# Patient Record
Sex: FEMALE | Race: BLACK OR AFRICAN AMERICAN | NOT HISPANIC OR LATINO | Employment: OTHER | URBAN - METROPOLITAN AREA
[De-identification: names, ages, dates, MRNs, and addresses within clinical notes are randomized per-mention and may not be internally consistent; named-entity substitution may affect disease eponyms.]

---

## 2017-02-27 ENCOUNTER — TELEPHONE (OUTPATIENT)
Dept: FAMILY MEDICINE | Facility: CLINIC | Age: 62
End: 2017-02-27

## 2017-02-27 DIAGNOSIS — E11.9 CONTROLLED TYPE 2 DIABETES MELLITUS WITHOUT COMPLICATION, WITHOUT LONG-TERM CURRENT USE OF INSULIN (H): Primary | ICD-10-CM

## 2017-02-27 NOTE — TELEPHONE ENCOUNTER
[employer] sending Fax for med change.  LANTUS SOLOSTAR 100 UNIT/ML soln not covered.  Patient inusrance prefers Basaglar, Levemapryl, Trebisa    Please send new Rx        [facility]

## 2017-03-26 DIAGNOSIS — E11.9 CONTROLLED TYPE 2 DIABETES MELLITUS WITHOUT COMPLICATION, WITHOUT LONG-TERM CURRENT USE OF INSULIN (H): ICD-10-CM

## 2017-03-28 RX ORDER — INSULIN DETEMIR 100 [IU]/ML
INJECTION, SOLUTION SUBCUTANEOUS
Qty: 15 ML | Refills: 0 | Status: SHIPPED | OUTPATIENT
Start: 2017-03-28 | End: 2017-12-15 | Stop reason: DRUGHIGH

## 2017-04-22 DIAGNOSIS — E11.3299 DIABETES MELLITUS WITH BACKGROUND RETINOPATHY (H): ICD-10-CM

## 2017-04-22 NOTE — TELEPHONE ENCOUNTER
insulin pen needle (B-D U/F) 31G X 5 MM         Last Written Prescription Date: 5/5/16  Last Fill Quantity: 100, # refills: 5  Last Office Visit with [facility], [facility] or [facility] prescribing provider:  10/17/16        BP Readings from Last 3 Encounters:   11/08/16 136/70   10/17/16 146/78   05/05/16 130/78     Lab Results   Component Value Date    MICROL 51 05/05/2016     Lab Results   Component Value Date    UMALCR 34.16 05/05/2016     Creatinine   Date Value Ref Range Status   10/17/2016 1.05 (H) 0.52 - 1.04 mg/dL Final   ]  GFR Estimate   Date Value Ref Range Status   10/17/2016 53 (L) >60 mL/min/1.7m2 Final     Comment:     Non  GFR Calc   05/05/2016 40 (L) >60 mL/min/1.7m2 Final     Comment:     Non  GFR Calc   10/20/2015 60 (L) >60 mL/min/1.7m2 Final     Comment:     Non  GFR Calc     GFR Estimate If Black   Date Value Ref Range Status   10/17/2016 64 >60 mL/min/1.7m2 Final     Comment:      GFR Calc   05/05/2016 49 (L) >60 mL/min/1.7m2 Final     Comment:      GFR Calc   10/20/2015 73 >60 mL/min/1.7m2 Final     Comment:      GFR Calc     Lab Results   Component Value Date    CHOL 139 05/05/2016     Lab Results   Component Value Date    HDL 42 05/05/2016     Lab Results   Component Value Date    LDL 73 05/05/2016     Lab Results   Component Value Date    TRIG 120 05/05/2016     Lab Results   Component Value Date    CHOLHDLRATIO 5.1 01/22/2015     Lab Results   Component Value Date    AST 13 05/05/2016     Lab Results   Component Value Date    ALT 15 05/05/2016     Lab Results   Component Value Date    A1C 7.3 10/17/2016    A1C 7.7 05/05/2016    A1C 9.7 10/20/2015    A1C 9.0 05/04/2015    A1C 14.5 01/22/2015     Potassium   Date Value Ref Range Status   10/17/2016 3.9 3.4 - 5.3 mmol/L Final

## 2017-04-25 RX ORDER — FLURBIPROFEN SODIUM 0.3 MG/ML
SOLUTION/ DROPS OPHTHALMIC
Qty: 100 EACH | Refills: 0 | Status: SHIPPED | OUTPATIENT
Start: 2017-04-25 | End: 2017-08-12

## 2017-04-25 NOTE — TELEPHONE ENCOUNTER
Medication is being filled for 1 time refill only due to:  Patient needs to be seen because due for office visit.   Carri Estrada RN

## 2017-05-03 DIAGNOSIS — E11.3299 DIABETES MELLITUS WITH BACKGROUND RETINOPATHY (H): ICD-10-CM

## 2017-05-03 DIAGNOSIS — E78.5 HYPERLIPIDEMIA LDL GOAL <100: ICD-10-CM

## 2017-05-03 RX ORDER — ATORVASTATIN CALCIUM 80 MG/1
TABLET, FILM COATED ORAL
Qty: 30 TABLET | Refills: 0 | Status: SHIPPED | OUTPATIENT
Start: 2017-05-03 | End: 2017-05-30

## 2017-05-03 NOTE — TELEPHONE ENCOUNTER
atorvastatin (LIPITOR) 80 MG tablet     Last Written Prescription Date: 10/17/16  Last Fill Quantity: 90, # refills: 1  Last Office Visit with [facility], [facility] or [facility] prescribing provider: 10/17/16       Lab Results   Component Value Date    CHOL 139 05/05/2016     Lab Results   Component Value Date    HDL 42 05/05/2016     Lab Results   Component Value Date    LDL 73 05/05/2016     Lab Results   Component Value Date    TRIG 120 05/05/2016     Lab Results   Component Value Date    CHOLHDLRATIO 5.1 01/22/2015         [facility]

## 2017-05-03 NOTE — TELEPHONE ENCOUNTER
Medication is being filled for 1 time refill only due to:  Patient needs to be seen because due for office visit and lab.   Carri Estrada RN

## 2017-05-23 ENCOUNTER — OFFICE VISIT (OUTPATIENT)
Dept: FAMILY MEDICINE | Facility: CLINIC | Age: 62
End: 2017-05-23
Payer: COMMERCIAL

## 2017-05-23 VITALS
HEIGHT: 61 IN | DIASTOLIC BLOOD PRESSURE: 80 MMHG | SYSTOLIC BLOOD PRESSURE: 132 MMHG | OXYGEN SATURATION: 97 % | BODY MASS INDEX: 37.57 KG/M2 | TEMPERATURE: 97.2 F | WEIGHT: 199 LBS | HEART RATE: 85 BPM

## 2017-05-23 DIAGNOSIS — E11.3299 DIABETES MELLITUS WITH BACKGROUND RETINOPATHY (H): ICD-10-CM

## 2017-05-23 DIAGNOSIS — E78.5 HYPERLIPIDEMIA LDL GOAL <100: ICD-10-CM

## 2017-05-23 DIAGNOSIS — I10 HYPERTENSION GOAL BP (BLOOD PRESSURE) < 140/90: ICD-10-CM

## 2017-05-23 DIAGNOSIS — E66.812 OBESITY, CLASS II, BMI 35-39.9: ICD-10-CM

## 2017-05-23 DIAGNOSIS — J30.9 ALLERGIC SINUSITIS: Primary | ICD-10-CM

## 2017-05-23 DIAGNOSIS — Z13.89 SCREENING FOR DIABETIC PERIPHERAL NEUROPATHY: ICD-10-CM

## 2017-05-23 LAB
ANION GAP SERPL CALCULATED.3IONS-SCNC: 8 MMOL/L (ref 3–14)
BUN SERPL-MCNC: 18 MG/DL (ref 7–30)
CALCIUM SERPL-MCNC: 8.7 MG/DL (ref 8.5–10.1)
CHLORIDE SERPL-SCNC: 106 MMOL/L (ref 94–109)
CO2 SERPL-SCNC: 27 MMOL/L (ref 20–32)
CREAT SERPL-MCNC: 1.17 MG/DL (ref 0.52–1.04)
CREAT UR-MCNC: 61 MG/DL
GFR SERPL CREATININE-BSD FRML MDRD: 47 ML/MIN/1.7M2
GLUCOSE SERPL-MCNC: 118 MG/DL (ref 70–99)
HBA1C MFR BLD: 9.2 % (ref 4.3–6)
LDLC SERPL DIRECT ASSAY-MCNC: 75 MG/DL
MICROALBUMIN UR-MCNC: 22 MG/L
MICROALBUMIN/CREAT UR: 36.47 MG/G CR (ref 0–25)
POTASSIUM SERPL-SCNC: 4.2 MMOL/L (ref 3.4–5.3)
SODIUM SERPL-SCNC: 141 MMOL/L (ref 133–144)
TSH SERPL DL<=0.005 MIU/L-ACNC: 2.28 MU/L (ref 0.4–4)

## 2017-05-23 PROCEDURE — 83721 ASSAY OF BLOOD LIPOPROTEIN: CPT | Performed by: PREVENTIVE MEDICINE

## 2017-05-23 PROCEDURE — 36415 COLL VENOUS BLD VENIPUNCTURE: CPT | Performed by: PREVENTIVE MEDICINE

## 2017-05-23 PROCEDURE — 80048 BASIC METABOLIC PNL TOTAL CA: CPT | Performed by: PREVENTIVE MEDICINE

## 2017-05-23 PROCEDURE — 84443 ASSAY THYROID STIM HORMONE: CPT | Performed by: PREVENTIVE MEDICINE

## 2017-05-23 PROCEDURE — 82043 UR ALBUMIN QUANTITATIVE: CPT | Performed by: PREVENTIVE MEDICINE

## 2017-05-23 PROCEDURE — 99214 OFFICE O/P EST MOD 30 MIN: CPT | Performed by: PREVENTIVE MEDICINE

## 2017-05-23 PROCEDURE — 83036 HEMOGLOBIN GLYCOSYLATED A1C: CPT | Performed by: PREVENTIVE MEDICINE

## 2017-05-23 PROCEDURE — 99207 C FOOT EXAM  NO CHARGE: CPT | Performed by: PREVENTIVE MEDICINE

## 2017-05-23 RX ORDER — FLUTICASONE PROPIONATE 50 MCG
1-2 SPRAY, SUSPENSION (ML) NASAL DAILY
Qty: 16 G | Refills: 0 | Status: SHIPPED | OUTPATIENT
Start: 2017-05-23 | End: 2019-02-25

## 2017-05-23 RX ORDER — CETIRIZINE HYDROCHLORIDE 10 MG/1
10 TABLET ORAL EVERY EVENING
Qty: 30 TABLET | Refills: 1 | Status: SHIPPED | OUTPATIENT
Start: 2017-05-23 | End: 2019-02-25

## 2017-05-23 ASSESSMENT — PAIN SCALES - GENERAL: PAINLEVEL: NO PAIN (0)

## 2017-05-23 NOTE — PATIENT INSTRUCTIONS
At [facility], we strive to deliver an exceptional experience to you, every time we see you.    If you receive a survey in the mail, please send us back your thoughts. We really do value your feedback.    Thank you for visiting [facility]    Normal or non-critical lab and imaging results will be communicated to you by MyChart, letter or phone within 7 days.  If you do not hear from us within 10 days, please call the clinic. If you have a critical or abnormal lab result, we will notify you by phone as soon as possible.     If you have any questions regarding your visit please contact:     Team [facility]  Clinic Hours Telephone Number   Dr. Veronica Flores   7am-7pm  Monday through Thursday  7am-5pm Friday [phone]  Teressa VERDIN RN   Pharmacy 8:30am-9pm Monday-Friday    9am-5pm Saturday-Sunday [phone]   Urgent Care 11am-9pm Monday-Friday        9am-5pm Saturday-Sunday [phone]     After hours, weekend or if you need to make an appointment with your primary provider please call [phone].   After Hours nurse advise: call [facility]: [phone]    Use [email or web address] (secure email communication and access to your chart) to send your primary care provider a message or make an appointment. Ask someone on your Team how to sign up for [employer]. To log on to [employer] or for more information in [employer] please visit the website at [email or web address].   As of October 8, 2013, all password changes, disabled accounts, or ID changes in [employer]/MyHealth will be done by our Access Services Department.   If you need help with your account or password, call: [phone]. Clinic staff no longer has the ability to change passwords.     Allergic Rhinitis  Allergic rhinitis is an allergic reaction that affects the nose, and often the eyes. It s often known  as nasal allergies. Nasal allergies are often due to things in the environment that are breathed in. Depending what you are sensitive to, nasal allergies may occur only during certain seasons. Or they may occur year round. Common indoor allergens include house dust mites, mold, cockroaches, and pet dander. Outdoor allergens include pollen from trees, grasses, and weeds.   Symptoms include a drippy, stuffy, and itchy nose. They also include sneezing and red and itchy eyes. You may feel tired more often. Severe allergies may also affect your breathing and trigger a condition called asthma.   Tests can be done to see what allergens are affecting you. You may be referred to an allergy specialist for testing and further evaluation.  Home care  The healthcare provider may prescribe medicines to help relieve allergy symptoms.   Ask the provider for advice on how to avoid substances that you are allergic to. Below are a few tips for each type of allergen.  Pet dander:    Do not have pets with fur and feathers.    If you cannot avoid having a pet, keep it out of your bedroom and off upholstered furniture.  Pollen:    When pollen counts are high, keep windows of your car and home closed. If possible, use an air conditioner instead.    Wear a filter mask when mowing or doing yard work.  House dust mites:    Wash bedding every week in warm water and detergent and dry on a hot setting.    Cover the mattress, box spring, and pillows with allergy covers.     If possible, sleep in a room with no carpet, curtains, or upholstered furniture.  Cockroaches:    Store food in sealed containers.    Remove garbage from the home promptly.    Fix water leaks  Mold:    Keep humidity low by using a dehumidifier or air conditioner. Keep the dehumidifier and air conditioner clean and free of mold.    Clean moldy areas with bleach and water.  In general:    Vacuum once or twice a week. If possible, use a vacuum with a high-efficiency particulate  air (HEPA) filter.    Do not smoke. Avoid cigarette smoke. Cigarette smoke is an irritant that can make symptoms worse.  Follow-up care  Follow up as advised by the health care provider or our staff. If you were referred to an allergy specialist, make this appointment promptly.  When to seek medical advice  Call your healthcare provider right away if the following occur:    Coughing or wheezing    Fever greater than 100.4 F (38 C)    Continuing symptoms, new symptoms, or worsening symptoms  Call 911 right away if you have:    Trouble breathing    Hives (raised red bumps)    Severe swelling of the face or severe itching of the eyes or mouth    [id number] [employer]. [address] [place] [address]. All rights reserved. This information is not intended as a substitute for professional medical care. Always follow your healthcare professional's instructions.

## 2017-05-23 NOTE — PROGRESS NOTES
SUBJECTIVE:                                                    Adrian Cruz is a 61 year old female who presents to clinic today for the following health issues:      RESPIRATORY SYMPTOMS      Duration: x 1.5 weeks    Description  nasal congestion, rhinorrhea, facial pain/pressure, cough, ear pain right and fatigue/malaise    Severity: moderate    Accompanying signs and symptoms: None    History (predisposing factors):  none    Precipitating or alleviating factors: None    Therapies tried and outcome:  OTC NSAID         Diabetes Follow-up    Patient is checking blood sugars: once daily.  Results are as follows:         am - 136 mg /dl    Diabetic concerns: None     Symptoms of hypoglycemia (low blood sugar): none     Paresthesias (numbness or burning in feet) or sores: No     Date of last diabetic eye exam: DUE, seen by [facility]     Hyperlipidemia Follow-Up      Rate your low fat/cholesterol diet?: fair    Taking statin?  Yes, no muscle aches from statin    Other lipid medications/supplements?:  none     Hypertension Follow-up      Outpatient blood pressures are being checked at home.  Results are less than 140/90.    Low Salt Diet: low salt         Problem list and histories reviewed & adjusted, as indicated.  Additional history: as documented    Patient Active Problem List   Diagnosis     Hyperlipidemia LDL goal <100     Type 2 diabetes, HbA1C goal < 8% (H)     Hypertension goal BP (blood pressure) < 140/90     Cataract     Diabetes mellitus with background retinopathy (H)     Advanced directives, counseling/discussion     Health Care Home     Morbid obesity (H)     Obesity, Class II, BMI 35-39.9 (H)     Past Surgical History:   Procedure Laterality Date     LASER SURGERY OF EYE Right 2015       Social History   Substance Use Topics     Smoking status: Never Smoker     Smokeless tobacco: Never Used     Alcohol use No     Family History   Problem Relation Age of Onset     DIABETES Mother       HEART DISEASE Mother      chf and pacemaker     DIABETES Father      Asthma Sister      Asthma Son      Asthma Daughter      HEART DISEASE Daughter      C.A.D. Father      age 49     CEREBROVASCULAR DISEASE Father      Genitourinary Problems Mother      ESRD     Glaucoma No family hx of      Macular Degeneration No family hx of          Current Outpatient Prescriptions   Medication Sig Dispense Refill     fluticasone (FLONASE) 50 MCG/ACT spray Spray 1-2 sprays into both nostrils daily 16 g 0     cetirizine (ZYRTEC) 10 MG tablet Take 1 tablet (10 mg) by mouth every evening 30 tablet 1     atorvastatin (LIPITOR) 80 MG tablet TAKE 1 TABLET BY MOUTH DAILY 30 tablet 0     B-D U/F insulin pen needle USE TO TEST AS DIRECTED 100 each 0     LEVEMIR FLEXTOUCH 100 UNIT/ML injection INJECT 14 UNITS SUBCUTANEOUS AT BEDTIME 15 mL 0     amLODIPine (NORVASC) 10 MG tablet Take 1 tablet (10 mg) by mouth daily 90 tablet 1     metFORMIN (GLUCOPHAGE-XR) 500 MG 24 hr tablet TAKE 4 CAPSULES BY MOUTH DAILY WITH BREAKFAST 360 tablet 1     glimepiride (AMARYL) 4 MG tablet TAKE 2 TABLETS BY MOUTH EVERY MORNING BEFORE BREAKFAST 180 tablet 1     sitagliptin (JANUVIA) 100 MG tablet Take 1 tablet (100 mg) by mouth daily 90 tablet 1     losartan-hydrochlorothiazide (HYZAAR) 100-25 MG per tablet Take 1 tablet by mouth daily 90 tablet 1     omeprazole 20 MG tablet Take 1 tablet (20 mg) by mouth daily 90 tablet 1     ONE TOUCH ULTRA test strip TEST TWICE DAILY 50 each 0     ORDER FOR DME Glucometer of choice (One touch) with supplies Disp 1    Testing Strips for bid testing  #100    Lancets for bid testing #100 1 Month 11     ORDER FOR DME Equipment being ordered: blood pressure cuff/machine 1 Device 0     aspirin 81 MG tablet Take by mouth daily       LANCETS MISC.        Allergies   Allergen Reactions     Ace Inhibitors Cough     Glucophage [Biguanides] Cramps     Stomach upset     Lisinopril      Recent Labs   Lab Test  10/17/16   1244  05/05/16    "0730  10/20/15   0754   01/22/15   0754  04/25/13   0734   02/16/12   0743   A1C  7.3*  7.7*  9.7*   < >  14.5*  12.9*   < >   --    LDL   --   73   --    --   137*  160*   < >   --    HDL   --   42*   --    --   46*  36*   < >   --    TRIG   --   120   --    --   266*  161*   < >   --    ALT   --   15   --    --   16   --    --   17   CR  1.05*  1.34*  0.94   --   0.85  0.68   --   0.81   GFRESTIMATED  53*  40*  60*   --   68  89   --   73   GFRESTBLACK  64  49*  73   --   83  >90   --   88   POTASSIUM  3.9  4.3  4.1   --   4.5  5.0   < >  4.7   TSH   --    --    --    --   1.82  1.86   --    --     < > = values in this interval not displayed.      BP Readings from Last 3 Encounters:   05/23/17 132/80   11/08/16 136/70   10/17/16 146/78    Wt Readings from Last 3 Encounters:   05/23/17 199 lb (90.3 kg)   10/17/16 201 lb (91.2 kg)   05/05/16 196 lb (88.9 kg)                    Reviewed and updated as needed this visit by clinical staff  Tobacco  Allergies  Meds       Reviewed and updated as needed this visit by Provider         ROS:  Constitutional, HEENT, cardiovascular, pulmonary, gi and gu systems are negative, except as otherwise noted.    OBJECTIVE:                                                    /80  Pulse 85  Temp 97.2  F (36.2  C) (Oral)  Ht 5' 0.5\" (1.537 m)  Wt 199 lb (90.3 kg)  SpO2 97%  Breastfeeding? No  BMI 38.22 kg/m2  Body mass index is 38.22 kg/(m^2).  GENERAL APPEARANCE: healthy, alert and no distress  EYES: Eyes grossly normal to inspection and conjunctivae and sclerae normal  HENT: ear canals and TM's normal and nose and mouth without ulcers or lesions  NECK: no adenopathy and no asymmetry, masses, or scars  RESP: lungs clear to auscultation - no rales, rhonchi or wheezes  CV: regular rates and rhythm, normal S1 S2, no S3 or S4 and no murmur, click or rub  ABDOMEN: soft, non-tender  MS: extremities normal- no gross deformities noted and peripheral pulses normal  SKIN: no " suspicious lesions or rashes  NEURO: Normal strength and tone, mentation intact and speech normal  DIABETIC FOOT EXAM: normal DP and PT pulses, no trophic changes or ulcerative lesions, normal sensory exam and normal monofilament exam  PSYCH: mentation appears normal    Diagnostic test results:  Diagnostic Test Results:  No results found for this or any previous visit (from the past 24 hour(s)).     ASSESSMENT/PLAN:                                                    1. Allergic sinusitis  -Defer antibiotics at this time   - fluticasone (FLONASE) 50 MCG/ACT spray; Spray 1-2 sprays into both nostrils daily  Dispense: 16 g; Refill: 0  - cetirizine (ZYRTEC) 10 MG tablet; Take 1 tablet (10 mg) by mouth every evening  Dispense: 30 tablet; Refill: 1    2. Diabetes mellitus with background retinopathy (H)  -Patient is interested in cutting down tablets that she is taking. If HbA1C is at goal, consider increasing insulin and tapering off Januvia (cost highest). She is also concerned about weight gain. From insulin   - BASIC METABOLIC PANEL  - Albumin Random Urine Quantitative  - TSH WITH FREE T4 REFLEX  - LDL cholesterol direct  - Hemoglobin A1c    3. Obesity, Class II, BMI 35-39.9 (H)  -Weight stable    4. Screening for diabetic peripheral neuropathy  - FOOT EXAM  NO CHARGE [28427.114]    5. Hypertension goal BP (blood pressure) < 140/90  -At goal  -continue current medication     6. Hyperlipidemia LDL goal <100  -Last LDL at goal at 73  -Await labs  -Continue statin     I ended our visit today by discussing the patient's diagnoses and recommended treatment. Please refer to today's diagnoses and orders for further details. I briefly discussed the pathophysiology of these conditions and outlined their expected course. I discussed the warning symptoms and signs that indicate an atypical course that would need urgent or emergent care. I also discussed self care strategies for symptom relief.  Patient voiced complete  understanding of plan of care and was in full agreement to proceed. After visit summary discussed and handed to patient.    Common side effects of medications prescribed at this visit were discussed with the patient. Severe side effects, including current applicable black box warnings, were discussed.     Follow up with Provider - Will contact with labs when available, if normal then diabetes follow up in 4-5 months       Radha Madrigal MD MPH    [facility]

## 2017-05-23 NOTE — MR AVS SNAPSHOT
After Visit Summary   5/23/2017    Adrian Cruz    MRN: [id number]           Patient Information     Date Of Birth          1955        Visit Information        Provider Department      5/23/2017 7:00 AM Radha Madrigal MD [facility]        Today's Diagnoses     Screening for diabetic peripheral neuropathy    -  1    Hypertension goal BP (blood pressure) < 140/90        Hyperlipidemia LDL goal <100        Obesity, Class II, BMI 35-39.9 (H)        Diabetes mellitus with background retinopathy (H)        Allergic sinusitis          Care Instructions    At [facility], we strive to deliver an exceptional experience to you, every time we see you.    If you receive a survey in the mail, please send us back your thoughts. We really do value your feedback.    Thank you for visiting [facility]    Normal or non-critical lab and imaging results will be communicated to you by MyChart, letter or phone within 7 days.  If you do not hear from us within 10 days, please call the clinic. If you have a critical or abnormal lab result, we will notify you by phone as soon as possible.     If you have any questions regarding your visit please contact:     Team Christine/Spirit  Clinic Hours Telephone Number   Dr. Veronica Flores   7am-7pm  Monday through Thursday  7am-5pm Friday [phone]  Teressa VERDIN RN   Pharmacy 8:30am-9pm Monday-Friday    9am-5pm Saturday-Sunday [phone]   Urgent Care 11am-9pm Monday-Friday        9am-5pm Saturday-Sunday [phone]     After hours, weekend or if you need to make an appointment with your primary provider please call [phone].   After Hours nurse advise: call [facility]: [phone]    Use [employer] (secure email communication and access to your chart) to send your primary care provider a  message or make an appointment. Ask someone on your Team how to sign up for [employer]. To log on to [employer] or for more information in [employer] please visit the website at [email or web address].   As of October 8, 2013, all password changes, disabled accounts, or ID changes in [employer]/MyHealth will be done by our Access Services Department.   If you need help with your account or password, call: [phone]. Clinic staff no longer has the ability to change passwords.     Allergic Rhinitis  Allergic rhinitis is an allergic reaction that affects the nose, and often the eyes. It s often known as nasal allergies. Nasal allergies are often due to things in the environment that are breathed in. Depending what you are sensitive to, nasal allergies may occur only during certain seasons. Or they may occur year round. Common indoor allergens include house dust mites, mold, cockroaches, and pet dander. Outdoor allergens include pollen from trees, grasses, and weeds.   Symptoms include a drippy, stuffy, and itchy nose. They also include sneezing and red and itchy eyes. You may feel tired more often. Severe allergies may also affect your breathing and trigger a condition called asthma.   Tests can be done to see what allergens are affecting you. You may be referred to an allergy specialist for testing and further evaluation.  Home care  The healthcare provider may prescribe medicines to help relieve allergy symptoms.   Ask the provider for advice on how to avoid substances that you are allergic to. Below are a few tips for each type of allergen.  Pet dander:    Do not have pets with fur and feathers.    If you cannot avoid having a pet, keep it out of your bedroom and off upholstered furniture.  Pollen:    When pollen counts are high, keep windows of your car and home closed. If possible, use an air conditioner instead.    Wear a filter mask when mowing or doing yard work.  House dust mites:    Wash bedding every week in warm  water and detergent and dry on a hot setting.    Cover the mattress, box spring, and pillows with allergy covers.     If possible, sleep in a room with no carpet, curtains, or upholstered furniture.  Cockroaches:    Store food in sealed containers.    Remove garbage from the home promptly.    Fix water leaks  Mold:    Keep humidity low by using a dehumidifier or air conditioner. Keep the dehumidifier and air conditioner clean and free of mold.    Clean moldy areas with bleach and water.  In general:    Vacuum once or twice a week. If possible, use a vacuum with a high-efficiency particulate air (HEPA) filter.    Do not smoke. Avoid cigarette smoke. Cigarette smoke is an irritant that can make symptoms worse.  Follow-up care  Follow up as advised by the health care provider or our staff. If you were referred to an allergy specialist, make this appointment promptly.  When to seek medical advice  Call your healthcare provider right away if the following occur:    Coughing or wheezing    Fever greater than 100.4 F (38 C)    Continuing symptoms, new symptoms, or worsening symptoms  Call 911 right away if you have:    Trouble breathing    Hives (raised red bumps)    Severe swelling of the face or severe itching of the eyes or mouth    0488-9399 [employer]. [address] [place] [address], [place] [address]. All rights reserved. This information is not intended as a substitute for professional medical care. Always follow your healthcare professional's instructions.              Follow-ups after your visit        Who to contact     If you have questions or need follow up information about today's clinic visit or your schedule please contact [facility] directly at [phone].  Normal or non-critical lab and imaging results will be communicated to you by MyChart, letter or phone within 4 business days after the clinic has received the results. If you do not hear from us within 7 days, please  "contact the clinic through [employer] or phone. If you have a critical or abnormal lab result, we will notify you by phone as soon as possible.  Submit refill requests through [employer] or call your pharmacy and they will forward the refill request to us. Please allow 3 business days for your refill to be completed.          Additional Information About Your Visit        [employer] Information     [employer] lets you send messages to your doctor, view your test results, renew your prescriptions, schedule appointments and more. To sign up, go to [email or web address] . Click on \"Log in\" on the left side of the screen, which will take you to the Welcome page. Then click on \"Sign up Now\" on the right side of the page.     You will be asked to enter the access code listed below, as well as some personal information. Please follow the directions to create your username and password.     Your access code is: [email or web address]  Expires: 2017  7:37 AM     Your access code will  in 90 days. If you need help or a new code, please call your [place] clinic or [phone].        Care EveryWhere ID     This is your Care EveryWhere ID. This could be used by other organizations to access your [place] medical records  [id number]        Your Vitals Were     Pulse Temperature Height Pulse Oximetry Breastfeeding? BMI (Body Mass Index)    85 97.2  F (36.2  C) (Oral) 5' 0.5\" (1.537 m) 97% No 38.22 kg/m2       Blood Pressure from Last 3 Encounters:   17 132/80   16 136/70   10/17/16 146/78    Weight from Last 3 Encounters:   17 199 lb (90.3 kg)   10/17/16 201 lb (91.2 kg)   16 196 lb (88.9 kg)              We Performed the Following     Albumin Random Urine Quantitative     BASIC METABOLIC PANEL     FOOT EXAM  NO CHARGE [31840.114]     Hemoglobin A1c     LDL cholesterol direct     TSH WITH FREE T4 REFLEX          Today's Medication Changes          These changes are accurate as of: 17  7:37 AM.  If you have " any questions, ask your nurse or doctor.               Start taking these medicines.        Dose/Directions    cetirizine 10 MG tablet   Commonly known as:  zyrTEC   Used for:  Allergic sinusitis   Started by:  Radha Madrigal MD        Dose:  10 mg   Take 1 tablet (10 mg) by mouth every evening   Quantity:  30 tablet   Refills:  1       fluticasone 50 MCG/ACT spray   Commonly known as:  FLONASE   Used for:  Allergic sinusitis   Started by:  Radha Madrigal MD        Dose:  1-2 spray   Spray 1-2 sprays into both nostrils daily   Quantity:  16 g   Refills:  0            Where to get your medicines      These medications were sent to [id number] - [place], [place] [address] AT [address], [place] [address]     Phone:  [phone]     cetirizine 10 MG tablet    fluticasone 50 MCG/ACT spray                Primary Care Provider Office Phone # Fax #    Milagro Morales PA-C [phone]       [facility] [address] [facility] [address]        Thank you!     Thank you for choosing [facility]  for your care. Our goal is always to provide you with excellent care. Hearing back from our patients is one way we can continue to improve our services. Please take a few minutes to complete the written survey that you may receive in the mail after your visit with us. Thank you!             Your Updated Medication List - Protect others around you: Learn how to safely use, store and throw away your medicines at www.disposemymeds.org.          This list is accurate as of: 5/23/17  7:37 AM.  Always use your most recent med list.                   Brand Name Dispense Instructions for use    amLODIPine 10 MG tablet    NORVASC    90 tablet    Take 1 tablet (10 mg) by mouth daily       aspirin 81 MG tablet      Take by mouth daily       atorvastatin 80 MG tablet    LIPITOR    30 tablet    TAKE 1 TABLET BY MOUTH DAILY        B-D U/F 31G X 5 MM   Generic drug:  insulin pen needle     100 each    USE TO TEST AS DIRECTED       cetirizine 10 MG tablet    zyrTEC    30 tablet    Take 1 tablet (10 mg) by mouth every evening       fluticasone 50 MCG/ACT spray    FLONASE    16 g    Spray 1-2 sprays into both nostrils daily       glimepiride 4 MG tablet    AMARYL    180 tablet    TAKE 2 TABLETS BY MOUTH EVERY MORNING BEFORE BREAKFAST       LANCETS MISC.          LEVEMIR FLEXTOUCH 100 UNIT/ML injection   Generic drug:  insulin detemir     15 mL    INJECT 14 UNITS SUBCUTANEOUS AT BEDTIME       losartan-hydrochlorothiazide 100-25 MG per tablet    HYZAAR    90 tablet    Take 1 tablet by mouth daily       metFORMIN 500 MG 24 hr tablet    GLUCOPHAGE-XR    360 tablet    TAKE 4 CAPSULES BY MOUTH DAILY WITH BREAKFAST       omeprazole 20 MG tablet     90 tablet    Take 1 tablet (20 mg) by mouth daily       ONE TOUCH ULTRA test strip   Generic drug:  blood glucose monitoring     50 each    TEST TWICE DAILY       * order for DME     1 Device    Equipment being ordered: blood pressure cuff/machine       * order for DME     1 Month    Glucometer of choice (One touch) with supplies Disp 1  Testing Strips for bid testing  #100  Lancets for bid testing #100       sitagliptin 100 MG tablet    JANUVIA    90 tablet    Take 1 tablet (100 mg) by mouth daily       * Notice:  This list has 2 medication(s) that are the same as other medications prescribed for you. Read the directions carefully, and ask your doctor or other care provider to review them with you.

## 2017-05-23 NOTE — NURSING NOTE
"Chief Complaint   Patient presents with     Sinus Problem       Initial /76  Pulse 85  Temp 97.2  F (36.2  C) (Oral)  Ht 5' 0.5\" (1.537 m)  Wt 199 lb (90.3 kg)  SpO2 97%  Breastfeeding? No  BMI 38.22 kg/m2 Estimated body mass index is 38.22 kg/(m^2) as calculated from the following:    Height as of this encounter: 5' 0.5\" (1.537 m).    Weight as of this encounter: 199 lb (90.3 kg).  Medication Reconciliation: complete   Luz BLANTON        "

## 2017-05-25 ENCOUNTER — TELEPHONE (OUTPATIENT)
Dept: FAMILY MEDICINE | Facility: CLINIC | Age: 62
End: 2017-05-25

## 2017-05-25 NOTE — PROGRESS NOTES
Please CALL patient:    Three month glucose value has increased from 7.3 to 9.2, goal is less than 8. Please increase Levemir Insulin from 14 units daily to 18 units daily. Increase dose by 2 units every 3 days if fasting glucose is over 120 mg/dl. Also, monitor glucose 2 hours after you have eaten. We would need to do a follow up on the diabetes in 3 months and check labs at that time. Please let me know if any questions.     LDL cholesterol is at goal.  Electrolytes and thyroid function are normal.  Kidney function is borderline, avoid Advil, Aleve and Ibuprofen over the counter  Urine sample showed a small amount of abnormal protein, this should improve as your glucose improves.     Thank you,    Radha Madrigal MD MPH

## 2017-05-25 NOTE — TELEPHONE ENCOUNTER
Please CALL patient:     Three month glucose value has increased from 7.3 to 9.2, goal is less than 8. Please increase Levemir Insulin from 14 units daily to 18 units daily. Increase dose by 2 units every 3 days if fasting glucose is over 120 mg/dl. Also, monitor glucose 2 hours after you have eaten. We would need to do a follow up on the diabetes in 3 months and check labs at that time. Please let me know if any questions.     LDL cholesterol is at goal.   Electrolytes and thyroid function are normal.   Kidney function is borderline, avoid Advil, Aleve and Ibuprofen over the counter   Urine sample showed a small amount of abnormal protein, this should improve as your glucose improves.     Thank you,     Radha Madrigal MD MPH     This [facility]    Reason for call discuss results and left message to return call.    When patient calls back, please contact clinic RN team. If no one available, document that pt called and route to care team. routine priority.        Teressa Camarena RN

## 2017-05-25 NOTE — TELEPHONE ENCOUNTER
Patient called back and was given the information below. Had patient repeat back the insulin dosing and increase. Corrected errors if stated.    Sabrina Ruiz RN, [facility]

## 2017-05-30 DIAGNOSIS — E11.3299 DIABETES MELLITUS WITH BACKGROUND RETINOPATHY (H): ICD-10-CM

## 2017-05-30 DIAGNOSIS — E78.5 HYPERLIPIDEMIA LDL GOAL <100: ICD-10-CM

## 2017-05-30 NOTE — TELEPHONE ENCOUNTER
atorvastatin (LIPITOR) 80 MG tablet     Last Written Prescription Date: 5/3/17  Last Fill Quantity: 30, # refills: 0  Last Office Visit with [facility], P or [facility] prescribing provider: 5/23/17       Lab Results   Component Value Date    CHOL 139 05/05/2016     Lab Results   Component Value Date    HDL 42 05/05/2016     Lab Results   Component Value Date    LDL 75 05/23/2017    LDL 73 05/05/2016     Lab Results   Component Value Date    TRIG 120 05/05/2016     Lab Results   Component Value Date    CHOLHDLRATIO 5.1 01/22/2015           Nelson Faarax  [facility]

## 2017-05-30 NOTE — TELEPHONE ENCOUNTER
glimepiride (AMARYL) 4 MG tablet         Last Written Prescription Date: 10/17/16  Last Fill Quantity: 180, # refills: 1  Last Office Visit with G, P or [facility] prescribing provider:  05/23/17        BP Readings from Last 3 Encounters:   05/23/17 132/80   11/08/16 136/70   10/17/16 146/78     Lab Results   Component Value Date    MICROL 22 05/23/2017     Lab Results   Component Value Date    UMALCR 36.47 05/23/2017     Creatinine   Date Value Ref Range Status   05/23/2017 1.17 (H) 0.52 - 1.04 mg/dL Final   ]  GFR Estimate   Date Value Ref Range Status   05/23/2017 47 (L) >60 mL/min/1.7m2 Final     Comment:     Non  GFR Calc   10/17/2016 53 (L) >60 mL/min/1.7m2 Final     Comment:     Non  GFR Calc   05/05/2016 40 (L) >60 mL/min/1.7m2 Final     Comment:     Non  GFR Calc     GFR Estimate If Black   Date Value Ref Range Status   05/23/2017 57 (L) >60 mL/min/1.7m2 Final     Comment:      GFR Calc   10/17/2016 64 >60 mL/min/1.7m2 Final     Comment:      GFR Calc   05/05/2016 49 (L) >60 mL/min/1.7m2 Final     Comment:      GFR Calc     Lab Results   Component Value Date    CHOL 139 05/05/2016     Lab Results   Component Value Date    HDL 42 05/05/2016     Lab Results   Component Value Date    LDL 75 05/23/2017    LDL 73 05/05/2016     Lab Results   Component Value Date    TRIG 120 05/05/2016     Lab Results   Component Value Date    CHOLHDLRATIO 5.1 01/22/2015     Lab Results   Component Value Date    AST 13 05/05/2016     Lab Results   Component Value Date    ALT 15 05/05/2016     Lab Results   Component Value Date    A1C 9.2 05/23/2017    A1C 7.3 10/17/2016    A1C 7.7 05/05/2016    A1C 9.7 10/20/2015    A1C 9.0 05/04/2015     Potassium   Date Value Ref Range Status   05/23/2017 4.2 3.4 - 5.3 mmol/L Final         [facility]

## 2017-06-01 NOTE — TELEPHONE ENCOUNTER
Routing refill request to provider for review/approval because:  Labs out of range:  Creatinine and GFR  Carri Estrada RN

## 2017-06-03 RX ORDER — ATORVASTATIN CALCIUM 80 MG/1
TABLET, FILM COATED ORAL
Qty: 90 TABLET | Refills: 3 | Status: SHIPPED | OUTPATIENT
Start: 2017-06-03 | End: 2018-04-27

## 2017-06-03 NOTE — TELEPHONE ENCOUNTER
Prescription approved per [facility] Refill Protocol.  LUI Camarena, Clinical RN [facility].

## 2017-06-06 RX ORDER — GLIMEPIRIDE 4 MG/1
TABLET ORAL
Qty: 180 TABLET | Refills: 0 | Status: SHIPPED | OUTPATIENT
Start: 2017-06-06 | End: 2017-09-08

## 2017-06-19 ENCOUNTER — TELEPHONE (OUTPATIENT)
Dept: FAMILY MEDICINE | Facility: CLINIC | Age: 62
End: 2017-06-19

## 2017-06-19 DIAGNOSIS — E11.3299 DIABETES MELLITUS WITH BACKGROUND RETINOPATHY (H): ICD-10-CM

## 2017-06-19 DIAGNOSIS — J30.9 ALLERGIC SINUSITIS: ICD-10-CM

## 2017-06-19 DIAGNOSIS — I10 ESSENTIAL HYPERTENSION WITH GOAL BLOOD PRESSURE LESS THAN 140/90: ICD-10-CM

## 2017-06-19 NOTE — TELEPHONE ENCOUNTER
sitagliptin (JANUVIA) 100 MG tablet         Last Written Prescription Date: 10/17/16  Last Fill Quantity: 90, # refills: 1  Last Office Visit with G, P or [facility] prescribing provider:  05/23/17        BP Readings from Last 3 Encounters:   05/23/17 132/80   11/08/16 136/70   10/17/16 146/78     Lab Results   Component Value Date    MICROL 22 05/23/2017     Lab Results   Component Value Date    UMALCR 36.47 05/23/2017     Creatinine   Date Value Ref Range Status   05/23/2017 1.17 (H) 0.52 - 1.04 mg/dL Final   ]  GFR Estimate   Date Value Ref Range Status   05/23/2017 47 (L) >60 mL/min/1.7m2 Final     Comment:     Non  GFR Calc   10/17/2016 53 (L) >60 mL/min/1.7m2 Final     Comment:     Non  GFR Calc   05/05/2016 40 (L) >60 mL/min/1.7m2 Final     Comment:     Non  GFR Calc     GFR Estimate If Black   Date Value Ref Range Status   05/23/2017 57 (L) >60 mL/min/1.7m2 Final     Comment:      GFR Calc   10/17/2016 64 >60 mL/min/1.7m2 Final     Comment:      GFR Calc   05/05/2016 49 (L) >60 mL/min/1.7m2 Final     Comment:      GFR Calc     Lab Results   Component Value Date    CHOL 139 05/05/2016     Lab Results   Component Value Date    HDL 42 05/05/2016     Lab Results   Component Value Date    LDL 75 05/23/2017    LDL 73 05/05/2016     Lab Results   Component Value Date    TRIG 120 05/05/2016     Lab Results   Component Value Date    CHOLHDLRATIO 5.1 01/22/2015     Lab Results   Component Value Date    AST 13 05/05/2016     Lab Results   Component Value Date    ALT 15 05/05/2016     Lab Results   Component Value Date    A1C 9.2 05/23/2017    A1C 7.3 10/17/2016    A1C 7.7 05/05/2016    A1C 9.7 10/20/2015    A1C 9.0 05/04/2015     Potassium   Date Value Ref Range Status   05/23/2017 4.2 3.4 - 5.3 mmol/L Final         [facility]

## 2017-06-19 NOTE — TELEPHONE ENCOUNTER
fluticasone (FLONASE) 50 MCG/ACT spray      Last Written Prescription Date: 05/23/17  Last Fill Quantity: 16g,  # refills: 0   Last Office Visit with [facility], [facility] or [facility] prescribing provider: 05/23/17      [facility]

## 2017-06-19 NOTE — TELEPHONE ENCOUNTER
losartan-hydrochlorothiazide (HYZAAR) 100-25 MG per tablet      Last Written Prescription Date: 10/17/16  Last Fill Quantity: 90, # refills: 1  Last Office Visit with [facility], [facility] or [facility] prescribing provider: 05/23/17       Potassium   Date Value Ref Range Status   05/23/2017 4.2 3.4 - 5.3 mmol/L Final     Creatinine   Date Value Ref Range Status   05/23/2017 1.17 (H) 0.52 - 1.04 mg/dL Final     BP Readings from Last 3 Encounters:   05/23/17 132/80   11/08/16 136/70   10/17/16 146/78         Elidia Tobin  [facility]

## 2017-06-20 RX ORDER — LOSARTAN POTASSIUM AND HYDROCHLOROTHIAZIDE 25; 100 MG/1; MG/1
TABLET ORAL
Qty: 90 TABLET | Refills: 0 | Status: SHIPPED | OUTPATIENT
Start: 2017-06-20 | End: 2017-09-15

## 2017-06-20 RX ORDER — FLUTICASONE PROPIONATE 50 MCG
SPRAY, SUSPENSION (ML) NASAL
Qty: 16 ML | Refills: 0 | OUTPATIENT
Start: 2017-06-20

## 2017-06-20 NOTE — TELEPHONE ENCOUNTER
Patient called back stating that she did not request this medication. States she does not need the medication. Medication refused back.    Sabrina Ruiz RN, [facility]

## 2017-06-20 NOTE — TELEPHONE ENCOUNTER
[facility]    Reason for call triage symptoms for meication and left message to return call.    When patient calls back, please contact clinic RN team. If no one available, document that pt called and route to care team. routine priority.      Sabrina Ruiz, RN

## 2017-06-20 NOTE — TELEPHONE ENCOUNTER
Routing refill request to provider for review/approval because:  Labs out of range:  Creatinine  A break in medication      Carri Estrada RN

## 2017-06-21 RX ORDER — SITAGLIPTIN 100 MG/1
TABLET, FILM COATED ORAL
Qty: 90 TABLET | Refills: 0 | Status: SHIPPED | OUTPATIENT
Start: 2017-06-21 | End: 2018-04-27

## 2017-06-27 DIAGNOSIS — E11.3299 DIABETES MELLITUS WITH BACKGROUND RETINOPATHY (H): ICD-10-CM

## 2017-06-27 DIAGNOSIS — I10 ESSENTIAL HYPERTENSION WITH GOAL BLOOD PRESSURE LESS THAN 140/90: ICD-10-CM

## 2017-06-27 NOTE — TELEPHONE ENCOUNTER
amLODIPine (NORVASC) 10 MG tablet      Last Written Prescription Date: 10/17/16  Last Fill Quantity: 90, # refills: 1    Last Office Visit with [facility], [facility] or [facility] prescribing provider:  5/23/17   Future Office Visit:        BP Readings from Last 3 Encounters:   05/23/17 132/80   11/08/16 136/70   10/17/16 146/78           Nelson Faarax  [facility]

## 2017-06-28 RX ORDER — AMLODIPINE BESYLATE 10 MG/1
TABLET ORAL
Qty: 90 TABLET | Refills: 0 | Status: SHIPPED | OUTPATIENT
Start: 2017-06-28 | End: 2017-09-26

## 2017-06-28 NOTE — TELEPHONE ENCOUNTER
Routing refill request to provider for review/approval because:  A break in medication  Carri Estrada RN

## 2017-07-18 DIAGNOSIS — E11.9 CONTROLLED TYPE 2 DIABETES MELLITUS WITHOUT COMPLICATION, WITHOUT LONG-TERM CURRENT USE OF INSULIN (H): ICD-10-CM

## 2017-07-18 NOTE — TELEPHONE ENCOUNTER
LEVEMIR FLEXTOUCH 100 UNIT/ML injection         Last Written Prescription Date: 3/28/17  Last Fill Quantity: 15 ml, # refills: 0  Last Office Visit with G, P or [facility] prescribing provider:  5/23/17        BP Readings from Last 3 Encounters:   05/23/17 132/80   11/08/16 136/70   10/17/16 146/78     Lab Results   Component Value Date    MICROL 22 05/23/2017     Lab Results   Component Value Date    UMALCR 36.47 05/23/2017     Creatinine   Date Value Ref Range Status   05/23/2017 1.17 (H) 0.52 - 1.04 mg/dL Final   ]  GFR Estimate   Date Value Ref Range Status   05/23/2017 47 (L) >60 mL/min/1.7m2 Final     Comment:     Non  GFR Calc   10/17/2016 53 (L) >60 mL/min/1.7m2 Final     Comment:     Non  GFR Calc   05/05/2016 40 (L) >60 mL/min/1.7m2 Final     Comment:     Non  GFR Calc     GFR Estimate If Black   Date Value Ref Range Status   05/23/2017 57 (L) >60 mL/min/1.7m2 Final     Comment:      GFR Calc   10/17/2016 64 >60 mL/min/1.7m2 Final     Comment:      GFR Calc   05/05/2016 49 (L) >60 mL/min/1.7m2 Final     Comment:      GFR Calc     Lab Results   Component Value Date    CHOL 139 05/05/2016     Lab Results   Component Value Date    HDL 42 05/05/2016     Lab Results   Component Value Date    LDL 75 05/23/2017    LDL 73 05/05/2016     Lab Results   Component Value Date    TRIG 120 05/05/2016     Lab Results   Component Value Date    CHOLHDLRATIO 5.1 01/22/2015     Lab Results   Component Value Date    AST 13 05/05/2016     Lab Results   Component Value Date    ALT 15 05/05/2016     Lab Results   Component Value Date    A1C 9.2 05/23/2017    A1C 7.3 10/17/2016    A1C 7.7 05/05/2016    A1C 9.7 10/20/2015    A1C 9.0 05/04/2015     Potassium   Date Value Ref Range Status   05/23/2017 4.2 3.4 - 5.3 mmol/L Final           Nelson Faarax  [facility]

## 2017-08-12 DIAGNOSIS — E11.3299 DIABETES MELLITUS WITH BACKGROUND RETINOPATHY (H): ICD-10-CM

## 2017-08-13 NOTE — TELEPHONE ENCOUNTER
B-D U/F insulin pen needle         Last Written Prescription Date: 4/25/17  Last Fill Quantity: 100, # refills: 0  Last Office Visit with [facility], [facility] or [facility] prescribing provider:  5/23/17        BP Readings from Last 3 Encounters:   05/23/17 132/80   11/08/16 136/70   10/17/16 146/78     Lab Results   Component Value Date    MICROL 22 05/23/2017     Lab Results   Component Value Date    UMALCR 36.47 05/23/2017     Creatinine   Date Value Ref Range Status   05/23/2017 1.17 (H) 0.52 - 1.04 mg/dL Final   ]  GFR Estimate   Date Value Ref Range Status   05/23/2017 47 (L) >60 mL/min/1.7m2 Final     Comment:     Non  GFR Calc   10/17/2016 53 (L) >60 mL/min/1.7m2 Final     Comment:     Non  GFR Calc   05/05/2016 40 (L) >60 mL/min/1.7m2 Final     Comment:     Non  GFR Calc     GFR Estimate If Black   Date Value Ref Range Status   05/23/2017 57 (L) >60 mL/min/1.7m2 Final     Comment:      GFR Calc   10/17/2016 64 >60 mL/min/1.7m2 Final     Comment:      GFR Calc   05/05/2016 49 (L) >60 mL/min/1.7m2 Final     Comment:      GFR Calc     Lab Results   Component Value Date    CHOL 139 05/05/2016     Lab Results   Component Value Date    HDL 42 05/05/2016     Lab Results   Component Value Date    LDL 75 05/23/2017    LDL 73 05/05/2016     Lab Results   Component Value Date    TRIG 120 05/05/2016     Lab Results   Component Value Date    CHOLHDLRATIO 5.1 01/22/2015     Lab Results   Component Value Date    AST 13 05/05/2016     Lab Results   Component Value Date    ALT 15 05/05/2016     Lab Results   Component Value Date    A1C 9.2 05/23/2017    A1C 7.3 10/17/2016    A1C 7.7 05/05/2016    A1C 9.7 10/20/2015    A1C 9.0 05/04/2015     Potassium   Date Value Ref Range Status   05/23/2017 4.2 3.4 - 5.3 mmol/L Final         Keila Helton  [facility]

## 2017-08-16 RX ORDER — FLURBIPROFEN SODIUM 0.3 MG/ML
SOLUTION/ DROPS OPHTHALMIC
Qty: 100 EACH | Refills: 0 | Status: SHIPPED | OUTPATIENT
Start: 2017-08-16 | End: 2017-12-15

## 2017-09-08 DIAGNOSIS — E11.3299 DIABETES MELLITUS WITH BACKGROUND RETINOPATHY (H): ICD-10-CM

## 2017-09-08 NOTE — TELEPHONE ENCOUNTER
glimepiride (AMARYL) 4 MG tablet         Last Written Prescription Date: 06/06/17  Last Fill Quantity: 180, # refills: 0  Last Office Visit with [facility], [facility] or [facility] prescribing provider:  05/23/17        BP Readings from Last 3 Encounters:   05/23/17 132/80   11/08/16 136/70   10/17/16 146/78     Lab Results   Component Value Date    MICROL 22 05/23/2017     Lab Results   Component Value Date    UMALCR 36.47 05/23/2017     Creatinine   Date Value Ref Range Status   05/23/2017 1.17 (H) 0.52 - 1.04 mg/dL Final   ]  GFR Estimate   Date Value Ref Range Status   05/23/2017 47 (L) >60 mL/min/1.7m2 Final     Comment:     Non  GFR Calc   10/17/2016 53 (L) >60 mL/min/1.7m2 Final     Comment:     Non  GFR Calc   05/05/2016 40 (L) >60 mL/min/1.7m2 Final     Comment:     Non  GFR Calc     GFR Estimate If Black   Date Value Ref Range Status   05/23/2017 57 (L) >60 mL/min/1.7m2 Final     Comment:      GFR Calc   10/17/2016 64 >60 mL/min/1.7m2 Final     Comment:      GFR Calc   05/05/2016 49 (L) >60 mL/min/1.7m2 Final     Comment:      GFR Calc     Lab Results   Component Value Date    CHOL 139 05/05/2016     Lab Results   Component Value Date    HDL 42 05/05/2016     Lab Results   Component Value Date    LDL 75 05/23/2017    LDL 73 05/05/2016     Lab Results   Component Value Date    TRIG 120 05/05/2016     Lab Results   Component Value Date    CHOLHDLRATIO 5.1 01/22/2015     Lab Results   Component Value Date    AST 13 05/05/2016     Lab Results   Component Value Date    ALT 15 05/05/2016     Lab Results   Component Value Date    A1C 9.2 05/23/2017    A1C 7.3 10/17/2016    A1C 7.7 05/05/2016    A1C 9.7 10/20/2015    A1C 9.0 05/04/2015     Potassium   Date Value Ref Range Status   05/23/2017 4.2 3.4 - 5.3 mmol/L Final         [facility]

## 2017-09-12 RX ORDER — GLIMEPIRIDE 4 MG/1
TABLET ORAL
Qty: 60 TABLET | Refills: 0 | Status: SHIPPED | OUTPATIENT
Start: 2017-09-12 | End: 2017-09-20

## 2017-09-15 DIAGNOSIS — I10 ESSENTIAL HYPERTENSION WITH GOAL BLOOD PRESSURE LESS THAN 140/90: ICD-10-CM

## 2017-09-15 DIAGNOSIS — E11.3299 DIABETES MELLITUS WITH BACKGROUND RETINOPATHY (H): ICD-10-CM

## 2017-09-15 RX ORDER — SITAGLIPTIN 100 MG/1
TABLET, FILM COATED ORAL
Qty: 90 TABLET | Refills: 0 | Status: SHIPPED | OUTPATIENT
Start: 2017-09-15 | End: 2017-12-15

## 2017-09-15 NOTE — TELEPHONE ENCOUNTER
JANUVIA 100 MG tablet         Last Written Prescription Date: 06/21/17  Last Fill Quantity: 90, # refills: 0  Last Office Visit with G, [facility] or [facility] prescribing provider:  05/23/17        BP Readings from Last 3 Encounters:   05/23/17 132/80   11/08/16 136/70   10/17/16 146/78     Lab Results   Component Value Date    MICROL 22 05/23/2017     Lab Results   Component Value Date    UMALCR 36.47 05/23/2017     Creatinine   Date Value Ref Range Status   05/23/2017 1.17 (H) 0.52 - 1.04 mg/dL Final   ]  GFR Estimate   Date Value Ref Range Status   05/23/2017 47 (L) >60 mL/min/1.7m2 Final     Comment:     Non  GFR Calc   10/17/2016 53 (L) >60 mL/min/1.7m2 Final     Comment:     Non  GFR Calc   05/05/2016 40 (L) >60 mL/min/1.7m2 Final     Comment:     Non  GFR Calc     GFR Estimate If Black   Date Value Ref Range Status   05/23/2017 57 (L) >60 mL/min/1.7m2 Final     Comment:      GFR Calc   10/17/2016 64 >60 mL/min/1.7m2 Final     Comment:      GFR Calc   05/05/2016 49 (L) >60 mL/min/1.7m2 Final     Comment:      GFR Calc     Lab Results   Component Value Date    CHOL 139 05/05/2016     Lab Results   Component Value Date    HDL 42 05/05/2016     Lab Results   Component Value Date    LDL 75 05/23/2017    LDL 73 05/05/2016     Lab Results   Component Value Date    TRIG 120 05/05/2016     Lab Results   Component Value Date    CHOLHDLRATIO 5.1 01/22/2015     Lab Results   Component Value Date    AST 13 05/05/2016     Lab Results   Component Value Date    ALT 15 05/05/2016     Lab Results   Component Value Date    A1C 9.2 05/23/2017    A1C 7.3 10/17/2016    A1C 7.7 05/05/2016    A1C 9.7 10/20/2015    A1C 9.0 05/04/2015     Potassium   Date Value Ref Range Status   05/23/2017 4.2 3.4 - 5.3 mmol/L Final         [facility]

## 2017-09-15 NOTE — TELEPHONE ENCOUNTER
losartan-hydrochlorothiazide (HYZAAR) 100-25 MG per tablet      Last Written Prescription Date: 06/20/17  Last Fill Quantity: 90, # refills: 0  Last Office Visit with [facility], [facility] or [facility] prescribing provider: 05/23/17       Potassium   Date Value Ref Range Status   05/23/2017 4.2 3.4 - 5.3 mmol/L Final     Creatinine   Date Value Ref Range Status   05/23/2017 1.17 (H) 0.52 - 1.04 mg/dL Final     BP Readings from Last 3 Encounters:   05/23/17 132/80   11/08/16 136/70   10/17/16 146/78         Elidia Tobin  [facility]

## 2017-09-19 RX ORDER — LOSARTAN POTASSIUM AND HYDROCHLOROTHIAZIDE 25; 100 MG/1; MG/1
TABLET ORAL
Qty: 90 TABLET | Refills: 2 | Status: SHIPPED | OUTPATIENT
Start: 2017-09-19 | End: 2018-04-27

## 2017-09-19 NOTE — TELEPHONE ENCOUNTER
Prescription approved per [facility] Refill Protocol.    Creatinine was slightly out of range but this was commented upon in lab notes and instructions to patient given.

## 2017-09-20 DIAGNOSIS — E11.3299 DIABETES MELLITUS WITH BACKGROUND RETINOPATHY (H): ICD-10-CM

## 2017-09-20 NOTE — TELEPHONE ENCOUNTER
glimepiride (AMARYL) 4 MG tablet         Last Written Prescription Date: 09/12/16  Last Fill Quantity: 60, # refills: 0  Last Office Visit with G, [facility] or [facility] prescribing provider:  05/23/17        BP Readings from Last 3 Encounters:   05/23/17 132/80   11/08/16 136/70   10/17/16 146/78     Lab Results   Component Value Date    MICROL 22 05/23/2017     Lab Results   Component Value Date    UMALCR 36.47 05/23/2017     Creatinine   Date Value Ref Range Status   05/23/2017 1.17 (H) 0.52 - 1.04 mg/dL Final   ]  GFR Estimate   Date Value Ref Range Status   05/23/2017 47 (L) >60 mL/min/1.7m2 Final     Comment:     Non  GFR Calc   10/17/2016 53 (L) >60 mL/min/1.7m2 Final     Comment:     Non  GFR Calc   05/05/2016 40 (L) >60 mL/min/1.7m2 Final     Comment:     Non  GFR Calc     GFR Estimate If Black   Date Value Ref Range Status   05/23/2017 57 (L) >60 mL/min/1.7m2 Final     Comment:      GFR Calc   10/17/2016 64 >60 mL/min/1.7m2 Final     Comment:      GFR Calc   05/05/2016 49 (L) >60 mL/min/1.7m2 Final     Comment:      GFR Calc     Lab Results   Component Value Date    CHOL 139 05/05/2016     Lab Results   Component Value Date    HDL 42 05/05/2016     Lab Results   Component Value Date    LDL 75 05/23/2017    LDL 73 05/05/2016     Lab Results   Component Value Date    TRIG 120 05/05/2016     Lab Results   Component Value Date    CHOLHDLRATIO 5.1 01/22/2015     Lab Results   Component Value Date    AST 13 05/05/2016     Lab Results   Component Value Date    ALT 15 05/05/2016     Lab Results   Component Value Date    A1C 9.2 05/23/2017    A1C 7.3 10/17/2016    A1C 7.7 05/05/2016    A1C 9.7 10/20/2015    A1C 9.0 05/04/2015     Potassium   Date Value Ref Range Status   05/23/2017 4.2 3.4 - 5.3 mmol/L Final         [facility]

## 2017-09-22 RX ORDER — GLIMEPIRIDE 4 MG/1
TABLET ORAL
Qty: 60 TABLET | Refills: 0 | Status: SHIPPED | OUTPATIENT
Start: 2017-09-22 | End: 2017-12-15

## 2017-09-22 NOTE — TELEPHONE ENCOUNTER
Routing refill request to provider for review/approval because:  Labs out of range:  Creatinine, GFR  A break in medication  Carri Estrada RN

## 2017-09-26 DIAGNOSIS — I10 ESSENTIAL HYPERTENSION WITH GOAL BLOOD PRESSURE LESS THAN 140/90: ICD-10-CM

## 2017-09-26 DIAGNOSIS — E11.3299 DIABETES MELLITUS WITH BACKGROUND RETINOPATHY (H): ICD-10-CM

## 2017-09-26 NOTE — TELEPHONE ENCOUNTER
amLODIPine (NORVASC) 10 MG tablet      Last Written Prescription Date: 06/28/17  Last Fill Quantity: 90, # refills: 0    Last Office Visit with [facility], [facility] or [facility] prescribing provider:  05/23/17   Future Office Visit:        BP Readings from Last 3 Encounters:   05/23/17 132/80   11/08/16 136/70   10/17/16 146/78         [facility]

## 2017-09-27 RX ORDER — AMLODIPINE BESYLATE 10 MG/1
TABLET ORAL
Qty: 90 TABLET | Refills: 1 | Status: SHIPPED | OUTPATIENT
Start: 2017-09-27 | End: 2018-03-31

## 2017-10-10 DIAGNOSIS — E11.9 CONTROLLED TYPE 2 DIABETES MELLITUS WITHOUT COMPLICATION, WITHOUT LONG-TERM CURRENT USE OF INSULIN (H): ICD-10-CM

## 2017-10-10 NOTE — TELEPHONE ENCOUNTER
LEVEMIR FLEXTOUCH 100 UNIT/ML injection       Last Written Prescription Date: 03/28/17  Last Fill Quantity: 15ml, # refills: 0    Last Office Visit with G, [facility] or [facility] prescribing provider:  05/23/17   Future Office Visit:       Date of Last Asthma Action Plan Letter:   There are no preventive care reminders to display for this patient.   Asthma Control Test: No flowsheet data found.    Date of Last Spirometry Test:   No results found for this or any previous visit.            [facility]

## 2017-10-12 RX ORDER — INSULIN DETEMIR 100 [IU]/ML
INJECTION, SOLUTION SUBCUTANEOUS
Qty: 15 ML | Refills: 0 | Status: SHIPPED | OUTPATIENT
Start: 2017-10-12 | End: 2017-12-15

## 2017-10-12 NOTE — TELEPHONE ENCOUNTER
Medication is being filled for 1 time refill only due to:  Patient needs to be seen because overdue for diabetes visit and lab. Noted in pharmacy comments.

## 2017-12-05 ENCOUNTER — TELEPHONE (OUTPATIENT)
Dept: FAMILY MEDICINE | Facility: CLINIC | Age: 62
End: 2017-12-05

## 2017-12-05 DIAGNOSIS — E11.3299 TYPE 2 DIABETES MELLITUS WITH BACKGROUND RETINOPATHY (H): Primary | ICD-10-CM

## 2017-12-05 NOTE — TELEPHONE ENCOUNTER
Panel Management Review      Lab Results   Component Value Date    A1C 9.2 05/23/2017    A1C 7.3 10/17/2016     Last Office Visit with this department: 6/19/2017    Fail List measure: Dm      Patient is due/failing the following:   A1C    Action needed:   Patient needs office visit for Diabetes check.    Type of outreach:    Phone, spoke to patient.       Questions for provider review:    None                                                                                   aMrina Anaya CMA

## 2017-12-11 DIAGNOSIS — E11.3299 DIABETES MELLITUS WITH BACKGROUND RETINOPATHY (H): ICD-10-CM

## 2017-12-11 RX ORDER — FLURBIPROFEN SODIUM 0.3 MG/ML
SOLUTION/ DROPS OPHTHALMIC
Qty: 100 EACH | Refills: 0 | Status: CANCELLED | OUTPATIENT
Start: 2017-12-11

## 2017-12-12 DIAGNOSIS — E11.3299 DIABETES MELLITUS WITH BACKGROUND RETINOPATHY (H): ICD-10-CM

## 2017-12-12 RX ORDER — SITAGLIPTIN 100 MG/1
TABLET, FILM COATED ORAL
Qty: 90 TABLET | Refills: 0 | Status: CANCELLED | OUTPATIENT
Start: 2017-12-12

## 2017-12-12 NOTE — TELEPHONE ENCOUNTER
Requested Prescriptions   Pending Prescriptions Disp Refills     glimepiride (AMARYL) 4 MG tablet [Pharmacy Med Name: GLIMEPIRIDE 4MG TABLETS]  Last Written Prescription Date:  09/22/17  Last Fill Quantity: 60,  # refills: 0   Last Office Visit with [facility], [facility] or [facility] prescribing provider:  05/23/17   Future Office Visit:    Next 5 appointments (look out 90 days)     Dec 15, 2017  7:00 AM CST   Office Visit with Radha Madrigal MD   [facility] ([facility])    [address] [place] [id number]   [phone]                180 tablet 0     Sig: TAKE 2 TABLETS BY MOUTH EVERY MORNING BEFORE BREAKFAST    Sulfonylurea Agents Failed    12/11/2017  9:56 PM       Failed - Patient's BP is less than 140/90    BP Readings from Last 3 Encounters:   05/23/17 132/80   11/08/16 136/70   10/17/16 146/78                Failed - Patient has documented A1c within the specified period of time.    Recent Labs   Lab Test  05/23/17   0744   A1C  9.2*            Failed - Patient has a recent creatinine (normal) within the past 12 mos.    Recent Labs   Lab Test  05/23/17 0744   CR  1.17*            Failed - Patient has had an appointment with authorizing provider within the past 6 mos. or  within next 30 days    Patient had office visit in the last 6 months or has a visit in the next 30 days with authorizing provider.  See chart review.            Passed - Patient has documented LDL within the past 12 mos.    Recent Labs   Lab Test  05/23/17   0744   LDL  75            Passed - Patient has had a Microalbumin in the past 12 mos.    Recent Labs   Lab Test  05/23/17 0817   MICROL  22   UMALCR  36.47*            Passed - Patient is age 18 or older       Passed - No active pregnancy on record       Passed - Patient has not had a positive pregnancy test within the past 12 mos.        B-D U/F insulin pen needle [Pharmacy Med Name: B-D PEN NDL MINI [id number](3/16)PRPL]  Last Written  Prescription Date:  08/16/17  Last Fill Quantity: 100,  # refills: 0   Last Office Visit with G, [facility] or [facility] prescribing provider:  05/23/17   Future Office Visit:    Next 5 appointments (look out 90 days)     Dec 15, 2017  7:00 AM CST   Office Visit with Radha Madrigal MD   [facility]    [address] [place] [id number] [phone]                100 each 0     Sig: USE TO TEST AS DIRECTED    Diabetic Supplies Protocol Failed    12/11/2017  9:56 PM       Failed - Patient has had appt within past 6 mos    IV to PO - Antibiotics     None                   Passed - Patient is 18 years of age or older

## 2017-12-12 NOTE — TELEPHONE ENCOUNTER
Requested Prescriptions   Pending Prescriptions Disp Refills     JANUVIA 100 MG tablet [Pharmacy Med Name: JANUVIA 100MG TABLETS]    Last Written Prescription Date:  9/15/17  Last Fill Quantity: 90,  # refills: 0   Last Office Visit with [facility], [facility] or [facility] prescribing provider:  5/23/17   Future Office Visit:    Next 5 appointments (look out 90 days)     Dec 15, 2017  7:00 AM CST   [facility]    [address] [place] [id number]   [phone]                  90 tablet 0     Sig: TAKE 1 TABLET BY MOUTH DAILY    DPP4 Inhibitors Protocol Failed    12/12/2017  1:31 PM       Failed - Blood pressure less than 140/90 in past 6 months    BP Readings from Last 3 Encounters:   05/23/17 132/80   11/08/16 136/70   10/17/16 146/78                Failed - HgbA1C in past 3 or 6 months    Recent Labs   Lab Test  05/23/17   0744   A1C  9.2*            Failed - Normal serum creatinine in past 12 months    Recent Labs   Lab Test  05/23/17   0744   CR  1.17*            Passed - LDL on file in past 12 months    Recent Labs   Lab Test  05/23/17   0744   LDL  75            Passed - Microalbumin on file in past 12 months    Recent Labs   Lab Test  05/23/17   0817   MICROL  22   UMALCR  36.47*            Passed - Patient is age 18 or older       Passed - No active pregnancy on record       Passed - No positive pregnancy test in past 12 months       Passed - Recent visit with authorizing provider's specialty in past 6 months    IV to PO - Antibiotics     None                          Nelson Faarax  [facility]

## 2017-12-14 NOTE — TELEPHONE ENCOUNTER
Future lab orders placed. Patient should stop at the lab on the morning of the appointment.  Thanks,    Radha Madrigal MD MPH

## 2017-12-14 NOTE — TELEPHONE ENCOUNTER
.. returned call    Best number to reach caller: Cell number on file:    Telephone Information:   Mobile [phone]       Is it ok to leave a detailed message: YES     Patient stated that she can't make the lab appointment today due to work schedule  but she'll do it tomorrow.

## 2017-12-14 NOTE — TELEPHONE ENCOUNTER
This writer attempted to contact Adrian on 12/14/17      Reason for call pre visit labs and left message that clinic will return call.      If patient calls back:   Relay message Pt can get her A1C lab drawn before her appointment, (read verbatim), document that pt called and close encounter.        Karin Ware, [facility]

## 2017-12-15 ENCOUNTER — OFFICE VISIT (OUTPATIENT)
Dept: FAMILY MEDICINE | Facility: CLINIC | Age: 62
End: 2017-12-15
Payer: COMMERCIAL

## 2017-12-15 VITALS
WEIGHT: 203 LBS | BODY MASS INDEX: 38.99 KG/M2 | DIASTOLIC BLOOD PRESSURE: 60 MMHG | HEART RATE: 91 BPM | TEMPERATURE: 96.5 F | OXYGEN SATURATION: 98 % | SYSTOLIC BLOOD PRESSURE: 138 MMHG

## 2017-12-15 DIAGNOSIS — Z79.4 TYPE 2 DIABETES MELLITUS WITH HYPERGLYCEMIA, WITH LONG-TERM CURRENT USE OF INSULIN (H): Primary | ICD-10-CM

## 2017-12-15 DIAGNOSIS — E66.01 MORBID OBESITY (H): ICD-10-CM

## 2017-12-15 DIAGNOSIS — E78.5 HYPERLIPIDEMIA LDL GOAL <100: ICD-10-CM

## 2017-12-15 DIAGNOSIS — E11.3299 DIABETES MELLITUS WITH BACKGROUND RETINOPATHY (H): ICD-10-CM

## 2017-12-15 DIAGNOSIS — Z12.11 SCREEN FOR COLON CANCER: ICD-10-CM

## 2017-12-15 DIAGNOSIS — E11.65 TYPE 2 DIABETES MELLITUS WITH HYPERGLYCEMIA, WITH LONG-TERM CURRENT USE OF INSULIN (H): Primary | ICD-10-CM

## 2017-12-15 DIAGNOSIS — I10 HYPERTENSION GOAL BP (BLOOD PRESSURE) < 140/90: ICD-10-CM

## 2017-12-15 LAB
ANION GAP SERPL CALCULATED.3IONS-SCNC: 7 MMOL/L (ref 3–14)
BUN SERPL-MCNC: 28 MG/DL (ref 7–30)
CALCIUM SERPL-MCNC: 9.1 MG/DL (ref 8.5–10.1)
CHLORIDE SERPL-SCNC: 104 MMOL/L (ref 94–109)
CO2 SERPL-SCNC: 26 MMOL/L (ref 20–32)
CREAT SERPL-MCNC: 1.3 MG/DL (ref 0.52–1.04)
GFR SERPL CREATININE-BSD FRML MDRD: 41 ML/MIN/1.7M2
GLUCOSE SERPL-MCNC: 161 MG/DL (ref 70–99)
HBA1C MFR BLD: 11.1 % (ref 4.3–6)
POTASSIUM SERPL-SCNC: 4.2 MMOL/L (ref 3.4–5.3)
SODIUM SERPL-SCNC: 137 MMOL/L (ref 133–144)

## 2017-12-15 PROCEDURE — 99214 OFFICE O/P EST MOD 30 MIN: CPT | Performed by: PREVENTIVE MEDICINE

## 2017-12-15 PROCEDURE — 80048 BASIC METABOLIC PNL TOTAL CA: CPT | Performed by: PREVENTIVE MEDICINE

## 2017-12-15 PROCEDURE — 36415 COLL VENOUS BLD VENIPUNCTURE: CPT | Performed by: PREVENTIVE MEDICINE

## 2017-12-15 PROCEDURE — 83036 HEMOGLOBIN GLYCOSYLATED A1C: CPT | Performed by: PREVENTIVE MEDICINE

## 2017-12-15 RX ORDER — METFORMIN HCL 500 MG
TABLET, EXTENDED RELEASE 24 HR ORAL
Qty: 360 TABLET | Refills: 1 | Status: SHIPPED | OUTPATIENT
Start: 2017-12-15 | End: 2018-04-27

## 2017-12-15 RX ORDER — GLIMEPIRIDE 4 MG/1
TABLET ORAL
Qty: 180 TABLET | Refills: 1 | Status: SHIPPED | OUTPATIENT
Start: 2017-12-15 | End: 2018-04-27

## 2017-12-15 RX ORDER — GLIMEPIRIDE 4 MG/1
TABLET ORAL
Qty: 180 TABLET | Refills: 0 | OUTPATIENT
Start: 2017-12-15

## 2017-12-15 ASSESSMENT — PAIN SCALES - GENERAL: PAINLEVEL: NO PAIN (0)

## 2017-12-15 NOTE — PROGRESS NOTES
Results discussed directly with patient while patient was present. Any further details documented in the note.   Radha Madrigal MD

## 2017-12-15 NOTE — PROGRESS NOTES
SUBJECTIVE:   Adrian Cruz is a 62 year old female who presents to clinic today for the following health issues:      Diabetes Follow-up    Patient is checking blood sugars: twice daily.    Results are as follows:       am - 130       bedtime - 160        Diabetic concerns: None     Symptoms of hypoglycemia (low blood sugar): none     Paresthesias (numbness or burning in feet) or sores: No     Date of last diabetic eye exam: DUE  BP Readings from Last 2 Encounters:   05/23/17 132/80   11/08/16 136/70     Hemoglobin A1C (%)   Date Value   05/23/2017 9.2 (H)   10/17/2016 7.3 (H)     LDL Cholesterol Calculated (mg/dL)   Date Value   05/05/2016 73   01/22/2015 137 (H)     LDL Cholesterol Direct (mg/dL)   Date Value   05/23/2017 75         Amount of exercise or physical activity: None    Problems taking medications regularly: No    Medication side effects: none    Diet: diabetic    Has stopped taking Metformin for several months, did not like taking so many pills.     Hyperlipidemia Follow-Up      Rate your low fat/cholesterol diet?: fair    Taking statin?  Yes, no muscle aches from statin    Other lipid medications/supplements?:  none    Hypertension Follow-up      Outpatient blood pressures are being checked at home.  Results are 130-140 sytolic.    Low Salt Diet: low salt       Obesity:  -Recently joined a program through work  -It is an online support group  -Has a diet , food scale  -Program is called [employer]       Problem list and histories reviewed & adjusted, as indicated.  Additional history: as documented    Patient Active Problem List   Diagnosis     Hyperlipidemia LDL goal <100     Type 2 diabetes, HbA1C goal < 8% (H)     Hypertension goal BP (blood pressure) < 140/90     Cataract     Diabetes mellitus with background retinopathy (H)     Advanced directives, counseling/discussion     Health Care Home     Morbid obesity (H)     Obesity, Class II, BMI 35-39.9     Past Surgical History:    Procedure Laterality Date     LASER SURGERY OF EYE Right 2015       Social History   Substance Use Topics     Smoking status: Never Smoker     Smokeless tobacco: Never Used     Alcohol use No     Family History   Problem Relation Age of Onset     DIABETES Mother      HEART DISEASE Mother      chf and pacemaker     DIABETES Father      Asthma Sister      Asthma Son      Asthma Daughter      HEART DISEASE Daughter      C.A.D. Father      age 49     CEREBROVASCULAR DISEASE Father      Genitourinary Problems Mother      ESRD     Glaucoma No family hx of      Macular Degeneration No family hx of          Current Outpatient Prescriptions   Medication Sig Dispense Refill     metFORMIN (GLUCOPHAGE-XR) 500 MG 24 hr tablet TAKE 4 CAPSULES BY MOUTH DAILY WITH BREAKFAST 360 tablet 1     insulin detemir (LEVEMIR FLEXTOUCH) 100 UNIT/ML injection INJECT 24 UNITS SUBCUTANEOUS AT BEDTIME 15 mL 0     glimepiride (AMARYL) 4 MG tablet TAKE 2 TABLETS BY MOUTH EVERY MORNING BEFORE A MEAL 180 tablet 1     sitagliptin (JANUVIA) 100 MG tablet Take 1 tablet (100 mg) by mouth daily 90 tablet 1     insulin pen needle (B-D U/F) 31G X 5 MM USE as directed once a day 100 each 3     amLODIPine (NORVASC) 10 MG tablet TAKE 1 TABLET(10 MG) BY MOUTH DAILY 90 tablet 1     losartan-hydrochlorothiazide (HYZAAR) 100-25 MG per tablet TAKE 1 TABLET BY MOUTH DAILY 90 tablet 2     JANUVIA 100 MG tablet TAKE 1 TABLET BY MOUTH DAILY 90 tablet 0     atorvastatin (LIPITOR) 80 MG tablet TAKE 1 TABLET BY MOUTH DAILY 90 tablet 3     fluticasone (FLONASE) 50 MCG/ACT spray Spray 1-2 sprays into both nostrils daily 16 g 0     cetirizine (ZYRTEC) 10 MG tablet Take 1 tablet (10 mg) by mouth every evening 30 tablet 1     omeprazole 20 MG tablet Take 1 tablet (20 mg) by mouth daily 90 tablet 1     ONE TOUCH ULTRA test strip TEST TWICE DAILY 50 each 0     ORDER FOR DME Glucometer of choice (One touch) with supplies Disp 1    Testing Strips for bid testing  #100    Lancets  for bid testing #100 1 Month 11     ORDER FOR DME Equipment being ordered: blood pressure cuff/machine 1 Device 0     aspirin 81 MG tablet Take by mouth daily       LANCETS MISC.        [DISCONTINUED] LEVEMIR FLEXTOUCH 100 UNIT/ML injection INJECT 18 UNITS SUBCUTANEOUS AT BEDTIME 15 mL 0     [DISCONTINUED] glimepiride (AMARYL) 4 MG tablet TAKE 2 TABLETS BY MOUTH EVERY MORNING BEFORE A MEAL 60 tablet 0     [DISCONTINUED] JANUVIA 100 MG tablet TAKE 1 TABLET BY MOUTH DAILY 90 tablet 0     [DISCONTINUED] LEVEMIR FLEXTOUCH 100 UNIT/ML injection INJECT 14 UNITS SUBCUTANEOUS AT BEDTIME 15 mL 0     [DISCONTINUED] metFORMIN (GLUCOPHAGE-XR) 500 MG 24 hr tablet TAKE 4 CAPSULES BY MOUTH DAILY WITH BREAKFAST 360 tablet 1     Allergies   Allergen Reactions     Ace Inhibitors Cough     Glucophage [Biguanides] Cramps     Stomach upset     Lisinopril      Recent Labs   Lab Test  12/15/17   0705  05/23/17   0744  10/17/16   1244  05/05/16   0730   01/22/15   0754  04/25/13   0734   02/16/12   0743   A1C  11.1*  9.2*  7.3*  7.7*   < >  14.5*  12.9*   < >   --    LDL   --   75   --   73   --   137*  160*   < >   --    HDL   --    --    --   42*   --   46*  36*   < >   --    TRIG   --    --    --   120   --   266*  161*   < >   --    ALT   --    --    --   15   --   16   --    --   17   CR   --   1.17*  1.05*  1.34*   < >  0.85  0.68   --   0.81   GFRESTIMATED   --   47*  53*  40*   < >  68  89   --   73   GFRESTBLACK   --   57*  64  49*   < >  83  >90   --   88   POTASSIUM   --   4.2  3.9  4.3   < >  4.5  5.0   < >  4.7   TSH   --   2.28   --    --    --   1.82  1.86   --    --     < > = values in this interval not displayed.      BP Readings from Last 3 Encounters:   12/15/17 138/60   05/23/17 132/80   11/08/16 136/70    Wt Readings from Last 3 Encounters:   12/15/17 203 lb (92.1 kg)   05/23/17 199 lb (90.3 kg)   10/17/16 201 lb (91.2 kg)               [facility]        Reviewed and updated as needed this visit by Provider         ROS:  Constitutional, HEENT, cardiovascular, pulmonary, gi and gu systems are negative, except as otherwise noted.      OBJECTIVE:                                                    /60  Pulse 91  Temp 96.5  F (35.8  C) (Oral)  Wt 203 lb (92.1 kg)  SpO2 98%  Breastfeeding? No  BMI 38.99 kg/m2  Body mass index is 38.99 kg/(m^2).  GENERAL APPEARANCE: healthy, alert and no distress  EYES: Eyes grossly normal to inspection and conjunctivae and sclerae normal  NECK: no adenopathy, no asymmetry, masses, or scars and trachea midline and normal to palpation  RESP: lungs clear to auscultation - no rales, rhonchi or wheezes  CV: regular rates and rhythm, normal S1 S2, no S3 or S4 and no murmur, click or rub  ABDOMEN: soft, non-tender  MS: extremities normal- no gross deformities noted  SKIN: no suspicious lesions or rashes  NEURO: Normal strength and tone, mentation intact and speech normal  PSYCH: mentation appears normal and affect normal/bright    Diagnostic test results:  Diagnostic Test Results:  Results for orders placed or performed in visit on 12/15/17 (from the past 24 hour(s))   Hemoglobin A1c   Result Value Ref Range    Hemoglobin A1C 11.1 (H) 4.3 - 6.0 %          ASSESSMENT/PLAN:                                                    1. Type 2 diabetes mellitus with hyperglycemia, with long-term current use of insulin (H)  -HbA1C has increased from 9.2 to 11.1  -Discussed restarting Metformin XR, can take 2 tabs twice a day if that is easier than 4 at a time  -Increased Levemir to 22 units and titrate up to 24 units  -Recheck HbA1C in 3 months  -Continue other medications the same     2. Morbid obesity (H)  -Goal of 5% body weight loss discussed in the next 6 months  -Has started a weight loss program    3. Diabetes mellitus with background retinopathy (H)  - metFORMIN (GLUCOPHAGE-XR) 500 MG 24 hr tablet; TAKE 4 CAPSULES BY MOUTH DAILY WITH  BREAKFAST  Dispense: 360 tablet; Refill: 1  - glimepiride (AMARYL) 4 MG tablet; TAKE 2 TABLETS BY MOUTH EVERY MORNING BEFORE A MEAL  Dispense: 180 tablet; Refill: 1  - sitagliptin (JANUVIA) 100 MG tablet; Take 1 tablet (100 mg) by mouth daily  Dispense: 90 tablet; Refill: 1    4. Screen for colon cancer  -Last colonoscopy was 2007  - GASTROENTEROLOGY ADULT REF PROCEDURE ONLY    5. Hypertension goal BP (blood pressure) < 140/90  -At goal  -Continue current medication  -Weight loss will also improve blood pressure     6. Hyperlipidemia LDL goal <100  -Last LDL was 75  -On statin and Aspirin     The 10-year ASCVD risk score (Juan A DC Jr, et al., 2013) is: 12.1%    Values used to calculate the score:      Age: 62 years      Sex: Female      Is Non- : No      Diabetic: Yes      Tobacco smoker: No      Systolic Blood Pressure: 138 mmHg      Is BP treated: Yes      HDL Cholesterol: 42 mg/dL      Total Cholesterol: 139 mg/dL      Follow up with Provider - Check HbA1C in 2-3 months, further follow up based on labs. If at goal then see me in 6 months. If not at goal will need office visit.      Radha Madrigal MD MPH  [facility]

## 2017-12-15 NOTE — MR AVS SNAPSHOT
After Visit Summary   12/15/2017    Adrian Cruz    MRN: [id number]           Patient Information     Date Of Birth          1955        Visit Information        Provider Department      12/15/2017 7:00 AM Radha Madrigal MD [facility]        Today's Diagnoses     Type 2 diabetes mellitus with hyperglycemia, with long-term current use of insulin (H)    -  1    Morbid obesity (H)        Diabetes mellitus with background retinopathy (H)        Screen for colon cancer        Hypertension goal BP (blood pressure) < 140/90        Hyperlipidemia LDL goal <100          Care Instructions    At [facility], we strive to deliver an exceptional experience to you, every time we see you.  If you receive a survey in the mail, please send us back your thoughts. We really do value your feedback.    Based on your medical history, these are the current health maintenance/preventive care services that you are due for (some may have been done at this visit.)  Health Maintenance Due   Topic Date Due     EYE EXAM Q1 YEAR  04/03/2016     DIABETIC EDUCATION Q1 YEAR  11/24/2016     INFLUENZA VACCINE (SYSTEM ASSIGNED)  09/01/2017     BMP Q6 MOS  11/23/2017     A1C Q6 MO  11/23/2017     COLON CANCER SCREEN (SYSTEM ASSIGNED)  11/29/2017         Suggested websites for health information:  Www.BakedCode.Arcivr : Up to date and easily searchable information on multiple topics.  Www.medlineplus.gov : medication info, interactive tutorials, watch real surgeries online  Www.familydoctor.org : good info from the Academy of Family Physicians  Www.cdc.gov : public health info, travel advisories, epidemics (H1N1)  Www.aap.org : children's health info, normal development, vaccinations  Www.health.[facility].[place].us : [place] dept of health, public health issues in [place], N1N1    Your care team:                            Family Medicine Internal Medicine   MD Maverick Manzano MD Shantel  MD Malena Martin PA-C, MD Pediatrics   DEBO Medrano, KELLY Flores APRN MD Eli Stewart MD Deborah Mielke, MD Kim Thein, APRN [facility]      Clinic hours: Monday - Thursday 7 am-7 pm; Fridays 7 am-5 pm.   Urgent care: Monday - Friday 11 am-9 pm; Saturday and Sunday 9 am-5 pm.  Pharmacy : Monday -Thursday 8 am-8 pm; Friday 8 am-6 pm; Saturday and Sunday 9 am-5 pm.     Clinic: [phone]   Pharmacy: [phone]              Follow-ups after your visit        Additional Services     GASTROENTEROLOGY ADULT REF PROCEDURE ONLY       Last Lab Result: Creatinine (mg/dL)       Date                     Value                 05/23/2017               1.17 (H)         ----------  Body mass index is 38.99 kg/(m^2).     Needed:  No  Language:  English    Patient will be contacted to schedule procedure.     Please be aware that coverage of these services is subject to the terms and limitations of your health insurance plan.  Call member services at your health plan with any benefit or coverage questions.  Any procedures must be performed at a [facility] facility OR coordinated by your clinic's referral office.    Please bring the following with you to your appointment:    (1) Any X-Rays, CTs or MRIs which have been performed.  Contact the facility where they were done to arrange for  prior to your scheduled appointment.    (2) List of current medications   (3) This referral request   (4) Any documents/labs given to you for this referral                  Follow-up notes from your care team     Return in about 3 months (around 3/15/2018) for Lab Work.      Future tests that were ordered for you today     Open Future Orders        Priority Expected Expires Ordered    Hemoglobin A1c Routine  12/15/2018 12/15/2017            Who to contact     If you have questions or need follow up information about today's clinic visit or your  "schedule please contact [facility] directly at [phone].  Normal or non-critical lab and imaging results will be communicated to you by MyChart, letter or phone within 4 business days after the clinic has received the results. If you do not hear from us within 7 days, please contact the clinic through MyChart or phone. If you have a critical or abnormal lab result, we will notify you by phone as soon as possible.  Submit refill requests through [employer] or call your pharmacy and they will forward the refill request to us. Please allow 3 business days for your refill to be completed.          Additional Information About Your Visit        [employer]iwave[employer]Stratus[employer]IVE Information     [employer] lets you send messages to your doctor, view your test results, renew your prescriptions, schedule appointments and more. To sign up, go to [email or web address] . Click on \"Log in\" on the left side of the screen, which will take you to the Welcome page. Then click on \"Sign up Now\" on the right side of the page.     You will be asked to enter the access code listed below, as well as some personal information. Please follow the directions to create your username and password.     Your access code is: [email or web address]  Expires: 3/15/2018  8:26 AM     Your access code will  in 90 days. If you need help or a new code, please call your [facility] clinic or [phone].        Care EveryWhere ID     This is your Care EveryWhere ID. This could be used by other organizations to access your [facility] medical records  [id number]        Your Vitals Were     Pulse Temperature Pulse Oximetry Breastfeeding? BMI (Body Mass Index)       91 96.5  F (35.8  C) (Oral) 98% No 38.99 kg/m2        Blood Pressure from Last 3 Encounters:   12/15/17 138/60   17 132/80   16 136/70    Weight from Last 3 Encounters:   12/15/17 203 lb (92.1 kg)   17 199 lb (90.3 kg)   10/17/16 201 lb (91.2 kg)              We Performed the Following     " Basic metabolic panel     GASTROENTEROLOGY ADULT REF PROCEDURE ONLY     Hemoglobin A1c          Today's Medication Changes          These changes are accurate as of: 12/15/17  8:26 AM.  If you have any questions, ask your nurse or doctor.               These medicines have changed or have updated prescriptions.        Dose/Directions    glimepiride 4 MG tablet   Commonly known as:  AMARYL   This may have changed:  See the new instructions.   Used for:  Diabetes mellitus with background retinopathy (H)   Changed by:  Radha Madrigal MD        TAKE 2 TABLETS BY MOUTH EVERY MORNING BEFORE A MEAL   Quantity:  180 tablet   Refills:  1       insulin pen needle 31G X 5 MM   Commonly known as:  B-D U/F   This may have changed:  See the new instructions.   Changed by:  Radha Madrigal MD        USE as directed once a day   Quantity:  100 each   Refills:  3       * JANUVIA 100 MG tablet   This may have changed:  Another medication with the same name was changed. Make sure you understand how and when to take each.   Used for:  Diabetes mellitus with background retinopathy (H)   Generic drug:  sitagliptin   Changed by:  Milagro Morales PA-C        TAKE 1 TABLET BY MOUTH DAILY   Quantity:  90 tablet   Refills:  0       * sitagliptin 100 MG tablet   Commonly known as:  JANUVIA   This may have changed:  See the new instructions.   Used for:  Diabetes mellitus with background retinopathy (H)   Changed by:  Radha Madrigal MD        Dose:  100 mg   Take 1 tablet (100 mg) by mouth daily   Quantity:  90 tablet   Refills:  1       LEVEMIR FLEXTOUCH 100 UNIT/ML injection   This may have changed:  See the new instructions.   Generic drug:  insulin detemir   Changed by:  Radha Madrigal MD        INJECT 24 UNITS SUBCUTANEOUS AT BEDTIME   Quantity:  15 mL   Refills:  0       * Notice:  This list has 2 medication(s) that are the same as other medications prescribed for you. Read the directions carefully, and ask your doctor or other  care provider to review them with you.         Where to get your medicines      These medications were sent to [id number] [place], [place] [address] AT [place] [address] [place] [address]     Phone:  [phone]     glimepiride 4 MG tablet    insulin pen needle 31G X 5 MM    metFORMIN 500 MG 24 hr tablet    sitagliptin 100 MG tablet                Primary Care Provider Office Phone # Fax #    Radha Madrigal [phone]       [address]  [facility] [address]        Equal Access to Services     [facility]: Hadii aad ku hadasho Soomaali, waaxda luqadaha, qaybta kaalmada adeegyada, waxay idiin hayaan adeeg sandra griffin . So [facility] [phone].    ATENCIÓN: Si habla español, tiene a sosa disposición servicios gratuitos de asistencia lingüística. Ll[facility] [phone].    We comply with applicable federal civil rights laws and [place] laws. We do not discriminate on the basis of race, color, national origin, age, disability, sex, sexual orientation, or gender identity.            Thank you!     Thank you for choosing [facility]  for your care. Our goal is always to provide you with excellent care. Hearing back from our patients is one way we can continue to improve our services. Please take a few minutes to complete the written survey that you may receive in the mail after your visit with us. Thank you!             Your Updated Medication List - Protect others around you: Learn how to safely use, store and throw away your medicines at www.disposemymeds.org.          This list is accurate as of: 12/15/17  8:26 AM.  Always use your most recent med list.                   Brand Name Dispense Instructions for use Diagnosis    amLODIPine 10 MG tablet    NORVASC    90 tablet    TAKE 1 TABLET(10 MG) BY MOUTH DAILY    Essential hypertension with goal blood pressure less than 140/90, Diabetes mellitus with background  retinopathy (H)       aspirin 81 MG tablet      Take by mouth daily        atorvastatin 80 MG tablet    LIPITOR    90 tablet    TAKE 1 TABLET BY MOUTH DAILY    Hyperlipidemia LDL goal <100, Diabetes mellitus with background retinopathy (H)       cetirizine 10 MG tablet    zyrTEC    30 tablet    Take 1 tablet (10 mg) by mouth every evening    Allergic sinusitis       fluticasone 50 MCG/ACT spray    FLONASE    16 g    Spray 1-2 sprays into both nostrils daily    Allergic sinusitis       glimepiride 4 MG tablet    AMARYL    180 tablet    TAKE 2 TABLETS BY MOUTH EVERY MORNING BEFORE A MEAL    Diabetes mellitus with background retinopathy (H)       insulin pen needle 31G X 5 MM    B-D U/F    100 each    USE as directed once a day        * JANUVIA 100 MG tablet   Generic drug:  sitagliptin     90 tablet    TAKE 1 TABLET BY MOUTH DAILY    Diabetes mellitus with background retinopathy (H)       * sitagliptin 100 MG tablet    JANUVIA    90 tablet    Take 1 tablet (100 mg) by mouth daily    Diabetes mellitus with background retinopathy (H)       LANCETS MISC.           LEVEMIR FLEXTOUCH 100 UNIT/ML injection   Generic drug:  insulin detemir     15 mL    INJECT 24 UNITS SUBCUTANEOUS AT BEDTIME        losartan-hydrochlorothiazide 100-25 MG per tablet    HYZAAR    90 tablet    TAKE 1 TABLET BY MOUTH DAILY    Essential hypertension with goal blood pressure less than 140/90, Diabetes mellitus with background retinopathy (H)       metFORMIN 500 MG 24 hr tablet    GLUCOPHAGE-XR    360 tablet    TAKE 4 CAPSULES BY MOUTH DAILY WITH BREAKFAST    Diabetes mellitus with background retinopathy (H)       omeprazole 20 MG tablet     90 tablet    Take 1 tablet (20 mg) by mouth daily    Gastroesophageal reflux disease without esophagitis       ONETOUCH ULTRA test strip   Generic drug:  blood glucose monitoring     50 each    TEST TWICE DAILY    Type 2 diabetes, HbA1C goal < 8% (H)       * order for DME     1 Device    Equipment being ordered:  blood pressure cuff/machine    Hypertension goal BP (blood pressure) < 140/90       * order for DME     1 Month    Glucometer of choice (One touch) with supplies Disp 1  Testing Strips for bid testing  #100  Lancets for bid testing #100    Type 2 diabetes, HbA1C goal < 8% (H)       * Notice:  This list has 4 medication(s) that are the same as other medications prescribed for you. Read the directions carefully, and ask your doctor or other care provider to review them with you.

## 2017-12-15 NOTE — PATIENT INSTRUCTIONS
At [facility], we strive to deliver an exceptional experience to you, every time we see you.  If you receive a survey in the mail, please send us back your thoughts. We really do value your feedback.    Based on your medical history, these are the current health maintenance/preventive care services that you are due for (some may have been done at this visit.)  Health Maintenance Due   Topic Date Due     EYE EXAM Q1 YEAR  04/03/2016     DIABETIC EDUCATION Q1 YEAR  11/24/2016     INFLUENZA VACCINE (SYSTEM ASSIGNED)  09/01/2017     BMP Q6 MOS  11/23/2017     A1C Q6 MO  11/23/2017     COLON CANCER SCREEN (SYSTEM ASSIGNED)  11/29/2017         Suggested websites for health information:  Www.[employer].Clean TeQ : Up to date and easily searchable information on multiple topics.  Www.medlineplus.gov : medication info, interactive tutorials, watch real surgeries online  Www.familydoctor.org : good info from the Academy of Family Physicians  Www.cdc.gov : public health info, travel advisories, epidemics (H1N1)  Www.aap.org : children's health info, normal development, vaccinations  Www.health.[facility].[place].us : [place] dept of health, public health issues in [place], N1N1    Your care team:                            Family Medicine Internal Medicine   MD Maverick Manzano MD Shantel Branch-Fleming, MD Katya Georgiev PA-C Nam Ho, MD Pediatrics   DEBO Medrano, MD Eli Barnett CNP, MD Deborah Mielke, MD Kim Thein, APRN CNP      Clinic hours: Monday - Thursday 7 am-7 pm; Fridays 7 am-5 pm.   Urgent care: Monday - Friday 11 am-9 pm; Saturday and Sunday 9 am-5 pm.  Pharmacy : Monday -Thursday 8 am-8 pm; Friday 8 am-6 pm; Saturday and Sunday 9 am-5 pm.     Clinic: [phone]   Pharmacy: [phone]

## 2017-12-15 NOTE — LETTER
December 15, 2017      Adrian Cruz  [address]  [facility] [address]            Dear Adrian Cruz,    Electrolytes were normal.   Glucose was high at 161 mg/dl  Kidney function was borderline low but stable for you. Avoid large dose of Ibuprofen. Optimal control of glucose should also help with this.   Plan of care and follow up as discussed in clinic.     Regards,    Radha Madrigal MD MPH/ak      Results for orders placed or performed in visit on 12/15/17   Hemoglobin A1c   Result Value Ref Range    Hemoglobin A1C 11.1 (H) 4.3 - 6.0 %   Basic metabolic panel   Result Value Ref Range    Sodium 137 133 - 144 mmol/L    Potassium 4.2 3.4 - 5.3 mmol/L    Chloride 104 94 - 109 mmol/L    Carbon Dioxide 26 20 - 32 mmol/L    Anion Gap 7 3 - 14 mmol/L    Glucose 161 (H) 70 - 99 mg/dL    Urea Nitrogen 28 7 - 30 mg/dL    Creatinine 1.30 (H) 0.52 - 1.04 mg/dL    GFR Estimate 41 (L) >60 mL/min/1.7m2    GFR Estimate If Black 50 (L) >60 mL/min/1.7m2    Calcium 9.1 8.5 - 10.1 mg/dL

## 2017-12-15 NOTE — PROGRESS NOTES
Please send a letter:    Dear Adrian Cruz,    Electrolytes were normal.   Glucose was high at 161 mg/dl  Kidney function was borderline low but stable for you. Avoid large dose of Ibuprofen. Optimal control of glucose should also help with this.   Plan of care and follow up as discussed in clinic.     Regards,    Radha Madrigal MD MPH

## 2018-01-10 DIAGNOSIS — E11.9 CONTROLLED TYPE 2 DIABETES MELLITUS WITHOUT COMPLICATION, WITHOUT LONG-TERM CURRENT USE OF INSULIN (H): ICD-10-CM

## 2018-01-10 NOTE — TELEPHONE ENCOUNTER
"Requested Prescriptions   Pending Prescriptions Disp Refills     LEVEMIR FLEXTOUCH 100 UNIT/ML injection [Pharmacy Med Name: LEVEMIR FLEX TOUCH PEN INJ 5X3ML]  Last Written Prescription Date:  12/15/17  Last Fill Quantity: 15ml,  # refills: 0   Last Office Visit with [facility], [facility] or [facility] prescribing provider:  12/15/17   Future Office Visit:    15 mL 0     Sig: INJECT 18 UNITS SUBCUTANEOUS AT BEDTIME    Long Acting Insulin Protocol Passed    1/10/2018  3:37 AM       Passed - Blood pressure less than 140/90 in past 6 months    BP Readings from Last 3 Encounters:   12/15/17 138/60   05/23/17 132/80   11/08/16 136/70                Passed - LDL on file in past 12 months    Recent Labs   Lab Test  05/23/17   0744   LDL  75            Passed - Microalbumin on file in past 12 months    Recent Labs   Lab Test  05/23/17   0817   MICROL  22   UMALCR  36.47*            Passed - Serum creatinine on file in past 12 months    Recent Labs   Lab Test  12/15/17   0705   CR  1.30*            Passed - HgbA1C in past 3 or 6 months    Recent Labs   Lab Test  12/15/17   0705   A1C  11.1*            Passed - Patient is age 18 or older       Passed - Recent visit with authorizing provider's specialty in past 6 months    Patient had office visit in the last 6 months or has a visit in the next 30 days with authorizing provider.  See \"Patient Info\" tab in inbasket, or \"Choose Columns\" in Meds & Orders section of the refill encounter.             "

## 2018-01-11 NOTE — TELEPHONE ENCOUNTER
Routing refill request to provider for review/approval because:  Labs out of range.   Fidelina Gottlieb RN

## 2018-02-06 ENCOUNTER — RADIANT APPOINTMENT (OUTPATIENT)
Dept: GENERAL RADIOLOGY | Facility: CLINIC | Age: 63
End: 2018-02-06
Attending: PHYSICIAN ASSISTANT
Payer: COMMERCIAL

## 2018-02-06 ENCOUNTER — OFFICE VISIT (OUTPATIENT)
Dept: URGENT CARE | Facility: URGENT CARE | Age: 63
End: 2018-02-06
Payer: COMMERCIAL

## 2018-02-06 VITALS
SYSTOLIC BLOOD PRESSURE: 148 MMHG | HEART RATE: 92 BPM | DIASTOLIC BLOOD PRESSURE: 74 MMHG | OXYGEN SATURATION: 98 % | TEMPERATURE: 97.6 F

## 2018-02-06 DIAGNOSIS — S99.911A INJURY OF RIGHT ANKLE AND FOOT, INITIAL ENCOUNTER: ICD-10-CM

## 2018-02-06 DIAGNOSIS — S99.921A INJURY OF RIGHT ANKLE AND FOOT, INITIAL ENCOUNTER: ICD-10-CM

## 2018-02-06 DIAGNOSIS — S82.821A CLOSED TORUS FRACTURE OF DISTAL END OF RIGHT FIBULA, INITIAL ENCOUNTER: Primary | ICD-10-CM

## 2018-02-06 PROCEDURE — 99213 OFFICE O/P EST LOW 20 MIN: CPT | Performed by: PHYSICIAN ASSISTANT

## 2018-02-06 PROCEDURE — 73630 X-RAY EXAM OF FOOT: CPT | Mod: RT

## 2018-02-06 PROCEDURE — 73610 X-RAY EXAM OF ANKLE: CPT | Mod: RT

## 2018-02-06 ASSESSMENT — PAIN SCALES - GENERAL: PAINLEVEL: EXTREME PAIN (9)

## 2018-02-06 NOTE — NURSING NOTE
"Chief Complaint   Patient presents with     Trauma     Pt c/o right ankle injury after falling outside of her home Wednesday.        Initial /74 (BP Location: Left arm, Patient Position: Chair, Cuff Size: Adult Large)  Pulse 92  Temp 97.6  F (36.4  C) (Oral)  SpO2 98%  Breastfeeding? No Estimated body mass index is 38.99 kg/(m^2) as calculated from the following:    Height as of 5/23/17: 5' 0.5\" (1.537 m).    Weight as of 12/15/17: 203 lb (92.1 kg).  Medication Reconciliation: complete     Darlene Rojas CMA (AAMA)      "

## 2018-02-06 NOTE — MR AVS SNAPSHOT
After Visit Summary   2/6/2018    Adrian Cruz    MRN: [id number]           Patient Information     Date Of Birth          1955        Visit Information        Provider Department      2/6/2018 11:10 AM Leonila Puckett PA-C [facility]        Today's Diagnoses     Closed torus fracture of distal end of right fibula, initial encounter    -  1    Injury of right ankle and foot, initial encounter           Follow-ups after your visit        Additional Services     ORTHO  REFERRAL       [facility] is referring you to the Orthopedic  Services at [facility].       The  Representative will assist you in the coordination of your Orthopedic and Musculoskeletal Care as prescribed by your physician.      The  Representative will call you within 1 business day to help schedule your appointment, or you may contact the  Representative at:    All areas ~ [phone]     Type of Referral : COMMINUTED TORUS TYPE FRACTURE R FIBULA  On 1/30/2018      Timeframe requested: this week    Coverage of these services is subject to the terms and limitations of your health insurance plan.  Please call member services at your health plan with any benefit or coverage questions.      If X-rays, CT or MRI's have been performed, please contact the facility where they were done to arrange for , prior to your scheduled appointment.  Please bring this referral request to your appointment and present it to your specialist.                  Who to contact     If you have questions or need follow up information about today's clinic visit or your schedule please contact [facility] directly at [phone].  Normal or non-critical lab and imaging results will be communicated to you by MyChart, letter or phone within 4 business days after the clinic has received the results. If you do not hear  "from us within 7 days, please contact the clinic through Indexing or phone. If you have a critical or abnormal lab result, we will notify you by phone as soon as possible.  Submit refill requests through Indexing or call your pharmacy and they will forward the refill request to us. Please allow 3 business days for your refill to be completed.          Additional Information About Your Visit        [employer] Information     Indexing lets you send messages to your doctor, view your test results, renew your prescriptions, schedule appointments and more. To sign up, go to [email or web address] . Click on \"Log in\" on the left side of the screen, which will take you to the Welcome page. Then click on \"Sign up Now\" on the right side of the page.     You will be asked to enter the access code listed below, as well as some personal information. Please follow the directions to create your username and password.     Your access code is: [email or web address]  Expires: 3/15/2018  8:26 AM     Your access code will  in 90 days. If you need help or a new code, please call your [place] clinic or [phone].        Care EveryWhere ID     This is your Care EveryWhere ID. This could be used by other organizations to access your [place] medical records  [id number]        Your Vitals Were     Pulse Temperature Pulse Oximetry Breastfeeding?          92 97.6  F (36.4  C) (Oral) 98% No         Blood Pressure from Last 3 Encounters:   18 148/74   12/15/17 138/60   17 132/80    Weight from Last 3 Encounters:   12/15/17 203 lb (92.1 kg)   17 199 lb (90.3 kg)   10/17/16 201 lb (91.2 kg)              We Performed the Following     ORTHO  REFERRAL          Today's Medication Changes          These changes are accurate as of 18 12:14 PM.  If you have any questions, ask your nurse or doctor.               These medicines have changed or have updated prescriptions.        Dose/Directions    * order for DME   This may " have changed:  Another medication with the same name was added. Make sure you understand how and when to take each.   Used for:  Hypertension goal BP (blood pressure) < 140/90   Changed by:  Leonila Puckett PA-C        Equipment being ordered: blood pressure cuff/machine   Quantity:  1 Device   Refills:  0       * order for DME   This may have changed:  Another medication with the same name was added. Make sure you understand how and when to take each.   Used for:  Type 2 diabetes, HbA1C goal < 8% (H)   Changed by:  Leonila Puckett PA-C        Glucometer of choice (One touch) with supplies Disp 1  Testing Strips for bid testing  #100  Lancets for bid testing #100   Quantity:  1 Month   Refills:  11       * order for DME   This may have changed:  You were already taking a medication with the same name, and this prescription was added. Make sure you understand how and when to take each.   Used for:  Closed torus fracture of distal end of right fibula, initial encounter   Changed by:  Leonila Puckett PA-C        by Device route continuous Air walker -  Use as instructed   Quantity:  1 Device   Refills:  0       * Notice:  This list has 3 medication(s) that are the same as other medications prescribed for you. Read the directions carefully, and ask your doctor or other care provider to review them with you.         Where to get your medicines      Some of these will need a paper prescription and others can be bought over the counter.  Ask your nurse if you have questions.     You don't need a prescription for these medications     order for DME                Primary Care Provider Office Phone # Fax #    Radha Madrigal [phone]       [address]  [facility] [address]        Equal Access to Services     : Hadii mayuri sanchez Sojaden, waaxda luqadaha, qaybta kaalkei jhaveri, naima woods. [facility] [phone].    ATENCIÓN: Luis Fernando mullen  español, tiene a sosa disposición servicios gratuitos de asistencia lingüística. Armin berg [phone].    We comply with applicable federal civil rights laws and [place] laws. We do not discriminate on the basis of race, color, national origin, age, disability, sex, sexual orientation, or gender identity.            Thank you!     Thank you for choosing [facility]  for your care. Our goal is always to provide you with excellent care. Hearing back from our patients is one way we can continue to improve our services. Please take a few minutes to complete the written survey that you may receive in the mail after your visit with us. Thank you!             Your Updated Medication List - Protect others around you: Learn how to safely use, store and throw away your medicines at www.disposemymeds.org.          This list is accurate as of 2/6/18 12:14 PM.  Always use your most recent med list.                   Brand Name Dispense Instructions for use Diagnosis    amLODIPine 10 MG tablet    NORVASC    90 tablet    TAKE 1 TABLET(10 MG) BY MOUTH DAILY    Essential hypertension with goal blood pressure less than 140/90, Diabetes mellitus with background retinopathy (H)       aspirin 81 MG tablet      Take by mouth daily        atorvastatin 80 MG tablet    LIPITOR    90 tablet    TAKE 1 TABLET BY MOUTH DAILY    Hyperlipidemia LDL goal <100, Diabetes mellitus with background retinopathy (H)       cetirizine 10 MG tablet    zyrTEC    30 tablet    Take 1 tablet (10 mg) by mouth every evening    Allergic sinusitis       fluticasone 50 MCG/ACT spray    FLONASE    16 g    Spray 1-2 sprays into both nostrils daily    Allergic sinusitis       glimepiride 4 MG tablet    AMARYL    180 tablet    TAKE 2 TABLETS BY MOUTH EVERY MORNING BEFORE A MEAL    Diabetes mellitus with background retinopathy (H)       insulin pen needle 31G X 5 MM    B-D U/F    100 each    USE as directed once a day        * JANUVIA 100 MG tablet    Generic drug:  sitagliptin     90 tablet    TAKE 1 TABLET BY MOUTH DAILY    Diabetes mellitus with background retinopathy (H)       * sitagliptin 100 MG tablet    JANUVIA    90 tablet    Take 1 tablet (100 mg) by mouth daily    Diabetes mellitus with background retinopathy (H)       LANCETS MISC.           * LEVEMIR FLEXTOUCH 100 UNIT/ML injection   Generic drug:  insulin detemir     15 mL    INJECT 24 UNITS SUBCUTANEOUS AT BEDTIME        * insulin detemir 100 UNIT/ML injection    LEVEMIR FLEXTOUCH    15 mL    Inject 24 Units Subcutaneous At Bedtime    Controlled type 2 diabetes mellitus without complication, without long-term current use of insulin (H)       losartan-hydrochlorothiazide 100-25 MG per tablet    HYZAAR    90 tablet    TAKE 1 TABLET BY MOUTH DAILY    Essential hypertension with goal blood pressure less than 140/90, Diabetes mellitus with background retinopathy (H)       metFORMIN 500 MG 24 hr tablet    GLUCOPHAGE-XR    360 tablet    TAKE 4 CAPSULES BY MOUTH DAILY WITH BREAKFAST    Diabetes mellitus with background retinopathy (H)       omeprazole 20 MG tablet     90 tablet    Take 1 tablet (20 mg) by mouth daily    Gastroesophageal reflux disease without esophagitis       ONETOUCH ULTRA test strip   Generic drug:  blood glucose monitoring     50 each    TEST TWICE DAILY    Type 2 diabetes, HbA1C goal < 8% (H)       * order for DME     1 Device    Equipment being ordered: blood pressure cuff/machine    Hypertension goal BP (blood pressure) < 140/90       * order for DME     1 Month    Glucometer of choice (One touch) with supplies Disp 1  Testing Strips for bid testing  #100  Lancets for bid testing #100    Type 2 diabetes, HbA1C goal < 8% (H)       * order for DME     1 Device    by Device route continuous Air walker -  Use as instructed    Closed torus fracture of distal end of right fibula, initial encounter       * Notice:  This list has 7 medication(s) that are the same as other medications  prescribed for you. Read the directions carefully, and ask your doctor or other care provider to review them with you.

## 2018-02-06 NOTE — PROGRESS NOTES
SUBJECTIVE:   Adrian Cruz is a 62 year old female who presents to clinic today for the following health issues:    Musculoskeletal problem/pain      Duration: Wednesday    Description  Location: right ankle    Intensity:  9/10    Accompanying signs and symptoms: swelling    History  Previous similar problem: no   Previous evaluation:  none    Precipitating or alleviating factors:  Trauma or overuse: YES- slipped on black ice at home on Wednesday. Landed on left hip, R leg went forward and ankle twisted. Is able to bear weight for slow gait  Aggravating factors include: putting pressure on foot, walking, sitting    Therapies tried and outcome: rest/inactivity, ibuprofen, ice, and heat- some relief.          Past Medical History:   Diagnosis Date     GERD (gastroesophageal reflux disease)      History of elevated lipids      Lower extremity edema      NIDDM (non-insulin dependent diabetes mellitus) 1997     Rhinitis      History   Smoking Status     Never Smoker   Smokeless Tobacco     Never Used       ROS:  GEN no fevers  SKIN no erythema  Musculoskeletal:  See HPI.      OBJECTIVE:  Blood pressure 148/74, pulse 92, temperature 97.6  F (36.4  C), temperature source Oral, SpO2 98 %, not currently breastfeeding.  Patient is alert and NAD.  EYES: conjunctiva clear  Ankle Exam (right):  Inspection:swelling around the lateral malleolus  swelling around the medial malleolus  Palpation:tender mainly over lateral malleolus  Tender mildly over medial malleolus  Tender mildly base of 5th metatarsal  Cap refill intact.    Good doralis pedis.  Neurovascularly Intact Distally.     XR no fx/fb- I see a comminuted torus fracture of the fibula.    ASSESSMENT:    ICD-10-CM    1. Closed torus fracture of distal end of right fibula, initial encounter S82.821A order for DME     ORTHO  REFERRAL   2. Injury of right ankle and foot, initial encounter S99.911A XR Ankle Right G/E 3 Views    S99.921A XR Foot Right G/E 3  Views           PLAN:See Ortho this week. Cam walker at all times.    OTC pain control as needed.  Ice, elevate, slowly increase activity level with active range of motion exercises encouraged.    GALINDO KhanC

## 2018-02-08 ENCOUNTER — OFFICE VISIT (OUTPATIENT)
Dept: PODIATRY | Facility: CLINIC | Age: 63
End: 2018-02-08
Payer: COMMERCIAL

## 2018-02-08 ENCOUNTER — TELEPHONE (OUTPATIENT)
Dept: PODIATRY | Facility: CLINIC | Age: 63
End: 2018-02-08

## 2018-02-08 ENCOUNTER — RADIANT APPOINTMENT (OUTPATIENT)
Dept: GENERAL RADIOLOGY | Facility: CLINIC | Age: 63
End: 2018-02-08
Attending: PODIATRIST
Payer: COMMERCIAL

## 2018-02-08 VITALS
SYSTOLIC BLOOD PRESSURE: 136 MMHG | BODY MASS INDEX: 37.19 KG/M2 | HEIGHT: 61 IN | WEIGHT: 197 LBS | DIASTOLIC BLOOD PRESSURE: 63 MMHG | HEART RATE: 87 BPM

## 2018-02-08 DIAGNOSIS — S82.831A CLOSED FRACTURE OF DISTAL END OF RIGHT FIBULA, UNSPECIFIED FRACTURE MORPHOLOGY, INITIAL ENCOUNTER: Primary | ICD-10-CM

## 2018-02-08 DIAGNOSIS — S82.831A CLOSED FRACTURE OF DISTAL END OF RIGHT FIBULA, UNSPECIFIED FRACTURE MORPHOLOGY, INITIAL ENCOUNTER: ICD-10-CM

## 2018-02-08 PROCEDURE — 73610 X-RAY EXAM OF ANKLE: CPT | Mod: RT

## 2018-02-08 PROCEDURE — 27786 TREATMENT OF ANKLE FRACTURE: CPT | Mod: RT | Performed by: PODIATRIST

## 2018-02-08 RX ORDER — HYDROCODONE BITARTRATE AND ACETAMINOPHEN 5; 325 MG/1; MG/1
TABLET ORAL
Qty: 20 TABLET | Refills: 0 | Status: SHIPPED | OUTPATIENT
Start: 2018-02-08 | End: 2018-04-27

## 2018-02-08 NOTE — PROGRESS NOTES
"Foot & Ankle Surgery  February 8, 2018    CC: R ankle fracture    I was asked to see Adrian Cruz regarding the chief complaint by:  HUAN Puckett PA-C    HPI:  Pt is a 62 year old female who presents with above complaint.  R ankle fracture, occurred 7 days ago.  She slipped on ice.  xrays 2/6/18 showed an oblique non-displaced fracture of the distal fibula without loss of ankle joint congruency.  She was put into a CAM and has been WB.  Describes a deep ache, pain 9/10 \"mostly when laying down at night, worse with walking\".  She has taken ibuprofen, applied ice packs.      ROS:   Pos for CC.  The patient denies current nausea, vomiting, chills, fevers, belly pain, calf pain, chest pain or SOB.  Complete remainder of ROS is otherwise neg.    VITALS:    Vitals:    02/08/18 0949   BP: 136/63   BP Location: Left arm   Patient Position: Sitting   Cuff Size: Adult Large   Pulse: 87   Weight: 197 lb (89.4 kg)   Height: 5' 1\" (1.549 m)       PMH:    Past Medical History:   Diagnosis Date     GERD (gastroesophageal reflux disease)      History of elevated lipids      Lower extremity edema      NIDDM (non-insulin dependent diabetes mellitus) 1997     Rhinitis        SXHX:    Past Surgical History:   Procedure Laterality Date     LASER SURGERY OF EYE Right 2015        MEDS:    Current Outpatient Prescriptions   Medication     insulin detemir (LEVEMIR FLEXTOUCH) 100 UNIT/ML injection     metFORMIN (GLUCOPHAGE-XR) 500 MG 24 hr tablet     glimepiride (AMARYL) 4 MG tablet     amLODIPine (NORVASC) 10 MG tablet     losartan-hydrochlorothiazide (HYZAAR) 100-25 MG per tablet     JANUVIA 100 MG tablet     atorvastatin (LIPITOR) 80 MG tablet     aspirin 81 MG tablet     order for DME     insulin detemir (LEVEMIR FLEXTOUCH) 100 UNIT/ML injection     sitagliptin (JANUVIA) 100 MG tablet     insulin pen needle (B-D U/F) 31G X 5 MM     fluticasone (FLONASE) 50 MCG/ACT spray     cetirizine (ZYRTEC) 10 MG tablet     omeprazole 20 MG tablet "     ONE TOUCH ULTRA test strip     ORDER FOR DME     ORDER FOR DME     LANCETS MISC.     No current facility-administered medications for this visit.        ALL:     Allergies   Allergen Reactions     Ace Inhibitors Cough     Glucophage [Biguanides] Cramps     Stomach upset     Lisinopril        FMH:    Family History   Problem Relation Age of Onset     DIABETES Mother      HEART DISEASE Mother      chf and pacemaker     DIABETES Father      Asthma Sister      Asthma Son      Asthma Daughter      HEART DISEASE Daughter      C.A.D. Father      age 49     CEREBROVASCULAR DISEASE Father      Genitourinary Problems Mother      ESRD     Glaucoma No family hx of      Macular Degeneration No family hx of        SocHx:    Social History     Social History     Marital status:      Spouse name: N/A     Number of children: N/A     Years of education: N/A     Occupational History     Not on file.     Social History Main Topics     Smoking status: Never Smoker     Smokeless tobacco: Never Used     Alcohol use No     Drug use: No     Sexual activity: No     Other Topics Concern     Parent/Sibling W/ Cabg, Mi Or Angioplasty Before 65f 55m? No     Social History Narrative           EXAMINATION:  Gen:   No apparent distress  Neuro:   A&Ox3, no deficits  Psych:    Answering questions appropriately for age and situation with normal affect  Head:    NCAT  Eye:    Visual scanning without deficit  Ear:    Response to auditory stimuli wnl  Lung:    Non-labored breathing on RA noted  Abd:    NTND per patient report  Lymph:    Moderate right ankle edema.  Vasc:    Pulses palpable, CFT minimally delayed  Neuro:    Light touch sensation intact to all sensory nerve distributions without paresthesias  Derm:    Neg for nodules, lesions or ulcerations  MSK:    R ankle - tender at distal fibula.  Knee-to-ankle neg.  No deltoid ligament pain.  No anterior calcaneus or 5th met base pain  Calf:    Neg for redness, swelling or  tenderness      Imaging:  xrays 2/6/18 - IMPRESSION: There is an oblique nondisplaced fracture of the distal fibula. There is soft tissue swelling about the ankle. Small bone  density projecting adjacent to the tip of the medial malleolus which  could be a small avulsion fracture or accessory ossicle. The ankle  mortise is congruent. Mild degenerative changes of the first  metatarsophalangeal joint.    -xrays 2/8/18 - IMPRESSION:  Mildly distracted oblique fracture through the distal  fibula, unchanged in alignment. No definite callus formation. No  significant change.    Assessment:  62 year old female with mildly displaced right distal fibular fracture without loss of ankle joint alignment      Plan:  Discussed etiologies, anatomy and options  1.  Right distal fibular fracture without ankle joint loss of congruency   -repeat films, as patient has been ambulating on the foot; no change in alignment  -NWB in Aircast boot  -Rx for Norco for better pain control  -RICE prn  -tensogrip dispensed for edema/pain control  -DME order for walker for NWB    Follow up:  4 weeks for xrays or sooner with acute issues      Patient's medical history was reviewed today    Body mass index is 37.22 kg/(m^2).  Weight management plan: Patient was referred to their PCP to discuss a diet and exercise plan.        Boby Rousseau DPM   Podiatric Foot & Ankle Surgeon  [facility]  [phone]

## 2018-02-08 NOTE — Clinical Note
Good morning  I saw Adrian last Thursday for her right distal fibular fracture.  As she had been ambulating on the foot, repeat films were ordered, but fortunately no change in alignment of the ankle was noted.  This type of fracture typically heals quite well non-surgically, and she'll start NWB with a walker.  We'll see her back in 4 weeks for repeat films.  Thanks  Boby

## 2018-02-08 NOTE — PATIENT INSTRUCTIONS
Thank you for choosing [facility]!    DR. VILCHIS'S CLINIC LOCATIONS:   MONDAY - [place] TUESDAY - [place]   [address] [facility]  [address]   [place], [place] [address] [place], [place] [id number] [phone]       THURSDAY AM - [place] THURSDAY PM - [facility]   [address]   [place] [address] [place], [place] [address] [phone]       FRIDAY AM - [place] SET UP SURGERY: [phone] [address] APPOINTMENTS: [phone]   [place] [address] BILLING QUESTIONS: [phone] FAX NUMBER: [phone]     Follow Up: in 4 weeks    PRICE Therapy    Many aches and pains throughout the foot and ankle can be helped with many simple treatments.  This is usually described as PRICE Therapy.      P - Protection - often times, inflammation/pain in the lower extremity is not able to improve simply because the areas involved are never allowed to rest.  Every step we take can bother the problematic area.  Protecting those areas is an important step in the healing process.  This may involve a walking cast boot, a special insert/orthotic device, an ankle brace, or simply avoiding barefoot walking.    R - Rest - in addition to protecting the foot/ankle, resting is an important, but often times difficult, treatment option.  Getting off your feet when they bother you, and specifically avoiding activities that cause pain/discomfort, are very beneficial to prevent, and treat, foot/ankle pain.      I - Ice - icing regularly can help to decrease inflammation and swelling in the foot, thus decreasing pain.  Using an ice pack or a bag of frozen peas works very well.  Ice for 20 minutes multiple times per day as needed.  Do not place the ice directly on the skin as this can cause tissue damage.    C - Compression - using a compression wrap or an ACE wrap can help to decrease swelling, which can help to decrease pain.   Wearing the wraps is generally not needed at night, but they should be worn on a regular basis when you are going to be on your feet for prolonged periods as gravity tends to pull fluids down to your feet/ankles.    E - Elevation - elevating your lower extremities multiple times daily for 15-20 minutes can help to decrease swelling, which works well in decreasing pain levels.      NSAID/Tylenol - An anti-inflammatory, like Aleve or ibuprofen, and/or a pain medication, such as Tylenol, can help to improve pain levels and get the issue resolved sooner rather than later.  Anyone with liver issues should be careful with Tylenol, and anyone with high blood pressure or heart, stomach or kidney issues should be careful with anti-inflammatories.  Please ask if you have questions about these medications, including dosage.    [facility]  [place]  [address]   [place] [address]  Ph: [phone]  Fax: [phone] [place] [facility]  [address]  [place] [address]  Ph: [phone]  Fax: [phone]   [place]  [address]   [place] [address]  Ph: [phone]  Fax: [phone]   [place]  [address]   [place] [address]  Ph: [phone]  Fax: [phone] [facility]   [address] [place], [place] [address] [phone]   [facility]  [address]   [phone]  [facility] [place]  [address]  [phone]     ** Alternative Scooter Rental/Purchase: [employer] [place]  [phone]  [email or web address]        Body Mass Index (BMI)  Many things can cause foot and ankle problems. Foot structure, activity level, foot mechanics and injuries are common causes of pain. One very important issue that often goes unmentioned, is body weight. Extra weight can cause increased stress on muscles, ligaments, bones and tendons. Sometimes just a few extra pounds is all it takes to put one over her/his threshold. Without reducing that  stress, it can be difficult to alleviate pain. Some people are uncomfortable addressing this issue, but we feel it is important for you to think about it. As Foot &  Ankle specialists, our job is addressing the lower extremity problem and possible causes. Regarding extra body weight, we encourage patients to discuss diet and weight management plans with their primary care doctors. It is this team approach that gives you the best opportunity for pain relief and getting you back on your feet.

## 2018-02-08 NOTE — TELEPHONE ENCOUNTER
Reason for Call:  Parking Permit     Detailed comments: Pt is requesting a temporary disability parking permit     Phone Number Patient can be reached at: Home number on file [phone] (home)    Best Time: any    Can we leave a detailed message on this number? YES    Call taken on 2/8/2018 at 11:54 AM by Marilyn Cruz

## 2018-02-08 NOTE — TELEPHONE ENCOUNTER
LVM disability parking form is completed, can leave form at [facility] or can mail to patient, requested call back from patient.    LV informing patient form would be mailed to them.

## 2018-02-08 NOTE — MR AVS SNAPSHOT
After Visit Summary   2/8/2018    Adrian Cruz    MRN: [id number]           Patient Information     Date Of Birth          1955        Visit Information        Provider Department      2/8/2018 9:45 AM Boby Vilchis DPM [facility]        Today's Diagnoses     Closed fracture of distal end of right fibula, unspecified fracture morphology, initial encounter    -  1      Care Instructions    Thank you for choosing [facility]!    DR. VILCHIS'S CLINIC LOCATIONS:   MONDAY - [place] TUESDAY - [place]   [address] [facility]  [address]   [place] [address] [place] [address]   [phone]       THURSDAY AM - [place] THURSDAY PM - [place]   [address]   [place] [address] [place] [address]   [phone]       FRIDAY AM - [place] SET UP SURGERY: [phone] [address] APPOINTMENTS: [phone]   [place] [address] BILLING QUESTIONS: [phone] FAX NUMBER: [phone]     Follow Up: in 4 weeks    PRICE Therapy    Many aches and pains throughout the foot and ankle can be helped with many simple treatments.  This is usually described as PRICE Therapy.      P - Protection - often times, inflammation/pain in the lower extremity is not able to improve simply because the areas involved are never allowed to rest.  Every step we take can bother the problematic area.  Protecting those areas is an important step in the healing process.  This may involve a walking cast boot, a special insert/orthotic device, an ankle brace, or simply avoiding barefoot walking.    R - Rest - in addition to protecting the foot/ankle, resting is an important, but often times difficult, treatment option.  Getting off your feet when they bother you, and specifically avoiding activities that cause pain/discomfort, are very beneficial to prevent, and treat, foot/ankle  pain.      I - Ice - icing regularly can help to decrease inflammation and swelling in the foot, thus decreasing pain.  Using an ice pack or a bag of frozen peas works very well.  Ice for 20 minutes multiple times per day as needed.  Do not place the ice directly on the skin as this can cause tissue damage.    C - Compression - using a compression wrap or an ACE wrap can help to decrease swelling, which can help to decrease pain.  Wearing the wraps is generally not needed at night, but they should be worn on a regular basis when you are going to be on your feet for prolonged periods as gravity tends to pull fluids down to your feet/ankles.    E - Elevation - elevating your lower extremities multiple times daily for 15-20 minutes can help to decrease swelling, which works well in decreasing pain levels.      NSAID/Tylenol - An anti-inflammatory, like Aleve or ibuprofen, and/or a pain medication, such as Tylenol, can help to improve pain levels and get the issue resolved sooner rather than later.  Anyone with liver issues should be careful with Tylenol, and anyone with high blood pressure or heart, stomach or kidney issues should be careful with anti-inflammatories.  Please ask if you have questions about these medications, including dosage.    [facility]  [place]  [address]   [place] [address]  Ph: [phone]  Fax: [phone] [place] ([facility])  [address]  [place] [address]  Ph: [phone]  Fax: [phone]   [place]  [address]   [place] [address]  Ph: [phone]  Fax: [phone]   [place]  [address]   [place] [address]  Ph: [phone]  Fax: [phone] [facility]   [address]  [place], [place] [address] [phone]   [facility]  [address]   [phone]  [place]  [place]  [address]  [phone]     ** Alternative Scooter Rental/Purchase: [employer] [place]  [phone]   [email or web address]        Body Mass Index (BMI)  Many things can cause foot and ankle problems. Foot structure, activity level, foot mechanics and injuries are common causes of pain. One very important issue that often goes unmentioned, is body weight. Extra weight can cause increased stress on muscles, ligaments, bones and tendons. Sometimes just a few extra pounds is all it takes to put one over her/his threshold. Without reducing that stress, it can be difficult to alleviate pain. Some people are uncomfortable addressing this issue, but we feel it is important for you to think about it. As Foot &  Ankle specialists, our job is addressing the lower extremity problem and possible causes. Regarding extra body weight, we encourage patients to discuss diet and weight management plans with their primary care doctors. It is this team approach that gives you the best opportunity for pain relief and getting you back on your feet.            Follow-ups after your visit        Future tests that were ordered for you today     Open Future Orders        Priority Expected Expires Ordered    XR Ankle Right G/E 3 Views Routine 2/8/2018 2/8/2019 2/8/2018            Who to contact     If you have questions or need follow up information about today's clinic visit or your schedule please contact [facility] directly at [phone].  Normal or non-critical lab and imaging results will be communicated to you by MyChart, letter or phone within 4 business days after the clinic has received the results. If you do not hear from us within 7 days, please contact the clinic through [employer] or phone. If you have a critical or abnormal lab result, we will notify you by phone as soon as possible.  Submit refill requests through [employer] or call your pharmacy and they will forward the refill request to us. Please allow 3 business days for your refill to be completed.          Additional Information About Your Visit        [employer]  "Information     Commun.it lets you send messages to your doctor, view your test results, renew your prescriptions, schedule appointments and more. To sign up, go to [email or web address] . Click on \"Log in\" on the left side of the screen, which will take you to the Welcome page. Then click on \"Sign up Now\" on the right side of the page.     You will be asked to enter the access code listed below, as well as some personal information. Please follow the directions to create your username and password.     Your access code is: [email or web address]  Expires: 3/15/2018  8:26 AM     Your access code will  in 90 days. If you need help or a new code, please call your [facility] clinic or [phone].        Care EveryWhere ID     This is your Care EveryWhere ID. This could be used by other organizations to access your [facility] medical records  [id number]        Your Vitals Were     Pulse Height BMI (Body Mass Index)             87 5' 1\" (1.549 m) 37.22 kg/m2          Blood Pressure from Last 3 Encounters:   18 136/63   18 148/74   12/15/17 138/60    Weight from Last 3 Encounters:   18 197 lb (89.4 kg)   12/15/17 203 lb (92.1 kg)   17 199 lb (90.3 kg)                 Today's Medication Changes          These changes are accurate as of 18 10:14 AM.  If you have any questions, ask your nurse or doctor.               Start taking these medicines.        Dose/Directions    HYDROcodone-acetaminophen 5-325 MG per tablet   Commonly known as:  NORCO   Used for:  Closed fracture of distal end of right fibula, unspecified fracture morphology, initial encounter   Started by:  Boby Rousseau DPM        Take 1-2 tablets every 4-6 hours as needed for pain.  Do not take other tylenol products with this medication, as too much tylenol can be damaging to the liver.   Quantity:  20 tablet   Refills:  0         These medicines have changed or have updated prescriptions.        Dose/Directions    * order for DME "   This may have changed:  Another medication with the same name was added. Make sure you understand how and when to take each.   Used for:  Hypertension goal BP (blood pressure) < 140/90   Changed by:  Boby Rousseau DPM        Equipment being ordered: blood pressure cuff/machine   Quantity:  1 Device   Refills:  0       * order for DME   This may have changed:  Another medication with the same name was added. Make sure you understand how and when to take each.   Used for:  Type 2 diabetes, HbA1C goal < 8% (H)   Changed by:  Boby Rousseau DPM        Glucometer of choice (One touch) with supplies Disp 1  Testing Strips for bid testing  #100  Lancets for bid testing #100   Quantity:  1 Month   Refills:  11       * order for DME   This may have changed:  Another medication with the same name was added. Make sure you understand how and when to take each.   Used for:  Closed torus fracture of distal end of right fibula, initial encounter   Changed by:  Boby Rousseau DPM        by Device route continuous Air walker -  Use as instructed   Quantity:  1 Device   Refills:  0       * order for DME   This may have changed:  You were already taking a medication with the same name, and this prescription was added. Make sure you understand how and when to take each.   Used for:  Closed fracture of distal end of right fibula, unspecified fracture morphology, initial encounter   Changed by:  Boby Rousseau DPM        Equipment being ordered: aluminum walker   Quantity:  1 Device   Refills:  0       * Notice:  This list has 4 medication(s) that are the same as other medications prescribed for you. Read the directions carefully, and ask your doctor or other care provider to review them with you.         Where to get your medicines      Some of these will need a paper prescription and others can be bought over the counter.  Ask your nurse if you have questions.     Bring a paper prescription for each of these  medications     HYDROcodone-acetaminophen 5-325 MG per tablet    order for DME                Primary Care Provider Office Phone # Fax #    Rahda Madrigal [phone]       [address]  [facility] [address]        Equal Access to Services     SHERYL HILTON : Hadii aad ku hadmaria eugeniao Soomaali, waaxda luqadaha, qaybta kaalmada adeegyada, waxay idiin hayaan ademaxi khdamarissh lajoshua woods. So [facility] [phone].    ATENCIÓN: Si habla español, tiene a sosa disposición servicios gratuitos de asistencia lingüística. Llame al [phone].    We comply with applicable federal civil rights laws and [place] laws. We do not discriminate on the basis of race, color, national origin, age, disability, sex, sexual orientation, or gender identity.            Thank you!     Thank you for choosing [facility]  for your care. Our goal is always to provide you with excellent care. Hearing back from our patients is one way we can continue to improve our services. Please take a few minutes to complete the written survey that you may receive in the mail after your visit with us. Thank you!             Your Updated Medication List - Protect others around you: Learn how to safely use, store and throw away your medicines at www.disposemymeds.org.          This list is accurate as of 2/8/18 10:14 AM.  Always use your most recent med list.                   Brand Name Dispense Instructions for use Diagnosis    amLODIPine 10 MG tablet    NORVASC    90 tablet    TAKE 1 TABLET(10 MG) BY MOUTH DAILY    Essential hypertension with goal blood pressure less than 140/90, Diabetes mellitus with background retinopathy (H)       aspirin 81 MG tablet      Take by mouth daily        atorvastatin 80 MG tablet    LIPITOR    90 tablet    TAKE 1 TABLET BY MOUTH DAILY    Hyperlipidemia LDL goal <100, Diabetes mellitus with background retinopathy (H)       cetirizine 10 MG tablet    zyrTEC    30 tablet    Take 1 tablet (10 mg) by mouth every  evening    Allergic sinusitis       fluticasone 50 MCG/ACT spray    FLONASE    16 g    Spray 1-2 sprays into both nostrils daily    Allergic sinusitis       glimepiride 4 MG tablet    AMARYL    180 tablet    TAKE 2 TABLETS BY MOUTH EVERY MORNING BEFORE A MEAL    Diabetes mellitus with background retinopathy (H)       HYDROcodone-acetaminophen 5-325 MG per tablet    NORCO    20 tablet    Take 1-2 tablets every 4-6 hours as needed for pain.  Do not take other tylenol products with this medication, as too much tylenol can be damaging to the liver.    Closed fracture of distal end of right fibula, unspecified fracture morphology, initial encounter       insulin pen needle 31G X 5 MM    B-D U/F    100 each    USE as directed once a day        * JANUVIA 100 MG tablet   Generic drug:  sitagliptin     90 tablet    TAKE 1 TABLET BY MOUTH DAILY    Diabetes mellitus with background retinopathy (H)       * sitagliptin 100 MG tablet    JANUVIA    90 tablet    Take 1 tablet (100 mg) by mouth daily    Diabetes mellitus with background retinopathy (H)       LANCETS MISC.           * LEVEMIR FLEXTOUCH 100 UNIT/ML injection   Generic drug:  insulin detemir     15 mL    INJECT 24 UNITS SUBCUTANEOUS AT BEDTIME        * insulin detemir 100 UNIT/ML injection    LEVEMIR FLEXTOUCH    15 mL    Inject 24 Units Subcutaneous At Bedtime    Controlled type 2 diabetes mellitus without complication, without long-term current use of insulin (H)       losartan-hydrochlorothiazide 100-25 MG per tablet    HYZAAR    90 tablet    TAKE 1 TABLET BY MOUTH DAILY    Essential hypertension with goal blood pressure less than 140/90, Diabetes mellitus with background retinopathy (H)       metFORMIN 500 MG 24 hr tablet    GLUCOPHAGE-XR    360 tablet    TAKE 4 CAPSULES BY MOUTH DAILY WITH BREAKFAST    Diabetes mellitus with background retinopathy (H)       omeprazole 20 MG tablet     90 tablet    Take 1 tablet (20 mg) by mouth daily    Gastroesophageal reflux  disease without esophagitis       ONETOUCH ULTRA test strip   Generic drug:  blood glucose monitoring     50 each    TEST TWICE DAILY    Type 2 diabetes, HbA1C goal < 8% (H)       * order for DME     1 Device    Equipment being ordered: blood pressure cuff/machine    Hypertension goal BP (blood pressure) < 140/90       * order for DME     1 Month    Glucometer of choice (One touch) with supplies Disp 1  Testing Strips for bid testing  #100  Lancets for bid testing #100    Type 2 diabetes, HbA1C goal < 8% (H)       * order for DME     1 Device    by Device route continuous Air walker -  Use as instructed    Closed torus fracture of distal end of right fibula, initial encounter       * order for DME     1 Device    Equipment being ordered: aluminum walker    Closed fracture of distal end of right fibula, unspecified fracture morphology, initial encounter       * Notice:  This list has 8 medication(s) that are the same as other medications prescribed for you. Read the directions carefully, and ask your doctor or other care provider to review them with you.

## 2018-02-08 NOTE — NURSING NOTE
"Chief Complaint   Patient presents with     Fracture     Right distal fibula fracture. DOI: 1/31/2018       Initial /63 (BP Location: Left arm, Patient Position: Sitting, Cuff Size: Adult Large)  Pulse 87  Ht 5' 1\" (1.549 m)  Wt 197 lb (89.4 kg)  BMI 37.22 kg/m2 Estimated body mass index is 37.22 kg/(m^2) as calculated from the following:    Height as of this encounter: 5' 1\" (1.549 m).    Weight as of this encounter: 197 lb (89.4 kg).  Medication Reconciliation: complete    "

## 2018-03-31 DIAGNOSIS — E11.9 CONTROLLED TYPE 2 DIABETES MELLITUS WITHOUT COMPLICATION, WITHOUT LONG-TERM CURRENT USE OF INSULIN (H): ICD-10-CM

## 2018-03-31 DIAGNOSIS — I10 ESSENTIAL HYPERTENSION WITH GOAL BLOOD PRESSURE LESS THAN 140/90: ICD-10-CM

## 2018-03-31 DIAGNOSIS — E11.3299 DIABETES MELLITUS WITH BACKGROUND RETINOPATHY (H): ICD-10-CM

## 2018-03-31 NOTE — TELEPHONE ENCOUNTER
"Requested Prescriptions   Pending Prescriptions Disp Refills     amLODIPine (NORVASC) 10 MG tablet [Pharmacy Med Name: AMLODIPINE BESYLATE 10MG TABLETS]    Last Written Prescription Date:  9/27/17  Last Fill Quantity: 90,  # refills: 1   Last Office Visit with [facility] or [facility] prescribing provider:  12/15/17   Future Office Visit:      90 tablet 0     Sig: TAKE 1 TABLET(10 MG) BY MOUTH DAILY    Calcium Channel Blockers Protocol  Failed    3/31/2018  3:36 AM       Failed - Normal serum creatinine on file in past 12 months    Recent Labs   Lab Test  12/15/17   0705   CR  1.30*            Passed - Blood pressure under 140/90 in past 12 months    BP Readings from Last 3 Encounters:   02/08/18 136/63   02/06/18 148/74   12/15/17 138/60                Passed - Recent (12 mo) or future (30 days) visit within the authorizing provider's specialty    Patient had office visit in the last 12 months or has a visit in the next 30 days with authorizing provider or within the authorizing provider's specialty.  See \"Patient Info\" tab in inbasket, or \"Choose Columns\" in Meds & Orders section of the refill encounter.           Passed - Patient is age 18 or older       Passed - No active pregnancy on record       Passed - No positive pregnancy test in past 12 months        LEVEMIR FLEXTOUCH 100 UNIT/ML injection [Pharmacy Med Name: LEVEMIR FLEX TOUCH PEN INJ 3ML]    Last Written Prescription Date:  1/12/18  Last Fill Quantity: 15 ml,  # refills: 0   Last Office Visit [place] [facility] or [facility] prescribing provider:  12/15/17   Future Office Visit:      15 mL 0     Sig: INJECT 24 UNITS SUBCUTANEOUSLY AT BEDTIME    Long Acting Insulin Protocol Failed    3/31/2018  3:36 AM       Failed - HgbA1C in past 3 or 6 months    Recent Labs   Lab Test  12/15/17   0705   A1C  11.1*            Passed - Blood pressure less than 140/90 in past 6 months    BP Readings from Last 3 Encounters:   02/08/18 136/63   02/06/18 148/74   12/15/17 138/60 " "               Passed - LDL on file in past 12 months    Recent Labs   Lab Test  05/23/17   0744   LDL  75            Passed - Microalbumin on file in past 12 months    Recent Labs   Lab Test  05/23/17   0817   MICROL  22   UMALCR  36.47*            Passed - Serum creatinine on file in past 12 months    Recent Labs   Lab Test  12/15/17   0705   CR  1.30*            Passed - Patient is age 18 or older       Passed - Recent (6 mo) or future (30 days) visit within the authorizing provider's specialty    Patient had office visit in the last 6 months or has a visit in the next 30 days with authorizing provider or within the authorizing provider's specialty.  See \"Patient Info\" tab in inbasket, or \"Choose Columns\" in Meds & Orders section of the refill encounter.                  Nelson Faarax  [facility]  "

## 2018-04-03 RX ORDER — AMLODIPINE BESYLATE 10 MG/1
TABLET ORAL
Qty: 30 TABLET | Refills: 0 | Status: SHIPPED | OUTPATIENT
Start: 2018-04-03 | End: 2018-04-27

## 2018-04-03 RX ORDER — INSULIN DETEMIR 100 [IU]/ML
INJECTION, SOLUTION SUBCUTANEOUS
Qty: 3 ML | Refills: 0 | Status: SHIPPED | OUTPATIENT
Start: 2018-04-03 | End: 2018-04-18

## 2018-04-03 NOTE — TELEPHONE ENCOUNTER
Routing refill request to provider for review/approval because:  Labs out of range  Labs not current:  A1C- patient did not show in February for A1C  Fidelina Gottlieb RN

## 2018-04-04 NOTE — TELEPHONE ENCOUNTER
Spoke with patient and informed her of the medication refill and that she needs to come in for labs and an office visit.  Mindy [place] MA

## 2018-04-18 ENCOUNTER — TELEPHONE (OUTPATIENT)
Dept: FAMILY MEDICINE | Facility: CLINIC | Age: 63
End: 2018-04-18

## 2018-04-18 DIAGNOSIS — E11.9 CONTROLLED TYPE 2 DIABETES MELLITUS WITHOUT COMPLICATION, WITHOUT LONG-TERM CURRENT USE OF INSULIN (H): ICD-10-CM

## 2018-04-18 NOTE — TELEPHONE ENCOUNTER
"Requested Prescriptions   Pending Prescriptions Disp Refills     LEVEMIR FLEXTOUCH 100 UNIT/ML injection [Pharmacy Med Name: LEVEMIR FLEX TOUCH PEN INJ 3ML]  Last Written Prescription Date:  04/03/18  Last Fill Quantity: 3ml,  # refills: 0   Last Office Visit with LUC, RICARDO or [facility] prescribing provider:  12/15/17   Future Office Visit:    3 mL 0     Sig: INJECT 24 UNITS UNDER THE SKIN AT BEDTIME    Long Acting Insulin Protocol Failed    4/18/2018  3:36 AM       Failed - HgbA1C in past 3 or 6 months    Recent Labs   Lab Test  12/15/17   0705   A1C  11.1*            Passed - Blood pressure less than 140/90 in past 6 months    BP Readings from Last 3 Encounters:   02/08/18 136/63   02/06/18 148/74   12/15/17 138/60                Passed - LDL on file in past 12 months    Recent Labs   Lab Test  05/23/17   0744   LDL  75            Passed - Microalbumin on file in past 12 months    Recent Labs   Lab Test  05/23/17   0817   MICROL  22   UMALCR  36.47*            Passed - Serum creatinine on file in past 12 months    Recent Labs   Lab Test  12/15/17   0705   CR  1.30*            Passed - Patient is age 18 or older       Passed - Recent (6 mo) or future (30 days) visit within the authorizing provider's specialty    Patient had office visit in the last 6 months or has a visit in the next 30 days with authorizing provider or within the authorizing provider's specialty.  See \"Patient Info\" tab in inbasket, or \"Choose Columns\" in Meds & Orders section of the refill encounter.              "

## 2018-04-19 NOTE — TELEPHONE ENCOUNTER
Routing refill request to provider for review/approval because:  Labs out of range:  A1c  Patient needs to be seen because:  Per last OV 12/15/17        1. Type 2 diabetes mellitus with hyperglycemia, with long-term current use of insulin (H)  -HbA1C has increased from 9.2 to 11.1  -Discussed restarting Metformin XR, can take 2 tabs twice a day if that is easier than 4 at a time  -Increased Levemir to 22 units and titrate up to 24 units  -Recheck HbA1C in 3 months  -Continue other medications the same     Xochitl Peter,RN BSN  [facility]  [phone]

## 2018-04-20 RX ORDER — INSULIN DETEMIR 100 [IU]/ML
INJECTION, SOLUTION SUBCUTANEOUS
Qty: 3 ML | Refills: 0 | Status: SHIPPED | OUTPATIENT
Start: 2018-04-20 | End: 2018-04-27 | Stop reason: DRUGHIGH

## 2018-04-20 NOTE — TELEPHONE ENCOUNTER
I sent in a one time refill only. Is due for office visit and labs. No further refills till then.  Thanks,  Radha Madrigal MD MPH

## 2018-04-20 NOTE — TELEPHONE ENCOUNTER
Patient states already have 4 pens left and doesn't need refills yet. Patient informed at least a refill is on hand at pharmacy already when out. Appointment schedule for next Friday with Dr Madrigal for Diabetes check.  William Paredes CMA

## 2018-04-27 ENCOUNTER — OFFICE VISIT (OUTPATIENT)
Dept: FAMILY MEDICINE | Facility: CLINIC | Age: 63
End: 2018-04-27
Payer: COMMERCIAL

## 2018-04-27 VITALS
HEART RATE: 87 BPM | TEMPERATURE: 95.1 F | HEIGHT: 61 IN | SYSTOLIC BLOOD PRESSURE: 137 MMHG | OXYGEN SATURATION: 99 % | BODY MASS INDEX: 38.06 KG/M2 | DIASTOLIC BLOOD PRESSURE: 63 MMHG | WEIGHT: 201.6 LBS

## 2018-04-27 DIAGNOSIS — E11.3299 DIABETES MELLITUS WITH BACKGROUND RETINOPATHY (H): ICD-10-CM

## 2018-04-27 DIAGNOSIS — Z13.89 SCREENING FOR DIABETIC PERIPHERAL NEUROPATHY: ICD-10-CM

## 2018-04-27 DIAGNOSIS — N18.30 TYPE 2 DIABETES MELLITUS WITH STAGE 3 CHRONIC KIDNEY DISEASE, WITH LONG-TERM CURRENT USE OF INSULIN (H): Primary | ICD-10-CM

## 2018-04-27 DIAGNOSIS — E11.22 TYPE 2 DIABETES MELLITUS WITH STAGE 3 CHRONIC KIDNEY DISEASE, WITH LONG-TERM CURRENT USE OF INSULIN (H): Primary | ICD-10-CM

## 2018-04-27 DIAGNOSIS — I10 HYPERTENSION GOAL BP (BLOOD PRESSURE) < 140/90: ICD-10-CM

## 2018-04-27 DIAGNOSIS — Z79.4 TYPE 2 DIABETES MELLITUS WITH STAGE 3 CHRONIC KIDNEY DISEASE, WITH LONG-TERM CURRENT USE OF INSULIN (H): Primary | ICD-10-CM

## 2018-04-27 DIAGNOSIS — E78.5 HYPERLIPIDEMIA LDL GOAL <100: ICD-10-CM

## 2018-04-27 DIAGNOSIS — I10 ESSENTIAL HYPERTENSION WITH GOAL BLOOD PRESSURE LESS THAN 140/90: ICD-10-CM

## 2018-04-27 DIAGNOSIS — E66.01 MORBID OBESITY (H): ICD-10-CM

## 2018-04-27 DIAGNOSIS — Z12.11 SCREEN FOR COLON CANCER: ICD-10-CM

## 2018-04-27 LAB
CHOLEST SERPL-MCNC: 115 MG/DL
CREAT UR-MCNC: 106 MG/DL
HBA1C MFR BLD: 10.2 % (ref 0–5.6)
HDLC SERPL-MCNC: 43 MG/DL
LDLC SERPL CALC-MCNC: 51 MG/DL
MICROALBUMIN UR-MCNC: 66 MG/L
MICROALBUMIN/CREAT UR: 61.89 MG/G CR (ref 0–25)
NONHDLC SERPL-MCNC: 72 MG/DL
TRIGL SERPL-MCNC: 106 MG/DL

## 2018-04-27 PROCEDURE — 36415 COLL VENOUS BLD VENIPUNCTURE: CPT | Performed by: PREVENTIVE MEDICINE

## 2018-04-27 PROCEDURE — 82043 UR ALBUMIN QUANTITATIVE: CPT | Performed by: PREVENTIVE MEDICINE

## 2018-04-27 PROCEDURE — 99207 C FOOT EXAM  NO CHARGE: CPT | Performed by: PREVENTIVE MEDICINE

## 2018-04-27 PROCEDURE — 80061 LIPID PANEL: CPT | Performed by: PREVENTIVE MEDICINE

## 2018-04-27 PROCEDURE — 99214 OFFICE O/P EST MOD 30 MIN: CPT | Performed by: PREVENTIVE MEDICINE

## 2018-04-27 PROCEDURE — 83036 HEMOGLOBIN GLYCOSYLATED A1C: CPT | Performed by: PREVENTIVE MEDICINE

## 2018-04-27 RX ORDER — GLIMEPIRIDE 4 MG/1
TABLET ORAL
Qty: 180 TABLET | Refills: 0 | Status: SHIPPED | OUTPATIENT
Start: 2018-04-27 | End: 2018-07-29

## 2018-04-27 RX ORDER — LOSARTAN POTASSIUM AND HYDROCHLOROTHIAZIDE 25; 100 MG/1; MG/1
1 TABLET ORAL DAILY
Qty: 90 TABLET | Refills: 0 | Status: SHIPPED | OUTPATIENT
Start: 2018-04-27 | End: 2018-07-03

## 2018-04-27 RX ORDER — METFORMIN HCL 500 MG
TABLET, EXTENDED RELEASE 24 HR ORAL
Qty: 360 TABLET | Refills: 0 | Status: SHIPPED | OUTPATIENT
Start: 2018-04-27 | End: 2018-07-29

## 2018-04-27 RX ORDER — AMLODIPINE BESYLATE 10 MG/1
TABLET ORAL
Qty: 90 TABLET | Refills: 0 | Status: SHIPPED | OUTPATIENT
Start: 2018-04-27 | End: 2018-07-29

## 2018-04-27 RX ORDER — ATORVASTATIN CALCIUM 80 MG/1
80 TABLET, FILM COATED ORAL DAILY
Qty: 90 TABLET | Refills: 0 | Status: SHIPPED | OUTPATIENT
Start: 2018-04-27 | End: 2018-07-29

## 2018-04-27 NOTE — LETTER
April 30, 2018      Adrian Cruz  [address]  [facility] [address]              Dear Adrian,    LDL cholesterol is at goal for you.  Three month glucose value is still high. Increase Levemir to 28 units as discussed.   Urine sample showed some abnormal protein, this should improve with better glucose control.    Plan of care and follow up as discussed in clinic.     Regards,    Radha Madrigal MD MPH/ag    Results for orders placed or performed in visit on 04/27/18   Hemoglobin A1c   Result Value Ref Range    Hemoglobin A1C 10.2 (H) 0 - 5.6 %   Lipid panel reflex to direct LDL Fasting   Result Value Ref Range    Cholesterol 115 <200 mg/dL    Triglycerides 106 <150 mg/dL    HDL Cholesterol 43 (L) >49 mg/dL    LDL Cholesterol Calculated 51 <100 mg/dL    Non HDL Cholesterol 72 <130 mg/dL   Albumin Random Urine Quantitative with Creat Ratio   Result Value Ref Range    Creatinine Urine 106 mg/dL    Albumin Urine mg/L 66 mg/L    Albumin Urine mg/g Cr 61.89 (H) 0 - 25 mg/g Cr

## 2018-04-27 NOTE — PATIENT INSTRUCTIONS
At [facility], we strive to deliver an exceptional experience to you, every time we see you.  If you receive a survey in the mail, please send us back your thoughts. We really do value your feedback.    Based on your medical history, these are the current health maintenance/preventive care services that you are due for (some may have been done at this visit.)  Health Maintenance Due   Topic Date Due     HIV SCREEN (SYSTEM ASSIGNED)  07/24/1972     EYE EXAM Q1 YEAR  04/03/2016     DIABETIC EDUCATION Q1 YEAR  11/24/2016     INFLUENZA VACCINE (1) 09/01/2017     COLON CANCER SCREEN (SYSTEM ASSIGNED)  11/29/2017     FOOT EXAM Q1 YEAR  05/23/2018     LIPID MONITORING Q1 YEAR  05/23/2018     MICROALBUMIN Q1 YEAR  05/23/2018         Suggested websites for health information:  Www.[employer] : Up to date and easily searchable information on multiple topics.  Www.Gigaom.gov : medication info, interactive tutorials, watch real surgeries online  Www.familydoctor.org : good info from the Academy of Family Physicians  Www.cdc.gov : public health info, travel advisories, epidemics (H1N1)  Www.aap.org : children's health info, normal development, vaccinations  Www.health.[facility].[place].us : [place] dept of health, public health issues in [place], N1N1    Your care team:                            Family Medicine Internal Medicine   MD Maverick Manzano MD Shantel Branch-Fleming, MD Katya Georgiev PA-C Nam Ho, MD Pediatrics   DEBO Medrano, MD Eli Barnett CNP, MD Deborah Mielke, MD Kim Thein, APRIRIS CNP      Clinic hours: Monday - Thursday 7 am-7 pm; Fridays 7 am-5 pm.   Urgent care: Monday - Friday 11 am-9 pm; Saturday and Sunday 9 am-5 pm.  Pharmacy : Monday -Thursday 8 am-8 pm; Friday 8 am-6 pm; Saturday and Sunday 9 am-5 pm.     Clinic: [phone]   Pharmacy: [phone]

## 2018-04-27 NOTE — PROGRESS NOTES
Please send a letter:    Dear Adrian Cruz,    LDL cholesterol is at goal for you.  Three month glucose value is still high. Increase Levemir to 28 units as discussed.   Urine sample showed some abnormal protein, this should improve with better glucose control.    Plan of care and follow up as discussed in clinic.     Regards,    Radha Madrigal MD MPH

## 2018-04-27 NOTE — MR AVS SNAPSHOT
After Visit Summary   4/27/2018    Adrian Cruz    MRN: [id number]           Patient Information     Date Of Birth          1955        Visit Information        Provider Department      4/27/2018 7:00 AM Radha Madrigal MD [facility]        Today's Diagnoses     Type 2 diabetes mellitus with stage 3 chronic kidney disease, with long-term current use of insulin (H)    -  1    Screen for colon cancer        Screening for diabetic peripheral neuropathy        Hypertension goal BP (blood pressure) < 140/90        Hyperlipidemia LDL goal <100          Care Instructions    At [facility], we strive to deliver an exceptional experience to you, every time we see you.  If you receive a survey in the mail, please send us back your thoughts. We really do value your feedback.    Based on your medical history, these are the current health maintenance/preventive care services that you are due for (some may have been done at this visit.)  Health Maintenance Due   Topic Date Due     HIV SCREEN (SYSTEM ASSIGNED)  07/24/1972     EYE EXAM Q1 YEAR  04/03/2016     DIABETIC EDUCATION Q1 YEAR  11/24/2016     INFLUENZA VACCINE (1) 09/01/2017     COLON CANCER SCREEN (SYSTEM ASSIGNED)  11/29/2017     FOOT EXAM Q1 YEAR  05/23/2018     LIPID MONITORING Q1 YEAR  05/23/2018     MICROALBUMIN Q1 YEAR  05/23/2018         Suggested websites for health information:  Www.[employer].org : Up to date and easily searchable information on multiple topics.  Www.medlineplus.gov : medication info, interactive tutorials, watch real surgeries online  Www.familydoctor.org : good info from the Academy of Family Physicians  Www.cdc.gov : public health info, travel advisories, epidemics (H1N1)  Www.aap.org : children's health info, normal development, vaccinations  Www.health.state[place]us : [place] dept of health, public health issues in [place], N1N1    Your care team:                            Family  Medicine Internal Medicine   MD Maverick Manzano MD Shantel Branch-Fleming, MD Katya Georgiev PA-C Nam Ho, MD Pediatrics   DEBO Medrano, MD Eli Barnett CNP, MD Deborah Mielke, MD Kim Thein, APRN [facility]      Clinic hours: Monday - Thursday 7 am-7 pm; Fridays 7 am-5 pm.   Urgent care: Monday - Friday 11 am-9 pm; Saturday and Sunday 9 am-5 pm.  Pharmacy : Monday -Thursday 8 am-8 pm; Friday 8 am-6 pm; Saturday and Sunday 9 am-5 pm.     Clinic: [phone]   Pharmacy: [phone]            Follow-ups after your visit        Additional Services     GASTROENTEROLOGY ADULT REF PROCEDURE ONLY       Last Lab Result: Creatinine (mg/dL)       Date                     Value                 12/15/2017               1.30 (H)         ----------  Body mass index is 38.09 kg/(m^2).     Needed:  No  Language:  English    Patient will be contacted to schedule procedure.     Please be aware that coverage of these services is subject to the terms and limitations of your health insurance plan.  Call member services at your health plan with any benefit or coverage questions.  Any procedures must be performed at a [place] facility OR coordinated by your clinic's referral office.    Please bring the following with you to your appointment:    (1) Any X-Rays, CTs or MRIs which have been performed.  Contact the facility where they were done to arrange for  prior to your scheduled appointment.    (2) List of current medications   (3) This referral request   (4) Any documents/labs given to you for this referral                  Future tests that were ordered for you today     Open Future Orders        Priority Expected Expires Ordered    TSH with free T4 reflex Routine  11/27/2018 4/27/2018    Basic metabolic panel Routine  11/27/2018 4/27/2018    Hemoglobin A1c Routine  11/27/2018 4/27/2018            Who to contact     If you  "have questions or need follow up information about today's clinic visit or your schedule please contact [facility] FREDY DELGADO directly at [phone].  Normal or non-critical lab and imaging results will be communicated to you by MyChart, letter or phone within 4 business days after the clinic has received the results. If you do not hear from us within 7 days, please contact the clinic through [employer] or phone. If you have a critical or abnormal lab result, we will notify you by phone as soon as possible.  Submit refill requests through [employer] or call your pharmacy and they will forward the refill request to us. Please allow 3 business days for your refill to be completed.          Additional Information About Your Visit        JANZZ[employer] Information     [employer] lets you send messages to your doctor, view your test results, renew your prescriptions, schedule appointments and more. To sign up, go to [email or web address] . Click on \"Log in\" on the left side of the screen, which will take you to the Welcome page. Then click on \"Sign up Now\" on the right side of the page.     You will be asked to enter the access code listed below, as well as some personal information. Please follow the directions to create your username and password.     Your access code is: [email or web address]  Expires: 2018  7:29 AM     Your access code will  in 90 days. If you need help or a new code, please call your [facility] clinic or [phone].        Care EveryWhere ID     This is your Care EveryWhere ID. This could be used by other organizations to access your [facility] medical records  [id number]        Your Vitals Were     Pulse Temperature Height Pulse Oximetry BMI (Body Mass Index)       87 95.1  F (35.1  C) (Tympanic) 5' 1\" (1.549 m) 99% 38.09 kg/m2        Blood Pressure from Last 3 Encounters:   18 137/63   18 136/63   18 148/74    Weight from Last 3 Encounters:   18 201 lb 9.6 oz (91.4 kg) "   02/08/18 197 lb (89.4 kg)   12/15/17 203 lb (92.1 kg)              We Performed the Following     Albumin Random Urine Quantitative with Creat Ratio     FOOT EXAM  NO CHARGE [[id number]]     GASTROENTEROLOGY ADULT REF PROCEDURE ONLY     Hemoglobin A1c     Lipid panel reflex to direct LDL Fasting          Today's Medication Changes          These changes are accurate as of 4/27/18  7:29 AM.  If you have any questions, ask your nurse or doctor.               These medicines have changed or have updated prescriptions.        Dose/Directions    * LEVEMIR FLEXTOUCH 100 UNIT/ML injection   This may have changed:  Another medication with the same name was changed. Make sure you understand how and when to take each.   Used for:  Controlled type 2 diabetes mellitus without complication, without long-term current use of insulin (H)   Generic drug:  insulin detemir   Changed by:  Radha Madrigal MD        INJECT 24 UNITS UNDER THE SKIN AT BEDTIME   Quantity:  3 mL   Refills:  0       * insulin detemir 100 UNIT/ML injection   Commonly known as:  LEVEMIR FLEXTOUCH   This may have changed:  additional instructions   Used for:  Type 2 diabetes mellitus with stage 3 chronic kidney disease, with long-term current use of insulin (H)   Changed by:  Radha Madrigal MD        INJECT 28 UNITS SUBCUTANEOUS AT BEDTIME   Quantity:  15 mL   Refills:  0       * Notice:  This list has 2 medication(s) that are the same as other medications prescribed for you. Read the directions carefully, and ask your doctor or other care provider to review them with you.         Where to get your medicines      These medications were sent to [facility], [address] AT [address] [place] [address]     Phone:  [phone]     insulin detemir 100 UNIT/ML injection                Primary Care Provider Office Phone # Fax #    Radha Madrigal [phone]       [address]  [facility] [address]        Equal Access to Services     SHERLY HILTON : Hadii mayuri goldsmith hadmaria eugeniaking Somakenzieali, waaxda luqadaha, qaybta kaalmada peterhlaeydeja, naima duffydamarismanuel woods. So [facility] [phone].    ATENCIÓN: Si habla español, tiene a sosa disposición servicios gratuitos de asistencia lingüística. [facility] [phone].    We comply with applicable federal civil rights laws and [place] laws. We do not discriminate on the basis of race, color, national origin, age, disability, sex, sexual orientation, or gender identity.            Thank you!     Thank you for choosing [facility]  for your care. Our goal is always to provide you with excellent care. Hearing back from our patients is one way we can continue to improve our services. Please take a few minutes to complete the written survey that you may receive in the mail after your visit with us. Thank you!             Your Updated Medication List - Protect others around you: Learn how to safely use, store and throw away your medicines at www.disposemymeds.org.          This list is accurate as of 4/27/18  7:29 AM.  Always use your most recent med list.                   Brand Name Dispense Instructions for use Diagnosis    amLODIPine 10 MG tablet    NORVASC    30 tablet    TAKE 1 TABLET(10 MG) BY MOUTH DAILY    Essential hypertension with goal blood pressure less than 140/90, Diabetes mellitus with background retinopathy (H)       aspirin 81 MG tablet      Take by mouth daily        atorvastatin 80 MG tablet    LIPITOR    90 tablet    TAKE 1 TABLET BY MOUTH DAILY    Hyperlipidemia LDL goal <100, Diabetes mellitus with background retinopathy (H)       cetirizine 10 MG tablet    zyrTEC    30 tablet    Take 1 tablet (10 mg) by mouth every evening    Allergic sinusitis       fluticasone 50 MCG/ACT spray    FLONASE    16 g    Spray 1-2 sprays into both nostrils daily    Allergic sinusitis       glimepiride 4 MG tablet    AMARYL     180 tablet    TAKE 2 TABLETS BY MOUTH EVERY MORNING BEFORE A MEAL    Diabetes mellitus with background retinopathy (H)       insulin pen needle 31G X 5 MM    B-D U/F    100 each    USE as directed once a day        * JANUVIA 100 MG tablet   Generic drug:  sitagliptin     90 tablet    TAKE 1 TABLET BY MOUTH DAILY    Diabetes mellitus with background retinopathy (H)       * sitagliptin 100 MG tablet    JANUVIA    90 tablet    Take 1 tablet (100 mg) by mouth daily    Diabetes mellitus with background retinopathy (H)       LANCETS MISC.           * LEVEMIR FLEXTOUCH 100 UNIT/ML injection   Generic drug:  insulin detemir     3 mL    INJECT 24 UNITS UNDER THE SKIN AT BEDTIME    Controlled type 2 diabetes mellitus without complication, without long-term current use of insulin (H)       * insulin detemir 100 UNIT/ML injection    LEVEMIR FLEXTOUCH    15 mL    INJECT 28 UNITS SUBCUTANEOUS AT BEDTIME    Type 2 diabetes mellitus with stage 3 chronic kidney disease, with long-term current use of insulin (H)       losartan-hydrochlorothiazide 100-25 MG per tablet    HYZAAR    90 tablet    TAKE 1 TABLET BY MOUTH DAILY    Essential hypertension with goal blood pressure less than 140/90, Diabetes mellitus with background retinopathy (H)       metFORMIN 500 MG 24 hr tablet    GLUCOPHAGE-XR    360 tablet    TAKE 4 CAPSULES BY MOUTH DAILY WITH BREAKFAST    Diabetes mellitus with background retinopathy (H)       omeprazole 20 MG tablet     90 tablet    Take 1 tablet (20 mg) by mouth daily    Gastroesophageal reflux disease without esophagitis       ONETOUCH ULTRA test strip   Generic drug:  blood glucose monitoring     50 each    TEST TWICE DAILY    Type 2 diabetes, HbA1C goal < 8% (H)       order for DME     1 Device    Equipment being ordered: blood pressure cuff/machine    Hypertension goal BP (blood pressure) < 140/90       order for DME     1 Month    Glucometer of choice (One touch) with supplies Disp 1  Testing Strips for bid  testing  #100  Lancets for bid testing #100    Type 2 diabetes, HbA1C goal < 8% (H)       * order for DME     1 Device    by Device route continuous Air walker -  Use as instructed    Closed torus fracture of distal end of right fibula, initial encounter       * order for DME     1 Device    Equipment being ordered: aluminum walker    Closed fracture of distal end of right fibula, unspecified fracture morphology, initial encounter       * Notice:  This list has 6 medication(s) that are the same as other medications prescribed for you. Read the directions carefully, and ask your doctor or other care provider to review them with you.

## 2018-04-27 NOTE — PROGRESS NOTES
SUBJECTIVE:   Adrian Cruz is a 62 year old female who presents to clinic today for the following health issues:    Diabetes Follow-up    Patient is checking blood sugars: once daily.  Results are as follows:          am - 172    Diabetic concerns: None     Symptoms of hypoglycemia (low blood sugar): none     Paresthesias (numbness or burning in feet) or sores: No     Date of last diabetic eye exam: 2017    Hyperlipidemia Follow-Up      Rate your low fat/cholesterol diet?: good    Taking statin?  Yes, no muscle aches from statin    Other lipid medications/supplements?:  none    Hypertension Follow-up      Outpatient blood pressures are being checked at home.  Results are 130/80.    Low Salt Diet: low salt    BP Readings from Last 2 Encounters:   04/27/18 137/63   02/08/18 136/63     Hemoglobin A1C (%)   Date Value   04/27/2018 10.2 (H)   12/15/2017 11.1 (H)     LDL Cholesterol Calculated (mg/dL)   Date Value   05/05/2016 73   01/22/2015 137 (H)     LDL Cholesterol Direct (mg/dL)   Date Value   05/23/2017 75       Amount of exercise or physical activity: None    Problems taking medications regularly: No    Medication side effects: none    Diet: low salt and low fat/cholesterol        Chronic Kidney Disease Follow-up      Current NSAID use?  Yes:   ibuprofen (Motrin)  Frequency: once every few weeks      Problem list and histories reviewed & adjusted, as indicated.  Additional history: as documented    Patient Active Problem List   Diagnosis     Hyperlipidemia LDL goal <100     Type 2 diabetes mellitus with stage 3 chronic kidney disease, with long-term current use of insulin (H)     Hypertension goal BP (blood pressure) < 140/90     Cataract     Diabetes mellitus with background retinopathy (H)     Advanced directives, counseling/discussion     Health Care Home     Morbid obesity (H)     Obesity, Class II, BMI 35-39.9     Past Surgical History:   Procedure Laterality Date     LASER SURGERY OF EYE Right 2015        Social History   Substance Use Topics     Smoking status: Never Smoker     Smokeless tobacco: Never Used     Alcohol use No     Family History   Problem Relation Age of Onset     DIABETES Mother      HEART DISEASE Mother      chf and pacemaker     Genitourinary Problems Mother      ESRD     DIABETES Father      C.A.D. Father      age 49     CEREBROVASCULAR DISEASE Father      Asthma Sister      Asthma Son      Asthma Daughter      HEART DISEASE Daughter      Glaucoma No family hx of      Macular Degeneration No family hx of          Current Outpatient Prescriptions   Medication Sig Dispense Refill     amLODIPine (NORVASC) 10 MG tablet TAKE 1 TABLET(10 MG) BY MOUTH DAILY 90 tablet 0     aspirin 81 MG tablet Take 1 tablet (81 mg) by mouth daily 90 tablet 0     atorvastatin (LIPITOR) 80 MG tablet Take 1 tablet (80 mg) by mouth daily 90 tablet 0     cetirizine (ZYRTEC) 10 MG tablet Take 1 tablet (10 mg) by mouth every evening 30 tablet 1     fluticasone (FLONASE) 50 MCG/ACT spray Spray 1-2 sprays into both nostrils daily 16 g 0     glimepiride (AMARYL) 4 MG tablet TAKE 2 TABLETS BY MOUTH EVERY MORNING BEFORE A MEAL 180 tablet 0     insulin detemir (LEVEMIR FLEXTOUCH) 100 UNIT/ML injection INJECT 28 UNITS SUBCUTANEOUS AT BEDTIME 15 mL 0     insulin pen needle (B-D U/F) 31G X 5 MM USE as directed once a day 100 each 3     losartan-hydrochlorothiazide (HYZAAR) 100-25 MG per tablet Take 1 tablet by mouth daily 90 tablet 0     metFORMIN (GLUCOPHAGE-XR) 500 MG 24 hr tablet TAKE 4 CAPSULES BY MOUTH DAILY WITH BREAKFAST 360 tablet 0     omeprazole 20 MG tablet Take 1 tablet (20 mg) by mouth daily 90 tablet 1     ONE TOUCH ULTRA test strip TEST TWICE DAILY 50 each 0     ORDER FOR DME Equipment being ordered: blood pressure cuff/machine 1 Device 0     ORDER FOR DME Glucometer of choice (One touch) with supplies Disp 1    Testing Strips for bid testing  #100    Lancets for bid testing #100 1 Month 11     order for DME by  Device route continuous Air walker -  Use as instructed 1 Device 0     order for DME Equipment being ordered: aluminum walker 1 Device 0     sitagliptin (JANUVIA) 100 MG tablet Take 1 tablet (100 mg) by mouth daily 90 tablet 0     [DISCONTINUED] amLODIPine (NORVASC) 10 MG tablet TAKE 1 TABLET(10 MG) BY MOUTH DAILY 30 tablet 0     [DISCONTINUED] atorvastatin (LIPITOR) 80 MG tablet TAKE 1 TABLET BY MOUTH DAILY 90 tablet 3     [DISCONTINUED] glimepiride (AMARYL) 4 MG tablet TAKE 2 TABLETS BY MOUTH EVERY MORNING BEFORE A MEAL 180 tablet 1     [DISCONTINUED] insulin detemir (LEVEMIR FLEXTOUCH) 100 UNIT/ML injection INJECT 24 UNITS SUBCUTANEOUS AT BEDTIME 15 mL 0     [DISCONTINUED] JANUVIA 100 MG tablet TAKE 1 TABLET BY MOUTH DAILY 90 tablet 0     [DISCONTINUED] LEVEMIR FLEXTOUCH 100 UNIT/ML injection INJECT 24 UNITS UNDER THE SKIN AT BEDTIME 3 mL 0     [DISCONTINUED] losartan-hydrochlorothiazide (HYZAAR) 100-25 MG per tablet TAKE 1 TABLET BY MOUTH DAILY 90 tablet 2     [DISCONTINUED] metFORMIN (GLUCOPHAGE-XR) 500 MG 24 hr tablet TAKE 4 CAPSULES BY MOUTH DAILY WITH BREAKFAST 360 tablet 1     [DISCONTINUED] sitagliptin (JANUVIA) 100 MG tablet Take 1 tablet (100 mg) by mouth daily 90 tablet 1     Allergies   Allergen Reactions     Ace Inhibitors Cough     Glucophage [Biguanides] Cramps     Stomach upset     Lisinopril      Recent Labs   Lab Test  04/27/18   0704  12/15/17   0705  05/23/17   0744   05/05/16   0730   01/22/15   0754  04/25/13   0734   02/16/12   0743   A1C  10.2*  11.1*  9.2*   < >  7.7*   < >  14.5*  12.9*   < >   --    LDL   --    --   75   --   73   --   137*  160*   < >   --    HDL   --    --    --    --   42*   --   46*  36*   < >   --    TRIG   --    --    --    --   120   --   266*  161*   < >   --    ALT   --    --    --    --   15   --   16   --    --   17   CR   --   1.30*  1.17*   < >  1.34*   < >  0.85  0.68   --   0.81   GFRESTIMATED   --   41*  47*   < >  40*   < >  68  89   --   73  "  GFRESTBLACK   --   50*  57*   < >  49*   < >  83  >90   --   88   POTASSIUM   --   4.2  4.2   < >  4.3   < >  4.5  5.0   < >  4.7   TSH   --    --   2.28   --    --    --   1.82  1.86   --    --     < > = values in this interval not displayed.      BP Readings from Last 3 Encounters:   04/27/18 137/63   02/08/18 136/63   02/06/18 148/74    Wt Readings from Last 3 Encounters:   04/27/18 201 lb 9.6 oz (91.4 kg)   02/08/18 197 lb (89.4 kg)   12/15/17 203 lb (92.1 kg)                  Labs reviewed in EPIC    Reviewed and updated as needed this visit by clinical staff  Tobacco  Allergies  Meds  Med Hx  Surg Hx  Fam Hx  Soc Hx      Reviewed and updated as needed this visit by Provider         ROS:  Constitutional, HEENT, cardiovascular, pulmonary, gi and gu systems are negative, except as otherwise noted.    OBJECTIVE:                                                    /63 (BP Location: Left arm, Patient Position: Chair, Cuff Size: Adult Large)  Pulse 87  Temp 95.1  F (35.1  C) (Tympanic)  Ht 5' 1\" (1.549 m)  Wt 201 lb 9.6 oz (91.4 kg)  SpO2 99%  BMI 38.09 kg/m2  Body mass index is 38.09 kg/(m^2).  GENERAL APPEARANCE: healthy, alert and no distress  EYES: Eyes grossly normal to inspection  NECK: trachea midline and normal to palpation  RESP: lungs clear to auscultation - no rales, rhonchi or wheezes  CV: regular rates and rhythm, normal S1 S2, no S3 or S4 and no irregular beats  ABDOMEN: soft, non-tender  MS: Trace bilateral pedal edema   SKIN: no suspicious lesions or rashes  NEURO: Normal strength and tone, mentation intact and speech normal  DIABETIC FOOT EXAM: normal DP and PT pulses, no trophic changes or ulcerative lesions, normal sensory exam and onychomycosis  PSYCH: mentation appears normal and affect normal/bright    Diagnostic test results:  Diagnostic Test Results:  Results for orders placed or performed in visit on 04/27/18 (from the past 24 hour(s))   Hemoglobin A1c   Result Value Ref " Range    Hemoglobin A1C 10.2 (H) 0 - 5.6 %        ASSESSMENT/PLAN:                                                    1. Type 2 diabetes mellitus with stage 3 chronic kidney disease, with long-term current use of insulin (H)  -slight improvement in HbA1C from 11.1 to 10.2  - Hemoglobin A1c  - Albumin Random Urine Quantitative with Creat Ratio  - insulin detemir (LEVEMIR FLEXTOUCH) 100 UNIT/ML injection; INJECT 28 UNITS SUBCUTANEOUS AT BEDTIME  Dispense: 15 mL; Refill: 0  - TSH with free T4 reflex; Future  - Basic metabolic panel; Future  - Hemoglobin A1c; Future  - aspirin 81 MG tablet; Take 1 tablet (81 mg) by mouth daily  Dispense: 90 tablet; Refill: 0  -Continue oral medication unchanged  -Increased Levemir to 28 units from 22 units  -Contact me with fasting readings in 1 week, adjust insulin as needed    2. Hypertension goal BP (blood pressure) < 140/90  - Albumin Random Urine Quantitative with Creat Ratio    3. Morbid obesity (H)  -Weight increased from last check  -Plans to increase exercise as weather improved    4. Diabetes mellitus with background retinopathy (H)  - amLODIPine (NORVASC) 10 MG tablet; TAKE 1 TABLET(10 MG) BY MOUTH DAILY  Dispense: 90 tablet; Refill: 0  - atorvastatin (LIPITOR) 80 MG tablet; Take 1 tablet (80 mg) by mouth daily  Dispense: 90 tablet; Refill: 0  - glimepiride (AMARYL) 4 MG tablet; TAKE 2 TABLETS BY MOUTH EVERY MORNING BEFORE A MEAL  Dispense: 180 tablet; Refill: 0  - sitagliptin (JANUVIA) 100 MG tablet; Take 1 tablet (100 mg) by mouth daily  Dispense: 90 tablet; Refill: 0  - losartan-hydrochlorothiazide (HYZAAR) 100-25 MG per tablet; Take 1 tablet by mouth daily  Dispense: 90 tablet; Refill: 0  - metFORMIN (GLUCOPHAGE-XR) 500 MG 24 hr tablet; TAKE 4 CAPSULES BY MOUTH DAILY WITH BREAKFAST  Dispense: 360 tablet; Refill: 0    5. Hyperlipidemia LDL goal <100  - Lipid panel reflex to direct LDL Fasting  -Last LDL was 75  - atorvastatin (LIPITOR) 80 MG tablet; Take 1 tablet (80 mg)  by mouth daily  Dispense: 90 tablet; Refill: 0      The 10-year ASCVD risk score (Juan A XIMENA Jr, et al., 2013) is: 11.9%    Values used to calculate the score:      Age: 62 years      Sex: Female      Is Non- : No      Diabetic: Yes      Tobacco smoker: No      Systolic Blood Pressure: 137 mmHg      Is BP treated: Yes      HDL Cholesterol: 42 mg/dL      Total Cholesterol: 139 mg/dL    6. Screen for colon cancer  -Due for colonoscopy  - GASTROENTEROLOGY ADULT REF PROCEDURE ONLY    7. Screening for diabetic peripheral neuropathy  - FOOT EXAM  NO CHARGE [80087.114]    8. Essential hypertension with goal blood pressure less than 140/90  - amLODIPine (NORVASC) 10 MG tablet; TAKE 1 TABLET(10 MG) BY MOUTH DAILY  Dispense: 90 tablet; Refill: 0  - losartan-hydrochlorothiazide (HYZAAR) 100-25 MG per tablet; Take 1 tablet by mouth daily  Dispense: 90 tablet; Refill: 0  -At goal  -continue current medication       Follow up with Provider - recheck labs in 8 weeks  If at goal them 4 month follow up      Radha Madrigal MD MPH  [facility]

## 2018-07-01 DIAGNOSIS — E11.3299 DIABETES MELLITUS WITH BACKGROUND RETINOPATHY (H): ICD-10-CM

## 2018-07-01 DIAGNOSIS — I10 ESSENTIAL HYPERTENSION WITH GOAL BLOOD PRESSURE LESS THAN 140/90: ICD-10-CM

## 2018-07-01 NOTE — TELEPHONE ENCOUNTER
"Requested Prescriptions   Pending Prescriptions Disp Refills     losartan-hydrochlorothiazide (HYZAAR) 100-25 MG per tablet [Pharmacy Med Name: LOSARTAN/HCTZ 100/25MG TABLETS] 90 tablet 0     Sig: TAKE 1 TABLET BY MOUTH DAILY    Angiotensin-II Receptors Failed    7/1/2018  3:38 AM       Failed - Normal serum creatinine on file in past 12 months    Recent Labs   Lab Test  12/15/17   0705   CR  1.30*            Passed - Blood pressure under 140/90 in past 12 months    BP Readings from Last 3 Encounters:   04/27/18 137/63   02/08/18 136/63   02/06/18 148/74                Passed - Recent (12 mo) or future (30 days) visit within the authorizing provider's specialty    Patient had office visit in the last 12 months or has a visit in the next 30 days with authorizing provider or within the authorizing provider's specialty.  See \"Patient Info\" tab in inbasket, or \"Choose Columns\" in Meds & Orders section of the refill encounter.           Passed - Patient is age 18 or older       Passed - No active pregnancy on record       Passed - Normal serum potassium on file in past 12 months    Recent Labs   Lab Test  12/15/17   0705   POTASSIUM  4.2                   Passed - No positive pregnancy test in past 12 months        Last Written Prescription Date:  4/27/18  Last Fill Quantity: 90,  # refills: 0   Last office visit: 4/27/2018 with prescribing provider:  SKY   Future Office Visit:      "

## 2018-07-02 ENCOUNTER — TELEPHONE (OUTPATIENT)
Dept: FAMILY MEDICINE | Facility: CLINIC | Age: 63
End: 2018-07-02

## 2018-07-02 NOTE — TELEPHONE ENCOUNTER
Panel Management Review      BP Readings from Last 1 Encounters:   04/27/18 137/63    ,   Lab Results   Component Value Date    A1C 10.2 04/27/2018    A1C 11.1 12/15/2017   , 4/27/2018  Last Office Visit with this department: 4/27/2018    Fail List measure: Colon cancer screening, Diabetes management      Patient is due/failing the following:   COLONOSCOPY and FOLLOW UP    Action needed:   Patient needs office visit for Diabetes check and another appointment for colonoscopy.    Type of outreach:    Phone, spoke to patient.  Appointment scheduled for diabetes check and reminded about colonoscopy due    Questions for provider review:    None                                                                                                                                    William Paredes      Chart routed to NA .

## 2018-07-03 RX ORDER — LOSARTAN POTASSIUM AND HYDROCHLOROTHIAZIDE 25; 100 MG/1; MG/1
1 TABLET ORAL DAILY
Qty: 30 TABLET | Refills: 0 | Status: SHIPPED | OUTPATIENT
Start: 2018-07-03 | End: 2018-10-30

## 2018-07-03 RX ORDER — LOSARTAN POTASSIUM AND HYDROCHLOROTHIAZIDE 25; 100 MG/1; MG/1
1 TABLET ORAL DAILY
Qty: 30 TABLET | Refills: 0 | Status: SHIPPED | OUTPATIENT
Start: 2018-07-03 | End: 2019-02-19

## 2018-07-03 NOTE — TELEPHONE ENCOUNTER
Call pharmacy and cancel lisinopril - hctz rx just sent. Looks like patient is on losartan-hctz now.  Labs and visit still due.

## 2018-07-03 NOTE — TELEPHONE ENCOUNTER
Routing refill request to provider for review/approval because:  CR not at goal  Beverley Greco RN

## 2018-07-03 NOTE — TELEPHONE ENCOUNTER
Called pharmacy with the information below.    Sabrina Ruiz RN, [facility]

## 2018-07-07 DIAGNOSIS — Z79.4 TYPE 2 DIABETES MELLITUS WITH STAGE 3 CHRONIC KIDNEY DISEASE, WITH LONG-TERM CURRENT USE OF INSULIN (H): ICD-10-CM

## 2018-07-07 DIAGNOSIS — E11.22 TYPE 2 DIABETES MELLITUS WITH STAGE 3 CHRONIC KIDNEY DISEASE, WITH LONG-TERM CURRENT USE OF INSULIN (H): ICD-10-CM

## 2018-07-07 DIAGNOSIS — N18.30 TYPE 2 DIABETES MELLITUS WITH STAGE 3 CHRONIC KIDNEY DISEASE, WITH LONG-TERM CURRENT USE OF INSULIN (H): ICD-10-CM

## 2018-07-07 NOTE — TELEPHONE ENCOUNTER
"Requested Prescriptions   Pending Prescriptions Disp Refills     LEVEMIR FLEXTOUCH 100 UNIT/ML injection [Pharmacy Med Name: LEVEMIR FLEX TOUCH PEN INJ 3ML] 15 mL 0    Last Written Prescription Date:  4/27/18  Last Fill Quantity: 15ml,  # refills: 0   Last Office Visit with G, P or [facility] prescribing provider:  4/27/2018     Future Office Visit:    Next 5 appointments (look out 90 days)     Jul 17, 2018  7:00 AM CDT   Office Visit with Radha Madrigal MD   [facility] ([facility]    [address] [place] [id number]   [phone]                  Sig: INJECT 28 UNITS UNDER THE SKIN AT BEDTIME    Long Acting Insulin Protocol Passed    7/7/2018  9:13 AM       Passed - Blood pressure less than 140/90 in past 6 months    BP Readings from Last 3 Encounters:   04/27/18 137/63   02/08/18 136/63   02/06/18 148/74                Passed - LDL on file in past 12 months    Recent Labs   Lab Test  04/27/18   0704   LDL  51            Passed - Microalbumin on file in past 12 months    Recent Labs   Lab Test  04/27/18   0730   MICROL  66   UMALCR  61.89*            Passed - Serum creatinine on file in past 12 months    Recent Labs   Lab Test  12/15/17   0705   CR  1.30*            Passed - HgbA1C in past 3 or 6 months    If HgbA1C is 8 or greater, it needs to be on file within the past 3 months.  If less than 8, must be on file within the past 6 months.     Recent Labs   Lab Test  04/27/18   0704   A1C  10.2*            Passed - Patient is age 18 or older       Passed - Recent (6 mo) or future (30 days) visit within the authorizing provider's specialty    Patient had office visit in the last 6 months or has a visit in the next 30 days with authorizing provider or within the authorizing provider's specialty.  See \"Patient Info\" tab in inbasket, or \"Choose Columns\" in Meds & Orders section of the refill encounter.              "

## 2018-07-10 RX ORDER — INSULIN DETEMIR 100 [IU]/ML
INJECTION, SOLUTION SUBCUTANEOUS
Qty: 15 ML | Refills: 0 | Status: SHIPPED | OUTPATIENT
Start: 2018-07-10 | End: 2018-08-06

## 2018-07-10 NOTE — TELEPHONE ENCOUNTER
Prescription approved per [facility] Refill Protocol.    Marcel Gutierrez RN, BSN

## 2018-07-29 DIAGNOSIS — E11.3299 DIABETES MELLITUS WITH BACKGROUND RETINOPATHY (H): ICD-10-CM

## 2018-07-29 DIAGNOSIS — I10 ESSENTIAL HYPERTENSION WITH GOAL BLOOD PRESSURE LESS THAN 140/90: ICD-10-CM

## 2018-07-29 DIAGNOSIS — E78.5 HYPERLIPIDEMIA LDL GOAL <100: ICD-10-CM

## 2018-07-29 NOTE — TELEPHONE ENCOUNTER
"Requested Prescriptions   Pending Prescriptions Disp Refills     amLODIPine (NORVASC) 10 MG tablet [Pharmacy Med Name: AMLODIPINE BESYLATE 10MG TABLETS] 90 tablet 0    Last Written Prescription Date:  4/27/18  Last Fill Quantity: 90,  # refills: 0   Last Office Visit with [facility], [facility] or [facility] prescribing provider:  7/17/2018     Future Office Visit:      Sig: TAKE 1 TABLET(10 MG) BY MOUTH DAILY    Calcium Channel Blockers Protocol  Failed    7/29/2018  3:36 AM       Failed - Normal serum creatinine on file in past 12 months    Recent Labs   Lab Test  12/15/17   0705   CR  1.30*            Passed - Blood pressure under 140/90 in past 12 months    BP Readings from Last 3 Encounters:   04/27/18 137/63   02/08/18 136/63   02/06/18 148/74                Passed - Recent (12 mo) or future (30 days) visit within the authorizing provider's specialty    Patient had office visit in the last 12 months or has a visit in the next 30 days with authorizing provider or within the authorizing provider's specialty.  See \"Patient Info\" tab in inbasket, or \"Choose Columns\" in Meds & Orders section of the refill encounter.           Passed - Patient is age 18 or older       Passed - No active pregnancy on record       Passed - No positive pregnancy test in past 12 months        JANUVIA 100 MG tablet [Pharmacy Med Name: JANUVIA 100MG TABLETS] 90 tablet 0    Last Written Prescription Date:  4/27/18  Last Fill Quantity: 90,  # refills: 0   Last Office Visit with [facility], [facility] or [facility] prescribing provider:  7/17/2018     Future Office Visit:      Sig: TAKE 1 TABLET(100 MG) BY MOUTH DAILY    DPP4 Inhibitors Protocol Failed    7/29/2018  3:36 AM       Failed - HgbA1C in past 3 or 6 months    If HgbA1C is 8 or greater, it needs to be on file within the past 3 months.  If less than 8, must be on file within the past 6 months.     Recent Labs   Lab Test  04/27/18   0704   A1C  10.2*            Failed - Normal serum creatinine in past 12 months    " "Recent Labs   Lab Test  12/15/17   0705   CR  1.30*            Passed - Blood pressure less than 140/90 in past 6 months    BP Readings from Last 3 Encounters:   04/27/18 137/63   02/08/18 136/63   02/06/18 148/74                Passed - LDL on file in past 12 months    Recent Labs   Lab Test  04/27/18   0704   LDL  51            Passed - Microalbumin on file in past 12 months    Recent Labs   Lab Test  04/27/18   0730   MICROL  66   UMALCR  61.89*            Passed - Patient is age 18 or older       Passed - No active pregnancy on record       Passed - No positive pregnancy test in past 12 months       Passed - Recent (6 mo) or future (30 days) visit within the authorizing provider's specialty    Patient had office visit in the last 6 months or has a visit in the next 30 days with authorizing provider.  See \"Patient Info\" tab in inbasket, or \"Choose Columns\" in Meds & Orders section of the refill encounter.            metFORMIN (GLUCOPHAGE-XR) 500 MG 24 hr tablet [Pharmacy Med Name: METFORMIN ER 500MG 24HR TABS] 360 tablet 0    Last Written Prescription Date:  4/27/18  Last Fill Quantity: 360,  # refills: 0   Last Office Visit with [facility], [facility] or [facility] prescribing provider:  7/17/2018     Future Office Visit:      Sig: TAKE 4 TABLETS BY MOUTH DAILY WITH BREAKFAST    Biguanide Agents Failed    7/29/2018  3:36 AM       Failed - Patient has documented A1c within the specified period of time.    If HgbA1C is 8 or greater, it needs to be on file within the past 3 months.  If less than 8, must be on file within the past 6 months.     Recent Labs   Lab Test  04/27/18   0704   A1C  10.2*            Failed - Patient's CR is NOT>1.4 OR Patient's EGFR is NOT<45 within past 12 mos.    Recent Labs   Lab Test  12/15/17   0705   GFRESTIMATED  41*   GFRESTBLACK  50*       Recent Labs   Lab Test  12/15/17   0705   CR  1.30*            Passed - Blood pressure less than 140/90 in past 6 months    BP Readings from Last 3 Encounters: " "  04/27/18 137/63   02/08/18 136/63   02/06/18 148/74                Passed - Patient has documented LDL within the past 12 mos.    Recent Labs   Lab Test  04/27/18   0704   LDL  51            Passed - Patient has had a Microalbumin in the past 12 mos.    Recent Labs   Lab Test  04/27/18   0730   MICROL  66   UMALCR  61.89*            Passed - Patient is age 10 or older       Passed - Patient does NOT have a diagnosis of CHF.       Passed - Patient is not pregnant       Passed - Patient has not had a positive pregnancy test within the past 12 mos.        Passed - Recent (6 mo) or future (30 days) visit within the authorizing provider's specialty    Patient had office visit in the last 6 months or has a visit in the next 30 days with authorizing provider or within the authorizing provider's specialty.  See \"Patient Info\" tab in inbasket, or \"Choose Columns\" in Meds & Orders section of the refill encounter.            atorvastatin (LIPITOR) 80 MG tablet [Pharmacy Med Name: ATORVASTATIN 80MG TABLETS] 90 tablet 0    Last Written Prescription Date:  4/27/18  Last Fill Quantity: 90,  # refills: 0   Last Office Visit with [facility], [facility] or [facility] prescribing provider:  7/17/2018     Future Office Visit:      Sig: TAKE 1 TABLET(80 MG) BY MOUTH DAILY    Statins Protocol Passed    7/29/2018  3:36 AM       Passed - LDL on file in past 12 months    Recent Labs   Lab Test  04/27/18   0704   LDL  51            Passed - No abnormal creatine kinase in past 12 months    No lab results found.            Passed - Recent (12 mo) or future (30 days) visit within the authorizing provider's specialty    Patient had office visit in the last 12 months or has a visit in the next 30 days with authorizing provider or within the authorizing provider's specialty.  See \"Patient Info\" tab in inbasket, or \"Choose Columns\" in Meds & Orders section of the refill encounter.           Passed - Patient is age 18 or older       Passed - No active pregnancy " "on record       Passed - No positive pregnancy test in past 12 months        glimepiride (AMARYL) 4 MG tablet [Pharmacy Med Name: GLIMEPIRIDE 4MG TABLETS] 180 tablet 0    Last Written Prescription Date:  4/27/18  Last Fill Quantity: 180,  # refills: 0   Last Office Visit with G, [facility] or [facility] prescribing provider:  7/17/2018     Future Office Visit:      Sig: TAKE 2 TABLETS BY MOUTH EVERY MORNING BEFORE A MEAL    Sulfonylurea Agents Failed    7/29/2018  3:36 AM       Failed - Patient has documented A1c within the specified period of time.    If HgbA1C is 8 or greater, it needs to be on file within the past 3 months.  If less than 8, must be on file within the past 6 months.     Recent Labs   Lab Test  04/27/18   0704   A1C  10.2*            Failed - Patient has a recent creatinine (normal) within the past 12 mos.    Recent Labs   Lab Test  12/15/17   0705   CR  1.30*            Passed - Blood pressure less than 140/90 in past 6 months    BP Readings from Last 3 Encounters:   04/27/18 137/63   02/08/18 136/63   02/06/18 148/74                Passed - Patient has documented LDL within the past 12 mos.    Recent Labs   Lab Test  04/27/18   0704   LDL  51            Passed - Patient has had a Microalbumin in the past 12 mos.    Recent Labs   Lab Test  04/27/18   0730   MICROL  66   UMALCR  61.89*            Passed - Patient is age 18 or older       Passed - No active pregnancy on record       Passed - Patient has not had a positive pregnancy test within the past 12 mos.       Passed - Recent (6 mo) or future (30 days) visit within the authorizing provider's specialty    Patient had office visit in the last 6 months or has a visit in the next 30 days with authorizing provider or within the authorizing provider's specialty.  See \"Patient Info\" tab in inbasket, or \"Choose Columns\" in Meds & Orders section of the refill encounter.              "

## 2018-07-29 NOTE — LETTER
Adrian Cruz  [address]  [facility] [address]          08/03/18      Dear Adrian Cruz        At [facility] we care about your health and are committed to providing quality patient care. Regular appointments are a vital part of the care and management of your health and can help prevent many of the complications that can occur.      It has come to our attention that you are due for a lab only appointment and a few days later an office visit with your provider. Please call [facility] at [phone] soon to schedule your appointment.    If you have transferred care to another clinic please call to inform us so that we do not continue to send you reminder letters.      Sincerely,      [facility] Care Team

## 2018-08-01 RX ORDER — GLIMEPIRIDE 4 MG/1
TABLET ORAL
Qty: 180 TABLET | Refills: 0 | Status: SHIPPED | OUTPATIENT
Start: 2018-08-01 | End: 2018-10-30

## 2018-08-01 RX ORDER — SITAGLIPTIN 100 MG/1
TABLET, FILM COATED ORAL
Qty: 90 TABLET | Refills: 0 | Status: SHIPPED | OUTPATIENT
Start: 2018-08-01 | End: 2018-10-30

## 2018-08-01 RX ORDER — ATORVASTATIN CALCIUM 80 MG/1
TABLET, FILM COATED ORAL
Qty: 90 TABLET | Refills: 0 | Status: SHIPPED | OUTPATIENT
Start: 2018-08-01 | End: 2018-10-30

## 2018-08-01 RX ORDER — METFORMIN HCL 500 MG
TABLET, EXTENDED RELEASE 24 HR ORAL
Qty: 360 TABLET | Refills: 0 | Status: SHIPPED | OUTPATIENT
Start: 2018-08-01 | End: 2018-10-30

## 2018-08-01 RX ORDER — AMLODIPINE BESYLATE 10 MG/1
TABLET ORAL
Qty: 90 TABLET | Refills: 0 | Status: SHIPPED | OUTPATIENT
Start: 2018-08-01 | End: 2018-10-30

## 2018-08-01 NOTE — TELEPHONE ENCOUNTER
This writer attempted to contact patient on 08/01/18      Reason for call medication approval and left message.      If patient calls back:   Schedule Lab appointment and OV within 1 month with PCP, document that pt called and close encounter         Mindy Monroe MA

## 2018-08-01 NOTE — TELEPHONE ENCOUNTER
For glimepiride, metformin, januvia and amlodipine:  Routing refill request to provider for review/approval because:  Labs out of range:  Creatinine, A1C      For atorvastatin:  Prescription approved per [facility] Refill Protocol.      Marcel Gutierrez RN, BSN

## 2018-08-03 NOTE — TELEPHONE ENCOUNTER
This writer attempted to contact patient on 08/03/18      Reason for call Patient to schedule a lab only appointment and an office visit  and left voicemail message and sent letter      If patient calls back:   Schedule Lab only appointment and Office Visit appointment within 1 month with PCP, document that pt called and close encounter         Viridiana Billings MA

## 2018-08-06 DIAGNOSIS — N18.30 TYPE 2 DIABETES MELLITUS WITH STAGE 3 CHRONIC KIDNEY DISEASE, WITH LONG-TERM CURRENT USE OF INSULIN (H): ICD-10-CM

## 2018-08-06 DIAGNOSIS — E11.22 TYPE 2 DIABETES MELLITUS WITH STAGE 3 CHRONIC KIDNEY DISEASE, WITH LONG-TERM CURRENT USE OF INSULIN (H): ICD-10-CM

## 2018-08-06 DIAGNOSIS — Z79.4 TYPE 2 DIABETES MELLITUS WITH STAGE 3 CHRONIC KIDNEY DISEASE, WITH LONG-TERM CURRENT USE OF INSULIN (H): ICD-10-CM

## 2018-08-06 NOTE — TELEPHONE ENCOUNTER
"Requested Prescriptions   Pending Prescriptions Disp Refills     LEVEMIR FLEXTOUCH 100 UNIT/ML injection [Pharmacy Med Name: LEVEMIR FLEX TOUCH PEN INJ 3ML]  Last Written Prescription Date:  07/10/18  Last Fill Quantity: 15ml,  # refills: 0   Last Office Visit with LUC, RICARDO or [facility] prescribing provider:  04/27/18   Future Office Visit:    15 mL 0     Sig: INJECT 28 UNITS UNDER THE SKIN AT BEDTIME    Long Acting Insulin Protocol Failed    8/6/2018  3:37 AM       Failed - HgbA1C in past 3 or 6 months    If HgbA1C is 8 or greater, it needs to be on file within the past 3 months.  If less than 8, must be on file within the past 6 months.     Recent Labs   Lab Test  04/27/18   0704   A1C  10.2*            Passed - Blood pressure less than 140/90 in past 6 months    BP Readings from Last 3 Encounters:   04/27/18 137/63   02/08/18 136/63   02/06/18 148/74                Passed - LDL on file in past 12 months    Recent Labs   Lab Test  04/27/18   0704   LDL  51            Passed - Microalbumin on file in past 12 months    Recent Labs   Lab Test  04/27/18   0730   MICROL  66   UMALCR  61.89*            Passed - Serum creatinine on file in past 12 months    Recent Labs   Lab Test  12/15/17   0705   CR  1.30*            Passed - Patient is age 18 or older       Passed - Recent (6 mo) or future (30 days) visit within the authorizing provider's specialty    Patient had office visit in the last 6 months or has a visit in the next 30 days with authorizing provider or within the authorizing provider's specialty.  See \"Patient Info\" tab in inbasket, or \"Choose Columns\" in Meds & Orders section of the refill encounter.              "

## 2018-08-08 RX ORDER — INSULIN DETEMIR 100 [IU]/ML
INJECTION, SOLUTION SUBCUTANEOUS
Qty: 15 ML | Refills: 0 | Status: SHIPPED | OUTPATIENT
Start: 2018-08-08 | End: 2018-10-30

## 2018-08-08 NOTE — TELEPHONE ENCOUNTER
Routing refill request to provider for review/approval because:  Labs not current:  A1C    Marcel Gutierrez RN, BSN

## 2018-09-21 ENCOUNTER — TRANSFERRED RECORDS (OUTPATIENT)
Dept: HEALTH INFORMATION MANAGEMENT | Facility: CLINIC | Age: 63
End: 2018-09-21

## 2018-09-27 DIAGNOSIS — Z79.4 TYPE 2 DIABETES MELLITUS WITH STAGE 3 CHRONIC KIDNEY DISEASE, WITH LONG-TERM CURRENT USE OF INSULIN (H): ICD-10-CM

## 2018-09-27 DIAGNOSIS — N18.30 TYPE 2 DIABETES MELLITUS WITH STAGE 3 CHRONIC KIDNEY DISEASE, WITH LONG-TERM CURRENT USE OF INSULIN (H): ICD-10-CM

## 2018-09-27 DIAGNOSIS — E11.22 TYPE 2 DIABETES MELLITUS WITH STAGE 3 CHRONIC KIDNEY DISEASE, WITH LONG-TERM CURRENT USE OF INSULIN (H): ICD-10-CM

## 2018-09-27 RX ORDER — ASPIRIN 81 MG/1
TABLET, COATED ORAL
Qty: 90 TABLET | Refills: 1 | Status: SHIPPED | OUTPATIENT
Start: 2018-09-27 | End: 2018-10-30

## 2018-09-27 NOTE — TELEPHONE ENCOUNTER
Prescription approved per [facility] Refill Protocol.    Tessy Denny RN  [facility]

## 2018-09-27 NOTE — TELEPHONE ENCOUNTER
"Requested Prescriptions   Pending Prescriptions Disp Refills     ASPIRIN LOW DOSE 81 MG EC tablet [Pharmacy Med Name: ASPIRIN 81MG EC LOW DOSE  TABLETS]  Last Written Prescription Date:  04/27/18  Last Fill Quantity: 90,  # refills: 0   Last Office Visit with G, P or [facility] prescribing provider:  04/27/18-Rohan   Future Office Visit:    90 tablet 0     Sig: TAKE 1 TABLET BY MOUTH DAILY    Analgesics (Non-Narcotic Tylenol and ASA Only) Passed    9/27/2018  3:36 AM       Passed - Recent (12 mo) or future (30 days) visit within the authorizing provider's specialty    Patient had office visit in the last 12 months or has a visit in the next 30 days with authorizing provider or within the authorizing provider's specialty.  See \"Patient Info\" tab in inbasket, or \"Choose Columns\" in Meds & Orders section of the refill encounter.           Passed - Patient is age 20 years or older    If ASA is flagged for ages under 20 years old. Forward to provider for confirmation Ryes Syndrome is not a concern.                "

## 2018-10-19 DIAGNOSIS — N18.30 TYPE 2 DIABETES MELLITUS WITH STAGE 3 CHRONIC KIDNEY DISEASE, WITH LONG-TERM CURRENT USE OF INSULIN (H): ICD-10-CM

## 2018-10-19 DIAGNOSIS — E11.22 TYPE 2 DIABETES MELLITUS WITH STAGE 3 CHRONIC KIDNEY DISEASE, WITH LONG-TERM CURRENT USE OF INSULIN (H): ICD-10-CM

## 2018-10-19 DIAGNOSIS — Z79.4 TYPE 2 DIABETES MELLITUS WITH STAGE 3 CHRONIC KIDNEY DISEASE, WITH LONG-TERM CURRENT USE OF INSULIN (H): ICD-10-CM

## 2018-10-19 NOTE — TELEPHONE ENCOUNTER
"Requested Prescriptions   Pending Prescriptions Disp Refills     LEVEMIR FLEXTOUCH 100 UNIT/ML injection [Pharmacy Med Name: LEVEMIR FLEX TOUCH PEN INJ 3ML] 15 mL 0     Sig: INJECT 28 UNITS UNDER THE SKIN AT BEDTIME    Long Acting Insulin Protocol Failed    10/19/2018  3:13 PM       Failed - HgbA1C in past 3 or 6 months    If HgbA1C is 8 or greater, it needs to be on file within the past 3 months.  If less than 8, must be on file within the past 6 months.     Recent Labs   Lab Test  04/27/18   0704   A1C  10.2*            Passed - Blood pressure less than 140/90 in past 6 months    BP Readings from Last 3 Encounters:   04/27/18 137/63   02/08/18 136/63   02/06/18 148/74                Passed - LDL on file in past 12 months    Recent Labs   Lab Test  04/27/18   0704   LDL  51            Passed - Microalbumin on file in past 12 months    Recent Labs   Lab Test  04/27/18   0730   MICROL  66   UMALCR  61.89*            Passed - Serum creatinine on file in past 12 months    Recent Labs   Lab Test  12/15/17   0705   CR  1.30*            Passed - Patient is age 18 or older       Passed - Recent (6 mo) or future (30 days) visit within the authorizing provider's specialty    Patient had office visit in the last 6 months or has a visit in the next 30 days with authorizing provider or within the authorizing provider's specialty.  See \"Patient Info\" tab in inbasket, or \"Choose Columns\" in Meds & Orders section of the refill encounter.            Last Written Prescription Date:  8/8/18  Last Fill Quantity: 15,  # refills: 0   Last office visit: 4/27/2018 with prescribing provider:  SKY   Future Office Visit:      "

## 2018-10-23 RX ORDER — INSULIN DETEMIR 100 [IU]/ML
INJECTION, SOLUTION SUBCUTANEOUS
Qty: 15 ML | Refills: 0 | OUTPATIENT
Start: 2018-10-23

## 2018-10-23 NOTE — TELEPHONE ENCOUNTER
Routing refill request to provider for review/approval because:  Labs out of range  Labs not current - A1c  Fidelina Gottlieb RN

## 2018-10-28 DIAGNOSIS — E11.3299 DIABETES MELLITUS WITH BACKGROUND RETINOPATHY (H): ICD-10-CM

## 2018-10-28 DIAGNOSIS — E78.5 HYPERLIPIDEMIA LDL GOAL <100: ICD-10-CM

## 2018-10-28 DIAGNOSIS — I10 ESSENTIAL HYPERTENSION WITH GOAL BLOOD PRESSURE LESS THAN 140/90: ICD-10-CM

## 2018-10-28 RX ORDER — AMLODIPINE BESYLATE 10 MG/1
TABLET ORAL
Qty: 90 TABLET | Refills: 0 | Status: CANCELLED | OUTPATIENT
Start: 2018-10-28

## 2018-10-28 RX ORDER — GLIMEPIRIDE 4 MG/1
TABLET ORAL
Qty: 180 TABLET | Refills: 0 | Status: CANCELLED | OUTPATIENT
Start: 2018-10-28

## 2018-10-28 RX ORDER — ATORVASTATIN CALCIUM 80 MG/1
TABLET, FILM COATED ORAL
Qty: 90 TABLET | Refills: 0 | Status: CANCELLED | OUTPATIENT
Start: 2018-10-28

## 2018-10-28 RX ORDER — SITAGLIPTIN 100 MG/1
TABLET, FILM COATED ORAL
Qty: 90 TABLET | Refills: 0 | Status: CANCELLED | OUTPATIENT
Start: 2018-10-28

## 2018-10-28 NOTE — TELEPHONE ENCOUNTER
"Requested Prescriptions   Pending Prescriptions Disp Refills     amLODIPine (NORVASC) 10 MG tablet [Pharmacy Med Name: AMLODIPINE BESYLATE 10MG TABLETS] 90 tablet 0     Sig: TAKE 1 TABLET(10 MG) BY MOUTH DAILY    Calcium Channel Blockers Protocol  Failed    10/28/2018  3:36 AM       Failed - Normal serum creatinine on file in past 12 months    Recent Labs   Lab Test  12/15/17   0705   CR  1.30*            Passed - Blood pressure under 140/90 in past 12 months    BP Readings from Last 3 Encounters:   04/27/18 137/63   02/08/18 136/63   02/06/18 148/74                Passed - Recent (12 mo) or future (30 days) visit within the authorizing provider's specialty    Patient had office visit in the last 12 months or has a visit in the next 30 days with authorizing provider or within the authorizing provider's specialty.  See \"Patient Info\" tab in inbasket, or \"Choose Columns\" in Meds & Orders section of the refill encounter.             Passed - Patient is age 18 or older       Passed - No active pregnancy on record       Passed - No positive pregnancy test in past 12 months        JANUVIA 100 MG tablet [Pharmacy Med Name: JANUVIA 100MG TABLETS] 90 tablet 0     Sig: TAKE 1 TABLET(100 MG) BY MOUTH DAILY    DPP4 Inhibitors Protocol Failed    10/28/2018  3:36 AM       Failed - Blood pressure less than 140/90 in past 6 months    BP Readings from Last 3 Encounters:   04/27/18 137/63   02/08/18 136/63   02/06/18 148/74                Failed - HgbA1C in past 3 or 6 months    If HgbA1C is 8 or greater, it needs to be on file within the past 3 months.  If less than 8, must be on file within the past 6 months.     Recent Labs   Lab Test  04/27/18   0704   A1C  10.2*            Failed - Normal serum creatinine in past 12 months    Recent Labs   Lab Test  12/15/17   0705   CR  1.30*            Passed - LDL on file in past 12 months    Recent Labs   Lab Test  04/27/18   0704   LDL  51            Passed - Microalbumin on file in past 12 " "months    Recent Labs   Lab Test  04/27/18   0730   MICROL  66   UMALCR  61.89*            Passed - Patient is age 18 or older       Passed - No active pregnancy on record       Passed - No positive pregnancy test in past 12 months       Passed - Recent (6 mo) or future (30 days) visit within the authorizing provider's specialty    Patient had office visit in the last 6 months or has a visit in the next 30 days with authorizing provider.  See \"Patient Info\" tab in inbasket, or \"Choose Columns\" in Meds & Orders section of the refill encounter.            atorvastatin (LIPITOR) 80 MG tablet [Pharmacy Med Name: ATORVASTATIN 80MG TABLETS] 90 tablet 0     Sig: TAKE 1 TABLET(80 MG) BY MOUTH DAILY    Statins Protocol Passed    10/28/2018  3:36 AM       Passed - LDL on file in past 12 months    Recent Labs   Lab Test  04/27/18   0704   LDL  51            Passed - No abnormal creatine kinase in past 12 months    No lab results found.            Passed - Recent (12 mo) or future (30 days) visit within the authorizing provider's specialty    Patient had office visit in the last 12 months or has a visit in the next 30 days with authorizing provider or within the authorizing provider's specialty.  See \"Patient Info\" tab in [facility], or \"Choose Columns\" in Meds & Orders section of the refill encounter.             Passed - Patient is age 18 or older       Passed - No active pregnancy on record       Passed - No positive pregnancy test in past 12 months        glimepiride (AMARYL) 4 MG tablet [Pharmacy Med Name: GLIMEPIRIDE 4MG TABLETS] 180 tablet 0     Sig: TAKE 2 TABLETS BY MOUTH EVERY MORNING BEFORE A MEAL    Sulfonylurea Agents Failed    10/28/2018  3:36 AM       Failed - Blood pressure less than 140/90 in past 6 months    BP Readings from Last 3 Encounters:   04/27/18 137/63   02/08/18 136/63   02/06/18 148/74                Failed - Patient has documented A1c within the specified period of time.    If HgbA1C is 8 or greater, " "it needs to be on file within the past 3 months.  If less than 8, must be on file within the past 6 months.     Recent Labs   Lab Test  04/27/18   0704   A1C  10.2*            Failed - Patient has a recent creatinine (normal) within the past 12 mos.    Recent Labs   Lab Test  12/15/17   0705   CR  1.30*            Passed - Patient has documented LDL within the past 12 mos.    Recent Labs   Lab Test  04/27/18   0704   LDL  51            Passed - Patient has had a Microalbumin in the past 12 mos.    Recent Labs   Lab Test  04/27/18   0730   MICROL  66   UMALCR  61.89*            Passed - Patient is age 18 or older       Passed - No active pregnancy on record       Passed - Patient has not had a positive pregnancy test within the past 12 mos.       Passed - Recent (6 mo) or future (30 days) visit within the authorizing provider's specialty    Patient had office visit in the last 6 months or has a visit in the next 30 days with authorizing provider or within the authorizing provider's specialty.  See \"Patient Info\" tab in inbasket, or \"Choose Columns\" in Meds & Orders section of the refill encounter.            Last Written Prescription Date:  8/1/18  Last Fill Quantity: 90,  # refills: 0   Last office visit: 4/27/2018 with prescribing provider:  SKY   Future Office Visit:   Next 5 appointments (look out 90 days)     Oct 30, 2018  7:40 AM CDT   SHORT with Radha Madrigal MD   [facility] ([facility]    [address] [place] [id number] [phone]                   "

## 2018-10-30 ENCOUNTER — OFFICE VISIT (OUTPATIENT)
Dept: FAMILY MEDICINE | Facility: CLINIC | Age: 63
End: 2018-10-30
Payer: COMMERCIAL

## 2018-10-30 VITALS
BODY MASS INDEX: 38.89 KG/M2 | HEIGHT: 61 IN | HEART RATE: 91 BPM | OXYGEN SATURATION: 97 % | WEIGHT: 206 LBS | DIASTOLIC BLOOD PRESSURE: 70 MMHG | TEMPERATURE: 97.8 F | SYSTOLIC BLOOD PRESSURE: 138 MMHG

## 2018-10-30 DIAGNOSIS — N18.30 TYPE 2 DIABETES MELLITUS WITH STAGE 3 CHRONIC KIDNEY DISEASE, WITH LONG-TERM CURRENT USE OF INSULIN (H): Primary | ICD-10-CM

## 2018-10-30 DIAGNOSIS — Z12.31 VISIT FOR SCREENING MAMMOGRAM: ICD-10-CM

## 2018-10-30 DIAGNOSIS — E11.22 TYPE 2 DIABETES MELLITUS WITH STAGE 3 CHRONIC KIDNEY DISEASE, WITH LONG-TERM CURRENT USE OF INSULIN (H): Primary | ICD-10-CM

## 2018-10-30 DIAGNOSIS — E66.01 MORBID OBESITY (H): ICD-10-CM

## 2018-10-30 DIAGNOSIS — Z12.11 SCREEN FOR COLON CANCER: ICD-10-CM

## 2018-10-30 DIAGNOSIS — E78.5 HYPERLIPIDEMIA LDL GOAL <100: ICD-10-CM

## 2018-10-30 DIAGNOSIS — E11.3299 DIABETES MELLITUS WITH BACKGROUND RETINOPATHY (H): ICD-10-CM

## 2018-10-30 DIAGNOSIS — Z28.21 INFLUENZA VACCINE REFUSED: ICD-10-CM

## 2018-10-30 DIAGNOSIS — Z79.4 TYPE 2 DIABETES MELLITUS WITH STAGE 3 CHRONIC KIDNEY DISEASE, WITH LONG-TERM CURRENT USE OF INSULIN (H): Primary | ICD-10-CM

## 2018-10-30 DIAGNOSIS — I10 ESSENTIAL HYPERTENSION WITH GOAL BLOOD PRESSURE LESS THAN 140/90: ICD-10-CM

## 2018-10-30 LAB
ANION GAP SERPL CALCULATED.3IONS-SCNC: 6 MMOL/L (ref 3–14)
BUN SERPL-MCNC: 24 MG/DL (ref 7–30)
CALCIUM SERPL-MCNC: 8.6 MG/DL (ref 8.5–10.1)
CHLORIDE SERPL-SCNC: 104 MMOL/L (ref 94–109)
CO2 SERPL-SCNC: 29 MMOL/L (ref 20–32)
CREAT SERPL-MCNC: 1.1 MG/DL (ref 0.52–1.04)
CREAT UR-MCNC: 71 MG/DL
GFR SERPL CREATININE-BSD FRML MDRD: 50 ML/MIN/1.7M2
GLUCOSE SERPL-MCNC: 124 MG/DL (ref 70–99)
HBA1C MFR BLD: 9.7 % (ref 0–5.6)
LDLC SERPL DIRECT ASSAY-MCNC: 76 MG/DL
MICROALBUMIN UR-MCNC: 134 MG/L
MICROALBUMIN/CREAT UR: 187.68 MG/G CR (ref 0–25)
POTASSIUM SERPL-SCNC: 4.1 MMOL/L (ref 3.4–5.3)
SODIUM SERPL-SCNC: 139 MMOL/L (ref 133–144)

## 2018-10-30 PROCEDURE — 83036 HEMOGLOBIN GLYCOSYLATED A1C: CPT | Performed by: PREVENTIVE MEDICINE

## 2018-10-30 PROCEDURE — 83721 ASSAY OF BLOOD LIPOPROTEIN: CPT | Performed by: PREVENTIVE MEDICINE

## 2018-10-30 PROCEDURE — 99214 OFFICE O/P EST MOD 30 MIN: CPT | Performed by: PREVENTIVE MEDICINE

## 2018-10-30 PROCEDURE — 80048 BASIC METABOLIC PNL TOTAL CA: CPT | Performed by: PREVENTIVE MEDICINE

## 2018-10-30 PROCEDURE — 36415 COLL VENOUS BLD VENIPUNCTURE: CPT | Performed by: PREVENTIVE MEDICINE

## 2018-10-30 PROCEDURE — 82043 UR ALBUMIN QUANTITATIVE: CPT | Performed by: PREVENTIVE MEDICINE

## 2018-10-30 RX ORDER — ATORVASTATIN CALCIUM 80 MG/1
80 TABLET, FILM COATED ORAL DAILY
Qty: 90 TABLET | Refills: 1 | Status: SHIPPED | OUTPATIENT
Start: 2018-10-30 | End: 2019-02-19

## 2018-10-30 RX ORDER — AMLODIPINE BESYLATE 10 MG/1
TABLET ORAL
Qty: 90 TABLET | Refills: 1 | Status: SHIPPED | OUTPATIENT
Start: 2018-10-30 | End: 2019-02-19

## 2018-10-30 RX ORDER — LOSARTAN POTASSIUM AND HYDROCHLOROTHIAZIDE 25; 100 MG/1; MG/1
1 TABLET ORAL DAILY
Qty: 90 TABLET | Refills: 1 | Status: SHIPPED | OUTPATIENT
Start: 2018-10-30 | End: 2019-02-19

## 2018-10-30 RX ORDER — METFORMIN HCL 500 MG
TABLET, EXTENDED RELEASE 24 HR ORAL
Qty: 360 TABLET | Refills: 1 | Status: SHIPPED | OUTPATIENT
Start: 2018-10-30 | End: 2019-02-19

## 2018-10-30 RX ORDER — GLIMEPIRIDE 4 MG/1
TABLET ORAL
Qty: 180 TABLET | Refills: 1 | Status: SHIPPED | OUTPATIENT
Start: 2018-10-30 | End: 2019-02-19

## 2018-10-30 ASSESSMENT — PAIN SCALES - GENERAL: PAINLEVEL: NO PAIN (0)

## 2018-10-30 NOTE — PATIENT INSTRUCTIONS
At [facility], we strive to deliver an exceptional experience to you, every time we see you.  If you receive a survey in the mail, please send us back your thoughts. We really do value your feedback.    Your care team:                            Family Medicine Internal Medicine   MD Maverick Manzano MD Shantel Branch-Fleming, MD Katya Georgiev PA-C Megan Hill, APRN KELLY Membreno MD Pediatrics   Edson Burkett, DEBO Hartman, MD Kim Roberson APRN CNP   MD Eli Carrasco MD Deborah Mielke, MD Viviana Stout, APRN [facility]      Clinic hours: Monday - Thursday 7 am-7 pm; Fridays 7 am-5 pm.   Urgent care: Monday - Friday 11 am-9 pm; Saturday and Sunday 9 am-5 pm.  Pharmacy : Monday -Thursday 8 am-8 pm; Friday 8 am-6 pm; Saturday and Sunday 9 am-5 pm.     Clinic: [phone]   Pharmacy: [phone]

## 2018-10-30 NOTE — PROGRESS NOTES
Please send a letter:    Dear Adrian Cruz,    Urine sample showed abnormal protein (sign of early kidney damage). This should improve with better glucose control.  LDL cholesterol is at goal for you.  Electrolytes are normal.  Kidney function is low but stable for you. Avoid over the counter Advil, Aleve or Ibuprofen.  Otherwise, plan of care and follow up as discussed in clinic.      Regards,    Radha Madrigal MD MPH

## 2018-10-30 NOTE — PROGRESS NOTES
SUBJECTIVE:   Adrian Cruz is a 63 year old female who presents to clinic today for the following health issues:      Diabetes Follow-up    Patient is checking blood sugars: twice daily.    Blood sugar testing frequency justification: Uncontrolled diabetes  Results are as follows:         am -               postprandial after supper- 200    Diabetic concerns: None     Symptoms of hypoglycemia (low blood sugar): none     Paresthesias (numbness or burning in feet) or sores: No     Date of last diabetic eye exam: UTD    Diabetes Management Resources    Hyperlipidemia Follow-Up      Rate your low fat/cholesterol diet?: good    Taking statin?  Yes, no muscle aches from statin    Other lipid medications/supplements?:  none    Hypertension Follow-up      Outpatient blood pressures are not being checked.    Low Salt Diet: low salt    BP Readings from Last 2 Encounters:   04/27/18 137/63   02/08/18 136/63     Hemoglobin A1C (%)   Date Value   04/27/2018 10.2 (H)   12/15/2017 11.1 (H)     LDL Cholesterol Calculated (mg/dL)   Date Value   04/27/2018 51   05/05/2016 73     LDL Cholesterol Direct (mg/dL)   Date Value   05/23/2017 75       Amount of exercise or physical activity: 6-7 days/week for an average of greater than 60 minutes    Problems taking medications regularly: No    Medication side effects: none    Diet: low salt, low fat/cholesterol and diabetic      Problem list and histories reviewed & adjusted, as indicated.  Additional history: as documented    Patient Active Problem List   Diagnosis     Hyperlipidemia LDL goal <100     Type 2 diabetes mellitus with stage 3 chronic kidney disease, with long-term current use of insulin (H)     Hypertension goal BP (blood pressure) < 140/90     Cataract     Diabetes mellitus with background retinopathy (H)     Advanced directives, counseling/discussion     Health Care Home     Morbid obesity (H)     Obesity, Class II, BMI 35-39.9     Past Surgical History:    Procedure Laterality Date     LASER SURGERY OF EYE Right 2015       Social History   Substance Use Topics     Smoking status: Never Smoker     Smokeless tobacco: Never Used     Alcohol use No     Family History   Problem Relation Age of Onset     Diabetes Mother      HEART DISEASE Mother      chf and pacemaker     Genitourinary Problems Mother      ESRD     Diabetes Father      C.A.D. Father      age 49     Cerebrovascular Disease Father      Asthma Sister      Asthma Son      Asthma Daughter      HEART DISEASE Daughter      Glaucoma No family hx of      Macular Degeneration No family hx of          Current Outpatient Prescriptions   Medication Sig Dispense Refill     amLODIPine (NORVASC) 10 MG tablet TAKE 1 TABLET(10 MG) BY MOUTH DAILY 90 tablet 1     aspirin (ASPIRIN LOW DOSE) 81 MG EC tablet Take 1 tablet (81 mg) by mouth daily 90 tablet 1     atorvastatin (LIPITOR) 80 MG tablet Take 1 tablet (80 mg) by mouth daily 90 tablet 1     cetirizine (ZYRTEC) 10 MG tablet Take 1 tablet (10 mg) by mouth every evening 30 tablet 1     fluticasone (FLONASE) 50 MCG/ACT spray Spray 1-2 sprays into both nostrils daily 16 g 0     glimepiride (AMARYL) 4 MG tablet TAKE 2 TABLETS BY MOUTH EVERY MORNING BEFORE A MEAL 180 tablet 1     insulin detemir (LEVEMIR FLEXTOUCH) 100 UNIT/ML injection INJECT 32 UNITS UNDER THE SKIN AT BEDTIME 15 mL 1     insulin pen needle (B-D U/F) 31G X 5 MM USE as directed once a day 100 each 3     losartan-hydrochlorothiazide (HYZAAR) 100-25 MG per tablet Take 1 tablet by mouth daily Needs to be seen for more. 90 tablet 1     losartan-hydrochlorothiazide (HYZAAR) 100-25 MG per tablet Take 1 tablet by mouth daily Needs to be seen for more. 30 tablet 0     metFORMIN (GLUCOPHAGE-XR) 500 MG 24 hr tablet TAKE 4 TABLETS BY MOUTH DAILY WITH BREAKFAST 360 tablet 1     omeprazole 20 MG tablet Take 1 tablet (20 mg) by mouth daily 90 tablet 1     ONE TOUCH ULTRA test strip TEST TWICE DAILY 50 each 0     order for  DME Equipment being ordered: aluminum walker 1 Device 0     order for DME by Device route continuous Air walker -  Use as instructed 1 Device 0     ORDER FOR DME Glucometer of choice (One touch) with supplies Disp 1    Testing Strips for bid testing  #100    Lancets for bid testing #100 1 Month 11     ORDER FOR DME Equipment being ordered: blood pressure cuff/machine 1 Device 0     sitagliptin (JANUVIA) 100 MG tablet TAKE 1 TABLET(100 MG) BY MOUTH DAILY 90 tablet 1     [DISCONTINUED] amLODIPine (NORVASC) 10 MG tablet TAKE 1 TABLET(10 MG) BY MOUTH DAILY 90 tablet 0     [DISCONTINUED] atorvastatin (LIPITOR) 80 MG tablet TAKE 1 TABLET(80 MG) BY MOUTH DAILY 90 tablet 0     [DISCONTINUED] glimepiride (AMARYL) 4 MG tablet TAKE 2 TABLETS BY MOUTH EVERY MORNING BEFORE A MEAL 180 tablet 0     [DISCONTINUED] JANUVIA 100 MG tablet TAKE 1 TABLET(100 MG) BY MOUTH DAILY 90 tablet 0     [DISCONTINUED] LEVEMIR FLEXTOUCH 100 UNIT/ML injection INJECT 28 UNITS UNDER THE SKIN AT BEDTIME 15 mL 0     [DISCONTINUED] losartan-hydrochlorothiazide (HYZAAR) 100-25 MG per tablet Take 1 tablet by mouth daily Needs to be seen for more. 30 tablet 0     [DISCONTINUED] metFORMIN (GLUCOPHAGE-XR) 500 MG 24 hr tablet TAKE 4 TABLETS BY MOUTH DAILY WITH BREAKFAST 360 tablet 0     Allergies   Allergen Reactions     Ace Inhibitors Cough     Glucophage [Biguanides] Cramps     Stomach upset     Lisinopril      Recent Labs   Lab Test  10/30/18   0810  04/27/18   0704  12/15/17   0705  05/23/17   0744   05/05/16   0730   01/22/15   0754   02/16/12   0743   A1C  9.7*  10.2*  11.1*  9.2*   < >  7.7*   < >  14.5*   < >   --    LDL   --   51   --   75   --   73   --   137*   < >   --    HDL   --   43*   --    --    --   42*   --   46*   < >   --    TRIG   --   106   --    --    --   120   --   266*   < >   --    ALT   --    --    --    --    --   15   --   16   --   17   CR   --    --   1.30*  1.17*   < >  1.34*   < >  0.85   < >  0.81   GFRESTIMATED   --    --    "41*  47*   < >  40*   < >  68   < >  73   GFRESTBLACK   --    --   50*  57*   < >  49*   < >  83   < >  88   POTASSIUM   --    --   4.2  4.2   < >  4.3   < >  4.5   < >  4.7   TSH   --    --    --   2.28   --    --    --   1.82   < >   --     < > = values in this interval not displayed.      BP Readings from Last 3 Encounters:   10/30/18 138/70   04/27/18 137/63   02/08/18 136/63    Wt Readings from Last 3 Encounters:   10/30/18 206 lb (93.4 kg)   04/27/18 201 lb 9.6 oz (91.4 kg)   02/08/18 197 lb (89.4 kg)                  Labs reviewed in EPIC    Reviewed and updated as needed this visit by clinical staff  Allergies  Meds       Reviewed and updated as needed this visit by Provider         ROS:  Constitutional, neuro, ENT, endocrine, pulmonary, cardiac, gastrointestinal, genitourinary, musculoskeletal, integument and psychiatric systems are negative, except as otherwise noted.    OBJECTIVE:                                                    /70  Pulse 91  Temp 97.8  F (36.6  C) (Oral)  Ht 5' 1\" (1.549 m)  Wt 206 lb (93.4 kg)  SpO2 97%  Breastfeeding? No  BMI 38.92 kg/m2  Body mass index is 38.92 kg/(m^2).  GENERAL APPEARANCE: healthy, alert and no distress  EYES: Eyes grossly normal to inspection  NECK: trachea midline and normal to palpation  RESP: lungs clear to auscultation - no rales, rhonchi or wheezes  CV: regular rates and rhythm, normal S1 S2, no S3 or S4 and no murmur, click or rub  ABDOMEN: soft, non-tender  MS: Trace bilateral pedal edema   SKIN: no suspicious lesions or rashes  NEURO: Normal strength and tone, mentation intact and speech normal  PSYCH: mentation appears normal    Diagnostic test results:  Diagnostic Test Results:  Results for orders placed or performed in visit on 10/30/18 (from the past 24 hour(s))   HEMOGLOBIN A1C   Result Value Ref Range    Hemoglobin A1C 9.7 (H) 0 - 5.6 %        ASSESSMENT/PLAN:                                                    1. Type 2 diabetes " mellitus with stage 3 chronic kidney disease, with long-term current use of insulin (H)  -Not at goal  -HbA1C has improved from 10.2 to 9.7  -Increased Levemir from 28 to 32 units  -Referral to [facility] provided   - BASIC METABOLIC PANEL  - HEMOGLOBIN A1C  - LDL cholesterol direct  - Albumin Random Urine Quantitative with Creat Ratio  - aspirin (ASPIRIN LOW DOSE) 81 MG EC tablet; Take 1 tablet (81 mg) by mouth daily  Dispense: 90 tablet; Refill: 1  - insulin detemir (LEVEMIR FLEXTOUCH) 100 UNIT/ML injection; INJECT 32 UNITS UNDER THE SKIN AT BEDTIME  Dispense: 15 mL; Refill: 1    2. Diabetes mellitus with background retinopathy (H)  - amLODIPine (NORVASC) 10 MG tablet; TAKE 1 TABLET(10 MG) BY MOUTH DAILY  Dispense: 90 tablet; Refill: 1  - atorvastatin (LIPITOR) 80 MG tablet; Take 1 tablet (80 mg) by mouth daily  Dispense: 90 tablet; Refill: 1  - glimepiride (AMARYL) 4 MG tablet; TAKE 2 TABLETS BY MOUTH EVERY MORNING BEFORE A MEAL  Dispense: 180 tablet; Refill: 1  - sitagliptin (JANUVIA) 100 MG tablet; TAKE 1 TABLET(100 MG) BY MOUTH DAILY  Dispense: 90 tablet; Refill: 1  - losartan-hydrochlorothiazide (HYZAAR) 100-25 MG per tablet; Take 1 tablet by mouth daily Needs to be seen for more.  Dispense: 90 tablet; Refill: 1  - metFORMIN (GLUCOPHAGE-XR) 500 MG 24 hr tablet; TAKE 4 TABLETS BY MOUTH DAILY WITH BREAKFAST  Dispense: 360 tablet; Refill: 1    3. Morbid obesity (H)  -Starting to increase walking     4. Essential hypertension with goal blood pressure less than 140/90  -At goal  -continue current medication   - amLODIPine (NORVASC) 10 MG tablet; TAKE 1 TABLET(10 MG) BY MOUTH DAILY  Dispense: 90 tablet; Refill: 1  - losartan-hydrochlorothiazide (HYZAAR) 100-25 MG per tablet; Take 1 tablet by mouth daily Needs to be seen for more.  Dispense: 90 tablet; Refill: 1    5. Screen for colon cancer  - GASTROENTEROLOGY ADULT REF PROCEDURE ONLY    6. Visit for screening mammogram  -scheduled   - MA SCREENING DIGITAL BILAT - Future   (s+30); Future    7. Hyperlipidemia LDL goal <100  -LDL was 51  - atorvastatin (LIPITOR) 80 MG tablet; Take 1 tablet (80 mg) by mouth daily  Dispense: 90 tablet; Refill: 1    The ASCVD Risk score ([place] XIMENA Jr, et al., 2013) failed to calculate for the following reasons:    The valid total cholesterol range is 130 to 320 mg/dL      8. Influenza vaccine refused  -Declined       Follow up with Provider - 4 months      Radha Madrigal MD MPH    [facility]

## 2018-10-30 NOTE — MR AVS SNAPSHOT
After Visit Summary   10/30/2018    Adrian Cruz    MRN: [id number]           Patient Information     Date Of Birth          1955        Visit Information        Provider Department      10/30/2018 7:40 AM Radha Madrigal MD [facility]        Today's Diagnoses     Type 2 diabetes mellitus with stage 3 chronic kidney disease, with long-term current use of insulin (H)    -  1    Screen for colon cancer        Visit for screening mammogram        Need for prophylactic vaccination and inoculation against influenza        Essential hypertension with goal blood pressure less than 140/90        Diabetes mellitus with background retinopathy (H)        Hyperlipidemia LDL goal <100          Care Instructions    At [facility], we strive to deliver an exceptional experience to you, every time we see you.  If you receive a survey in the mail, please send us back your thoughts. We really do value your feedback.    Your care team:                            Family Medicine Internal Medicine   MD Maverick Manzano MD Shantel Branch-Fleming, MD Katya Georgiev PA-C Megan Hill, APRN KELLY Membreno MD Pediatrics   Edson Burkett PAJUSTO Hartman, MD Kim Roberson APRN CNP   MD Eli Carrasco MD Deborah Mielke, MD Kim Thein, APRN [facility]      Clinic hours: Monday - Thursday 7 am-7 pm; Fridays 7 am-5 pm.   Urgent care: Monday - Friday 11 am-9 pm; Saturday and Sunday 9 am-5 pm.  Pharmacy : Monday -Thursday 8 am-8 pm; Friday 8 am-6 pm; Saturday and Sunday 9 am-5 pm.     Clinic: [phone]   Pharmacy: [phone]                Follow-ups after your visit        Additional Services     GASTROENTEROLOGY ADULT REF PROCEDURE ONLY       Last Lab Result: Creatinine (mg/dL)       Date                     Value                 12/15/2017               1.30 (H)         ----------  Body mass index is 38.92  kg/(m^2).     Needed:  No  Language:  English    Patient will be contacted to schedule procedure.     Please be aware that coverage of these services is subject to the terms and limitations of your health insurance plan.  Call member services at your health plan with any benefit or coverage questions.  Any procedures must be performed at a [facility] facility OR coordinated by your clinic's referral office.    Please bring the following with you to your appointment:    (1) Any X-Rays, CTs or MRIs which have been performed.  Contact the facility where they were done to arrange for  prior to your scheduled appointment.    (2) List of current medications   (3) This referral request   (4) Any documents/labs given to you for this referral            MED THERAPY MANAGE REFERRAL       Your provider has referred you to: **[facility] Scheduling (numerous locations) [phone]   [email or web address]  FMG: [facility] [phone]   [email or web address]    Reason for Referral: Diabetes     The [facility] department will contact you to schedule an appointment.  You may also schedule the appointment by calling [phone].  For [facility] [place] [facility] [place] patients, please call [phone] to confirm/schedule your appointment on the next business day.    This service is designed to help you get the most from your medications.  A specially trained Pharmacist will work closely with you and your providers to solve any questions, concerns, issues or problems related to your medications.    Please bring all of your prescription and non-prescription medications (such as vitamins, over-the-counter medications, and herbals) or a detailed medication list to your appointment.    If you have a glucose meter or other home monitoring information,  "please also bring this to your appointment (i.e. blood glucose log, blood pressure log, pain log, etc.).                  Future tests that were ordered for you today     Open Future Orders        Priority Expected Expires Ordered    MA SCREENING DIGITAL BILAT - Future  (s+30) Routine  2018 10/30/2018            Who to contact     If you have questions or need follow up information about today's clinic visit or your schedule please contact [facility] directly at [phone].  Normal or non-critical lab and imaging results will be communicated to you by [employer], letter or phone within 4 business days after the clinic has received the results. If you do not hear from us within 7 days, please contact the clinic through uTaPt or phone. If you have a critical or abnormal lab result, we will notify you by phone as soon as possible.  Submit refill requests through [employer] or call your pharmacy and they will forward the refill request to us. Please allow 3 business days for your refill to be completed.          Additional Information About Your Visit        [employer] Information     [employer] lets you send messages to your doctor, view your test results, renew your prescriptions, schedule appointments and more. To sign up, go to [email or web address] . Click on \"Log in\" on the left side of the screen, which will take you to the Welcome page. Then click on \"Sign up Now\" on the right side of the page.     You will be asked to enter the access code listed below, as well as some personal information. Please follow the directions to create your username and password.     Your access code is: [email or web address]  Expires: 2019  8:36 AM     Your access code will  in 90 days. If you need help or a new code, please call your [place] clinic or [phone].        Care EveryWhere ID     This is your Care EveryWhere ID. This could be used by other organizations to access your [facility] medical " "records  [id number]        Your Vitals Were     Pulse Temperature Height Pulse Oximetry Breastfeeding? BMI (Body Mass Index)    91 97.8  F (36.6  C) (Oral) 5' 1\" (1.549 m) 97% No 38.92 kg/m2       Blood Pressure from Last 3 Encounters:   10/30/18 138/70   04/27/18 137/63   02/08/18 136/63    Weight from Last 3 Encounters:   10/30/18 206 lb (93.4 kg)   04/27/18 201 lb 9.6 oz (91.4 kg)   02/08/18 197 lb (89.4 kg)              We Performed the Following     Albumin Random Urine Quantitative with Creat Ratio     BASIC METABOLIC PANEL     GASTROENTEROLOGY ADULT REF PROCEDURE ONLY     HEMOGLOBIN A1C     LDL cholesterol direct     MED THERAPY MANAGE REFERRAL          Today's Medication Changes          These changes are accurate as of 10/30/18  8:36 AM.  If you have any questions, ask your nurse or doctor.               These medicines have changed or have updated prescriptions.        Dose/Directions    aspirin 81 MG EC tablet   Commonly known as:  ASPIRIN LOW DOSE   This may have changed:  See the new instructions.   Used for:  Type 2 diabetes mellitus with stage 3 chronic kidney disease, with long-term current use of insulin (H)   Changed by:  Radha Madrigal MD        Dose:  81 mg   Take 1 tablet (81 mg) by mouth daily   Quantity:  90 tablet   Refills:  1       atorvastatin 80 MG tablet   Commonly known as:  LIPITOR   This may have changed:  See the new instructions.   Used for:  Hyperlipidemia LDL goal <100, Diabetes mellitus with background retinopathy (H)   Changed by:  Radha Madrigal MD        Dose:  80 mg   Take 1 tablet (80 mg) by mouth daily   Quantity:  90 tablet   Refills:  1       insulin detemir 100 UNIT/ML injection   Commonly known as:  LEVEMIR FLEXTOUCH   This may have changed:  See the new instructions.   Used for:  Type 2 diabetes mellitus with stage 3 chronic kidney disease, with long-term current use of insulin (H)   Changed by:  Radha Madrigal MD        INJECT 32 UNITS UNDER THE SKIN AT BEDTIME "   Quantity:  15 mL   Refills:  1            Where to get your medicines      These medications were sent to [facility] - ANJEL MIRANDA - [facility] AT [address] [place] [address]     Phone:  [phone]     amLODIPine 10 MG tablet    aspirin 81 MG EC tablet    atorvastatin 80 MG tablet    glimepiride 4 MG tablet    insulin detemir 100 UNIT/ML injection    losartan-hydrochlorothiazide 100-25 MG per tablet    metFORMIN 500 MG 24 hr tablet    sitagliptin 100 MG tablet                Primary Care Provider Office Phone # Fax #    Radha Madrigal [phone]       [address] [place]  [facility] [address]        Equal Access to Services     SHERYL HILTON : Hadii mayuri ku hadasho Soomaali, waaxda luqadaha, qaybta kaalmada adeegyada, waxay idiin haylucindan [place]. So [facility] [phone].    ATENCIÓN: Si habla español, tiene a sosa disposición servicios gratuitos de asistencia lingüística. [facility] [phone].    We comply with applicable federal civil rights laws and [place] laws. We do not discriminate on the basis of race, color, national origin, age, disability, sex, sexual orientation, or gender identity.            Thank you!     Thank you for choosing [facility]  for your care. Our goal is always to provide you with excellent care. Hearing back from our patients is one way we can continue to improve our services. Please take a few minutes to complete the written survey that you may receive in the mail after your visit with us. Thank you!             Your Updated Medication List - Protect others around you: Learn how to safely use, store and throw away your medicines at www.disposemymeds.org.          This list is accurate as of 10/30/18  8:36 AM.  Always use your most recent med list.                   Brand Name Dispense Instructions for use Diagnosis    amLODIPine 10 MG tablet    NORVASC    90 tablet    TAKE 1  TABLET(10 MG) BY MOUTH DAILY    Essential hypertension with goal blood pressure less than 140/90, Diabetes mellitus with background retinopathy (H)       aspirin 81 MG EC tablet    ASPIRIN LOW DOSE    90 tablet    Take 1 tablet (81 mg) by mouth daily    Type 2 diabetes mellitus with stage 3 chronic kidney disease, with long-term current use of insulin (H)       atorvastatin 80 MG tablet    LIPITOR    90 tablet    Take 1 tablet (80 mg) by mouth daily    Hyperlipidemia LDL goal <100, Diabetes mellitus with background retinopathy (H)       cetirizine 10 MG tablet    zyrTEC    30 tablet    Take 1 tablet (10 mg) by mouth every evening    Allergic sinusitis       fluticasone 50 MCG/ACT spray    FLONASE    16 g    Spray 1-2 sprays into both nostrils daily    Allergic sinusitis       glimepiride 4 MG tablet    AMARYL    180 tablet    TAKE 2 TABLETS BY MOUTH EVERY MORNING BEFORE A MEAL    Diabetes mellitus with background retinopathy (H)       insulin detemir 100 UNIT/ML injection    LEVEMIR FLEXTOUCH    15 mL    INJECT 32 UNITS UNDER THE SKIN AT BEDTIME    Type 2 diabetes mellitus with stage 3 chronic kidney disease, with long-term current use of insulin (H)       insulin pen needle 31G X 5 MM    B-D U/F    100 each    USE as directed once a day        * losartan-hydrochlorothiazide 100-25 MG per tablet    HYZAAR    30 tablet    Take 1 tablet by mouth daily Needs to be seen for more.    Essential hypertension with goal blood pressure less than 140/90, Diabetes mellitus with background retinopathy (H)       * losartan-hydrochlorothiazide 100-25 MG per tablet    HYZAAR    90 tablet    Take 1 tablet by mouth daily Needs to be seen for more.    Essential hypertension with goal blood pressure less than 140/90, Diabetes mellitus with background retinopathy (H)       metFORMIN 500 MG 24 hr tablet    GLUCOPHAGE-XR    360 tablet    TAKE 4 TABLETS BY MOUTH DAILY WITH BREAKFAST    Diabetes mellitus with background retinopathy (H)        omeprazole 20 MG tablet     90 tablet    Take 1 tablet (20 mg) by mouth daily    Gastroesophageal reflux disease without esophagitis       ONETOUCH ULTRA test strip   Generic drug:  blood glucose monitoring     50 each    TEST TWICE DAILY    Type 2 diabetes, HbA1C goal < 8% (H)       order for DME     1 Device    Equipment being ordered: blood pressure cuff/machine    Hypertension goal BP (blood pressure) < 140/90       order for DME     1 Month    Glucometer of choice (One touch) with supplies Disp 1  Testing Strips for bid testing  #100  Lancets for bid testing #100    Type 2 diabetes, HbA1C goal < 8% (H)       * order for DME     1 Device    by Device route continuous Air walker -  Use as instructed    Closed torus fracture of distal end of right fibula, initial encounter       * order for DME     1 Device    Equipment being ordered: aluminum walker    Closed fracture of distal end of right fibula, unspecified fracture morphology, initial encounter       sitagliptin 100 MG tablet    JANUVIA    90 tablet    TAKE 1 TABLET(100 MG) BY MOUTH DAILY    Diabetes mellitus with background retinopathy (H)       * Notice:  This list has 4 medication(s) that are the same as other medications prescribed for you. Read the directions carefully, and ask your doctor or other care provider to review them with you.

## 2019-01-24 DIAGNOSIS — N18.30 TYPE 2 DIABETES MELLITUS WITH STAGE 3 CHRONIC KIDNEY DISEASE, WITH LONG-TERM CURRENT USE OF INSULIN (H): ICD-10-CM

## 2019-01-24 DIAGNOSIS — E11.22 TYPE 2 DIABETES MELLITUS WITH STAGE 3 CHRONIC KIDNEY DISEASE, WITH LONG-TERM CURRENT USE OF INSULIN (H): ICD-10-CM

## 2019-01-24 DIAGNOSIS — Z79.4 TYPE 2 DIABETES MELLITUS WITH STAGE 3 CHRONIC KIDNEY DISEASE, WITH LONG-TERM CURRENT USE OF INSULIN (H): ICD-10-CM

## 2019-01-24 NOTE — TELEPHONE ENCOUNTER
"Requested Prescriptions   Pending Prescriptions Disp Refills     LEVEMIR FLEXTOUCH 100 UNIT/ML pen [Pharmacy Med Name: LEVEMIR FLEX TOUCH PEN INJ 3ML] 15 mL 0      Last Written Prescription Date:  10/30/18  Last Fill Quantity: 15ml,  # refills: 1   Last Office Visit with ARIA, LEVON or [facility] prescribing provider:  10/30/18Nadja   Future Office Visit:    Sig: ADMINISTER 32 UNITS UNDER THE SKIN AT BEDTIME    Long Acting Insulin Protocol Passed - 1/24/2019  3:37 AM       Passed - Blood pressure less than 140/90 in past 6 months    BP Readings from Last 3 Encounters:   10/30/18 138/70   04/27/18 137/63   02/08/18 136/63                Passed - LDL on file in past 12 months    Recent Labs   Lab Test 10/30/18  0810   LDL 76            Passed - Microalbumin on file in past 12 months    Recent Labs   Lab Test 10/30/18  0815   MICROL 134   UMALCR 187.68*            Passed - Serum creatinine on file in past 12 months    Recent Labs   Lab Test 10/30/18  0810   CR 1.10*            Passed - HgbA1C in past 3 or 6 months    If HgbA1C is 8 or greater, it needs to be on file within the past 3 months.  If less than 8, must be on file within the past 6 months.     Recent Labs   Lab Test 10/30/18  0810   A1C 9.7*            Passed - Medication is active on med list       Passed - Patient is age 18 or older       Passed - Recent (6 mo) or future (30 days) visit within the authorizing provider's specialty    Patient had office visit in the last 6 months or has a visit in the next 30 days with authorizing provider or within the authorizing provider's specialty.  See \"Patient Info\" tab in inbasket, or \"Choose Columns\" in Meds & Orders section of the refill encounter.              "

## 2019-01-28 NOTE — TELEPHONE ENCOUNTER
Called and spoke to patient and scheduled her appt for 2/19/19, patient will be out of town prior to that.  Viridiana Billings MA/  For [employer] and Christine

## 2019-01-29 RX ORDER — INSULIN DETEMIR 100 [IU]/ML
INJECTION, SOLUTION SUBCUTANEOUS
Qty: 15 ML | Refills: 0 | Status: SHIPPED | OUTPATIENT
Start: 2019-01-29 | End: 2019-02-19

## 2019-01-29 NOTE — TELEPHONE ENCOUNTER
Prescription approved per [facility] Refill Protocol.    Sabrina Ruiz RN, [facility]

## 2019-02-19 ENCOUNTER — OFFICE VISIT (OUTPATIENT)
Dept: FAMILY MEDICINE | Facility: CLINIC | Age: 64
End: 2019-02-19
Payer: COMMERCIAL

## 2019-02-19 VITALS
SYSTOLIC BLOOD PRESSURE: 140 MMHG | DIASTOLIC BLOOD PRESSURE: 70 MMHG | OXYGEN SATURATION: 96 % | WEIGHT: 208 LBS | TEMPERATURE: 97.3 F | HEART RATE: 92 BPM | HEIGHT: 61 IN | BODY MASS INDEX: 39.27 KG/M2

## 2019-02-19 DIAGNOSIS — Z79.4 TYPE 2 DIABETES MELLITUS WITH STAGE 3 CHRONIC KIDNEY DISEASE, WITH LONG-TERM CURRENT USE OF INSULIN (H): ICD-10-CM

## 2019-02-19 DIAGNOSIS — E11.22 TYPE 2 DIABETES MELLITUS WITH STAGE 3 CHRONIC KIDNEY DISEASE, WITH LONG-TERM CURRENT USE OF INSULIN (H): ICD-10-CM

## 2019-02-19 DIAGNOSIS — N18.30 TYPE 2 DIABETES MELLITUS WITH STAGE 3 CHRONIC KIDNEY DISEASE, WITH LONG-TERM CURRENT USE OF INSULIN (H): ICD-10-CM

## 2019-02-19 DIAGNOSIS — E66.01 MORBID OBESITY (H): ICD-10-CM

## 2019-02-19 DIAGNOSIS — E78.5 HYPERLIPIDEMIA LDL GOAL <100: ICD-10-CM

## 2019-02-19 DIAGNOSIS — I10 ESSENTIAL HYPERTENSION WITH GOAL BLOOD PRESSURE LESS THAN 140/90: ICD-10-CM

## 2019-02-19 DIAGNOSIS — E11.3299 DIABETES MELLITUS WITH BACKGROUND RETINOPATHY (H): Primary | ICD-10-CM

## 2019-02-19 DIAGNOSIS — Z12.31 VISIT FOR SCREENING MAMMOGRAM: ICD-10-CM

## 2019-02-19 DIAGNOSIS — I10 HYPERTENSION GOAL BP (BLOOD PRESSURE) < 140/90: ICD-10-CM

## 2019-02-19 DIAGNOSIS — Z12.11 SCREEN FOR COLON CANCER: ICD-10-CM

## 2019-02-19 LAB
ANION GAP SERPL CALCULATED.3IONS-SCNC: 7 MMOL/L (ref 3–14)
BUN SERPL-MCNC: 24 MG/DL (ref 7–30)
CALCIUM SERPL-MCNC: 8.5 MG/DL (ref 8.5–10.1)
CHLORIDE SERPL-SCNC: 103 MMOL/L (ref 94–109)
CO2 SERPL-SCNC: 28 MMOL/L (ref 20–32)
CREAT SERPL-MCNC: 1.07 MG/DL (ref 0.52–1.04)
CREAT UR-MCNC: 92 MG/DL
GFR SERPL CREATININE-BSD FRML MDRD: 55 ML/MIN/{1.73_M2}
GLUCOSE SERPL-MCNC: 184 MG/DL (ref 70–99)
HBA1C MFR BLD: 10.2 % (ref 0–5.6)
MICROALBUMIN UR-MCNC: 312 MG/L
MICROALBUMIN/CREAT UR: 340.61 MG/G CR (ref 0–25)
POTASSIUM SERPL-SCNC: 3.7 MMOL/L (ref 3.4–5.3)
SODIUM SERPL-SCNC: 138 MMOL/L (ref 133–144)

## 2019-02-19 PROCEDURE — 82043 UR ALBUMIN QUANTITATIVE: CPT | Performed by: PREVENTIVE MEDICINE

## 2019-02-19 PROCEDURE — 80048 BASIC METABOLIC PNL TOTAL CA: CPT | Performed by: PREVENTIVE MEDICINE

## 2019-02-19 PROCEDURE — 83036 HEMOGLOBIN GLYCOSYLATED A1C: CPT | Performed by: PREVENTIVE MEDICINE

## 2019-02-19 PROCEDURE — 36415 COLL VENOUS BLD VENIPUNCTURE: CPT | Performed by: PREVENTIVE MEDICINE

## 2019-02-19 PROCEDURE — 99214 OFFICE O/P EST MOD 30 MIN: CPT | Performed by: PREVENTIVE MEDICINE

## 2019-02-19 RX ORDER — ATORVASTATIN CALCIUM 80 MG/1
80 TABLET, FILM COATED ORAL DAILY
Qty: 90 TABLET | Refills: 3 | Status: SHIPPED | OUTPATIENT
Start: 2019-02-19 | End: 2020-02-21

## 2019-02-19 RX ORDER — AMLODIPINE BESYLATE 10 MG/1
TABLET ORAL
Qty: 90 TABLET | Refills: 3 | Status: SHIPPED | OUTPATIENT
Start: 2019-02-19 | End: 2020-02-21

## 2019-02-19 RX ORDER — GLIMEPIRIDE 4 MG/1
TABLET ORAL
Qty: 180 TABLET | Refills: 3 | Status: SHIPPED | OUTPATIENT
Start: 2019-02-19 | End: 2020-02-21

## 2019-02-19 RX ORDER — METOPROLOL SUCCINATE 25 MG/1
12.5 TABLET, EXTENDED RELEASE ORAL DAILY
Qty: 45 TABLET | Refills: 3 | Status: SHIPPED | OUTPATIENT
Start: 2019-02-19 | End: 2020-02-21

## 2019-02-19 RX ORDER — LOSARTAN POTASSIUM AND HYDROCHLOROTHIAZIDE 25; 100 MG/1; MG/1
1 TABLET ORAL DAILY
Qty: 90 TABLET | Refills: 3 | Status: SHIPPED | OUTPATIENT
Start: 2019-02-19 | End: 2019-12-02

## 2019-02-19 RX ORDER — METFORMIN HCL 500 MG
TABLET, EXTENDED RELEASE 24 HR ORAL
Qty: 360 TABLET | Refills: 3 | Status: SHIPPED | OUTPATIENT
Start: 2019-02-19 | End: 2020-02-11

## 2019-02-19 ASSESSMENT — PAIN SCALES - GENERAL: PAINLEVEL: NO PAIN (0)

## 2019-02-19 ASSESSMENT — MIFFLIN-ST. JEOR: SCORE: 1430.86

## 2019-02-19 NOTE — LETTER
February 20, 2019      Adrian Cruz  [address]  [place] [address]            Dear Adrian,    Urine sample is showing more abnormal protein than last check, it is very important that we get your diabetes under better control to prevent damage to your kidneys.  Electrolytes are in the normal range.  Kidney function is stable for you.   Glucose was high at 184.   Plan of care and follow up as discussed in clinic.  Please let me know if you have any questions and thank you for choosing [place].      Regards,    Radha Madrigal MD MPH/ag  Results for orders placed or performed in visit on 02/19/19   Albumin Random Urine Quantitative with Creat Ratio   Result Value Ref Range    Creatinine Urine 92 mg/dL    Albumin Urine mg/L 312 mg/L    Albumin Urine mg/g Cr 340.61 (H) 0 - 25 mg/g Cr   Hemoglobin A1c   Result Value Ref Range    Hemoglobin A1C 10.2 (H) 0 - 5.6 %   Basic metabolic panel   Result Value Ref Range    Sodium 138 133 - 144 mmol/L    Potassium 3.7 3.4 - 5.3 mmol/L    Chloride 103 94 - 109 mmol/L    Carbon Dioxide 28 20 - 32 mmol/L    Anion Gap 7 3 - 14 mmol/L    Glucose 184 (H) 70 - 99 mg/dL    Urea Nitrogen 24 7 - 30 mg/dL    Creatinine 1.07 (H) 0.52 - 1.04 mg/dL    GFR Estimate 55 (L) >60 mL/min/[1.73_m2]    GFR Estimate If Black 63 >60 mL/min/[1.73_m2]    Calcium 8.5 8.5 - 10.1 mg/dL

## 2019-02-19 NOTE — PROGRESS NOTES
SUBJECTIVE:   Adrian Cruz is a 63 year old female who presents to clinic today for the following health issues:      Diabetes Follow-up    Patient is checking blood sugars: twice daily.    Results are as follows:         am -          bedtime - 130-160    Diabetic concerns: None     Symptoms of hypoglycemia (low blood sugar): couple of times levels were 69      Paresthesias (numbness or burning in feet) or sores: No     Date of last diabetic eye exam: UTD    Did not schedule with MTM per last visit request     Diabetes Management Resources    Hyperlipidemia Follow-Up      Rate your low fat/cholesterol diet?: good    Taking statin?  Yes, no muscle aches from statin    Other lipid medications/supplements?:  none    Hypertension Follow-up      Outpatient blood pressures are not being checked.    Low Salt Diet: low salt    BP Readings from Last 2 Encounters:   02/19/19 140/70   10/30/18 138/70     Hemoglobin A1C (%)   Date Value   02/19/2019 10.2 (H)   10/30/2018 9.7 (H)     LDL Cholesterol Calculated (mg/dL)   Date Value   04/27/2018 51   05/05/2016 73     LDL Cholesterol Direct (mg/dL)   Date Value   10/30/2018 76   05/23/2017 75       Amount of exercise or physical activity: 4-5 days/week for an average of 30-45 minutes    Problems taking medications regularly: No    Medication side effects: none    Diet: low salt, low fat/cholesterol and diabetic      Problem list and histories reviewed & adjusted, as indicated.  Additional history: as documented    Patient Active Problem List   Diagnosis     Hyperlipidemia LDL goal <100     Type 2 diabetes mellitus with stage 3 chronic kidney disease, with long-term current use of insulin (H)     Hypertension goal BP (blood pressure) < 140/90     Cataract     Diabetes mellitus with background retinopathy (H)     Advanced directives, counseling/discussion     Health Care Home     Morbid obesity (H)     Obesity, Class II, BMI 35-39.9     Past Surgical History:    Procedure Laterality Date     LASER SURGERY OF EYE Right 2015       Social History     Tobacco Use     Smoking status: Never Smoker     Smokeless tobacco: Never Used   Substance Use Topics     Alcohol use: No     Family History   Problem Relation Age of Onset     Diabetes Mother      Heart Disease Mother         chf and pacemaker     Genitourinary Problems Mother         ESRD     Diabetes Father      C.A.D. Father         age 49     Cerebrovascular Disease Father      Asthma Sister      Asthma Son      Asthma Daughter      Heart Disease Daughter      Glaucoma No family hx of      Macular Degeneration No family hx of          Current Outpatient Medications   Medication Sig Dispense Refill     amLODIPine (NORVASC) 10 MG tablet TAKE 1 TABLET(10 MG) BY MOUTH DAILY 90 tablet 3     aspirin (ASPIRIN LOW DOSE) 81 MG EC tablet Take 1 tablet (81 mg) by mouth daily 90 tablet 3     atorvastatin (LIPITOR) 80 MG tablet Take 1 tablet (80 mg) by mouth daily 90 tablet 3     cetirizine (ZYRTEC) 10 MG tablet Take 1 tablet (10 mg) by mouth every evening 30 tablet 1     fluticasone (FLONASE) 50 MCG/ACT spray Spray 1-2 sprays into both nostrils daily 16 g 0     glimepiride (AMARYL) 4 MG tablet TAKE 2 TABLETS BY MOUTH EVERY MORNING BEFORE A MEAL 180 tablet 3     insulin detemir (LEVEMIR FLEXTOUCH) 100 UNIT/ML pen ADMINISTER 32 UNITS UNDER THE SKIN AT BEDTIME 45 mL 3     insulin pen needle (B-D U/F) 31G X 5 MM USE as directed once a day 100 each 3     losartan-hydrochlorothiazide (HYZAAR) 100-25 MG tablet Take 1 tablet by mouth daily Needs to be seen for more. 90 tablet 3     metFORMIN (GLUCOPHAGE-XR) 500 MG 24 hr tablet TAKE 4 TABLETS BY MOUTH DAILY WITH BREAKFAST 360 tablet 3     metoprolol succinate ER (TOPROL-XL) 25 MG 24 hr tablet Take 0.5 tablets (12.5 mg) by mouth daily 45 tablet 3     omeprazole 20 MG tablet Take 1 tablet (20 mg) by mouth daily 90 tablet 1     ONE TOUCH ULTRA test strip TEST TWICE DAILY 50 each 0     order for  DME Equipment being ordered: aluminum walker 1 Device 0     order for DME by Device route continuous Air walker -  Use as instructed 1 Device 0     ORDER FOR DME Glucometer of choice (One touch) with supplies Disp 1    Testing Strips for bid testing  #100    Lancets for bid testing #100 1 Month 11     ORDER FOR DME Equipment being ordered: blood pressure cuff/machine 1 Device 0     sitagliptin (JANUVIA) 100 MG tablet TAKE 1 TABLET(100 MG) BY MOUTH DAILY 90 tablet 3     Allergies   Allergen Reactions     Ace Inhibitors Cough     Glucophage [Biguanides] Cramps     Stomach upset     Lisinopril      Recent Labs   Lab Test 02/19/19  0724 10/30/18  0810 04/27/18  0704 12/15/17  0705 05/23/17  0744  05/05/16  0730  01/22/15  0754  02/16/12  0743   A1C 10.2* 9.7* 10.2* 11.1* 9.2*   < > 7.7*   < > 14.5*   < >  --    LDL  --  76 51  --  75  --  73  --  137*   < >  --    HDL  --   --  43*  --   --   --  42*  --  46*   < >  --    TRIG  --   --  106  --   --   --  120  --  266*   < >  --    ALT  --   --   --   --   --   --  15  --  16  --  17   CR  --  1.10*  --  1.30* 1.17*   < > 1.34*   < > 0.85   < > 0.81   GFRESTIMATED  --  50*  --  41* 47*   < > 40*   < > 68   < > 73   GFRESTBLACK  --  60*  --  50* 57*   < > 49*   < > 83   < > 88   POTASSIUM  --  4.1  --  4.2 4.2   < > 4.3   < > 4.5   < > 4.7   TSH  --   --   --   --  2.28  --   --   --  1.82   < >  --     < > = values in this interval not displayed.      BP Readings from Last 3 Encounters:   02/19/19 140/70   10/30/18 138/70   04/27/18 137/63    Wt Readings from Last 3 Encounters:   02/19/19 94.3 kg (208 lb)   10/30/18 93.4 kg (206 lb)   04/27/18 91.4 kg (201 lb 9.6 oz)                  Labs reviewed in EPIC    Reviewed and updated as needed this visit by clinical staff  Tobacco  Allergies  Meds  Problems       Reviewed and updated as needed this visit by Provider  Allergies  Problems         ROS:  Constitutional, neuro, ENT, endocrine, pulmonary, cardiac,  "gastrointestinal, genitourinary, musculoskeletal, integument and psychiatric systems are negative, except as otherwise noted.    OBJECTIVE:                                                    /70   Pulse 92   Temp 97.3  F (36.3  C) (Oral)   Ht 1.549 m (5' 1\")   Wt 94.3 kg (208 lb)   SpO2 96%   Breastfeeding? No   BMI 39.30 kg/m    Body mass index is 39.3 kg/m .  GENERAL APPEARANCE: healthy, alert and no distress  EYES: Eyes grossly normal to inspection and conjunctivae and sclerae normal  RESP: lungs clear to auscultation - no rales, rhonchi or wheezes  CV: regular rates and rhythm, normal S1 S2, no S3 or S4 and no murmur, click or rub  ABDOMEN: soft, non-tender and Central obesity++  MS: extremities normal- no gross deformities noted and peripheral pulses normal  SKIN: no suspicious lesions or rashes  NEURO: Normal strength and tone, mentation intact and speech normal  DIABETIC FOOT EXAM: normal DP and PT pulses, no trophic changes or ulcerative lesions, normal sensory exam and normal monofilament exam  PSYCH: mentation appears normal and affect normal/bright    Diagnostic test results:  Diagnostic Test Results:  Results for orders placed or performed in visit on 02/19/19 (from the past 24 hour(s))   Hemoglobin A1c   Result Value Ref Range    Hemoglobin A1C 10.2 (H) 0 - 5.6 %        ASSESSMENT/PLAN:                                                    1. Diabetes mellitus with background retinopathy (H)  -HbA1C has increased from 9.7 to 10.2  -Did not schedule with MTM last time, new referral place  -Will defer increasing basal insulin as had some low glucose readings  -Will need to monitor meal time glucose, may benefit from meal time coverage, Victoza can also be considered to help with weight (Cost may be limiting factor)   - Albumin Random Urine Quantitative with Creat Ratio  - Hemoglobin A1c  - Basic metabolic panel  - BARIATRIC ADULT REFERRAL  - amLODIPine (NORVASC) 10 MG tablet; TAKE 1 TABLET(10 " MG) BY MOUTH DAILY  Dispense: 90 tablet; Refill: 3  - atorvastatin (LIPITOR) 80 MG tablet; Take 1 tablet (80 mg) by mouth daily  Dispense: 90 tablet; Refill: 3  - glimepiride (AMARYL) 4 MG tablet; TAKE 2 TABLETS BY MOUTH EVERY MORNING BEFORE A MEAL  Dispense: 180 tablet; Refill: 3  - losartan-hydrochlorothiazide (HYZAAR) 100-25 MG tablet; Take 1 tablet by mouth daily Needs to be seen for more.  Dispense: 90 tablet; Refill: 3  - metFORMIN (GLUCOPHAGE-XR) 500 MG 24 hr tablet; TAKE 4 TABLETS BY MOUTH DAILY WITH BREAKFAST  Dispense: 360 tablet; Refill: 3  - sitagliptin (JANUVIA) 100 MG tablet; TAKE 1 TABLET(100 MG) BY MOUTH DAILY  Dispense: 90 tablet; Refill: 3    2. Morbid obesity (H)  -has gained weight since last check   - BARIATRIC ADULT REFERRAL    3. Screen for colon cancer  -Last colonoscopy was in 2007   - GASTROENTEROLOGY ADULT REF PROCEDURE ONLY    4. Visit for screening mammogram  -will get through [facility] program in 4/2019     5. Hyperlipidemia LDL goal <100  -Last LDL was 76  - atorvastatin (LIPITOR) 80 MG tablet; Take 1 tablet (80 mg) by mouth daily  Dispense: 90 tablet; Refill: 3    6. Hypertension goal BP (blood pressure) < 140/90  -Not at goal, added beta blocker  - metoprolol succinate ER (TOPROL-XL) 25 MG 24 hr tablet; Take 0.5 tablets (12.5 mg) by mouth daily  Dispense: 45 tablet; Refill: 3    7. Essential hypertension with goal blood pressure less than 140/90  - amLODIPine (NORVASC) 10 MG tablet; TAKE 1 TABLET(10 MG) BY MOUTH DAILY  Dispense: 90 tablet; Refill: 3  - losartan-hydrochlorothiazide (HYZAAR) 100-25 MG tablet; Take 1 tablet by mouth daily Needs to be seen for more.  Dispense: 90 tablet; Refill: 3    8. Type 2 diabetes mellitus with stage 3 chronic kidney disease, with long-term current use of insulin (H)  - aspirin (ASPIRIN LOW DOSE) 81 MG EC tablet; Take 1 tablet (81 mg) by mouth daily  Dispense: 90 tablet; Refill: 3  - insulin detemir (LEVEMIR FLEXTOUCH) 100 UNIT/ML pen; ADMINISTER 32  UNITS UNDER THE SKIN AT BEDTIME  Dispense: 45 mL; Refill: 3      Follow up with Provider - 4 months      Radha Madrigal MD MPH    [facility]

## 2019-02-19 NOTE — PATIENT INSTRUCTIONS
At [facility], we strive to deliver an exceptional experience to you, every time we see you.  If you receive a survey in the mail, please send us back your thoughts. We really do value your feedback.    Your care team:                            Family Medicine Internal Medicine   MD Maverick Manzano MD Shantel Branch-Fleming, MD Katya Georgiev PA-C Megan Hill, APRN KELLY Membreno MD Pediatrics   Edson Burkett, DEBO Hartman, MD Kim Roberson APRN CNP   MD Eli Carrasco MD Deborah Mielke, MD Viviana Stout, APRN [facility]      Clinic hours: Monday - Thursday 7 am-7 pm; Fridays 7 am-5 pm.   Urgent care: Monday - Friday 11 am-9 pm; Saturday and Sunday 9 am-5 pm.  Pharmacy : Monday -Thursday 8 am-8 pm; Friday 8 am-6 pm; Saturday and Sunday 9 am-5 pm.     Clinic: [phone]   Pharmacy: [phone]

## 2019-02-20 NOTE — RESULT ENCOUNTER NOTE
Please send a letter:    Dear Adrian Cruz,    Urine sample is showing more abnormal protein than last check, it is very important that we get your diabetes under better control to prevent damage to your kidneys.  Electrolytes are in the normal range.  Kidney function is stable for you.   Glucose was high at 184.   Plan of care and follow up as discussed in clinic.  Please let me know if you have any questions and thank you for choosing [facility].      Regards,    Radha Madrigal MD MPH

## 2019-02-25 ENCOUNTER — OFFICE VISIT (OUTPATIENT)
Dept: PHARMACY | Facility: CLINIC | Age: 64
End: 2019-02-25
Payer: COMMERCIAL

## 2019-02-25 VITALS — HEART RATE: 84 BPM | SYSTOLIC BLOOD PRESSURE: 182 MMHG | DIASTOLIC BLOOD PRESSURE: 82 MMHG

## 2019-02-25 DIAGNOSIS — E11.22 TYPE 2 DIABETES MELLITUS WITH STAGE 3 CHRONIC KIDNEY DISEASE, WITH LONG-TERM CURRENT USE OF INSULIN (H): Primary | ICD-10-CM

## 2019-02-25 DIAGNOSIS — I10 HYPERTENSION GOAL BP (BLOOD PRESSURE) < 140/90: ICD-10-CM

## 2019-02-25 DIAGNOSIS — N18.30 TYPE 2 DIABETES MELLITUS WITH STAGE 3 CHRONIC KIDNEY DISEASE, WITH LONG-TERM CURRENT USE OF INSULIN (H): Primary | ICD-10-CM

## 2019-02-25 DIAGNOSIS — J30.9 ALLERGIC SINUSITIS: ICD-10-CM

## 2019-02-25 DIAGNOSIS — K21.9 GASTROESOPHAGEAL REFLUX DISEASE WITHOUT ESOPHAGITIS: ICD-10-CM

## 2019-02-25 DIAGNOSIS — E78.5 HYPERLIPIDEMIA LDL GOAL <100: ICD-10-CM

## 2019-02-25 DIAGNOSIS — Z79.4 TYPE 2 DIABETES MELLITUS WITH STAGE 3 CHRONIC KIDNEY DISEASE, WITH LONG-TERM CURRENT USE OF INSULIN (H): Primary | ICD-10-CM

## 2019-02-25 PROCEDURE — 99605 MTMS BY PHARM NP 15 MIN: CPT | Performed by: PHARMACIST

## 2019-02-25 PROCEDURE — 99607 MTMS BY PHARM ADDL 15 MIN: CPT | Performed by: PHARMACIST

## 2019-02-25 RX ORDER — FLUTICASONE PROPIONATE 50 MCG
1-2 SPRAY, SUSPENSION (ML) NASAL DAILY PRN
Qty: 16 G | Refills: 0 | COMMUNITY
Start: 2019-02-25 | End: 2022-05-13

## 2019-02-25 RX ORDER — NICOTINE POLACRILEX 4 MG/1
20 GUM, CHEWING ORAL DAILY PRN
Qty: 90 TABLET | Refills: 1 | COMMUNITY
Start: 2019-02-25

## 2019-02-25 RX ORDER — CETIRIZINE HYDROCHLORIDE 10 MG/1
10 TABLET ORAL
Qty: 30 TABLET | Refills: 1 | COMMUNITY
Start: 2019-02-25 | End: 2019-06-18

## 2019-02-25 RX ORDER — LIRAGLUTIDE 6 MG/ML
0.6 INJECTION SUBCUTANEOUS DAILY
Qty: 6 ML | Refills: 1 | Status: SHIPPED | OUTPATIENT
Start: 2019-02-25 | End: 2019-04-30

## 2019-02-25 NOTE — PROGRESS NOTES
SUBJECTIVE/OBJECTIVE:                           Adrian Cruz is a 63 year old female coming in for an initial visit for Medication Therapy Management.  She was referred to me from Radha Madrigal.    Chief Complaint: Need to focus on diabetes and get my blood sugars down.  Personal Healthcare Goals:   1. Take less medication, frustrated she has to take so many pills per day.   2. Weight loss - will be starting with a [employer] insurance nutritionist and health coaching program (telephonic).  Referred to weight management program but frustrated with insurance barriers and cost.    Allergies/ADRs: Reviewed in Epic  Tobacco: No tobacco use  Alcohol: not currently using  Caffeine: 2 cups/day of coffee  Activity: walking during workdays, takes breaks from work every 90 minutes. No exercise in evening or weekends.  PMH: Reviewed in Epic    Medication Adherence/Access:  Patient uses pill box(es) and uses reminders/alarms.  Patient takes medications 2 time(s) per day.   Per patient, misses medication 1 times per month      Diabetes:  Pt currently taking Levemir 32u daily, metformin XR 2000mg/day, glimepiride 8mg daily, Januvia 100mg daily. Pt is not experiencing side effects.  SMBG: two times daily.   Ranges (patient reported):   Fasting 110-120s.  Not checking PPGs.   Patient is not experiencing hypoglycemia.   Recent symptoms of high blood sugar? none  Eye exam: due  Foot exam: up to date  ACEi/ARB: Yes: losartan.   Urine Albumin:   Lab Results   Component Value Date    UMALCR 340.61 (H) 02/19/2019      Aspirin: Taking 81mg daily and denies side effects  Diet/Exercise: Has been making changes to her food choices starting in January and frustrated about lack of weight loss and continues to have high BS. Breakfast bagel cream cheese or oatmeal.  Snacks fruit. Lunch: salad, veggie burger+bun. Dinner: salad and veggie burger. Noodles. [place], mashed potato. Yogurt. Non sweetened beverages.     Lab Results   Component Value  "Date    A1C 10.2 02/19/2019    A1C 9.7 10/30/2018    A1C 10.2 04/27/2018    A1C 11.1 12/15/2017    A1C 9.2 05/23/2017     Hypertension: Current medications include amlodipine 10mg daily, losartan/hctz 100/25mg daily. Has not started metoprolol.  Patient does not self-monitor BP.  Patient reports no current medication side effects.  Denies headache, blurry vision.     Allergic rhinitis: Has Flonase and Zyrtec available to take PRN.  Only needs to take in the spring and sometimes fall, works well when she needs them.    GERD: Has omeprazole available for PRN use, hasn't needed to take in a long time.     Hyperlipidemia: Current therapy includes atorvastatin 80mg once daily.  Pt reports no significant myalgias or other side effects.  Recent Labs   Lab Test 10/30/18  0810 04/27/18  0704  05/05/16  0730 01/22/15  0754 04/25/13  0734   CHOL  --  115  --  139 236* 228*   HDL  --  43*  --  42* 46* 36*   LDL 76 51   < > 73 137* 160*   TRIG  --  106  --  120 266* 161*   CHOLHDLRATIO  --   --   --   --  5.1* 6.3*    < > = values in this interval not displayed.       Today's Vitals: /82   Pulse 84       Est CrCl ([place], AjBW 66.4kg) ~ 56 ml/min  Wt Readings from Last 1 Encounters:   02/19/19 208 lb (94.3 kg)     Ht Readings from Last 1 Encounters:   02/19/19 5' 1\" (1.549 m)     Creatinine   Date Value Ref Range Status   02/19/2019 1.07 (H) 0.52 - 1.04 mg/dL Final   ]      ASSESSMENT:                             Current medications were reviewed today.     Medication Adherence: excellent, no issues identified    Diabetes: needs improvement. A1c not at goal <7%. Fasting BS at goal  but likely has very elevated PPGs and not catching with SMBG.  Discussed therapy options, including mealtime insulin, GLP-1 daily or weekly, SGLT2 (ie Invokana), pioglitizone.  Recommend she discontinue Januvia and glimepiride, start mealtime insulin for best success for A1c lowering, however pt declines and would like to instead " focus on weight management and lifestyle changes.  To support her in this effort, will start with use of Victoza with option to dose up to 3mg daily (or switch to saxenda) for weight loss. Will replace Januvia with Victoza.  Education on how to inject and use device.  Also education A1c lowering is typically not enough to achieve A1c goal with this intervention alone - will require diet and exercise changes on her part.  Pt agreeable and provided education to reduce carbohydrates, increase SMBG, start logging her food choices.      Hypertension: Needs Improvement. Patient is not meeting BP goal of < 140/90mmHg.  BP quite elevated today x2.  Encouraged her to start metoprolol and take in the evening.  She will also restart home monitoring.     Allergic rhinitis: stable    GERD: stable    Hyperlipidemia: Stable. Pt is on high intensity statin which is indicated based on 2013 ACC/AHA guidelines for lipid management.         PLAN:                            1. Stop Januvia.   2. Start Victoza 0.6mg daily x1 week, then 1.2mg daily  3. Increase BS monitoring to TID, fasting and 2 hours PPGs  4. Start metoprolol XL 12.5mg daily as prescribed. Restart home BP monitoring.   5. Start a food and glucose logbook  Glucose Log    I spent 60 minutes with this patient today. All changes were made via collaborative practice agreement with Radha Madrigal. A copy of the visit note was provided to the patient's primary care provider.    Will follow up in 1 month.    The patient was given a summary of these recommendations as an after visit summary.     Yessenia Jacobsen, PharmD, BCACP

## 2019-02-25 NOTE — PATIENT INSTRUCTIONS
Recommendations from today's MTM visit:                                                    MTM (medication therapy management) is a service provided by a clinical pharmacist designed to help you get the most of out of your medicines.   Today we reviewed what your medicines are for, how to know if they are working, that your medicines are safe and how to make your medicine regimen as easy as possible.     1. Stop taking Januvia, start Victoza 0.6mg once a day x7 days, then 1.2mg once a day.    2. Try testing blood sugar at different times.  Helpful times are before a meal and 2 hours after a meal.  If you test before and after the same meal, numbers should be within 50 points.    3. Consider keeping a food log with your portion sizes and blood sugars    4. Great job with your changes in food choices so far!  Continue looking for ways to decrease carbohydrates and get a protein with every meal to help keep you full.     5. Take metoprolol at night.  OK to continue amlodipine, losartan/hydrochlorothiazide in the morning.    Next MTM visit: 1 month    To schedule another MTM appointment, please call the clinic directly or you may call the MTM scheduling line at [phone] or toll-free at [phone].     My Clinical Pharmacist's contact information:                                                      It was a pleasure talking with you today!  Please feel free to contact me with any questions or concerns you have.      Yessenia Jacobsen, PharmD, [facility]   [phone]    You may receive a survey about the MTM services you received by email and/or US Mail.  I would appreciate your feedback to help me serve you better in the future. Your comments will be anonymous.    Diabetes Goals:    Home Monitoring of Blood Sugars:Fasting  mg/dL and 2 hours after a meal less than 180 mg/dL.    Hemoglobin A1C: Less than 7%. Yours is   Lab Results   Component Value Date    A1C 10.2 02/19/2019   .    Blood Pressure: Less than  140/90mmHg.     Cholesterol: You are taking atorvastatin to help decrease the risk of heart disease.    Things you can do to help lower your blood sugars:    Diet: 3 meals and 1-2 snacks per day with 45-60 grams of carbohydrates per meal and 15-30 grams of carbohydrates per snack. Try to fill your plate at least half-full with vegetables, fill one-quarter full with lean meats or protein, and also make sure you get at least some carbohydrate with every meal.    Exercise: 30 minutes per day of anything that will increase your heart rate and make you break a sweat! Gardening, walking, cleaning the house, changing the oil in your car, etc. If you feel like 30 minutes per day is too much, start small. Even lifting canned foods or working your arms with a resistance band in front of the TV can help.

## 2019-04-01 ENCOUNTER — TELEPHONE (OUTPATIENT)
Dept: PHARMACY | Facility: CLINIC | Age: 64
End: 2019-04-01

## 2019-04-23 ENCOUNTER — TRANSFERRED RECORDS (OUTPATIENT)
Dept: HEALTH INFORMATION MANAGEMENT | Facility: CLINIC | Age: 64
End: 2019-04-23

## 2019-04-30 DIAGNOSIS — Z79.4 TYPE 2 DIABETES MELLITUS WITH STAGE 3 CHRONIC KIDNEY DISEASE, WITH LONG-TERM CURRENT USE OF INSULIN (H): ICD-10-CM

## 2019-04-30 DIAGNOSIS — N18.30 TYPE 2 DIABETES MELLITUS WITH STAGE 3 CHRONIC KIDNEY DISEASE, WITH LONG-TERM CURRENT USE OF INSULIN (H): ICD-10-CM

## 2019-04-30 DIAGNOSIS — E11.22 TYPE 2 DIABETES MELLITUS WITH STAGE 3 CHRONIC KIDNEY DISEASE, WITH LONG-TERM CURRENT USE OF INSULIN (H): ICD-10-CM

## 2019-04-30 NOTE — TELEPHONE ENCOUNTER
"Requested Prescriptions   Pending Prescriptions Disp Refills     VICTOZA PEN 18 MG/3ML soln [Pharmacy Med Name: VICTOZA 18MG/3ML INJ PEN 3ML]  Last Written Prescription Date:  02/25/19  Last Fill Quantity: 6ml,  # refills: 1   Last Office Visit with ARIA, LEVON or [facility] prescribing provider:  02/19/19Nadja   Future Office Visit:    6 mL 0     Sig: ADMINISTER 0.6 MG UNDER THE SKIN DAILY FOR 7 DAYS. INCREASE TO 1.2 MG DAILY       GLP-1 Agonists Protocol Failed - 4/30/2019  3:37 AM        Failed - Blood pressure less than 140/90 in past 6 months     BP Readings from Last 3 Encounters:   02/25/19 182/82   02/19/19 140/70   10/30/18 138/70                 Failed - Normal serum creatinine on file in past 12 months     Recent Labs   Lab Test 02/19/19  0724   CR 1.07*             Passed - LDL on file in past 12 months     Recent Labs   Lab Test 10/30/18  0810   LDL 76             Passed - Microalbumin on file in past 12 months     Recent Labs   Lab Test 02/19/19  0724   MICROL 312   UMALCR 340.61*             Passed - HgbA1C in past 3 or 6 months     If HgbA1C is 8 or greater, it needs to be on file within the past 3 months.  If less than 8, must be on file within the past 6 months.     Recent Labs   Lab Test 02/19/19  0724   A1C 10.2*             Passed - Medication is active on med list        Passed - Patient is age 18 or older        Passed - No active pregnancy on record        Passed - No positive pregnancy test in past 12 months        Passed - Recent (6 mo) or future (30 days) visit within the authorizing provider's specialty     Patient had office visit in the last 6 months or has a visit in the next 30 days with authorizing provider.  See \"Patient Info\" tab in inbasket, or \"Choose Columns\" in Meds & Orders section of the refill encounter.              "

## 2019-05-01 RX ORDER — LIRAGLUTIDE 6 MG/ML
INJECTION SUBCUTANEOUS
Qty: 6 ML | Refills: 0 | Status: SHIPPED | OUTPATIENT
Start: 2019-05-01 | End: 2019-05-21

## 2019-05-01 NOTE — TELEPHONE ENCOUNTER
Routing refill request to provider for review/approval because:  Labs out of range:  Blood pressure and serum creatinine.    Elvia Garcia RN

## 2019-05-15 ENCOUNTER — TELEPHONE (OUTPATIENT)
Dept: FAMILY MEDICINE | Facility: CLINIC | Age: 64
End: 2019-05-15

## 2019-05-15 NOTE — TELEPHONE ENCOUNTER
Patient is calling to request that all perscriptions now be sent to [facility] in [place] -      [address]  Phone #[phone]

## 2019-05-15 NOTE — TELEPHONE ENCOUNTER
This writer attempted to contact patient on 05/15/19      Reason for call pharmacy to pharmacy transactions and left detailed message.      If patient calls back:   Relay message, (read verbatim), document that pt called and close encounter    Patient should contact her current pharmacy and have them call her old pharmacy to transfer her rxs.     Joyce Rodriguez, CMA

## 2019-05-21 DIAGNOSIS — E11.22 TYPE 2 DIABETES MELLITUS WITH STAGE 3 CHRONIC KIDNEY DISEASE, WITH LONG-TERM CURRENT USE OF INSULIN (H): ICD-10-CM

## 2019-05-21 DIAGNOSIS — N18.30 TYPE 2 DIABETES MELLITUS WITH STAGE 3 CHRONIC KIDNEY DISEASE, WITH LONG-TERM CURRENT USE OF INSULIN (H): ICD-10-CM

## 2019-05-21 DIAGNOSIS — Z79.4 TYPE 2 DIABETES MELLITUS WITH STAGE 3 CHRONIC KIDNEY DISEASE, WITH LONG-TERM CURRENT USE OF INSULIN (H): ICD-10-CM

## 2019-05-22 NOTE — TELEPHONE ENCOUNTER
"Requested Prescriptions   Pending Prescriptions Disp Refills     VICTOZA PEN 18 MG/3ML soln [Pharmacy Med Name: VICTOZA 2-CHAD 18 MG/3 ML PEN]      Last Written Prescription Date:  5/1/19  Last Fill Quantity: 6 ml,  # refills: 0   Last Office Visit with [facility] or [facility] prescribing provider:  2/19/19   Future Office Visit:       0     Sig: INJECT 0.6 MG UNDER SKIN DAILY FOR 7 DAYS, THEN INCREASE TO 1.2 MG DAILY       GLP-1 Agonists Protocol Failed - 5/21/2019  7:03 PM        Failed - Blood pressure less than 140/90 in past 6 months     BP Readings from Last 3 Encounters:   02/25/19 182/82   02/19/19 140/70   10/30/18 138/70                 Failed - HgbA1C in past 3 or 6 months     If HgbA1C is 8 or greater, it needs to be on file within the past 3 months.  If less than 8, must be on file within the past 6 months.     Recent Labs   Lab Test 02/19/19  0724   A1C 10.2*             Failed - Normal serum creatinine on file in past 12 months     Recent Labs   Lab Test 02/19/19  0724   CR 1.07*             Passed - LDL on file in past 12 months     Recent Labs   Lab Test 10/30/18  0810   LDL 76             Passed - Microalbumin on file in past 12 months     Recent Labs   Lab Test 02/19/19  0724   MICROL 312   UMALCR 340.61*             Passed - Medication is active on med list        Passed - Patient is age 18 or older        Passed - No active pregnancy on record        Passed - No positive pregnancy test in past 12 months        Passed - Recent (6 mo) or future (30 days) visit within the authorizing provider's specialty     Patient had office visit in the last 6 months or has a visit in the next 30 days with authorizing provider.  See \"Patient Info\" tab in inbasket, or \"Choose Columns\" in Meds & Orders section of the refill encounter.                  Nelson Husseinx  [facility]  "

## 2019-05-23 RX ORDER — LIRAGLUTIDE 6 MG/ML
1.2 INJECTION SUBCUTANEOUS DAILY
Qty: 6 ML | Refills: 0 | Status: SHIPPED | OUTPATIENT
Start: 2019-05-23 | End: 2019-06-18

## 2019-05-23 NOTE — TELEPHONE ENCOUNTER
Routing refill request to provider for review/approval because:  Labs out of range:  creatinine  Labs not current:  A1C          Marcel Gutierrez RN, BSN, PHN

## 2019-05-23 NOTE — TELEPHONE ENCOUNTER
Refilled, due for follow up next month.   Radha Madrigal MD MPH      Routing comment    This writer attempted to contact Patient on 05/23/19      Reason for call Patient due for follow up in June and left message.      If patient calls back:   Schedule Office Visit appointment In June with PCP, postponing message.        Viridiana Billings MA

## 2019-06-04 NOTE — TELEPHONE ENCOUNTER
Spoke to patient and scheduled a follow up appt for 6/18/19.  Viridiana Billings MA/  For [employer] [facility]

## 2019-06-18 ENCOUNTER — OFFICE VISIT (OUTPATIENT)
Dept: FAMILY MEDICINE | Facility: CLINIC | Age: 64
End: 2019-06-18
Payer: COMMERCIAL

## 2019-06-18 VITALS
TEMPERATURE: 97.6 F | HEART RATE: 91 BPM | HEIGHT: 61 IN | OXYGEN SATURATION: 96 % | WEIGHT: 204 LBS | SYSTOLIC BLOOD PRESSURE: 115 MMHG | RESPIRATION RATE: 16 BRPM | DIASTOLIC BLOOD PRESSURE: 71 MMHG | BODY MASS INDEX: 38.51 KG/M2

## 2019-06-18 DIAGNOSIS — N18.30 TYPE 2 DIABETES MELLITUS WITH STAGE 3 CHRONIC KIDNEY DISEASE, WITH LONG-TERM CURRENT USE OF INSULIN (H): Primary | ICD-10-CM

## 2019-06-18 DIAGNOSIS — E66.01 MORBID OBESITY (H): ICD-10-CM

## 2019-06-18 DIAGNOSIS — Z12.11 SCREEN FOR COLON CANCER: ICD-10-CM

## 2019-06-18 DIAGNOSIS — I10 ESSENTIAL HYPERTENSION WITH GOAL BLOOD PRESSURE LESS THAN 140/90: ICD-10-CM

## 2019-06-18 DIAGNOSIS — Z79.4 TYPE 2 DIABETES MELLITUS WITH STAGE 3 CHRONIC KIDNEY DISEASE, WITH LONG-TERM CURRENT USE OF INSULIN (H): Primary | ICD-10-CM

## 2019-06-18 DIAGNOSIS — E11.22 TYPE 2 DIABETES MELLITUS WITH STAGE 3 CHRONIC KIDNEY DISEASE, WITH LONG-TERM CURRENT USE OF INSULIN (H): Primary | ICD-10-CM

## 2019-06-18 DIAGNOSIS — J30.9 ALLERGIC SINUSITIS: ICD-10-CM

## 2019-06-18 LAB — HBA1C MFR BLD: 7.7 % (ref 0–5.6)

## 2019-06-18 PROCEDURE — 36415 COLL VENOUS BLD VENIPUNCTURE: CPT | Performed by: PREVENTIVE MEDICINE

## 2019-06-18 PROCEDURE — 83036 HEMOGLOBIN GLYCOSYLATED A1C: CPT | Performed by: PREVENTIVE MEDICINE

## 2019-06-18 PROCEDURE — 99214 OFFICE O/P EST MOD 30 MIN: CPT | Performed by: PREVENTIVE MEDICINE

## 2019-06-18 RX ORDER — LIRAGLUTIDE 6 MG/ML
1.2 INJECTION SUBCUTANEOUS DAILY
Qty: 6 ML | Refills: 3 | Status: SHIPPED | OUTPATIENT
Start: 2019-06-18 | End: 2019-09-15

## 2019-06-18 RX ORDER — CETIRIZINE HYDROCHLORIDE 10 MG/1
10 TABLET ORAL
Qty: 90 TABLET | Refills: 1 | Status: SHIPPED | OUTPATIENT
Start: 2019-06-18 | End: 2022-05-13

## 2019-06-18 ASSESSMENT — MIFFLIN-ST. JEOR: SCORE: 1412.72

## 2019-06-18 ASSESSMENT — PAIN SCALES - GENERAL: PAINLEVEL: NO PAIN (0)

## 2019-06-18 NOTE — PROGRESS NOTES
Subjective     Adrian Cruz is a 63 year old female who presents to clinic today for the following health issues:    HPI   Diabetes Follow-up      How often are you checking your blood sugar? One time daily    What time of day are you checking your blood sugars (select all that apply)?  Before meals    Have you had any blood sugars above 200?  No    Have you had any blood sugars below 70?  Yes 69-70    What symptoms do you notice when your blood sugar is low?  Other: only in the morning     What concerns do you have today about your diabetes? None     Do you have any of these symptoms? (Select all that apply)  No numbness or tingling in feet.  No redness, sores or blisters on feet.  No complaints of excessive thirst.  No reports of blurry vision.  No significant changes to weight.     Have you had a diabetic eye exam in the last 12 months? Yes- Date of last eye exam: Monthly     BP Readings from Last 2 Encounters:   06/18/19 115/71   02/25/19 182/82     Hemoglobin A1C (%)   Date Value   06/18/2019 7.7 (H)   02/19/2019 10.2 (H)     LDL Cholesterol Calculated (mg/dL)   Date Value   04/27/2018 51   05/05/2016 73     LDL Cholesterol Direct (mg/dL)   Date Value   10/30/2018 76   05/23/2017 75       Diabetes Management Resources    Hypertension Follow-up      Do you check your blood pressure regularly outside of the clinic? No     Are you following a low salt diet? Yes    Are your blood pressures ever more than 140 on the top number (systolic) OR more   than 90 on the bottom number (diastolic), for example 140/90? No       Amount of exercise or physical activity: walking     Problems taking medications regularly: No    Medication side effects: none    Diet: regular (no restrictions)      Hyperlipidemia Follow-Up      Are you having any of the following symptoms? (Select all that apply)  No complaints of shortness of breath, chest pain or pressure.  No increased sweating or nausea with activity.  No left-sided neck  or arm pain.  No complaints of pain in calves when walking 1-2 blocks.    Are you regularly taking any medication or supplement to lower your cholesterol?   Yes- statin    Are you having muscle aches or other side effects that you think could be caused by your cholesterol lowering medication?  No    Obesity:  -working with a  through [employer]     Patient Active Problem List   Diagnosis     Hyperlipidemia LDL goal <100     Type 2 diabetes mellitus with stage 3 chronic kidney disease, with long-term current use of insulin (H)     Hypertension goal BP (blood pressure) < 140/90     Cataract     Diabetes mellitus with background retinopathy (H)     Advanced directives, counseling/discussion     Health Care Home     Morbid obesity (H)     Obesity, Class II, BMI 35-39.9     Past Surgical History:   Procedure Laterality Date     LASER SURGERY OF EYE Right 2015       Social History     Tobacco Use     Smoking status: Never Smoker     Smokeless tobacco: Never Used   Substance Use Topics     Alcohol use: No     Family History   Problem Relation Age of Onset     Diabetes Mother      Heart Disease Mother         chf and pacemaker     Genitourinary Problems Mother         ESRD     Diabetes Father      C.A.D. Father         age 49     Cerebrovascular Disease Father      Asthma Sister      Asthma Son      Asthma Daughter      Heart Disease Daughter      Glaucoma No family hx of      Macular Degeneration No family hx of          Current Outpatient Medications   Medication Sig Dispense Refill     amLODIPine (NORVASC) 10 MG tablet TAKE 1 TABLET(10 MG) BY MOUTH DAILY 90 tablet 3     aspirin (ASPIRIN LOW DOSE) 81 MG EC tablet Take 1 tablet (81 mg) by mouth daily 90 tablet 3     atorvastatin (LIPITOR) 80 MG tablet Take 1 tablet (80 mg) by mouth daily 90 tablet 3     cetirizine (ZYRTEC) 10 MG tablet Take 1 tablet (10 mg) by mouth every evening as needed for allergies 90 tablet 1     fluticasone (FLONASE) 50 MCG/ACT nasal spray Spray  1-2 sprays into both nostrils daily as needed for rhinitis or allergies 16 g 0     glimepiride (AMARYL) 4 MG tablet TAKE 2 TABLETS BY MOUTH EVERY MORNING BEFORE A MEAL 180 tablet 3     insulin detemir (LEVEMIR FLEXTOUCH) 100 UNIT/ML pen ADMINISTER 32 UNITS UNDER THE SKIN AT BEDTIME 45 mL 3     insulin pen needle (B-D U/F) 31G X 5 MM miscellaneous USE as directed twice a day 100 each 11     liraglutide (VICTOZA PEN) 18 MG/3ML solution Inject 1.2 mg Subcutaneous daily 6 mL 3     losartan-hydrochlorothiazide (HYZAAR) 100-25 MG tablet Take 1 tablet by mouth daily Needs to be seen for more. 90 tablet 3     metFORMIN (GLUCOPHAGE-XR) 500 MG 24 hr tablet TAKE 4 TABLETS BY MOUTH DAILY WITH BREAKFAST 360 tablet 3     metoprolol succinate ER (TOPROL-XL) 25 MG 24 hr tablet Take 0.5 tablets (12.5 mg) by mouth daily 45 tablet 3     omeprazole 20 MG tablet Take 1 tablet (20 mg) by mouth daily as needed 90 tablet 1     ONE TOUCH ULTRA test strip TEST TWICE DAILY 50 each 0     order for DME Equipment being ordered: Digital home blood pressure monitor kit 1 Device 0     ORDER FOR DME Equipment being ordered: blood pressure cuff/machine 1 Device 0     Allergies   Allergen Reactions     Ace Inhibitors Cough     Glucophage [Biguanides] Cramps     Stomach upset     Recent Labs   Lab Test 06/18/19  0717 02/19/19  0724 10/30/18  0810 04/27/18  0704  05/23/17  0744  05/05/16  0730  01/22/15  0754  02/16/12  0743   A1C 7.7* 10.2* 9.7* 10.2*   < > 9.2*   < > 7.7*   < > 14.5*   < >  --    LDL  --   --  76 51  --  75  --  73  --  137*   < >  --    HDL  --   --   --  43*  --   --   --  42*  --  46*   < >  --    TRIG  --   --   --  106  --   --   --  120  --  266*   < >  --    ALT  --   --   --   --   --   --   --  15  --  16  --  17   CR  --  1.07* 1.10*  --    < > 1.17*   < > 1.34*   < > 0.85   < > 0.81   GFRESTIMATED  --  55* 50*  --    < > 47*   < > 40*   < > 68   < > 73   GFRESTBLACK  --  63 60*  --    < > 57*   < > 49*   < > 83   < > 88  "  POTASSIUM  --  3.7 4.1  --    < > 4.2   < > 4.3   < > 4.5   < > 4.7   TSH  --   --   --   --   --  2.28  --   --   --  1.82   < >  --     < > = values in this interval not displayed.      BP Readings from Last 3 Encounters:   06/18/19 115/71   02/25/19 182/82   02/19/19 140/70    Wt Readings from Last 3 Encounters:   06/18/19 92.5 kg (204 lb)   02/19/19 94.3 kg (208 lb)   10/30/18 93.4 kg (206 lb)            Reviewed and updated as needed this visit by Provider  Tobacco  Allergies  Meds  Problems  Med Hx  Surg Hx  Fam Hx         Review of Systems   ROS COMP: Constitutional, HEENT, cardiovascular, pulmonary, gi and gu systems are negative, except as otherwise noted.      Objective    /71 (BP Location: Left arm, Patient Position: Chair, Cuff Size: Adult Regular)   Pulse 91   Temp 97.6  F (36.4  C) (Oral)   Resp 16   Ht 1.549 m (5' 1\")   Wt 92.5 kg (204 lb)   SpO2 96%   BMI 38.55 kg/m    Body mass index is 38.55 kg/m .  Physical Exam   GENERAL: healthy, alert and no distress  EYES: Eyes grossly normal to inspection, PERRL and conjunctivae and sclerae normal  RESP: lungs clear to auscultation - no rales, rhonchi or wheezes  CV: regular rate and rhythm, normal S1 S2, no S3 or S4, no murmur, click or rub, no peripheral edema and peripheral pulses strong  ABDOMEN: soft, nontender, no rebound or guarding   MS: no gross musculoskeletal defects noted, no edema  SKIN: no suspicious lesions or rashes  NEURO: Normal strength and tone, mentation intact and speech normal  PSYCH: mentation appears normal, affect normal/bright  Diabetic foot exam: normal DP and PT pulses, no trophic changes or ulcerative lesions and normal sensory exam    Diagnostic Test Results:  Labs reviewed in Epic  Results for orders placed or performed in visit on 06/18/19 (from the past 24 hour(s))   Hemoglobin A1c   Result Value Ref Range    Hemoglobin A1C 7.7 (H) 0 - 5.6 %           Assessment & Plan     Adrian was seen today for " "hypertension and diabetes.    Diagnoses and all orders for this visit:    Type 2 diabetes mellitus with stage 3 chronic kidney disease, with long-term current use of insulin (H)  -     Hemoglobin A1c  -     liraglutide (VICTOZA PEN) 18 MG/3ML solution; Inject 1.2 mg Subcutaneous daily  -     Albumin Random Urine Quantitative with Creat Ratio; Future  -     Hemoglobin A1c; Future  -     Basic metabolic panel; Future  -     Lipid panel reflex to direct LDL Fasting; Future  -     TSH with free T4 reflex; Future  -     ALT; Future  -HbA1C is at goal!   -continue current medication     Allergic sinusitis  -     cetirizine (ZYRTEC) 10 MG tablet; Take 1 tablet (10 mg) by mouth every evening as needed for allergies    Essential hypertension with goal blood pressure less than 140/90  -     order for DME; Equipment being ordered: Digital home blood pressure monitor kit  -     Basic metabolic panel; Future  -     ALT; Future  -at goal  -continue current medication     Morbid obesity (H)  -has lost weight since last visit     Screen for colon cancer  -     Fecal colorectal cancer screen FIT - Future (S+30); Future         BMI:   Estimated body mass index is 38.55 kg/m  as calculated from the following:    Height as of this encounter: 1.549 m (5' 1\").    Weight as of this encounter: 92.5 kg (204 lb).   Weight management plan: Discussed healthy diet and exercise guidelines        See Patient Instructions    Return in about 6 months (around 12/18/2019) for Routine Visit, Lab Work, Physical Exam, BP Recheck.    Radha Madrigal MD MPH    [facility]        "

## 2019-06-18 NOTE — PATIENT INSTRUCTIONS
At [facility], we strive to deliver an exceptional experience to you, every time we see you.  If you receive a survey in the mail, please send us back your thoughts. We really do value your feedback.    Based on your medical history, these are the current health maintenance/preventive care services that you are due for (some may have been done at this visit.)  Health Maintenance Due   Topic Date Due     HIV SCREENING  07/24/1969     ZOSTER IMMUNIZATION (1 of 2) 07/24/2004     EYE EXAM  04/03/2016     PREVENTIVE CARE VISIT  05/05/2017     COLONOSCOPY  11/29/2017     LIPID  04/27/2019     TSH W/FREE T4 REFLEX  05/23/2019         Suggested websites for health information:  Www.[employer].org : Up to date and easily searchable information on multiple topics.  Www.medlineplus.gov : medication info, interactive tutorials, watch real surgeries online  Www.familydoctor.org : good info from the Academy of Family Physicians  Www.cdc.gov : public health info, travel advisories, epidemics (H1N1)  Www.aap.org : children's health info, normal development, vaccinations  Www.health.[facility].[place].us : [place] dept of health, public health issues in [place], N1N1    Your care team:                            Family Medicine Internal Medicine   MD Maverick Manzano MD Shantel Branch-Fleming, MD Katya Georgiev PA-C Nam Ho, MD Pediatrics   DEBO Medrano, MD Eli Barnett CNP, MD Deborah Mielke, MD Kim Thein, APRN [facility]      Clinic hours: Monday - Thursday 7 am-7 pm; Fridays 7 am-5 pm.   Urgent care: Monday - Friday 11 am-9 pm; Saturday and Sunday 9 am-5 pm.  Pharmacy : Monday -Thursday 8 am-8 pm; Friday 8 am-6 pm; Saturday and Sunday 9 am-5 pm.     Clinic: [phone]   Pharmacy: [phone]

## 2019-09-15 DIAGNOSIS — Z79.4 TYPE 2 DIABETES MELLITUS WITH STAGE 3 CHRONIC KIDNEY DISEASE, WITH LONG-TERM CURRENT USE OF INSULIN (H): ICD-10-CM

## 2019-09-15 DIAGNOSIS — N18.30 TYPE 2 DIABETES MELLITUS WITH STAGE 3 CHRONIC KIDNEY DISEASE, WITH LONG-TERM CURRENT USE OF INSULIN (H): ICD-10-CM

## 2019-09-15 DIAGNOSIS — E11.22 TYPE 2 DIABETES MELLITUS WITH STAGE 3 CHRONIC KIDNEY DISEASE, WITH LONG-TERM CURRENT USE OF INSULIN (H): ICD-10-CM

## 2019-09-15 NOTE — TELEPHONE ENCOUNTER
"Requested Prescriptions   Pending Prescriptions Disp Refills     VICTOZA PEN 18 MG/3ML soln [Pharmacy Med Name: VICTOZA 3-CHAD 18 MG/3 ML PEN]  1     Sig: INJECT 1.2 MG UNDER THE SKIN ONCE DAILY         Last Written Prescription Date:  6/18/19  Last Fill Quantity: 6 ml,  # refills: 3   Last Office Visit with [facility] or [facility] prescribing provider:  6/18/19   Future Office Visit:         GLP-1 Agonists Protocol Failed - 9/15/2019 12:18 AM        Failed - Normal serum creatinine on file in past 12 months     Recent Labs   Lab Test 02/19/19  0724   CR 1.07*             Passed - Blood pressure less than 140/90 in past 6 months     BP Readings from Last 3 Encounters:   06/18/19 115/71   02/25/19 182/82   02/19/19 140/70                 Passed - LDL on file in past 12 months     Recent Labs   Lab Test 10/30/18  0810   LDL 76             Passed - Microalbumin on file in past 12 months     Recent Labs   Lab Test 02/19/19  0724   MICROL 312   UMALCR 340.61*             Passed - HgbA1C in past 3 or 6 months     If HgbA1C is 8 or greater, it needs to be on file within the past 3 months.  If less than 8, must be on file within the past 6 months.     Recent Labs   Lab Test 06/18/19  0717   A1C 7.7*             Passed - Medication is active on med list        Passed - Patient is age 18 or older        Passed - No active pregnancy on record        Passed - No positive pregnancy test in past 12 months        Passed - Recent (6 mo) or future (30 days) visit within the authorizing provider's specialty     Patient had office visit in the last 6 months or has a visit in the next 30 days with authorizing provider.  See \"Patient Info\" tab in inbasket, or \"Choose Columns\" in Meds & Orders section of the refill encounter.                  Nelson Faarax  [facility]  "

## 2019-09-17 RX ORDER — LIRAGLUTIDE 6 MG/ML
INJECTION SUBCUTANEOUS
Qty: 18 ML | Refills: 1 | Status: SHIPPED | OUTPATIENT
Start: 2019-09-17 | End: 2020-02-21

## 2019-10-28 DIAGNOSIS — Z12.11 SCREEN FOR COLON CANCER: ICD-10-CM

## 2019-10-28 PROCEDURE — 82274 ASSAY TEST FOR BLOOD FECAL: CPT | Performed by: PREVENTIVE MEDICINE

## 2019-11-03 LAB — HEMOCCULT STL QL IA: NEGATIVE

## 2019-11-04 NOTE — RESULT ENCOUNTER NOTE
Please send a letter:    Dear Adrian Cruz,    Stool test is not showing any blood. Plan to recheck once a year as part of colon cancer screening.  Please let me know if you have any questions and thank you for choosing [facility].    Regards,    Radha Madrigal MD MPH

## 2019-12-01 ENCOUNTER — TELEPHONE (OUTPATIENT)
Dept: FAMILY MEDICINE | Facility: CLINIC | Age: 64
End: 2019-12-01

## 2019-12-01 DIAGNOSIS — I10 HYPERTENSION GOAL BP (BLOOD PRESSURE) < 140/90: Primary | ICD-10-CM

## 2019-12-01 NOTE — TELEPHONE ENCOUNTER
losartan-hydrochlorothiazide (HYZAAR) 100-25 MG tablet is not available. Please split rx into two.

## 2019-12-02 RX ORDER — HYDROCHLOROTHIAZIDE 25 MG/1
25 TABLET ORAL DAILY
Qty: 90 TABLET | Refills: 1 | Status: SHIPPED | OUTPATIENT
Start: 2019-12-02 | End: 2020-02-21

## 2019-12-02 RX ORDER — LOSARTAN POTASSIUM 100 MG/1
100 TABLET ORAL DAILY
Qty: 90 TABLET | Refills: 1 | Status: SHIPPED | OUTPATIENT
Start: 2019-12-02 | End: 2020-02-21

## 2019-12-02 NOTE — TELEPHONE ENCOUNTER
Routing to provider for review - can new prescriptions be placed?    Thanks    Will Fredy BLANTON

## 2020-02-05 DIAGNOSIS — E11.22 TYPE 2 DIABETES MELLITUS WITH STAGE 3 CHRONIC KIDNEY DISEASE, WITH LONG-TERM CURRENT USE OF INSULIN (H): ICD-10-CM

## 2020-02-05 DIAGNOSIS — Z79.4 TYPE 2 DIABETES MELLITUS WITH STAGE 3 CHRONIC KIDNEY DISEASE, WITH LONG-TERM CURRENT USE OF INSULIN (H): ICD-10-CM

## 2020-02-05 DIAGNOSIS — N18.30 TYPE 2 DIABETES MELLITUS WITH STAGE 3 CHRONIC KIDNEY DISEASE, WITH LONG-TERM CURRENT USE OF INSULIN (H): ICD-10-CM

## 2020-02-05 NOTE — TELEPHONE ENCOUNTER
"Requested Prescriptions   Pending Prescriptions Disp Refills     [facility] ASPIRIN LOW STRENGTH 81 MG EC tablet [Pharmacy Med Name: [facility] ASPIRIN EC 81 MG TABLET]  Last Written Prescription Date: 02/19/19  Last Fill Quantity: 90,  # refills: 3   Last Office Visit with [facility], [facility] or [facility] prescribing provider:  06/18/19Nadja   Future Office Visit:    90 tablet 2     Sig: TAKE 1 TABLET (81 MG) BY MOUTH EVERY DAY       Analgesics (Non-Narcotic Tylenol and ASA Only) Passed - 2/5/2020  2:45 AM        Passed - Recent (12 mo) or future (30 days) visit within the authorizing provider's specialty     Patient has had an office visit with the authorizing provider or a provider within the authorizing providers department within the previous 12 mos or has a future within next 30 days. See \"Patient Info\" tab in inbasket, or \"Choose Columns\" in Meds & Orders section of the refill encounter.              Passed - Patient is age 20 years or older     If ASA is flagged for ages under 20 years old. Forward to provider for confirmation Ryes Syndrome is not a concern.              Passed - Medication is active on med list          "

## 2020-02-07 RX ORDER — PROPYLENE GLYCOL/PEG 400 0.3 %-0.4%
DROPS OPHTHALMIC (EYE)
Qty: 90 TABLET | Refills: 1 | Status: SHIPPED | OUTPATIENT
Start: 2020-02-07 | End: 2020-08-14

## 2020-02-07 NOTE — TELEPHONE ENCOUNTER
Prescription approved per G Refill Protocol.      Sabrina Ruiz RN, [facility] Triage

## 2020-02-08 DIAGNOSIS — E11.3299 DIABETES MELLITUS WITH BACKGROUND RETINOPATHY (H): ICD-10-CM

## 2020-02-08 NOTE — TELEPHONE ENCOUNTER
"Requested Prescriptions   Pending Prescriptions Disp Refills     metFORMIN (GLUCOPHAGE-XR) 500 MG 24 hr tablet [Pharmacy Med Name: METFORMIN HCL  MG TABLET] 360 tablet 2     Sig: TAKE 4 TABLETS BY MOUTH EVERY DAY WITH BREAKFAST       Biguanide Agents Failed - 2/8/2020 12:28 AM        Failed - Blood pressure less than 140/90 in past 6 months     BP Readings from Last 3 Encounters:   06/18/19 115/71   02/25/19 182/82   02/19/19 140/70                 Failed - Patient has documented LDL within the past 12 mos.     Recent Labs   Lab Test 10/30/18  0810   LDL 76             Failed - Patient has documented A1c within the specified period of time.     If HgbA1C is 8 or greater, it needs to be on file within the past 3 months.  If less than 8, must be on file within the past 6 months.     Recent Labs   Lab Test 06/18/19  0717   A1C 7.7*             Failed - Recent (6 mo) or future (30 days) visit within the authorizing provider's specialty     Patient had office visit in the last 6 months or has a visit in the next 30 days with authorizing provider or within the authorizing provider's specialty.  See \"Patient Info\" tab in inbasket, or \"Choose Columns\" in Meds & Orders section of the refill encounter.            Passed - Patient has had a Microalbumin in the past 15 mos.     Recent Labs   Lab Test 02/19/19  0724   MICROL 312   UMALCR 340.61*             Passed - Patient is age 10 or older        Passed - Patient's CR is NOT>1.4 OR Patient's EGFR is NOT<45 within past 12 mos.     Recent Labs   Lab Test 02/19/19  0724   GFRESTIMATED 55*   GFRESTBLACK 63       Recent Labs   Lab Test 02/19/19  0724   CR 1.07*             Passed - Patient does NOT have a diagnosis of CHF.        Passed - Medication is active on med list        Passed - Patient is not pregnant        Passed - Patient has not had a positive pregnancy test within the past 12 mos.       ]  Last Written Prescription Date:  2/19/19  Last Fill Quantity: 360,  # " refills: 3   Last office visit: 6/18/2019 with prescribing provider:  Radha Madrigal     Future Office Visit:

## 2020-02-08 NOTE — LETTER
[address]  [address]  [phone]    February 12, 2020      Adrian Cruz  [address]  [facility] [address]      Dear Adrian,    We have refilled your metformin.   We will need to see you for an office visit and fasting labs before any additional refills can be given.  Please call [phone] to schedule this appointment.      Thank you,    [facility]

## 2020-02-11 RX ORDER — METFORMIN HCL 500 MG
TABLET, EXTENDED RELEASE 24 HR ORAL
Qty: 120 TABLET | Refills: 0 | Status: SHIPPED | OUTPATIENT
Start: 2020-02-11 | End: 2020-02-21

## 2020-02-11 NOTE — TELEPHONE ENCOUNTER
Routing refill request to provider for review/approval because:    Biguanide Agents Failed for the followin/8 9:03 AM   Blood pressure less than 140/90 in past 6 months    Patient has documented LDL within the past 12 mos.    Patient has documented A1c within the specified period of time.    Recent (6 mo) or future (30 days) visit within the authorizing provider's specialty     Sabrina Ruiz RN, [facility] Triage

## 2020-02-21 ENCOUNTER — OFFICE VISIT (OUTPATIENT)
Dept: FAMILY MEDICINE | Facility: CLINIC | Age: 65
End: 2020-02-21
Payer: COMMERCIAL

## 2020-02-21 VITALS
RESPIRATION RATE: 16 BRPM | TEMPERATURE: 97.5 F | DIASTOLIC BLOOD PRESSURE: 70 MMHG | WEIGHT: 200 LBS | OXYGEN SATURATION: 93 % | HEIGHT: 61 IN | SYSTOLIC BLOOD PRESSURE: 138 MMHG | HEART RATE: 89 BPM | BODY MASS INDEX: 37.76 KG/M2

## 2020-02-21 DIAGNOSIS — N18.30 TYPE 2 DIABETES MELLITUS WITH STAGE 3 CHRONIC KIDNEY DISEASE, WITH LONG-TERM CURRENT USE OF INSULIN (H): Primary | ICD-10-CM

## 2020-02-21 DIAGNOSIS — I10 HYPERTENSION GOAL BP (BLOOD PRESSURE) < 140/90: ICD-10-CM

## 2020-02-21 DIAGNOSIS — E78.5 HYPERLIPIDEMIA LDL GOAL <100: ICD-10-CM

## 2020-02-21 DIAGNOSIS — Z79.4 TYPE 2 DIABETES MELLITUS WITH STAGE 3 CHRONIC KIDNEY DISEASE, WITH LONG-TERM CURRENT USE OF INSULIN (H): Primary | ICD-10-CM

## 2020-02-21 DIAGNOSIS — Z12.11 SCREEN FOR COLON CANCER: ICD-10-CM

## 2020-02-21 DIAGNOSIS — E66.01 MORBID OBESITY (H): ICD-10-CM

## 2020-02-21 DIAGNOSIS — E11.3299 DIABETES MELLITUS WITH BACKGROUND RETINOPATHY (H): ICD-10-CM

## 2020-02-21 DIAGNOSIS — E11.22 TYPE 2 DIABETES MELLITUS WITH STAGE 3 CHRONIC KIDNEY DISEASE, WITH LONG-TERM CURRENT USE OF INSULIN (H): Primary | ICD-10-CM

## 2020-02-21 LAB
ANION GAP SERPL CALCULATED.3IONS-SCNC: 6 MMOL/L (ref 3–14)
BUN SERPL-MCNC: 25 MG/DL (ref 7–30)
CALCIUM SERPL-MCNC: 8.5 MG/DL (ref 8.5–10.1)
CHLORIDE SERPL-SCNC: 108 MMOL/L (ref 94–109)
CHOLEST SERPL-MCNC: 139 MG/DL
CO2 SERPL-SCNC: 27 MMOL/L (ref 20–32)
CREAT SERPL-MCNC: 1.38 MG/DL (ref 0.52–1.04)
CREAT UR-MCNC: 112 MG/DL
GFR SERPL CREATININE-BSD FRML MDRD: 40 ML/MIN/{1.73_M2}
GLUCOSE SERPL-MCNC: 170 MG/DL (ref 70–99)
HBA1C MFR BLD: 8.4 % (ref 0–5.6)
HDLC SERPL-MCNC: 32 MG/DL
LDLC SERPL CALC-MCNC: 80 MG/DL
MICROALBUMIN UR-MCNC: 304 MG/L
MICROALBUMIN/CREAT UR: 271.43 MG/G CR (ref 0–25)
NONHDLC SERPL-MCNC: 107 MG/DL
POTASSIUM SERPL-SCNC: 4.2 MMOL/L (ref 3.4–5.3)
SODIUM SERPL-SCNC: 141 MMOL/L (ref 133–144)
TRIGL SERPL-MCNC: 135 MG/DL

## 2020-02-21 PROCEDURE — 83036 HEMOGLOBIN GLYCOSYLATED A1C: CPT | Performed by: PREVENTIVE MEDICINE

## 2020-02-21 PROCEDURE — 80061 LIPID PANEL: CPT | Performed by: PREVENTIVE MEDICINE

## 2020-02-21 PROCEDURE — 99214 OFFICE O/P EST MOD 30 MIN: CPT | Performed by: PREVENTIVE MEDICINE

## 2020-02-21 PROCEDURE — 36415 COLL VENOUS BLD VENIPUNCTURE: CPT | Performed by: PREVENTIVE MEDICINE

## 2020-02-21 PROCEDURE — 80048 BASIC METABOLIC PNL TOTAL CA: CPT | Performed by: PREVENTIVE MEDICINE

## 2020-02-21 PROCEDURE — 82043 UR ALBUMIN QUANTITATIVE: CPT | Performed by: PREVENTIVE MEDICINE

## 2020-02-21 RX ORDER — METFORMIN HCL 500 MG
TABLET, EXTENDED RELEASE 24 HR ORAL
Qty: 360 TABLET | Refills: 3 | Status: SHIPPED | OUTPATIENT
Start: 2020-02-21 | End: 2021-02-05

## 2020-02-21 RX ORDER — LOSARTAN POTASSIUM 100 MG/1
100 TABLET ORAL DAILY
Qty: 90 TABLET | Refills: 3 | Status: SHIPPED | OUTPATIENT
Start: 2020-02-21 | End: 2021-02-05

## 2020-02-21 RX ORDER — HYDROCHLOROTHIAZIDE 25 MG/1
25 TABLET ORAL DAILY
Qty: 90 TABLET | Refills: 3 | Status: SHIPPED | OUTPATIENT
Start: 2020-02-21 | End: 2021-02-05

## 2020-02-21 RX ORDER — ATORVASTATIN CALCIUM 80 MG/1
80 TABLET, FILM COATED ORAL DAILY
Qty: 90 TABLET | Refills: 3 | Status: SHIPPED | OUTPATIENT
Start: 2020-02-21 | End: 2021-02-05

## 2020-02-21 RX ORDER — LIRAGLUTIDE 6 MG/ML
INJECTION SUBCUTANEOUS
Qty: 18 ML | Refills: 3 | Status: SHIPPED | OUTPATIENT
Start: 2020-02-21 | End: 2021-02-05

## 2020-02-21 RX ORDER — GLIMEPIRIDE 4 MG/1
TABLET ORAL
Qty: 180 TABLET | Refills: 3 | Status: SHIPPED | OUTPATIENT
Start: 2020-02-21 | End: 2021-02-05

## 2020-02-21 RX ORDER — AMLODIPINE BESYLATE 10 MG/1
TABLET ORAL
Qty: 90 TABLET | Refills: 3 | Status: SHIPPED | OUTPATIENT
Start: 2020-02-21 | End: 2021-02-05

## 2020-02-21 ASSESSMENT — MIFFLIN-ST. JEOR: SCORE: 1383.69

## 2020-02-21 ASSESSMENT — PAIN SCALES - GENERAL: PAINLEVEL: NO PAIN (0)

## 2020-02-21 NOTE — RESULT ENCOUNTER NOTE
Please send a letter:    Dear Adrian Cruz,    Urine sample is showing abnormal protein, but this is improved from last check. Should improve further with glucose control.  LDL cholesterol is at goal for you.  Electrolytes are normal.  Kidney function is lower than your baseline. Avoid medications like Ibuprofen, Advil and Aleve.  We need to recheck this in 3 months.   Please let me know if you have any questions and thank you for choosing [facility].    Regards,    Radha Madrigal MD MPH

## 2020-02-21 NOTE — PATIENT INSTRUCTIONS
At [facility], we strive to deliver an exceptional experience to you, every time we see you. If you receive a survey, please complete it as we do value your feedback.  If you have MyChart, you can expect to receive results automatically within 24 hours of their completion.  Your provider will send a note interpreting your results as well.   If you do not have MyChart, you should receive your results in about a week by mail.    Your care team:                            Family Medicine Internal Medicine   MD Maverick Manzano MD Shantel Branch-Fleming, MD Katya Georgiev PA-C Megan Hill, APRIRIS Membreno, MD Pediatrics   Edson Burkett, PAMaryC  Lissy Hartman, MD Kmi Roberson APRN CNP   MD Eli Carrasco MD Deborah Mielke, MD Kim Thein, APRN CNP      Clinic hours: Monday - Thursday 7 am-7 pm; Fridays 7 am-5 pm.   Urgent care: Monday - Friday 11 am-9 pm; Saturday and Sunday 9 am-5 pm.  Pharmacy : Monday -Thursday 8 am-8 pm; Friday 8 am-6 pm; Saturday and Sunday 9 am-5 pm.     Clinic: [phone]   Pharmacy: [phone]

## 2020-02-21 NOTE — PROGRESS NOTES
Subjective     Adrian Cruz is a 64 year old female who presents to clinic today for the following health issues:    HPI   Diabetes Follow-up    How often are you checking your blood sugar? Two times daily  Blood sugar testing frequency justification:  Patient modifying lifestyle changes (diet, exercise) with blood sugars  What time of day are you checking your blood sugars (select all that apply)?  Morning Before Meal and After Dinner  Have you had any blood sugars above 200?  No  Have you had any blood sugars below 70?  Yes 68    What symptoms do you notice when your blood sugar is low?  Shaky    What concerns do you have today about your diabetes? None     Do you have any of these symptoms? (Select all that apply)  No numbness or tingling in feet.  No redness, sores or blisters on feet.  No complaints of excessive thirst.  No reports of blurry vision.  No significant changes to weight.  Have you had a diabetic eye exam in the last 12 months? Yes-Vitreoretinal surgery 10/2019     Has changed her eating habits  Had one episode of low glucose  Has concerns about medication coverage with new insurance plan, cost of insulin much higher than before     Hyperlipidemia Follow-Up      Are you regularly taking any medication or supplement to lower your cholesterol?   Yes- LIPITOR    Are you having muscle aches or other side effects that you think could be caused by your cholesterol lowering medication?  No    Hypertension Follow-up      Do you check your blood pressure regularly outside of the clinic? Yes     Are you following a low salt diet? Yes    Are your blood pressures ever more than 140 on the top number (systolic) OR more   than 90 on the bottom number (diastolic), for example 140/90? No    BP Readings from Last 2 Encounters:   02/21/20 138/70   06/18/19 115/71     Hemoglobin A1C (%)   Date Value   02/21/2020 8.4 (H)   06/18/2019 7.7 (H)     LDL Cholesterol Calculated (mg/dL)   Date Value   04/27/2018 51    05/05/2016 73     LDL Cholesterol Direct (mg/dL)   Date Value   10/30/2018 76   05/23/2017 75         How many servings of fruits and vegetables do you eat daily?  4 or more    On average, how many sweetened beverages do you drink each day (Examples: soda, juice, sweet tea, etc.  Do NOT count diet or artificially sweetened beverages)?   1    How many days per week do you exercise enough to make your heart beat faster? 3 or less    How many minutes a day do you exercise enough to make your heart beat faster? 9 or less    How many days per week do you miss taking your medication? 0    Patient Active Problem List   Diagnosis     Hyperlipidemia LDL goal <100     Type 2 diabetes mellitus with stage 3 chronic kidney disease, with long-term current use of insulin (H)     Hypertension goal BP (blood pressure) < 140/90     Cataract     Diabetes mellitus with background retinopathy (H)     Advanced directives, counseling/discussion     Health Care Home     Morbid obesity (H)     Obesity, Class II, BMI 35-39.9     Past Surgical History:   Procedure Laterality Date     LASER SURGERY OF EYE Right 2015       Social History     Tobacco Use     Smoking status: Never Smoker     Smokeless tobacco: Never Used   Substance Use Topics     Alcohol use: No     Family History   Problem Relation Age of Onset     Diabetes Mother      Heart Disease Mother         chf and pacemaker     Genitourinary Problems Mother         ESRD     Diabetes Father      C.A.D. Father         age 49     Cerebrovascular Disease Father      Asthma Sister      Asthma Son      Asthma Daughter      Heart Disease Daughter      Glaucoma No family hx of      Macular Degeneration No family hx of          Current Outpatient Medications   Medication Sig Dispense Refill     amLODIPine 10 MG PO tablet TAKE 1 TABLET(10 MG) BY MOUTH DAILY 90 tablet 3     atorvastatin 80 MG PO tablet Take 1 tablet (80 mg) by mouth daily 90 tablet 3     cetirizine (ZYRTEC) 10 MG tablet Take 1  tablet (10 mg) by mouth every evening as needed for allergies 90 tablet 1     [employer] ASPIRIN LOW STRENGTH 81 MG EC tablet TAKE 1 TABLET (81 MG) BY MOUTH EVERY DAY 90 tablet 1     fluticasone (FLONASE) 50 MCG/ACT nasal spray Spray 1-2 sprays into both nostrils daily as needed for rhinitis or allergies 16 g 0     glimepiride 4 MG PO tablet TAKE 2 TABLETS BY MOUTH EVERY MORNING BEFORE A MEAL 180 tablet 3     hydrochlorothiazide 25 MG PO tablet Take 1 tablet (25 mg) by mouth daily 90 tablet 3     insulin detemir (LEVEMIR FLEXTOUCH) 100 UNIT/ML SC pen ADMINISTER 32 UNITS UNDER THE SKIN AT BEDTIME 45 mL 3     insulin pen needle (B-D U/F) 31G X 5 MM miscellaneous USE as directed twice a day 100 each 11     liraglutide (VICTOZA PEN) 18 MG/3ML SC solution INJECT 1.2 MG UNDER THE SKIN ONCE DAILY 18 mL 3     losartan 100 MG PO tablet Take 1 tablet (100 mg) by mouth daily 90 tablet 3     metFORMIN 500 MG PO 24 hr tablet TAKE 4 TABLETS BY MOUTH EVERY DAY WITH BREAKFAST 360 tablet 3     omeprazole 20 MG tablet Take 1 tablet (20 mg) by mouth daily as needed 90 tablet 1     ONE TOUCH ULTRA test strip TEST TWICE DAILY 50 each 0     order for DME Equipment being ordered: Digital home blood pressure monitor kit 1 Device 0     ORDER FOR DME Equipment being ordered: blood pressure cuff/machine 1 Device 0     Allergies   Allergen Reactions     Ace Inhibitors Cough     Glucophage [Biguanides] Cramps     Stomach upset     Recent Labs   Lab Test 02/21/20  0726 06/18/19  0717 02/19/19  0724 10/30/18  0810 04/27/18  0704  05/23/17  0744  05/05/16  0730  01/22/15  0754  02/16/12  0743   A1C 8.4* 7.7* 10.2* 9.7* 10.2*   < > 9.2*   < > 7.7*   < > 14.5*   < >  --    LDL  --   --   --  76 51  --  75  --  73  --  137*   < >  --    HDL  --   --   --   --  43*  --   --   --  42*  --  46*   < >  --    TRIG  --   --   --   --  106  --   --   --  120  --  266*   < >  --    ALT  --   --   --   --   --   --   --   --  15  --  16  --  17   CR  --   --  1.07*  "1.10*  --    < > 1.17*   < > 1.34*   < > 0.85   < > 0.81   GFRESTIMATED  --   --  55* 50*  --    < > 47*   < > 40*   < > 68   < > 73   GFRESTBLACK  --   --  63 60*  --    < > 57*   < > 49*   < > 83   < > 88   POTASSIUM  --   --  3.7 4.1  --    < > 4.2   < > 4.3   < > 4.5   < > 4.7   TSH  --   --   --   --   --   --  2.28  --   --   --  1.82   < >  --     < > = values in this interval not displayed.      BP Readings from Last 3 Encounters:   02/21/20 138/70   06/18/19 115/71   02/25/19 182/82    Wt Readings from Last 3 Encounters:   02/21/20 90.7 kg (200 lb)   06/18/19 92.5 kg (204 lb)   02/19/19 94.3 kg (208 lb)         Reviewed and updated as needed this visit by Provider  Tobacco  Allergies  Meds  Problems  Med Hx  Surg Hx  Fam Hx         Review of Systems   ROS COMP: Constitutional, HEENT, cardiovascular, pulmonary, gi and gu systems are negative, except as otherwise noted.      Objective    /70 (BP Location: Left arm, Patient Position: Sitting, Cuff Size: Adult Regular)   Pulse 89   Temp 97.5  F (36.4  C) (Oral)   Resp 16   Ht 1.54 m (5' 0.63\")   Wt 90.7 kg (200 lb)   LMP  (LMP Unknown)   SpO2 93%   Breastfeeding No   BMI 38.25 kg/m    Body mass index is 38.25 kg/m .  Physical Exam   GENERAL: healthy, alert and no distress  EYES: Eyes grossly normal to inspection, PERRL and conjunctivae and sclerae normal  RESP: lungs clear to auscultation - no rales, rhonchi or wheezes  CV: regular rate and rhythm, normal S1 S2, no S3 or S4, no murmur, click or rub, no peripheral edema and peripheral pulses strong  ABDOMEN: soft, nontender, no hepatosplenomegaly, no masses and bowel sounds normal  MS: no gross musculoskeletal defects noted, no edema  SKIN: no suspicious lesions or rashes  NEURO: Normal strength and tone, mentation intact and speech normal  PSYCH: mentation appears normal, affect normal/bright  Diabetic foot exam: normal DP and PT pulses, normal sensory exam and one small callus (long " standing) on the plantar aspect of the left foot base of 5th toe     Diagnostic Test Results:  Labs reviewed in Epic  Results for orders placed or performed in visit on 02/21/20 (from the past 24 hour(s))   HEMOGLOBIN A1C   Result Value Ref Range    Hemoglobin A1C 8.4 (H) 0 - 5.6 %           Assessment & Plan     Adrian was seen today for diabetes, lipids and hypertension.    Diagnoses and all orders for this visit:    Type 2 diabetes mellitus with stage 3 chronic kidney disease, with long-term current use of insulin (H)  -     BASIC METABOLIC PANEL  -     HEMOGLOBIN A1C  -     Albumin Random Urine Quantitative with Creat Ratio  -     AMBULATORY ADULT DIABETES EDUCATOR REFERRAL  -     insulin detemir (LEVEMIR FLEXTOUCH) 100 UNIT/ML SC pen; ADMINISTER 32 UNITS UNDER THE SKIN AT BEDTIME  -     liraglutide (VICTOZA PEN) 18 MG/3ML SC solution; INJECT 1.2 MG UNDER THE SKIN ONCE DAILY  -Not at goal  -HbA1C has increased from 7.7 to 8.4  -one episode of hypoglycemia, hence defer basal insulin increase at this time  -referral to Diabetes educator  -will likely benefit from Continuous glucose monitoring  -May need meal time insulin  -hopefully, diabetes educator can also help with reducing cost of medication   -form for DMV completed, copy made for medical records       Morbid obesity (H)  -lost weight since last check  -will be restarting a supervised weight loss program at her workplace    Hypertension goal BP (blood pressure) < 140/90  -     BASIC METABOLIC PANEL  -     hydrochlorothiazide 25 MG PO tablet; Take 1 tablet (25 mg) by mouth daily  -     losartan 100 MG PO tablet; Take 1 tablet (100 mg) by mouth daily  -at goal, continue current medication     Diabetes mellitus with background retinopathy (H)  -     amLODIPine 10 MG PO tablet; TAKE 1 TABLET(10 MG) BY MOUTH DAILY  -     atorvastatin 80 MG PO tablet; Take 1 tablet (80 mg) by mouth daily  -     glimepiride 4 MG PO tablet; TAKE 2 TABLETS BY MOUTH EVERY MORNING  "BEFORE A MEAL  -     metFORMIN 500 MG PO 24 hr tablet; TAKE 4 TABLETS BY MOUTH EVERY DAY WITH BREAKFAST    Hyperlipidemia LDL goal <100  -     Lipid panel reflex to direct LDL Fasting  -     atorvastatin 80 MG PO tablet; Take 1 tablet (80 mg) by mouth daily    Screen for colon cancer  -     Fecal colorectal cancer screen FIT - Future (S+30); Future         BMI:   Estimated body mass index is 38.25 kg/m  as calculated from the following:    Height as of this encounter: 1.54 m (5' 0.63\").    Weight as of this encounter: 90.7 kg (200 lb).   Weight management plan: Discussed healthy diet and exercise guidelines        Return in about 3 months (around 5/21/2020) for Lab Work.    Radha Madrigal MD MPH    [facility]      "

## 2020-02-26 ENCOUNTER — TELEPHONE (OUTPATIENT)
Dept: FAMILY MEDICINE | Facility: CLINIC | Age: 65
End: 2020-02-26

## 2020-08-11 DIAGNOSIS — N18.30 TYPE 2 DIABETES MELLITUS WITH STAGE 3 CHRONIC KIDNEY DISEASE, WITH LONG-TERM CURRENT USE OF INSULIN (H): ICD-10-CM

## 2020-08-11 DIAGNOSIS — E11.22 TYPE 2 DIABETES MELLITUS WITH STAGE 3 CHRONIC KIDNEY DISEASE, WITH LONG-TERM CURRENT USE OF INSULIN (H): ICD-10-CM

## 2020-08-11 DIAGNOSIS — Z79.4 TYPE 2 DIABETES MELLITUS WITH STAGE 3 CHRONIC KIDNEY DISEASE, WITH LONG-TERM CURRENT USE OF INSULIN (H): ICD-10-CM

## 2020-09-01 ENCOUNTER — TRANSFERRED RECORDS (OUTPATIENT)
Dept: MULTI SPECIALTY CLINIC | Facility: CLINIC | Age: 65
End: 2020-09-01

## 2020-09-01 LAB — RETINOPATHY: NORMAL

## 2020-09-04 ENCOUNTER — TRANSFERRED RECORDS (OUTPATIENT)
Dept: HEALTH INFORMATION MANAGEMENT | Facility: CLINIC | Age: 65
End: 2020-09-04

## 2020-09-10 NOTE — TELEPHONE ENCOUNTER
Approved. I had asked patient to increase dose as HbA1C was not at goal.   Radha Madrigal MD MPH   (Routing comment)    [facility] [address] [place] [address] [facility] [address]  Phone: [phone]  Fax: [phone]    Elsy Hare DO        September 10, 2020     Patient: Aissatou Garcia   YOB: 2001   Date of Visit: 9/10/2020       To Whom It May Concern: It is my medical opinion that Aissatou Garcia may return to full participation 9-14-20 with no restrictions. If you have any questions or concerns, please don't hesitate to call.     Sincerely,        Bijal Mcdaniels DO

## 2020-12-01 RX ORDER — METOPROLOL SUCCINATE 25 MG/1
TABLET, EXTENDED RELEASE ORAL
Qty: 45 TABLET | Refills: 2 | OUTPATIENT
Start: 2020-12-01

## 2021-02-16 ENCOUNTER — OFFICE VISIT (OUTPATIENT)
Dept: FAMILY MEDICINE | Facility: CLINIC | Age: 66
End: 2021-02-16
Payer: COMMERCIAL

## 2021-02-16 VITALS
HEIGHT: 61 IN | TEMPERATURE: 97.5 F | BODY MASS INDEX: 38.89 KG/M2 | OXYGEN SATURATION: 96 % | WEIGHT: 206 LBS | HEART RATE: 77 BPM | SYSTOLIC BLOOD PRESSURE: 130 MMHG | DIASTOLIC BLOOD PRESSURE: 79 MMHG

## 2021-02-16 DIAGNOSIS — E78.5 HYPERLIPIDEMIA LDL GOAL <100: ICD-10-CM

## 2021-02-16 DIAGNOSIS — N18.31 TYPE 2 DIABETES MELLITUS WITH STAGE 3A CHRONIC KIDNEY DISEASE, WITH LONG-TERM CURRENT USE OF INSULIN (H): ICD-10-CM

## 2021-02-16 DIAGNOSIS — R32 URINARY INCONTINENCE, UNSPECIFIED TYPE: ICD-10-CM

## 2021-02-16 DIAGNOSIS — Z79.4 TYPE 2 DIABETES MELLITUS WITH STAGE 3A CHRONIC KIDNEY DISEASE, WITH LONG-TERM CURRENT USE OF INSULIN (H): ICD-10-CM

## 2021-02-16 DIAGNOSIS — Z12.11 SCREEN FOR COLON CANCER: ICD-10-CM

## 2021-02-16 DIAGNOSIS — E66.01 MORBID OBESITY (H): ICD-10-CM

## 2021-02-16 DIAGNOSIS — Z00.00 ENCOUNTER FOR MEDICARE ANNUAL WELLNESS EXAM: Primary | ICD-10-CM

## 2021-02-16 DIAGNOSIS — E11.22 TYPE 2 DIABETES MELLITUS WITH STAGE 3A CHRONIC KIDNEY DISEASE, WITH LONG-TERM CURRENT USE OF INSULIN (H): ICD-10-CM

## 2021-02-16 DIAGNOSIS — Z12.31 VISIT FOR SCREENING MAMMOGRAM: ICD-10-CM

## 2021-02-16 DIAGNOSIS — I10 HYPERTENSION GOAL BP (BLOOD PRESSURE) < 140/90: ICD-10-CM

## 2021-02-16 LAB
ALBUMIN SERPL-MCNC: 2.9 G/DL (ref 3.4–5)
ALP SERPL-CCNC: 143 U/L (ref 40–150)
ALT SERPL W P-5'-P-CCNC: 18 U/L (ref 0–50)
ANION GAP SERPL CALCULATED.3IONS-SCNC: 5 MMOL/L (ref 3–14)
AST SERPL W P-5'-P-CCNC: 10 U/L (ref 0–45)
BILIRUB SERPL-MCNC: 0.4 MG/DL (ref 0.2–1.3)
BUN SERPL-MCNC: 28 MG/DL (ref 7–30)
CALCIUM SERPL-MCNC: 9 MG/DL (ref 8.5–10.1)
CHLORIDE SERPL-SCNC: 106 MMOL/L (ref 94–109)
CHOLEST SERPL-MCNC: 137 MG/DL
CO2 SERPL-SCNC: 28 MMOL/L (ref 20–32)
CREAT SERPL-MCNC: 1.12 MG/DL (ref 0.52–1.04)
CREAT UR-MCNC: 138 MG/DL
GFR SERPL CREATININE-BSD FRML MDRD: 51 ML/MIN/{1.73_M2}
GLUCOSE SERPL-MCNC: 82 MG/DL (ref 70–99)
HBA1C MFR BLD: 10.9 % (ref 0–5.6)
HDLC SERPL-MCNC: 44 MG/DL
HGB BLD-MCNC: 12.4 G/DL (ref 11.7–15.7)
LDLC SERPL CALC-MCNC: 74 MG/DL
MICROALBUMIN UR-MCNC: 799 MG/L
MICROALBUMIN/CREAT UR: 578.99 MG/G CR (ref 0–25)
NONHDLC SERPL-MCNC: 93 MG/DL
POTASSIUM SERPL-SCNC: 3.8 MMOL/L (ref 3.4–5.3)
PROT SERPL-MCNC: 7 G/DL (ref 6.8–8.8)
SODIUM SERPL-SCNC: 139 MMOL/L (ref 133–144)
TRIGL SERPL-MCNC: 96 MG/DL
TSH SERPL DL<=0.005 MIU/L-ACNC: 2.34 MU/L (ref 0.4–4)

## 2021-02-16 PROCEDURE — 85018 HEMOGLOBIN: CPT | Performed by: PREVENTIVE MEDICINE

## 2021-02-16 PROCEDURE — 99214 OFFICE O/P EST MOD 30 MIN: CPT | Mod: 25 | Performed by: PREVENTIVE MEDICINE

## 2021-02-16 PROCEDURE — 80053 COMPREHEN METABOLIC PANEL: CPT | Performed by: PREVENTIVE MEDICINE

## 2021-02-16 PROCEDURE — 80061 LIPID PANEL: CPT | Performed by: PREVENTIVE MEDICINE

## 2021-02-16 PROCEDURE — 82043 UR ALBUMIN QUANTITATIVE: CPT | Performed by: PREVENTIVE MEDICINE

## 2021-02-16 PROCEDURE — 83036 HEMOGLOBIN GLYCOSYLATED A1C: CPT | Performed by: PREVENTIVE MEDICINE

## 2021-02-16 PROCEDURE — 84443 ASSAY THYROID STIM HORMONE: CPT | Performed by: PREVENTIVE MEDICINE

## 2021-02-16 PROCEDURE — 36415 COLL VENOUS BLD VENIPUNCTURE: CPT | Performed by: PREVENTIVE MEDICINE

## 2021-02-16 PROCEDURE — G0402 INITIAL PREVENTIVE EXAM: HCPCS | Performed by: PREVENTIVE MEDICINE

## 2021-02-16 RX ORDER — METFORMIN HCL 500 MG
TABLET, EXTENDED RELEASE 24 HR ORAL
Qty: 360 TABLET | Refills: 1 | Status: SHIPPED | OUTPATIENT
Start: 2021-02-16 | End: 2021-08-06

## 2021-02-16 RX ORDER — LIRAGLUTIDE 6 MG/ML
INJECTION SUBCUTANEOUS
Qty: 18 ML | Refills: 1 | Status: SHIPPED | OUTPATIENT
Start: 2021-02-16 | End: 2021-04-27

## 2021-02-16 ASSESSMENT — PAIN SCALES - GENERAL: PAINLEVEL: NO PAIN (0)

## 2021-02-16 ASSESSMENT — MIFFLIN-ST. JEOR: SCORE: 1407.82

## 2021-02-16 NOTE — PATIENT INSTRUCTIONS
At [facility], we strive to deliver an exceptional experience to you, every time we see you. If you receive a survey, please complete it as we do value your feedback.  If you have MyChart, you can expect to receive results automatically within 24 hours of their completion.  Your provider will send a note interpreting your results as well.   If you do not have MyChart, you should receive your results in about a week by mail.    Your care team:                            Family Medicine Internal Medicine   MD Maverick Manzano MD Shantel Branch-Fleming, MD Srinivasa Vaka, MD Katya Belousova, PAJUSTO Biggs, APRN CNP    Eliseo Membreno, MD Pediatrics   Edson Burkett, PAJUSTO Hartman, CNP MD Kim Fowler APRN CNP   MD Eli Carrasco MD Deborah Mielke, MD Viviana Stout, APRN [facility]      Clinic hours: Monday - Thursday 7 am-6 pm; Fridays 7 am-5 pm.   Urgent care: Monday - Friday 11 am-9 pm; Saturday and Sunday 9 am-5 pm.    Clinic: [phone]       [facility]: Monday - Thursday 8 am - 7 pm; Friday 8 am - 6 pm  [facility]: [phone]     Use [email or web address] for 24/7 diagnosis and treatment of dozens of conditions.  Patient Education   Personalized Prevention Plan  You are due for the preventive services outlined below.  Your care team is available to assist you in scheduling these services.  If you have already completed any of these items, please share that information with your care team to update in your medical record.  Health Maintenance Due   Topic Date Due     Osteoporosis Screening  1955     Zoster (Shingles) Vaccine (1 of 2) 07/24/2004     Eye Exam  04/03/2016     Annual Wellness Visit  07/24/2019     FALL RISK ASSESSMENT  07/24/2019     Pneumococcal Vaccine (1 of 1 - PPSV23) 01/22/2020     Discuss Advance Care Planning  02/10/2020     Mammogram  04/23/2020      A1C Lab  08/21/2020     Basic Metabolic Panel  08/21/2020     Flu Vaccine (1) 09/01/2020     Colorectal Cancer Screening  10/28/2020     PHQ-2  01/01/2021     Cholesterol Lab  02/21/2021     Kidney Microalbumin Urine Test  02/21/2021     Diabetic Foot Exam  02/21/2021

## 2021-02-16 NOTE — PROGRESS NOTES
"  SUBJECTIVE:   Adrian Cruz is a 65 year old female who presents for Preventive Visit.      Patient has been advised of split billing requirements and indicates understanding: Yes  Are you in the first 12 months of your Medicare Part B coverage?  Yes,  Visual Acuity:  Right Eye: 20/40   Left Eye: 20/40  Both Eyes: 20/40    Physical Health:    In general, how would you rate your overall physical health? good    Outside of work, how many days during the week do you exercise? 1 day/week    Outside of work, approximately how many minutes a day do you exercise?greater than 60 minutes    If you drink alcohol do you typically have >3 drinks per day or >7 drinks per week? Not Applicable    Do you usually eat at least 4 servings of fruit and vegetables a day, include whole grains & fiber and avoid regularly eating high fat or \"junk\" foods? Yes    Do you have any problems taking medications regularly?  No    Do you have any side effects from medications? none    Needs assistance for the following daily activities: no assistance needed    Which of the following safety concerns are present in your home?  none identified     Hearing impairment: No    In the past 6 months, have you been bothered by leaking of urine? yes    Mental Health:    In general, how would you rate your overall mental or emotional health? excellent  PHQ-2 Score:      Do you feel safe in your environment? Yes    Have you ever done Advance Care Planning? (For example, a Health Directive, POLST, or a discussion with a medical provider or your loved ones about your wishes): Yes, patient states has an Advance Care Planning document and will bring a copy to the clinic.    Additional concerns to address?  No    Fall risk:  Fallen 2 or more times in the past year?: No  Any fall with injury in the past year?: No    Cognitive Screenin) Repeat 3 items (Leader, Season, Table)    2) Clock draw: NORMAL  3) 3 item recall: Recalls 2 objects   Results: " NORMAL clock, 1-2 items recalled: COGNITIVE IMPAIRMENT LESS LIKELY    Mini-CogTM Copyright RAVEN Parada. Licensed by the author for use in [facility]; reprinted with permission (alva@[employer]). All rights reserved.      Do you have sleep apnea, excessive snoring or daytime drowsiness?: no    Urine leakage:  -last 4 months  -when coughing or sneezing  -Urge incontinence+  -no burning  -No constipation   -no blood in the urine     Diabetes Follow-up    How often are you checking your blood sugar? One time daily  What time of day are you checking your blood sugars (select all that apply)?  Before meals  Have you had any blood sugars above 200?  Yes after meals   Have you had any blood sugars below 70?  No    What symptoms do you notice when your blood sugar is low?  None    What concerns do you have today about your diabetes? None     Do you have any of these symptoms? (Select all that apply)  No numbness or tingling in feet.  No redness, sores or blisters on feet.  No complaints of excessive thirst.  No reports of blurry vision.  No significant changes to weight.    Have you had a diabetic eye exam in the last 12 months? Yes- Date of last eye exam: 09/20,  Location: Vitreoretinal surgery      Eye follow up Q6 weeks     Fasting 70, 97,119 mg/dl   2 hours after meals tends to be higher, 167-200 mg/dl  Has been doing more stress eating   Sister is sick    Hyperlipidemia Follow-Up      Are you regularly taking any medication or supplement to lower your cholesterol?   Yes- statin    Are you having muscle aches or other side effects that you think could be caused by your cholesterol lowering medication?  No    Hypertension Follow-up      Do you check your blood pressure regularly outside of the clinic? Yes     Are you following a low salt diet? Yes    Are your blood pressures ever more than 140 on the top number (systolic) OR more   than 90 on the bottom number (diastolic), for example 140/90?  No      138/79  140/80  BP Readings from Last 2 Encounters:   02/16/21 130/79   02/21/20 138/70     Hemoglobin A1C (%)   Date Value   02/16/2021 10.9 (H)   02/21/2020 8.4 (H)     LDL Cholesterol Calculated (mg/dL)   Date Value   02/16/2021 74   02/21/2020 80         Reviewed and updated as needed this visit by clinical staff  Tobacco  Allergies  Meds  Problems  Med Hx  Surg Hx  Fam Hx          Reviewed and updated as needed this visit by Provider  Tobacco  Allergies  Meds  Problems  Med Hx  Surg Hx  Fam Hx         Social History     Tobacco Use     Smoking status: Never Smoker     Smokeless tobacco: Never Used   Substance Use Topics     Alcohol use: No                           Current providers sharing in care for this patient include:   Patient Care Team:  Radha Madrigal MD as PCP - General (Family Practice)  Radha Madrigal MD as Assigned PCP  Emma Martinez RPH as Pharmacist    The following health maintenance items are reviewed in Epic and correct as of today:  Health Maintenance   Topic Date Due     DEXA  1955     ZOSTER IMMUNIZATION (1 of 2) 07/24/2004     EYE EXAM  04/03/2016     FALL RISK ASSESSMENT  07/24/2019     Pneumococcal Vaccine: 65+ Years (1 of 1 - PPSV23) 01/22/2020     MAMMO SCREENING  04/23/2020     INFLUENZA VACCINE (1) 09/01/2020     COLORECTAL CANCER SCREENING  10/28/2020     DIABETIC FOOT EXAM  02/21/2021     A1C  08/16/2021     BMP  08/16/2021     DTAP/TDAP/TD IMMUNIZATION (3 - Td) 01/23/2022     MEDICARE ANNUAL WELLNESS VISIT  02/16/2022     LIPID  02/16/2022     MICROALBUMIN  02/16/2022     ADVANCE CARE PLANNING  02/16/2026     HEPATITIS C SCREENING  Completed     PHQ-2  Completed     Pneumococcal Vaccine: Pediatrics (0 to 5 Years) and At-Risk Patients (6 to 64 Years)  Aged Out     IPV IMMUNIZATION  Aged Out     MENINGITIS IMMUNIZATION  Aged Out     Lab work is in process  Labs reviewed in EPIC  BP Readings from Last 3 Encounters:   02/16/21 130/79   02/21/20 138/70    06/18/19 115/71    Wt Readings from Last 3 Encounters:   02/16/21 93.4 kg (206 lb)   02/21/20 90.7 kg (200 lb)   06/18/19 92.5 kg (204 lb)                  Patient Active Problem List   Diagnosis     Hyperlipidemia LDL goal <100     Type 2 diabetes mellitus with stage 3 chronic kidney disease, with long-term current use of insulin (H)     Hypertension goal BP (blood pressure) < 140/90     Cataract     Diabetes mellitus with background retinopathy (H)     Advanced directives, counseling/discussion     Health Care Home     Morbid obesity (H)     Obesity, Class II, BMI 35-39.9     Past Surgical History:   Procedure Laterality Date     LASER SURGERY OF EYE Right 2015       Social History     Tobacco Use     Smoking status: Never Smoker     Smokeless tobacco: Never Used   Substance Use Topics     Alcohol use: No     Family History   Problem Relation Age of Onset     Diabetes Mother      Heart Disease Mother         chf and pacemaker     Genitourinary Problems Mother         ESRD     Diabetes Father      C.A.D. Father         age 49     Cerebrovascular Disease Father      Asthma Sister      Asthma Son      Asthma Daughter      Heart Disease Daughter      Glaucoma No family hx of      Macular Degeneration No family hx of          Current Outpatient Medications   Medication Sig Dispense Refill     amLODIPine (NORVASC) 10 MG tablet TAKE 1 TABLET(10 MG) BY MOUTH DAILY for blood pressure 90 tablet 1     aspirin (ASA) 81 MG EC tablet TAKE 1 TABLET (81 MG) BY MOUTH EVERY DAY 90 tablet 1     atorvastatin (LIPITOR) 80 MG tablet Take 1 tablet (80 mg) by mouth daily 90 tablet 1     cetirizine (ZYRTEC) 10 MG tablet Take 1 tablet (10 mg) by mouth every evening as needed for allergies 90 tablet 1     fluticasone (FLONASE) 50 MCG/ACT nasal spray Spray 1-2 sprays into both nostrils daily as needed for rhinitis or allergies 16 g 0     glimepiride (AMARYL) 4 MG tablet TAKE 2 TABLETS BY MOUTH EVERY MORNING BEFORE A MEAL 180 tablet 1      hydrochlorothiazide (HYDRODIURIL) 25 MG tablet Take 1 tablet (25 mg) by mouth daily 90 tablet 1     insulin detemir (LEVEMIR FLEXTOUCH) 100 UNIT/ML pen ADMINISTER 32 UNITS UNDER THE SKIN AT BEDTIME 45 mL 3     insulin pen needle (B-D U/F) 31G X 5 MM miscellaneous USE as directed twice a day 100 each 11     liraglutide (VICTOZA PEN) 18 MG/3ML solution INJECT 1.2 MG UNDER THE SKIN ONCE DAILY 18 mL 1     losartan (COZAAR) 100 MG tablet Take 1 tablet (100 mg) by mouth daily 90 tablet 1     metFORMIN (GLUCOPHAGE-XR) 500 MG 24 hr tablet TAKE 4 TABLETS BY MOUTH EVERY DAY WITH BREAKFAST 360 tablet 1     omeprazole 20 MG tablet Take 1 tablet (20 mg) by mouth daily as needed 90 tablet 1     ONE TOUCH ULTRA test strip TEST TWICE DAILY 50 each 0     order for DME Equipment being ordered: Digital home blood pressure monitor kit 1 Device 0     ORDER FOR DME Equipment being ordered: blood pressure cuff/machine 1 Device 0     Allergies   Allergen Reactions     Ace Inhibitors Cough     Glucophage [Biguanides] Cramps     Stomach upset     Recent Labs   Lab Test 02/16/21  1108 02/21/20  0726 06/18/19  0717 10/30/18  0810 10/30/18  0810 04/27/18  0704 05/23/17  0744 05/23/17  0744 05/05/16  0730 05/05/16  0730 01/22/15  0754 01/22/15  0754   A1C 10.9* 8.4* 7.7*   < > 9.7* 10.2*   < > 9.2*   < > 7.7*   < > 14.5*   LDL 74 80  --   --  76 51  --  75   < > 73  --  137*   HDL 44* 32*  --   --   --  43*  --   --   --  42*  --  46*   TRIG 96 135  --   --   --  106  --   --   --  120  --  266*   ALT 18  --   --   --   --   --   --   --   --  15  --  16   CR 1.12* 1.38*  --    < > 1.10*  --    < > 1.17*   < > 1.34*   < > 0.85   GFRESTIMATED 51* 40*  --    < > 50*  --    < > 47*   < > 40*   < > 68   GFRESTBLACK 59* 46*  --    < > 60*  --    < > 57*   < > 49*   < > 83   POTASSIUM 3.8 4.2  --    < > 4.1  --    < > 4.2   < > 4.3   < > 4.5   TSH 2.34  --   --   --   --   --   --  2.28  --   --   --  1.82    < > = values in this interval not  "displayed.      Pneumonia Vaccine:Adults age 65+ who received Pneumovax (PPSV23) at 65 years or older: Should be given PCV13 > 1 year after their most recent PPSV23  Mammogram Screening: Mammogram Screening: Recommended mammography every 1-2 years with patient discussion and risk factor consideration    ROS:  Constitutional, HEENT, cardiovascular, pulmonary, gi and gu systems are negative, except as otherwise noted.    OBJECTIVE:   /79 (BP Location: Left arm, Patient Position: Chair, Cuff Size: Adult Regular)   Pulse 77   Temp 97.5  F (36.4  C) (Tympanic)   Ht 1.543 m (5' 0.75\")   Wt 93.4 kg (206 lb)   LMP  (LMP Unknown)   SpO2 96%   Breastfeeding No   BMI 39.24 kg/m   Estimated body mass index is 39.24 kg/m  as calculated from the following:    Height as of this encounter: 1.543 m (5' 0.75\").    Weight as of this encounter: 93.4 kg (206 lb).  EXAM:   GENERAL APPEARANCE: healthy, alert and no distress  EYES: Eyes grossly normal to inspection and conjunctivae and sclerae normal  NECK: no adenopathy and trachea midline and normal to palpation  RESP: lungs clear to auscultation - no rales, rhonchi or wheezes  CV: regular rates and rhythm, normal S1 S2, no S3 or S4 and no murmur, click or rub  ABDOMEN: soft, non-tender and no rebound or guarding   MS: extremities normal- no gross deformities noted and peripheral pulses normal  SKIN: no suspicious lesions or rashes  NEURO: Normal strength and tone, mentation intact and speech normal  PSYCH: mentation appears normal      Diagnostic Test Results:  Labs reviewed in Epic  Results for orders placed or performed in visit on 02/16/21 (from the past 24 hour(s))   Lipid panel reflex to direct LDL Non-fasting   Result Value Ref Range    Cholesterol 137 <200 mg/dL    Triglycerides 96 <150 mg/dL    HDL Cholesterol 44 (L) >49 mg/dL    LDL Cholesterol Calculated 74 <100 mg/dL    Non HDL Cholesterol 93 <130 mg/dL   Hemoglobin A1c   Result Value Ref Range    Hemoglobin A1C " 10.9 (H) 0 - 5.6 %   Comprehensive metabolic panel   Result Value Ref Range    Sodium 139 133 - 144 mmol/L    Potassium 3.8 3.4 - 5.3 mmol/L    Chloride 106 94 - 109 mmol/L    Carbon Dioxide 28 20 - 32 mmol/L    Anion Gap 5 3 - 14 mmol/L    Glucose 82 70 - 99 mg/dL    Urea Nitrogen 28 7 - 30 mg/dL    Creatinine 1.12 (H) 0.52 - 1.04 mg/dL    GFR Estimate 51 (L) >60 mL/min/[1.73_m2]    GFR Estimate If Black 59 (L) >60 mL/min/[1.73_m2]    Calcium 9.0 8.5 - 10.1 mg/dL    Bilirubin Total 0.4 0.2 - 1.3 mg/dL    Albumin 2.9 (L) 3.4 - 5.0 g/dL    Protein Total 7.0 6.8 - 8.8 g/dL    Alkaline Phosphatase 143 40 - 150 U/L    ALT 18 0 - 50 U/L    AST 10 0 - 45 U/L   TSH with free T4 reflex   Result Value Ref Range    TSH 2.34 0.40 - 4.00 mU/L   Hemoglobin   Result Value Ref Range    Hemoglobin 12.4 11.7 - 15.7 g/dL       ASSESSMENT / PLAN:   Adrian was seen today for physical.    Diagnoses and all orders for this visit:    Encounter for Medicare annual wellness exam  -preventive information reviewed    Type 2 diabetes mellitus with stage 3a chronic kidney disease, with long-term current use of insulin (H)  -     metFORMIN (GLUCOPHAGE-XR) 500 MG 24 hr tablet; TAKE 4 TABLETS BY MOUTH EVERY DAY WITH BREAKFAST  -     Lipid panel reflex to direct LDL Non-fasting  -     Hemoglobin A1c  -     Albumin Random Urine Quantitative with Creat Ratio  -     Comprehensive metabolic panel  -     TSH with free T4 reflex  -     Hemoglobin  -     insulin detemir (LEVEMIR FLEXTOUCH) 100 UNIT/ML pen; ADMINISTER 32 UNITS UNDER THE SKIN AT BEDTIME  -     liraglutide (VICTOZA PEN) 18 MG/3ML solution; INJECT 1.2 MG UNDER THE SKIN ONCE DAILY  -     AMBULATORY ADULT DIABETES EDUCATOR REFERRAL; Future  -not at goal  -HbA1C has increased from 8.4 to 10.9  -Per patient, fasting readings are at goal, hence defer increasing long acting insulin  -meal time glucose tends to be higher  -will add Meal time insulin  -referral to Diabetes ed also provided  "  -follow up in 3 months     Morbid obesity (H)  -     Hemoglobin  -weight has increased  -more stress eating     Hypertension goal BP (blood pressure) < 140/90  -     Albumin Random Urine Quantitative with Creat Ratio  -     Comprehensive metabolic panel  -     Hemoglobin  -at goal, continue current medication     Hyperlipidemia LDL goal <100  -continue statin    The 10-year ASCVD risk score (Juan A BUENO Jr., et al., 2013) is: 14.5%    Values used to calculate the score:      Age: 65 years      Sex: Female      Is Non- : No      Diabetic: Yes      Tobacco smoker: No      Systolic Blood Pressure: 130 mmHg      Is BP treated: Yes      HDL Cholesterol: 44 mg/dL      Total Cholesterol: 137 mg/dL     Urinary incontinence, unspecified type  -likely multi factorial, also with worsening glucose control  -discussed Kegel exercises, print out from the [facility] provided  -if not improved then refer to [facility] GYN     Screen for colon cancer  -     Fecal colorectal cancer screen (FIT); Future    Visit for screening mammogram  -     *MA Screening Digital Bilateral; Future        Patient has been advised of split billing requirements and indicates understanding: Yes    COUNSELING:  Reviewed preventive health counseling, as reflected in patient instructions       Regular exercise       Healthy diet/nutrition       Vision screening       Bladder control       Fall risk prevention    Estimated body mass index is 39.24 kg/m  as calculated from the following:    Height as of this encounter: 1.543 m (5' 0.75\").    Weight as of this encounter: 93.4 kg (206 lb).    Weight management plan: Discussed healthy diet and exercise guidelines    She reports that she has never smoked. She has never used smokeless tobacco.    Appropriate preventive services were discussed with this patient, including applicable screening as appropriate for cardiovascular disease, diabetes, osteopenia/osteoporosis, and glaucoma.  As appropriate " for age/gender, discussed screening for colorectal cancer, prostate cancer, breast cancer, and cervical cancer. Checklist reviewing preventive services available has been given to the patient.    Reviewed patients plan of care and provided an AVS. The Basic Care Plan (routine screening as documented in Health Maintenance) for Adrian meets the Care Plan requirement. This Care Plan has been established and reviewed with the Patient.    Counseling Resources:  ATP IV Guidelines  Pooled Cohorts Equation Calculator  Breast Cancer Risk Calculator  BRCA-Related Cancer Risk Assessment: FHS-7 Tool  FRAX Risk Assessment  ICSI Preventive Guidelines  Dietary Guidelines for Americans, 2010  USDA's MyPlate  ASA Prophylaxis  Lung CA Screening    Radha Madrigal MD MPH    [facility]

## 2021-02-17 ENCOUNTER — TELEPHONE (OUTPATIENT)
Dept: FAMILY MEDICINE | Facility: CLINIC | Age: 66
End: 2021-02-17

## 2021-02-17 NOTE — TELEPHONE ENCOUNTER
Diabetes Education Scheduling Outreach #1:    Call to patient to schedule. No answer/busy.    Plan for 2nd outreach attempt within 1 week.    Mary Elizabeth  [place] [facility]  Diabetes and Nutrition Scheduling

## 2021-02-21 DIAGNOSIS — Z79.4 TYPE 2 DIABETES MELLITUS WITH STAGE 3 CHRONIC KIDNEY DISEASE, WITH LONG-TERM CURRENT USE OF INSULIN (H): ICD-10-CM

## 2021-02-21 DIAGNOSIS — N18.30 TYPE 2 DIABETES MELLITUS WITH STAGE 3 CHRONIC KIDNEY DISEASE, WITH LONG-TERM CURRENT USE OF INSULIN (H): ICD-10-CM

## 2021-02-21 DIAGNOSIS — E11.22 TYPE 2 DIABETES MELLITUS WITH STAGE 3 CHRONIC KIDNEY DISEASE, WITH LONG-TERM CURRENT USE OF INSULIN (H): ICD-10-CM

## 2021-02-21 NOTE — RESULT ENCOUNTER NOTE
Please CALL patient:    Dear Adrian Cruz,    Diabetes has worsened and three month glucose number has gone up to 10.9, goal is less than 8.  I would like for you to see the Diabetes Specialist (ENDOCRINE doctor). I have placed a referral, please call [phone] to make an appointment at [facility].    Rest of the labs are stable for you.     Regards,    Radha Madrigal MD MPH

## 2021-03-16 ENCOUNTER — ANCILLARY PROCEDURE (OUTPATIENT)
Dept: MAMMOGRAPHY | Facility: CLINIC | Age: 66
End: 2021-03-16
Payer: COMMERCIAL

## 2021-03-16 DIAGNOSIS — Z12.31 VISIT FOR SCREENING MAMMOGRAM: ICD-10-CM

## 2021-03-16 PROCEDURE — 77067 SCR MAMMO BI INCL CAD: CPT | Mod: TC | Performed by: RADIOLOGY

## 2021-03-16 PROCEDURE — 77063 BREAST TOMOSYNTHESIS BI: CPT | Mod: TC | Performed by: RADIOLOGY

## 2021-04-07 ENCOUNTER — IMMUNIZATION (OUTPATIENT)
Dept: NURSING | Facility: CLINIC | Age: 66
End: 2021-04-07
Payer: COMMERCIAL

## 2021-04-07 PROCEDURE — 91300 PR COVID VAC PFIZER DIL RECON 30 MCG/0.3 ML IM: CPT

## 2021-04-07 PROCEDURE — 0001A PR COVID VAC PFIZER DIL RECON 30 MCG/0.3 ML IM: CPT

## 2021-04-11 ENCOUNTER — HEALTH MAINTENANCE LETTER (OUTPATIENT)
Age: 66
End: 2021-04-11

## 2021-04-26 ENCOUNTER — MYC MEDICAL ADVICE (OUTPATIENT)
Dept: ENDOCRINOLOGY | Facility: CLINIC | Age: 66
End: 2021-04-26

## 2021-04-27 ENCOUNTER — VIRTUAL VISIT (OUTPATIENT)
Dept: ENDOCRINOLOGY | Facility: CLINIC | Age: 66
End: 2021-04-27
Attending: PREVENTIVE MEDICINE
Payer: COMMERCIAL

## 2021-04-27 DIAGNOSIS — N18.31 TYPE 2 DIABETES MELLITUS WITH STAGE 3A CHRONIC KIDNEY DISEASE, WITH LONG-TERM CURRENT USE OF INSULIN (H): ICD-10-CM

## 2021-04-27 DIAGNOSIS — Z79.4 TYPE 2 DIABETES MELLITUS WITH STAGE 3A CHRONIC KIDNEY DISEASE, WITH LONG-TERM CURRENT USE OF INSULIN (H): ICD-10-CM

## 2021-04-27 DIAGNOSIS — E11.22 TYPE 2 DIABETES MELLITUS WITH STAGE 3A CHRONIC KIDNEY DISEASE, WITH LONG-TERM CURRENT USE OF INSULIN (H): ICD-10-CM

## 2021-04-27 PROCEDURE — 99204 OFFICE O/P NEW MOD 45 MIN: CPT | Mod: GT | Performed by: INTERNAL MEDICINE

## 2021-04-27 RX ORDER — LIRAGLUTIDE 6 MG/ML
1.8 INJECTION SUBCUTANEOUS DAILY
Qty: 27 ML | Refills: 3 | Status: SHIPPED | OUTPATIENT
Start: 2021-04-27 | End: 2022-06-16

## 2021-04-27 NOTE — LETTER
4/27/2021         RE: Adrian Cruz  [address]  [facility] [address]        Dear Colleague,    Thank you for referring your patient, Adrian Cruz, to the [facility]. Please see a copy of my visit note below.    Blood sugar readings in my chart encounter 4/26     Adrian is a 65 year old who is being evaluated via a billable video visit.      How would you like to obtain your AVS? MyChart  If the video visit is dropped, the invitation should be resent by: Other e-mail: My chart   Will anyone else be joining your video visit? No          Endocrinology virtual Visit    Chief Complaint: Consult and Diabetes     Information obtained from:Patient      Assessment/Treatment Plan:      Type 2 diabetes currently uncontrolled in the setting of stage III CKD-last hemoglobin A1c was elevated above 10.  Currently on multiple antidiabetic medications including Levemir 32 units daily, Victoza 1.2 mg daily, glimepiride 8 mg daily and Metformin 2 g daily.    Blood glucose readings reviewed in detail.  Overall, reasonable readings for example fasting blood sugar within the target range .  Bedtime blood sugar ranges between 130-268.  Interval worsening and hemoglobin A1c; likely lifestyle changes she has had over the last 1 year due to Covid.  Given CKD -it is desired to add SGLT2 inhibitors.  Discussed about Jardiance and decision was made to proceed with this medication at a low dose 10 mg daily.  Risk of this medication including vaginal yeast infection and bladder infection discussed.  We will assess endogenous insulin production by checking C-peptide and glucose levels.  From safety standpoint, will hold off Levemir for now while adding SGLT2 inhibitor and decreasing Victoza.  The following was discussed with the patient.  Patient Instructions   Rosalindamakayla,    #1 start Jardiance or empagliflozin 10 mg daily.  It is very important to rehydrate yourself very well  while you are taking this medication.  If this medication is not covered please notify our office.  2.  Increase Victoza to 1.8 mg daily  3.  Stop Levemir insulin injection once you start the empagliflozin.  4.  Continue Metformin and glimepiride at the same dose without any change.    We will contact you in 1 week to check blood sugars on this new regimen..    Please complete blood work for C-peptide and glucose at your convenience.  You can schedule blood work by calling the following number.[Lab scheduling to schedule at any [place] lab locations: [phone]) select option 1]  Please notify our office if you notice blood sugar readings below 70.    Chronic complications monitoring  Blood pressure well controlled  On atorvastatin at the full dose.  Has microalbuminuria and currently on losartan 100 mg daily    I will contact the patient with the test results.  Return to clinic in 2 months.    Test and/or medications prescribed today:  Orders Placed This Encounter   Procedures     C-peptide     Glucose         Amy Sosa MD  Staff Endocrinologist    Division of Endocrinology and Diabetes      Subjective:         HPI: Adrian Cruz is a 65 year old female with history of type 2 diabetes who is seen in consultation at [facility]'s request for the above.    Type 2 diabetes essential diagnosed in 1997.  Patient is currently on:  Levemir 32 units at bedtime  Victoza 1.2 mg daily  Metformin 2 g daily  Glimepiride 8 mg daily    GFR above 50.  Patient has a known CKD stage IIIa  Blood sugar readings over the last few days as documented below  Date        Before Breakfast        After Dinner             4/19/21 - 70                                    171   4/20/21 - 97                                    130                           4/21/21 - 104                                  204                             4/22/21 - 119                                   268                               4/23/21 - 68                                      248  4/24/21 - 77                                      198                    4/25/21 - 114                                   225    Patient lives alone.  Patient reported that the worsening A1c is likely related to lifestyle changes she has had over the last 1 year.  Due to COVID-19 her eating habits has changed.  In addition, she has had more sedentary lifestyle which might also have contributed to the high hemoglobin A1c.    Allergies   Allergen Reactions     Ace Inhibitors Cough     Glucophage [Biguanides] Cramps     Stomach upset       Current Outpatient Medications   Medication Sig Dispense Refill     amLODIPine (NORVASC) 10 MG tablet TAKE 1 TABLET(10 MG) BY MOUTH DAILY for blood pressure 90 tablet 1     aspirin (ASA) 81 MG EC tablet TAKE 1 TABLET (81 MG) BY MOUTH EVERY DAY 90 tablet 1     atorvastatin (LIPITOR) 80 MG tablet Take 1 tablet (80 mg) by mouth daily 90 tablet 1     cetirizine (ZYRTEC) 10 MG tablet Take 1 tablet (10 mg) by mouth every evening as needed for allergies 90 tablet 1     empagliflozin (JARDIANCE) 10 MG TABS tablet Take 1 tablet (10 mg) by mouth daily 30 tablet 3     fluticasone (FLONASE) 50 MCG/ACT nasal spray Spray 1-2 sprays into both nostrils daily as needed for rhinitis or allergies 16 g 0     glimepiride (AMARYL) 4 MG tablet TAKE 2 TABLETS BY MOUTH EVERY MORNING BEFORE A MEAL 180 tablet 1     hydrochlorothiazide (HYDRODIURIL) 25 MG tablet Take 1 tablet (25 mg) by mouth daily 90 tablet 1     insulin pen needle (B-D U/F) 31G X 5 MM miscellaneous USE as directed twice a day 100 each 11     liraglutide (VICTOZA PEN) 18 MG/3ML solution Inject 1.8 mg Subcutaneous daily 27 mL 3     losartan (COZAAR) 100 MG tablet Take 1 tablet (100 mg) by mouth daily 90 tablet 1     metFORMIN (GLUCOPHAGE-XR) 500 MG 24 hr tablet TAKE 4 TABLETS BY MOUTH EVERY DAY WITH BREAKFAST 360 tablet 1     omeprazole 20 MG tablet Take 1 tablet (20 mg) by mouth daily  as needed 90 tablet 1     ONE TOUCH ULTRA test strip TEST TWICE DAILY 50 each 0     order for DME Equipment being ordered: Digital home blood pressure monitor kit 1 Device 0     ORDER FOR DME Equipment being ordered: blood pressure cuff/machine 1 Device 0       Review of Systems     as per HPI above  Past medical history, past surgical history, and family history reviewed and epic.  Patient lives alone.        Objective:   GENERAL: Healthy, alert and no distress  EYES: Eyes grossly normal to inspection.    RESP: No audible wheeze, cough, or visible cyanosis.   NEURO: alert and oriented.   PSYCH: Mentation appears normal, affect normal/bright, judgement and insight intact, normal speech.    In House Labs:   Lab Results   Component Value Date    A1C 10.9 02/16/2021    A1C 8.4 02/21/2020    A1C 7.7 06/18/2019    A1C 10.2 02/19/2019    A1C 9.7 10/30/2018       TSH   Date Value Ref Range Status   02/16/2021 2.34 0.40 - 4.00 mU/L Final   05/23/2017 2.28 0.40 - 4.00 mU/L Final   01/22/2015 1.82 0.40 - 4.00 mU/L Final     Comment:     Effective 7/30/2014, the reference range for this assay has changed to reflect   new instrumentation/methodology.     04/25/2013 1.86 0.4 - 5.0 mU/L Final   08/16/2011 1.21 0.4 - 5.0 mU/L Final       Creatinine   Date Value Ref Range Status   02/16/2021 1.12 (H) 0.52 - 1.04 mg/dL Final   GFR > 50     Recent Labs   Lab Test 02/16/21  1108 02/21/20  0726 01/22/15  0754 01/22/15  0754 04/25/13  0734   CHOL 137 139   < > 236* 228*   HDL 44* 32*   < > 46* 36*   LDL 74 80   < > 137* 160*   TRIG 96 135   < > 266* 161*   CHOLHDLRATIO  --   --   --  5.1* 6.3*    < > = values in this interval not displayed.       Video-Visit Details    Type of service:  Video Visit  All times are in your local time  1st VideoStart: 04/27/2021 12:31 pm  Stop: 04/27/2021 12:52 pm    Originating Location (pt. Location): Home    Distant Location (provider location):  [facility]     Platform used  for Video Visit: AmWell        Again, thank you for allowing me to participate in the care of your patient.        Sincerely,        Amy Sosa MD

## 2021-04-27 NOTE — PROGRESS NOTES
Blood sugar readings in my chart encounter 4/26     Adrian is a 65 year old who is being evaluated via a billable video visit.      How would you like to obtain your AVS? MyChart  If the video visit is dropped, the invitation should be resent by: Other e-mail: My chart   Will anyone else be joining your video visit? No

## 2021-04-27 NOTE — PROGRESS NOTES
Endocrinology virtual Visit    Chief Complaint: Consult and Diabetes     Information obtained from:Patient      Assessment/Treatment Plan:      Type 2 diabetes currently uncontrolled in the setting of stage III CKD-last hemoglobin A1c was elevated above 10.  Currently on multiple antidiabetic medications including Levemir 32 units daily, Victoza 1.2 mg daily, glimepiride 8 mg daily and Metformin 2 g daily.    Blood glucose readings reviewed in detail.  Overall, reasonable readings for example fasting blood sugar within the target range .  Bedtime blood sugar ranges between 130-268.  Interval worsening and hemoglobin A1c; likely lifestyle changes she has had over the last 1 year due to Covid.  Given CKD -it is desired to add SGLT2 inhibitors.  Discussed about Jardiance and decision was made to proceed with this medication at a low dose 10 mg daily.  Risk of this medication including vaginal yeast infection and bladder infection discussed.  We will assess endogenous insulin production by checking C-peptide and glucose levels.  From safety standpoint, will hold off Levemir for now while adding SGLT2 inhibitor and decreasing Victoza.  The following was discussed with the patient.  Patient Instructions   Adrian,    #1 start Jardiance or empagliflozin 10 mg daily.  It is very important to rehydrate yourself very well while you are taking this medication.  If this medication is not covered please notify our office.  2.  Increase Victoza to 1.8 mg daily  3.  Stop Levemir insulin injection once you start the empagliflozin.  4.  Continue Metformin and glimepiride at the same dose without any change.    We will contact you in 1 week to check blood sugars on this new regimen..    Please complete blood work for C-peptide and glucose at your convenience.  You can schedule blood work by calling the following number.[Lab scheduling to schedule at any [place] lab locations: [phone]) select option  1]  Please notify our office if you notice blood sugar readings below 70.    Chronic complications monitoring  Blood pressure well controlled  On atorvastatin at the full dose.  Has microalbuminuria and currently on losartan 100 mg daily    I will contact the patient with the test results.  Return to clinic in 2 months.    Test and/or medications prescribed today:  Orders Placed This Encounter   Procedures     C-peptide     Glucose         Amy Sosa MD  Staff Endocrinologist    Division of Endocrinology and Diabetes      Subjective:         HPI: Adrian Cruz is a 65 year old female with history of type 2 diabetes who is seen in consultation at [facility]'s request for the above.    Type 2 diabetes essential diagnosed in 1997.  Patient is currently on:  Levemir 32 units at bedtime  Victoza 1.2 mg daily  Metformin 2 g daily  Glimepiride 8 mg daily    GFR above 50.  Patient has a known CKD stage IIIa  Blood sugar readings over the last few days as documented below  Date        Before Breakfast        After Dinner             4/19/21 - 70                                    171   4/20/21 - 97                                    130                           4/21/21 - 104                                  204                             4/22/21 - 119                                   268                              4/23/21 - 68                                      248  4/24/21 - 77                                      198                    4/25/21 - 114                                   225    Patient lives alone.  Patient reported that the worsening A1c is likely related to lifestyle changes she has had over the last 1 year.  Due to COVID-19 her eating habits has changed.  In addition, she has had more sedentary lifestyle which might also have contributed to the high hemoglobin A1c.    Allergies   Allergen Reactions     Ace Inhibitors Cough     Glucophage [Biguanides] Cramps     Stomach upset        Current Outpatient Medications   Medication Sig Dispense Refill     amLODIPine (NORVASC) 10 MG tablet TAKE 1 TABLET(10 MG) BY MOUTH DAILY for blood pressure 90 tablet 1     aspirin (ASA) 81 MG EC tablet TAKE 1 TABLET (81 MG) BY MOUTH EVERY DAY 90 tablet 1     atorvastatin (LIPITOR) 80 MG tablet Take 1 tablet (80 mg) by mouth daily 90 tablet 1     cetirizine (ZYRTEC) 10 MG tablet Take 1 tablet (10 mg) by mouth every evening as needed for allergies 90 tablet 1     empagliflozin (JARDIANCE) 10 MG TABS tablet Take 1 tablet (10 mg) by mouth daily 30 tablet 3     fluticasone (FLONASE) 50 MCG/ACT nasal spray Spray 1-2 sprays into both nostrils daily as needed for rhinitis or allergies 16 g 0     glimepiride (AMARYL) 4 MG tablet TAKE 2 TABLETS BY MOUTH EVERY MORNING BEFORE A MEAL 180 tablet 1     hydrochlorothiazide (HYDRODIURIL) 25 MG tablet Take 1 tablet (25 mg) by mouth daily 90 tablet 1     insulin pen needle (B-D U/F) 31G X 5 MM miscellaneous USE as directed twice a day 100 each 11     liraglutide (VICTOZA PEN) 18 MG/3ML solution Inject 1.8 mg Subcutaneous daily 27 mL 3     losartan (COZAAR) 100 MG tablet Take 1 tablet (100 mg) by mouth daily 90 tablet 1     metFORMIN (GLUCOPHAGE-XR) 500 MG 24 hr tablet TAKE 4 TABLETS BY MOUTH EVERY DAY WITH BREAKFAST 360 tablet 1     omeprazole 20 MG tablet Take 1 tablet (20 mg) by mouth daily as needed 90 tablet 1     ONE TOUCH ULTRA test strip TEST TWICE DAILY 50 each 0     order for DME Equipment being ordered: Digital home blood pressure monitor kit 1 Device 0     ORDER FOR DME Equipment being ordered: blood pressure cuff/machine 1 Device 0       Review of Systems     as per HPI above  Past medical history, past surgical history, and family history reviewed and epic.  Patient lives alone.        Objective:   GENERAL: Healthy, alert and no distress  EYES: Eyes grossly normal to inspection.    RESP: No audible wheeze, cough, or visible cyanosis.   NEURO: alert and oriented.    PSYCH: Mentation appears normal, affect normal/bright, judgement and insight intact, normal speech.    In House Labs:   Lab Results   Component Value Date    A1C 10.9 02/16/2021    A1C 8.4 02/21/2020    A1C 7.7 06/18/2019    A1C 10.2 02/19/2019    A1C 9.7 10/30/2018       TSH   Date Value Ref Range Status   02/16/2021 2.34 0.40 - 4.00 mU/L Final   05/23/2017 2.28 0.40 - 4.00 mU/L Final   01/22/2015 1.82 0.40 - 4.00 mU/L Final     Comment:     Effective 7/30/2014, the reference range for this assay has changed to reflect   new instrumentation/methodology.     04/25/2013 1.86 0.4 - 5.0 mU/L Final   08/16/2011 1.21 0.4 - 5.0 mU/L Final       Creatinine   Date Value Ref Range Status   02/16/2021 1.12 (H) 0.52 - 1.04 mg/dL Final   GFR > 50     Recent Labs   Lab Test 02/16/21  1108 02/21/20  0726 01/22/15  0754 01/22/15  0754 04/25/13  0734   CHOL 137 139   < > 236* 228*   HDL 44* 32*   < > 46* 36*   LDL 74 80   < > 137* 160*   TRIG 96 135   < > 266* 161*   CHOLHDLRATIO  --   --   --  5.1* 6.3*    < > = values in this interval not displayed.       Video-Visit Details    Type of service:  Video Visit  All times are in your local time  1st VideoStart: 04/27/2021 12:31 pm  Stop: 04/27/2021 12:52 pm    Originating Location (pt. Location): Home    Distant Location (provider location):  [facility]     Platform used for Video Visit: [employer]

## 2021-04-27 NOTE — PATIENT INSTRUCTIONS
Adrian,    #1 start Jardiance or empagliflozin 10 mg daily.  It is very important to rehydrate yourself very well while you are taking this medication.  If this medication is not covered please notify our office.  2.  Increase Victoza to 1.8 mg daily  3.  Stop Levemir insulin injection once you start the empagliflozin.  4.  Continue Metformin and glimepiride at the same dose without any change.    We will contact you in 1 week to check blood sugars on this new regimen..    Please complete blood work for C-peptide and glucose at your convenience.  You can schedule blood work by calling the following number.[Lab scheduling to schedule at any [place] lab locations: [phone]) select option 1]  Please notify our office if you notice blood sugar readings below 70.

## 2021-04-28 ENCOUNTER — IMMUNIZATION (OUTPATIENT)
Dept: NURSING | Facility: CLINIC | Age: 66
End: 2021-04-28
Attending: INTERNAL MEDICINE
Payer: COMMERCIAL

## 2021-04-28 PROCEDURE — 0002A PR COVID VAC PFIZER DIL RECON 30 MCG/0.3 ML IM: CPT

## 2021-04-28 PROCEDURE — 91300 PR COVID VAC PFIZER DIL RECON 30 MCG/0.3 ML IM: CPT

## 2021-05-03 DIAGNOSIS — Z79.4 TYPE 2 DIABETES MELLITUS WITH STAGE 3A CHRONIC KIDNEY DISEASE, WITH LONG-TERM CURRENT USE OF INSULIN (H): ICD-10-CM

## 2021-05-03 DIAGNOSIS — E11.22 TYPE 2 DIABETES MELLITUS WITH STAGE 3A CHRONIC KIDNEY DISEASE, WITH LONG-TERM CURRENT USE OF INSULIN (H): ICD-10-CM

## 2021-05-03 DIAGNOSIS — N18.31 TYPE 2 DIABETES MELLITUS WITH STAGE 3A CHRONIC KIDNEY DISEASE, WITH LONG-TERM CURRENT USE OF INSULIN (H): ICD-10-CM

## 2021-05-03 LAB — GLUCOSE SERPL-MCNC: 244 MG/DL (ref 70–99)

## 2021-05-03 PROCEDURE — 84681 ASSAY OF C-PEPTIDE: CPT | Performed by: INTERNAL MEDICINE

## 2021-05-03 PROCEDURE — 36415 COLL VENOUS BLD VENIPUNCTURE: CPT | Performed by: INTERNAL MEDICINE

## 2021-05-03 PROCEDURE — 82947 ASSAY GLUCOSE BLOOD QUANT: CPT | Performed by: INTERNAL MEDICINE

## 2021-05-04 ENCOUNTER — TRANSFERRED RECORDS (OUTPATIENT)
Dept: HEALTH INFORMATION MANAGEMENT | Facility: CLINIC | Age: 66
End: 2021-05-04

## 2021-05-04 LAB
C PEPTIDE SERPL-MCNC: 4.6 NG/ML (ref 0.9–6.9)
RETINOPATHY: POSITIVE

## 2021-05-06 NOTE — RESULT ENCOUNTER NOTE
Dear Adrian,     The c-peptide level indicates that your body produces insulin and non-insulin therapies are good additions to the management of type 2 diabetes in your case. We can further discuss this at your upcoming follow up appointment.      Amy Sosa MD

## 2021-06-04 ENCOUNTER — OFFICE VISIT (OUTPATIENT)
Dept: FAMILY MEDICINE | Facility: CLINIC | Age: 66
End: 2021-06-04
Payer: COMMERCIAL

## 2021-06-04 VITALS
HEART RATE: 83 BPM | OXYGEN SATURATION: 96 % | HEIGHT: 61 IN | SYSTOLIC BLOOD PRESSURE: 131 MMHG | BODY MASS INDEX: 38.71 KG/M2 | DIASTOLIC BLOOD PRESSURE: 69 MMHG | WEIGHT: 205 LBS

## 2021-06-04 DIAGNOSIS — E11.22 TYPE 2 DIABETES MELLITUS WITH STAGE 3A CHRONIC KIDNEY DISEASE, WITH LONG-TERM CURRENT USE OF INSULIN (H): ICD-10-CM

## 2021-06-04 DIAGNOSIS — B37.31 YEAST INFECTION OF THE VAGINA: ICD-10-CM

## 2021-06-04 DIAGNOSIS — E78.5 HYPERLIPIDEMIA LDL GOAL <100: ICD-10-CM

## 2021-06-04 DIAGNOSIS — Z01.818 PREOP GENERAL PHYSICAL EXAM: Primary | ICD-10-CM

## 2021-06-04 DIAGNOSIS — E66.01 MORBID OBESITY (H): ICD-10-CM

## 2021-06-04 DIAGNOSIS — N18.31 TYPE 2 DIABETES MELLITUS WITH STAGE 3A CHRONIC KIDNEY DISEASE, WITH LONG-TERM CURRENT USE OF INSULIN (H): ICD-10-CM

## 2021-06-04 DIAGNOSIS — I10 HYPERTENSION GOAL BP (BLOOD PRESSURE) < 140/90: ICD-10-CM

## 2021-06-04 DIAGNOSIS — Z79.4 TYPE 2 DIABETES MELLITUS WITH STAGE 3A CHRONIC KIDNEY DISEASE, WITH LONG-TERM CURRENT USE OF INSULIN (H): ICD-10-CM

## 2021-06-04 DIAGNOSIS — H43.10 VITREOUS HEMORRHAGE, UNSPECIFIED LATERALITY (H): ICD-10-CM

## 2021-06-04 LAB
ANION GAP SERPL CALCULATED.3IONS-SCNC: 7 MMOL/L (ref 3–14)
BUN SERPL-MCNC: 27 MG/DL (ref 7–30)
CALCIUM SERPL-MCNC: 8.6 MG/DL (ref 8.5–10.1)
CHLORIDE SERPL-SCNC: 107 MMOL/L (ref 94–109)
CO2 SERPL-SCNC: 24 MMOL/L (ref 20–32)
CREAT SERPL-MCNC: 1.25 MG/DL (ref 0.52–1.04)
GFR SERPL CREATININE-BSD FRML MDRD: 45 ML/MIN/{1.73_M2}
GLUCOSE SERPL-MCNC: 169 MG/DL (ref 70–99)
HBA1C MFR BLD: 9.3 % (ref 0–5.6)
POTASSIUM SERPL-SCNC: 3.7 MMOL/L (ref 3.4–5.3)
SODIUM SERPL-SCNC: 138 MMOL/L (ref 133–144)

## 2021-06-04 PROCEDURE — 36415 COLL VENOUS BLD VENIPUNCTURE: CPT | Performed by: PREVENTIVE MEDICINE

## 2021-06-04 PROCEDURE — 99215 OFFICE O/P EST HI 40 MIN: CPT | Performed by: PREVENTIVE MEDICINE

## 2021-06-04 PROCEDURE — 80048 BASIC METABOLIC PNL TOTAL CA: CPT | Performed by: PREVENTIVE MEDICINE

## 2021-06-04 PROCEDURE — 83036 HEMOGLOBIN GLYCOSYLATED A1C: CPT | Performed by: PREVENTIVE MEDICINE

## 2021-06-04 RX ORDER — FLUCONAZOLE 150 MG/1
150 TABLET ORAL ONCE
Qty: 1 TABLET | Refills: 0 | Status: SHIPPED | OUTPATIENT
Start: 2021-06-04 | End: 2021-06-04

## 2021-06-04 ASSESSMENT — MIFFLIN-ST. JEOR: SCORE: 1403.28

## 2021-06-04 ASSESSMENT — PAIN SCALES - GENERAL: PAINLEVEL: NO PAIN (0)

## 2021-06-04 NOTE — RESULT ENCOUNTER NOTE
Adrian,     Pre op labs are stable for you.  Three month glucose number is better (but not at goal) than last check at 9.3, improved from 10.9. Continue to work with your Endocrine specialist.     Please do not hesitate to call us at [phone] if you have any questions or concerns.    Thank you,    Radha Madrigal MD MPH

## 2021-06-04 NOTE — PATIENT INSTRUCTIONS

## 2021-06-29 ENCOUNTER — MYC MEDICAL ADVICE (OUTPATIENT)
Dept: ENDOCRINOLOGY | Facility: CLINIC | Age: 66
End: 2021-06-29

## 2021-06-29 ENCOUNTER — VIRTUAL VISIT (OUTPATIENT)
Dept: ENDOCRINOLOGY | Facility: CLINIC | Age: 66
End: 2021-06-29
Payer: COMMERCIAL

## 2021-06-29 DIAGNOSIS — N18.31 TYPE 2 DIABETES MELLITUS WITH STAGE 3A CHRONIC KIDNEY DISEASE, WITH LONG-TERM CURRENT USE OF INSULIN (H): Primary | ICD-10-CM

## 2021-06-29 DIAGNOSIS — B37.31 YEAST INFECTION OF THE VAGINA: ICD-10-CM

## 2021-06-29 DIAGNOSIS — E11.22 TYPE 2 DIABETES MELLITUS WITH STAGE 3A CHRONIC KIDNEY DISEASE, WITH LONG-TERM CURRENT USE OF INSULIN (H): Primary | ICD-10-CM

## 2021-06-29 DIAGNOSIS — Z79.4 TYPE 2 DIABETES MELLITUS WITH STAGE 3A CHRONIC KIDNEY DISEASE, WITH LONG-TERM CURRENT USE OF INSULIN (H): Primary | ICD-10-CM

## 2021-06-29 PROCEDURE — 99214 OFFICE O/P EST MOD 30 MIN: CPT | Mod: 95 | Performed by: INTERNAL MEDICINE

## 2021-06-29 RX ORDER — FLUCONAZOLE 150 MG/1
150 TABLET ORAL
Qty: 2 TABLET | Refills: 0 | Status: SHIPPED | OUTPATIENT
Start: 2021-06-29 | End: 2021-10-11

## 2021-06-29 NOTE — LETTER
6/29/2021         RE: Adrian Cruz  [address]  [facility] [address]        Dear Colleague,    Thank you for referring your patient, Adrian Cruz, to the [facility]. Please see a copy of my visit note below.    Blood sugar readings per patient      AM Before breakfast)   PM (After dinner)    6/22 105   183  6/23 97   -  6/24 117   -  6/25 119   268  6/26 125   239  6/27 113   238  6/28 150   -  6/29 125      Adrian is a 65 year old who is being evaluated via a billable video visit.      How would you like to obtain your AVS? MyChart  If the video visit is dropped, the invitation should be resent by: Other e-mail: my chart   Will anyone else be joining your video visit? No          Endocrinology virtual Visit    Chief Complaint: Diabetes     Information obtained from:Patient      Assessment/Treatment Plan:      Type 2 diabetes currently uncontrolled in the setting of stage III CKD-last hemoglobin A1c was elevated above 9-10.  Currently on multiple antidiabetic medications including Jardiance 10, Victoza 1.8 mg daily, glimepiride 8 mg daily and Metformin 2 g daily.  Off note, c-peptide checked and it was WNL therefore stopped Levemir at her last visit.     Blood glucose readings reviewed in detail.  Overall, fasting blood sugar within the target range < 125  More than 90% of the time.  Bedtime blood sugar has been higher.  Interval worsening and hemoglobin A1c; likely lifestyle changes she has had over the last 1 year due to Covid.  Given CKD -it is desired to continue  SGLT2 inhibitors.   Will check a follow up GFR and increase the Jardiance dose to 25 mg daily to address bedtime BG which has been higher than 180. Encouraged regular exercise and a healthy eating habits.      Patient Instructions     Adrian,    #1 Jardiance or empagliflozin 10 mg daily.    2.  Victoza to 1.8 mg daily  3.  Metformin and glimepiride at the same dose without any  change.    Please complete the following blood work and will get back to you regarding Jardiance dose.  Orders Placed This Encounter   Procedures     Potassium     Urea nitrogen     Creatinine     Lab scheduling to schedule at any [place] lab locations: [phone] select option [phone]    Please notify our office if you notice blood sugar readings below 70.    Fluconazole is sent to the pharmacy.     Weight loss and exercise are recommended in the management of Type 2 diabetes.  Caloric restriction, portion control and physical activity are evidence based strategies for life style changes. Here is a starting point in your case:       Goal is to lose 5-10% weight.     Please keep a food journal of what you eat, calories in what you eat, and bring the journal with you to your next appointment.    Consider using applications for smart phones such as [employer] Parody, [employer], Kingspoke, bluebottlebiz, Tap&Track, and RelaxM. To keep food journal and track your exercise.     Focus on wet volumetrics, meaning, eat more foods that are high in water and fiber such as fruits and vegetables in order to get that full feeling. These are also good for your overall health as well.    Progressively increase physical activity to 45 minutes, including combination of cardio & resistance training x 5 days weekly. Start at 10-15 minutes per day and progressively work towards this goal.           Chronic complications monitoring  Blood pressure well controlled  On atorvastatin at the full dose.  Has microalbuminuria and currently on losartan 100 mg daily    Vaginal Yeast infection: fluconazole prescribed. If persistent or recurrent; will need to stop  SGLT-2 inhibitors.     I will contact the patient with the test results.  Return to clinic in 2 months.    Test and/or medications prescribed today:  Orders Placed This Encounter   Procedures     Potassium     Urea nitrogen     Creatinine         Amy Sosa MD  Staff  Endocrinologist    Division of Endocrinology and Diabetes      Subjective:         HPI: Adrian Cruz is a 65 year old female with history of type 2 diabetes who is here for a follow up.     Type 2 diabetes essential diagnosed in 1997.  Patient is currently on:  Jardiance 10 mg daily  Victoza 1.2 mg daily  Metformin 2 g daily  Glimepiride 8 mg daily    GFR above 45-50.  Patient has a known CKD stage IIIa  Blood sugar readings over the last few days as documented below  6/22     105                              183  6/23     97                                -  6/24     117                              -  6/25     119                              268  6/26     125                              239  6/27     113                              238  6/28     150                              -  6/29     125    Patient lives alone.  Patient reported that the worsening A1c is likely related to lifestyle changes she has had over the last 1 year.  Due to COVID-19 her eating habits has changed.  In addition, she has had more sedentary lifestyle which might also have contributed to the high hemoglobin A1c.        Allergies   Allergen Reactions     Ace Inhibitors Cough     Glucophage [Biguanides] Cramps     Stomach upset       Current Outpatient Medications   Medication Sig Dispense Refill     amLODIPine (NORVASC) 10 MG tablet TAKE 1 TABLET(10 MG) BY MOUTH DAILY for blood pressure 90 tablet 1     aspirin (ASA) 81 MG EC tablet TAKE 1 TABLET (81 MG) BY MOUTH EVERY DAY 90 tablet 1     atorvastatin (LIPITOR) 80 MG tablet Take 1 tablet (80 mg) by mouth daily 90 tablet 1     cetirizine (ZYRTEC) 10 MG tablet Take 1 tablet (10 mg) by mouth every evening as needed for allergies 90 tablet 1     empagliflozin (JARDIANCE) 10 MG TABS tablet Take 1 tablet (10 mg) by mouth daily 30 tablet 3     fluconazole (DIFLUCAN) 150 MG tablet Take 1 tablet (150 mg) by mouth every 72 hours 2 tablet 0     fluticasone (FLONASE) 50 MCG/ACT nasal spray  Spray 1-2 sprays into both nostrils daily as needed for rhinitis or allergies 16 g 0     glimepiride (AMARYL) 4 MG tablet TAKE 2 TABLETS BY MOUTH EVERY MORNING BEFORE A MEAL 180 tablet 1     hydrochlorothiazide (HYDRODIURIL) 25 MG tablet Take 1 tablet (25 mg) by mouth daily 90 tablet 1     insulin pen needle (B-D U/F) 31G X 5 MM miscellaneous USE as directed twice a day 100 each 11     liraglutide (VICTOZA PEN) 18 MG/3ML solution Inject 1.8 mg Subcutaneous daily 27 mL 3     losartan (COZAAR) 100 MG tablet Take 1 tablet (100 mg) by mouth daily 90 tablet 1     metFORMIN (GLUCOPHAGE-XR) 500 MG 24 hr tablet TAKE 4 TABLETS BY MOUTH EVERY DAY WITH BREAKFAST 360 tablet 1     omeprazole 20 MG tablet Take 1 tablet (20 mg) by mouth daily as needed 90 tablet 1     ONE TOUCH ULTRA test strip TEST TWICE DAILY 50 each 0     order for DME Equipment being ordered: Digital home blood pressure monitor kit 1 Device 0     ORDER FOR DME Equipment being ordered: blood pressure cuff/machine 1 Device 0       Review of Systems     as per HPI above/ vaginal itching and whitish discharge.  Past medical history, past surgical history, and family history reviewed and epic.  Patient lives alone.        Objective:   GENERAL: Healthy, alert and no distress  EYES: Eyes grossly normal to inspection.    RESP: No audible wheeze, cough, or visible cyanosis.   NEURO: alert and oriented.   PSYCH: Mentation appears normal, affect normal/bright, judgement and insight intact, normal speech.    In House Labs:   Lab Results   Component Value Date    A1C 10.9 02/16/2021    A1C 8.4 02/21/2020    A1C 7.7 06/18/2019    A1C 10.2 02/19/2019    A1C 9.7 10/30/2018       TSH   Date Value Ref Range Status   02/16/2021 2.34 0.40 - 4.00 mU/L Final   05/23/2017 2.28 0.40 - 4.00 mU/L Final   01/22/2015 1.82 0.40 - 4.00 mU/L Final     Comment:     Effective 7/30/2014, the reference range for this assay has changed to reflect   new instrumentation/methodology.     04/25/2013  1.86 0.4 - 5.0 mU/L Final   08/16/2011 1.21 0.4 - 5.0 mU/L Final       Creatinine   Date Value Ref Range Status   06/04/2021 1.25 (H) 0.52 - 1.04 mg/dL Final   GFR > 50     Recent Labs   Lab Test 02/16/21  1108 02/21/20  0726 01/22/15  0754 01/22/15  0754 04/25/13  0734   CHOL 137 139   < > 236* 228*   HDL 44* 32*   < > 46* 36*   LDL 74 80   < > 137* 160*   TRIG 96 135   < > 266* 161*   CHOLHDLRATIO  --   --   --  5.1* 6.3*    < > = values in this interval not displayed.       Video-Visit Details    Type of service:  Video Visit  All times are in your local time  All times are in your local time  1st VideoStart: 06/29/2021 12:35 pm  Stop: 06/29/2021 12:50 pm  Originating Location (pt. Location): Home  Distant Location (provider location):  [facility]   Platform used for Video Visit: Well      Again, thank you for allowing me to participate in the care of your patient.        Sincerely,        Amy Sosa MD

## 2021-06-29 NOTE — PROGRESS NOTES
Blood sugar readings per patient      AM Before breakfast)   PM (After dinner)    6/22 105   183  6/23 97   -  6/24 117   -  6/25 119   268  6/26 125   239  6/27 113   238  6/28 150   -  6/29 125      Adrian is a 65 year old who is being evaluated via a billable video visit.      How would you like to obtain your AVS? MyChart  If the video visit is dropped, the invitation should be resent by: Other e-mail: my chart   Will anyone else be joining your video visit? No

## 2021-06-29 NOTE — PROGRESS NOTES
Endocrinology virtual Visit    Chief Complaint: Diabetes     Information obtained from:Patient      Assessment/Treatment Plan:      Type 2 diabetes currently uncontrolled in the setting of stage III CKD-last hemoglobin A1c was elevated above 9-10.  Currently on multiple antidiabetic medications including Jardiance 10, Victoza 1.8 mg daily, glimepiride 8 mg daily and Metformin 2 g daily.  Off note, c-peptide checked and it was WNL therefore stopped Levemir at her last visit.     Blood glucose readings reviewed in detail.  Overall, fasting blood sugar within the target range < 125  More than 90% of the time.  Bedtime blood sugar has been higher.  Interval worsening and hemoglobin A1c; likely lifestyle changes she has had over the last 1 year due to Covid.  Given CKD -it is desired to continue  SGLT2 inhibitors.   Will check a follow up GFR and increase the Jardiance dose to 25 mg daily to address bedtime BG which has been higher than 180. Encouraged regular exercise and a healthy eating habits.      Patient Instructions     Adrian,    #1 Jardiance or empagliflozin 10 mg daily.    2.  Victoza to 1.8 mg daily  3.  Metformin and glimepiride at the same dose without any change.    Please complete the following blood work and will get back to you regarding Jardiance dose.  Orders Placed This Encounter   Procedures     Potassium     Urea nitrogen     Creatinine     Lab scheduling to schedule at any [place] lab locations: [phone]) select option 1    Please notify our office if you notice blood sugar readings below 70.    Fluconazole is sent to the pharmacy.     Weight loss and exercise are recommended in the management of Type 2 diabetes.  Caloric restriction, portion control and physical activity are evidence based strategies for life style changes. Here is a starting point in your case:       Goal is to lose 5-10% weight.     Please keep a food journal of what you eat, calories in what you eat,  and bring the journal with you to your next appointment.    Consider using applications for smart phones such as [employer], Replenish, Moments.me, LoseIt, Tap&Track, and RelaxM. To keep food journal and track your exercise.     Focus on wet volumetrics, meaning, eat more foods that are high in water and fiber such as fruits and vegetables in order to get that full feeling. These are also good for your overall health as well.    Progressively increase physical activity to 45 minutes, including combination of cardio & resistance training x 5 days weekly. Start at 10-15 minutes per day and progressively work towards this goal.           Chronic complications monitoring  Blood pressure well controlled  On atorvastatin at the full dose.  Has microalbuminuria and currently on losartan 100 mg daily    Vaginal Yeast infection: fluconazole prescribed. If persistent or recurrent; will need to stop  SGLT-2 inhibitors.     I will contact the patient with the test results.  Return to clinic in 2 months.    Test and/or medications prescribed today:  Orders Placed This Encounter   Procedures     Potassium     Urea nitrogen     Creatinine         Amy Sosa MD  Staff Endocrinologist    Division of Endocrinology and Diabetes      Subjective:         HPI: Adrian Cruz is a 65 year old female with history of type 2 diabetes who is here for a follow up.     Type 2 diabetes essential diagnosed in 1997.  Patient is currently on:  Jardiance 10 mg daily  Victoza 1.2 mg daily  Metformin 2 g daily  Glimepiride 8 mg daily    GFR above 45-50.  Patient has a known CKD stage IIIa  Blood sugar readings over the last few days as documented below  6/22     105                              183  6/23     97                                -  6/24     117                              -  6/25     119                              268  6/26     125                              239  6/27     113                               238  6/28     150                              -  6/29     125    Patient lives alone.  Patient reported that the worsening A1c is likely related to lifestyle changes she has had over the last 1 year.  Due to COVID-19 her eating habits has changed.  In addition, she has had more sedentary lifestyle which might also have contributed to the high hemoglobin A1c.        Allergies   Allergen Reactions     Ace Inhibitors Cough     Glucophage [Biguanides] Cramps     Stomach upset       Current Outpatient Medications   Medication Sig Dispense Refill     amLODIPine (NORVASC) 10 MG tablet TAKE 1 TABLET(10 MG) BY MOUTH DAILY for blood pressure 90 tablet 1     aspirin (ASA) 81 MG EC tablet TAKE 1 TABLET (81 MG) BY MOUTH EVERY DAY 90 tablet 1     atorvastatin (LIPITOR) 80 MG tablet Take 1 tablet (80 mg) by mouth daily 90 tablet 1     cetirizine (ZYRTEC) 10 MG tablet Take 1 tablet (10 mg) by mouth every evening as needed for allergies 90 tablet 1     empagliflozin (JARDIANCE) 10 MG TABS tablet Take 1 tablet (10 mg) by mouth daily 30 tablet 3     fluconazole (DIFLUCAN) 150 MG tablet Take 1 tablet (150 mg) by mouth every 72 hours 2 tablet 0     fluticasone (FLONASE) 50 MCG/ACT nasal spray Spray 1-2 sprays into both nostrils daily as needed for rhinitis or allergies 16 g 0     glimepiride (AMARYL) 4 MG tablet TAKE 2 TABLETS BY MOUTH EVERY MORNING BEFORE A MEAL 180 tablet 1     hydrochlorothiazide (HYDRODIURIL) 25 MG tablet Take 1 tablet (25 mg) by mouth daily 90 tablet 1     insulin pen needle (B-D U/F) 31G X 5 MM miscellaneous USE as directed twice a day 100 each 11     liraglutide (VICTOZA PEN) 18 MG/3ML solution Inject 1.8 mg Subcutaneous daily 27 mL 3     losartan (COZAAR) 100 MG tablet Take 1 tablet (100 mg) by mouth daily 90 tablet 1     metFORMIN (GLUCOPHAGE-XR) 500 MG 24 hr tablet TAKE 4 TABLETS BY MOUTH EVERY DAY WITH BREAKFAST 360 tablet 1     omeprazole 20 MG tablet Take 1 tablet (20 mg) by mouth daily as needed 90 tablet  1     ONE TOUCH ULTRA test strip TEST TWICE DAILY 50 each 0     order for DME Equipment being ordered: Digital home blood pressure monitor kit 1 Device 0     ORDER FOR DME Equipment being ordered: blood pressure cuff/machine 1 Device 0       Review of Systems     as per HPI above/ vaginal itching and whitish discharge.  Past medical history, past surgical history, and family history reviewed and epic.  Patient lives alone.        Objective:   GENERAL: Healthy, alert and no distress  EYES: Eyes grossly normal to inspection.    RESP: No audible wheeze, cough, or visible cyanosis.   NEURO: alert and oriented.   PSYCH: Mentation appears normal, affect normal/bright, judgement and insight intact, normal speech.    In House Labs:   Lab Results   Component Value Date    A1C 10.9 02/16/2021    A1C 8.4 02/21/2020    A1C 7.7 06/18/2019    A1C 10.2 02/19/2019    A1C 9.7 10/30/2018       TSH   Date Value Ref Range Status   02/16/2021 2.34 0.40 - 4.00 mU/L Final   05/23/2017 2.28 0.40 - 4.00 mU/L Final   01/22/2015 1.82 0.40 - 4.00 mU/L Final     Comment:     Effective 7/30/2014, the reference range for this assay has changed to reflect   new instrumentation/methodology.     04/25/2013 1.86 0.4 - 5.0 mU/L Final   08/16/2011 1.21 0.4 - 5.0 mU/L Final       Creatinine   Date Value Ref Range Status   06/04/2021 1.25 (H) 0.52 - 1.04 mg/dL Final   GFR > 50     Recent Labs   Lab Test 02/16/21  1108 02/21/20  0726 01/22/15  0754 01/22/15  0754 04/25/13  0734   CHOL 137 139   < > 236* 228*   HDL 44* 32*   < > 46* 36*   LDL 74 80   < > 137* 160*   TRIG 96 135   < > 266* 161*   CHOLHDLRATIO  --   --   --  5.1* 6.3*    < > = values in this interval not displayed.       Video-Visit Details    Type of service:  Video Visit  All times are in your local time  All times are in your local time  1st VideoStart: 06/29/2021 12:35 pm  Stop: 06/29/2021 12:50 pm  Originating Location (pt. Location): Home  Distant Location (provider location):  [facility]   Platform used for Video Visit: Jamison

## 2021-06-29 NOTE — PATIENT INSTRUCTIONS
Adrian,    #1 Jardiance or empagliflozin 10 mg daily.    2.  Victoza to 1.8 mg daily  3.  Metformin and glimepiride at the same dose without any change.    Please complete the following blood work and will get back to you regarding Jardiance dose.  Orders Placed This Encounter   Procedures     Potassium     Urea nitrogen     Creatinine     Lab scheduling to schedule at any [place] lab locations: [phone] select option 1    Please notify our office if you notice blood sugar readings below 70.    Fluconazole is sent to the pharmacy.     Weight loss and exercise are recommended in the management of Type 2 diabetes.  Caloric restriction, portion control and physical activity are evidence based strategies for life style changes. Here is a starting point in your case:       Goal is to lose 5-10% weight.     Please keep a food journal of what you eat, calories in what you eat, and bring the journal with you to your next appointment.    Consider using applications for smart phones such as [employer], TryLife, OpenAir, LoseIt, Tap&Track, and RelaxM. To keep food journal and track your exercise.     Focus on wet volumetrics, meaning, eat more foods that are high in water and fiber such as fruits and vegetables in order to get that full feeling. These are also good for your overall health as well.    Progressively increase physical activity to 45 minutes, including combination of cardio & resistance training x 5 days weekly. Start at 10-15 minutes per day and progressively work towards this goal.

## 2021-07-08 DIAGNOSIS — E11.22 TYPE 2 DIABETES MELLITUS WITH STAGE 3A CHRONIC KIDNEY DISEASE, WITH LONG-TERM CURRENT USE OF INSULIN (H): ICD-10-CM

## 2021-07-08 DIAGNOSIS — Z79.4 TYPE 2 DIABETES MELLITUS WITH STAGE 3A CHRONIC KIDNEY DISEASE, WITH LONG-TERM CURRENT USE OF INSULIN (H): ICD-10-CM

## 2021-07-08 DIAGNOSIS — N18.31 TYPE 2 DIABETES MELLITUS WITH STAGE 3A CHRONIC KIDNEY DISEASE, WITH LONG-TERM CURRENT USE OF INSULIN (H): ICD-10-CM

## 2021-07-08 LAB
BUN SERPL-MCNC: 28 MG/DL (ref 7–30)
CREAT SERPL-MCNC: 1.3 MG/DL (ref 0.52–1.04)
GFR SERPL CREATININE-BSD FRML MDRD: 42 ML/MIN/{1.73_M2}
POTASSIUM SERPL-SCNC: 4.2 MMOL/L (ref 3.4–5.3)

## 2021-07-08 PROCEDURE — 84132 ASSAY OF SERUM POTASSIUM: CPT | Performed by: INTERNAL MEDICINE

## 2021-07-08 PROCEDURE — 84520 ASSAY OF UREA NITROGEN: CPT | Performed by: INTERNAL MEDICINE

## 2021-07-08 PROCEDURE — 82565 ASSAY OF CREATININE: CPT | Performed by: INTERNAL MEDICINE

## 2021-07-08 PROCEDURE — 36415 COLL VENOUS BLD VENIPUNCTURE: CPT | Performed by: INTERNAL MEDICINE

## 2021-07-09 NOTE — RESULT ENCOUNTER NOTE
Adrian,     The blood work results indicate chronic kidney disease stage III. Results overall has been stable over the last one year. I recommend continuing Jardiance as this medication has been shown to reduce progression of chronic kidney disease. Please let me know if any questions.     Amy Sosa MD

## 2021-07-14 DIAGNOSIS — E78.5 HYPERLIPIDEMIA LDL GOAL <100: ICD-10-CM

## 2021-07-14 RX ORDER — ATORVASTATIN CALCIUM 80 MG/1
TABLET, FILM COATED ORAL
Qty: 90 TABLET | Refills: 1 | Status: SHIPPED | OUTPATIENT
Start: 2021-07-14 | End: 2021-12-31

## 2021-07-30 DIAGNOSIS — I10 HYPERTENSION GOAL BP (BLOOD PRESSURE) < 140/90: ICD-10-CM

## 2021-07-30 RX ORDER — LOSARTAN POTASSIUM 100 MG/1
TABLET ORAL
Qty: 90 TABLET | Refills: 1 | Status: SHIPPED | OUTPATIENT
Start: 2021-07-30 | End: 2022-01-26

## 2021-07-30 NOTE — TELEPHONE ENCOUNTER
Routing refill request to provider for review/approval because:  Labs out of range:  cr  Tosha Deutsch BSN, RN

## 2021-08-06 DIAGNOSIS — N18.31 TYPE 2 DIABETES MELLITUS WITH STAGE 3A CHRONIC KIDNEY DISEASE, WITH LONG-TERM CURRENT USE OF INSULIN (H): ICD-10-CM

## 2021-08-06 DIAGNOSIS — Z79.4 TYPE 2 DIABETES MELLITUS WITH STAGE 3A CHRONIC KIDNEY DISEASE, WITH LONG-TERM CURRENT USE OF INSULIN (H): ICD-10-CM

## 2021-08-06 DIAGNOSIS — E11.22 TYPE 2 DIABETES MELLITUS WITH STAGE 3A CHRONIC KIDNEY DISEASE, WITH LONG-TERM CURRENT USE OF INSULIN (H): ICD-10-CM

## 2021-08-06 DIAGNOSIS — I10 HYPERTENSION GOAL BP (BLOOD PRESSURE) < 140/90: ICD-10-CM

## 2021-08-06 RX ORDER — GLIMEPIRIDE 4 MG/1
TABLET ORAL
Qty: 180 TABLET | Refills: 1 | Status: SHIPPED | OUTPATIENT
Start: 2021-08-06 | End: 2021-10-11

## 2021-08-06 RX ORDER — HYDROCHLOROTHIAZIDE 25 MG/1
TABLET ORAL
Qty: 90 TABLET | Refills: 1 | Status: SHIPPED | OUTPATIENT
Start: 2021-08-06 | End: 2022-02-08

## 2021-08-06 RX ORDER — METFORMIN HCL 500 MG
TABLET, EXTENDED RELEASE 24 HR ORAL
Qty: 360 TABLET | Refills: 1 | Status: SHIPPED | OUTPATIENT
Start: 2021-08-06 | End: 2021-10-11

## 2021-08-06 RX ORDER — AMLODIPINE BESYLATE 10 MG/1
TABLET ORAL
Qty: 90 TABLET | Refills: 1 | Status: SHIPPED | OUTPATIENT
Start: 2021-08-06 | End: 2022-02-08

## 2021-08-06 NOTE — TELEPHONE ENCOUNTER
Refills provided, further refills on Diabetes medications from Endocrine please. Thank you, Radha Madrigal MD MPH

## 2021-08-06 NOTE — TELEPHONE ENCOUNTER
Routing refill request to provider for review/approval because:  Labs out of range:  cr  Pt seeing endocrinologist now, do you want diabetic meds to go to them.  Tosha Deutsch BSN, RN

## 2021-08-27 DIAGNOSIS — Z79.4 TYPE 2 DIABETES MELLITUS WITH STAGE 3A CHRONIC KIDNEY DISEASE, WITH LONG-TERM CURRENT USE OF INSULIN (H): ICD-10-CM

## 2021-08-27 DIAGNOSIS — E11.22 TYPE 2 DIABETES MELLITUS WITH STAGE 3A CHRONIC KIDNEY DISEASE, WITH LONG-TERM CURRENT USE OF INSULIN (H): ICD-10-CM

## 2021-08-27 DIAGNOSIS — N18.31 TYPE 2 DIABETES MELLITUS WITH STAGE 3A CHRONIC KIDNEY DISEASE, WITH LONG-TERM CURRENT USE OF INSULIN (H): ICD-10-CM

## 2021-08-31 NOTE — TELEPHONE ENCOUNTER
JARDIANCE 10 MG TABLET  Last Written Prescription Date:  4/27/2021  Last Fill Quantity: 30,   # refills: 3  Last Office Visit :  6/29/2021  Future Office visit:  10/11/2021  30 Tabs, 3 Refills sent to pharm 8/31/2021      Pricila Amor RN  Central Triage Red Flags/Med Refills

## 2021-09-26 ENCOUNTER — HEALTH MAINTENANCE LETTER (OUTPATIENT)
Age: 66
End: 2021-09-26

## 2021-10-07 NOTE — PROGRESS NOTES
Adrian is a 66 year old who is being evaluated via a billable video visit.      How would you like to obtain your AVS? [employer] Serviceshart  If the video visit is dropped, the invitation should be resent by: Send to e-mail at: [email or web address]  Will anyone else be joining your video visit? No      Wilbert REYES MA   Adult Endocrine   [facility]      Outcome for 10/08/21 3:13 PM :Glucose data sent in [email or web address] Message, also see below:    Date        Before Breakfast        After Dinner             10/01/21 - 115                                   160   10/01/21 - 93                                     171                       10/01/21 - 108                                  180                       10/01/21 - 110                                  190                             10/01/21 - 97                                     145   10/01/21 - 103                                   155   10/01/21 - 115                                   140   10/01/21 - 125                                   155     Outcome for 10/08/21 10:35 AM :Reached patient but they declined to share any data because She will send bg readings in a Digital Path message today.    Outcome for 10/07/21 4:38 PM :Reached patient but they declined to share any data because she does not have her meter with her at this time. She will reply back via Digital Path with BG data.    Shilpa Paredes [facility]  Adult Endocrinology  [facility]

## 2021-10-08 ENCOUNTER — MYC MEDICAL ADVICE (OUTPATIENT)
Dept: ENDOCRINOLOGY | Facility: CLINIC | Age: 66
End: 2021-10-08

## 2021-10-11 ENCOUNTER — VIRTUAL VISIT (OUTPATIENT)
Dept: ENDOCRINOLOGY | Facility: CLINIC | Age: 66
End: 2021-10-11
Payer: COMMERCIAL

## 2021-10-11 DIAGNOSIS — E11.22 TYPE 2 DIABETES MELLITUS WITH STAGE 3A CHRONIC KIDNEY DISEASE, WITH LONG-TERM CURRENT USE OF INSULIN (H): Primary | ICD-10-CM

## 2021-10-11 DIAGNOSIS — N18.31 TYPE 2 DIABETES MELLITUS WITH STAGE 3A CHRONIC KIDNEY DISEASE, WITH LONG-TERM CURRENT USE OF INSULIN (H): Primary | ICD-10-CM

## 2021-10-11 DIAGNOSIS — Z79.4 TYPE 2 DIABETES MELLITUS WITH STAGE 3A CHRONIC KIDNEY DISEASE, WITH LONG-TERM CURRENT USE OF INSULIN (H): Primary | ICD-10-CM

## 2021-10-11 DIAGNOSIS — R32 URINARY INCONTINENCE, UNSPECIFIED TYPE: ICD-10-CM

## 2021-10-11 PROCEDURE — 99214 OFFICE O/P EST MOD 30 MIN: CPT | Mod: 95 | Performed by: INTERNAL MEDICINE

## 2021-10-11 RX ORDER — METFORMIN HCL 500 MG
1000 TABLET, EXTENDED RELEASE 24 HR ORAL 2 TIMES DAILY WITH MEALS
Qty: 360 TABLET | Refills: 1 | Status: SHIPPED | OUTPATIENT
Start: 2021-10-11 | End: 2022-03-14

## 2021-10-11 RX ORDER — GLIMEPIRIDE 4 MG/1
8 TABLET ORAL
Qty: 180 TABLET | Refills: 1 | Status: SHIPPED | OUTPATIENT
Start: 2021-10-11 | End: 2022-03-14

## 2021-10-11 NOTE — PATIENT INSTRUCTIONS
Verlinda,  Continue your current medications without any change.   #1 Jardiance or empagliflozin 10 mg daily.    2.  Victoza to 1.8 mg daily  3.  Metformin and glimepiride at the same dose without any change.    Please schedule blood work for A1c. Lab scheduling to schedule at any [place] lab locations: [phone]) select option 1    Please notify our office if you notice blood sugar readings below 70.    Orders Placed This Encounter   Procedures     Hemoglobin A1c     Adult Urology Referral     Weight loss and exercise are recommended in the management of Type 2 diabetes.  Caloric restriction, portion control and physical activity are evidence based strategies for life style changes. Here is a starting point in your case:     Please keep a food journal of what you eat, calories in what you eat, and bring the journal with you to your next appointment.    Consider using applications for smart phones such as [employer], Pipelinefx, Boomi, LoseIt, Tap&Track, and RelaxM. To keep food journal and track your exercise.     Focus on wet volumetrics, meaning, eat more foods that are high in water and fiber such as fruits and vegetables in order to get that full feeling. These are also good for your overall health as well.    Progressively increase physical activity to 45 minutes, including combination of cardio & resistance training x 5 days weekly. Start at 10-15 minutes per day and progressively work towards this goal.         [facility]-Department of Endocrinology  Ana Gregorio RN, Diabetes Educator: [phone]  Clinic Nurses LORE [place]; CMA's: Wilbert Oliveira Yang Mechelle   Clinic Fax: [phone]  On-Call Endocrine at the [place] (after hours/weekends): [phone] option 4  Scheduling Line: [phone]

## 2021-10-11 NOTE — LETTER
10/11/2021         RE: Adrian Cruz  [address]  [facility] [address]        Dear Colleague,    Thank you for referring your patient, Adrian Cruz, to the [facility]. Please see a copy of my visit note below.    Adrian is a 66 year old who is being evaluated via a billable video visit.      How would you like to obtain your AVS? [employer]  If the video visit is dropped, the invitation should be resent by: Send to e-mail at: [email or web address]  Will anyone else be joining your video visit? No      Wilbert REYES MA   Adult Endocrine   [facility]      Outcome for 10/08/21 3:13 PM :Glucose data sent in [employer] Message, also see below:    Date        Before Breakfast        After Dinner             10/01/21 - 115                                   160   10/01/21 - 93                                     171                       10/01/21 - 108                                  180                       10/01/21 - 110                                  190                             10/01/21 - 97                                     145   10/01/21 - 103                                   155   10/01/21 - 115                                   140   10/01/21 - 125                                   155     Outcome for 10/08/21 10:35 AM :Reached patient but they declined to share any data because She will send bg readings in a [employer] message today.    Outcome for 10/07/21 4:38 PM :Reached patient but they declined to share any data because she does not have her meter with her at this time. She will reply back via [employer] with BG data.    Shilpa Paredes [facility]  Adult Endocrinology  [facility]      Endocrinology virtual Visit    Chief Complaint: Diabetes     Information obtained from:Patient      Assessment/Treatment Plan:      Type 2 diabetes in the setting of stage III CKD-interval improvement of her home blood sugar after we have  adjusted her antidiabetic medications.  I have advised to continue the following medications-Jardiance 10, Victoza 1.8 mg daily, glimepiride 8 mg daily and Metformin 2 g daily.  She is tolerating on the medication without any significant side effects.  She has had yeast infection from Jardiance initially which has resolved but.  Off note, c-peptide checked and it was WNL therefore stopped Levemir at her last visit.     Blood glucose readings reviewed in detail.  Overall, fasting blood sugar within the target range < 125  More than 90% of the time.  Bedtime blood sugar has stable 2.  Given CKD -it is desired to increase SGLT2 inhibitors.  However, given her history of longstanding urinary incontinence will defer now for now.  In addition, blood sugar optimally controlled on current medications therefore adjustment of empagliflozin was not made today.  Encouraged to continue regular exercise and healthy eating habits.  Chronic complications monitoring  Blood pressure well controlled historically.  On atorvastatin at the full dose.  Has microalbuminuria and currently on losartan 100 mg daily    Urinary incontinence-referral to urology/gynecology for additional intervention.  Kegel exercises did not help.    Patient Instructions     Adrian,  Continue your current medications without any change.   #1 Jardiance or empagliflozin 10 mg daily.    2.  Victoza to 1.8 mg daily  3.  Metformin and glimepiride at the same dose without any change.    Please schedule blood work for A1c. Lab scheduling to schedule at any [facility] lab locations: [phone] select option [phone]    Please notify our office if you notice blood sugar readings below 70.    Orders Placed This Encounter   Procedures     Hemoglobin A1c     Adult Urology Referral     Weight loss and exercise are recommended in the management of Type 2 diabetes.  Caloric restriction, portion control and physical activity are evidence based strategies for life  style changes. Here is a starting point in your case:     Please keep a food journal of what you eat, calories in what you eat, and bring the journal with you to your next appointment.    Consider using applications for smart phones such as [employer], [employer], [employer], LoseIt, Tap&Track, and RelaxM. To keep food journal and track your exercise.     Focus on wet volumetrics, meaning, eat more foods that are high in water and fiber such as fruits and vegetables in order to get that full feeling. These are also good for your overall health as well.    Progressively increase physical activity to 45 minutes, including combination of cardio & resistance training x 5 days weekly. Start at 10-15 minutes per day and progressively work towards this goal.         [facility]-Department of Endocrinology  Ana Gregorio RN, Diabetes Educator: [phone]  Clinic Nurses LORE [employer]; CMA's: Wilbert Oliveira Yang Mechelle   Clinic Fax: [phone]  On-Call Endocrine at the [facility] (after hours/weekends): [phone] option 4  Scheduling Line: [phone]       I will contact the patient with the test results.  Return to clinic in 4 months.    Test and/or medications prescribed today:  Orders Placed This Encounter   Procedures     Hemoglobin A1c     Adult Urology Referral         Amy Sosa MD  Staff Endocrinologist    Division of Endocrinology and Diabetes      Subjective:         HPI: Adrian Cruz is a 66 year old female with history of type 2 diabetes who is here for a follow up.     Type 2 diabetes essential diagnosed in 1997.  Patient is currently on:  Jardiance 10 mg daily  Victoza 1.8 mg daily  Metformin 2 g daily  Glimepiride 8 mg daily    GFR above 45-50.  Patient has a known CKD stage IIIa  Blood sugar readings over the last few days as documented below   Date        Before Breakfast        After Dinner             10/01/21 - 115                                   160    10/01/21 - 93                                     171                       10/01/21 - 108                                  180                       10/01/21 - 110                                  190                             10/01/21 - 97                                     145   10/01/21 - 103                                   155   10/01/21 - 115                                   140   10/01/21 - 125                                   155     Patient lives alone.  Patient reported that the worsening A1c is likely related to lifestyle changes she has had over the last 1 year.  Due to COVID-19 her eating habits has changed. She has started walking now.     Reports urinary incontinence. Kegel exercises didn't help.         Allergies   Allergen Reactions     Ace Inhibitors Cough     Glucophage [Biguanides] Cramps     Stomach upset       Current Outpatient Medications   Medication Sig Dispense Refill     amLODIPine (NORVASC) 10 MG tablet TAKE 1 TABLET(10 MG) BY MOUTH DAILY FOR BLOOD PRESSURE 90 tablet 1     aspirin (ASA) 81 MG EC tablet TAKE 1 TABLET (81 MG) BY MOUTH EVERY DAY 90 tablet 2     atorvastatin (LIPITOR) 80 MG tablet TAKE 1 TABLET BY MOUTH EVERY DAY 90 tablet 1     cetirizine (ZYRTEC) 10 MG tablet Take 1 tablet (10 mg) by mouth every evening as needed for allergies 90 tablet 1     empagliflozin (JARDIANCE) 10 MG TABS tablet Take 1 tablet (10 mg) by mouth daily 30 tablet 3     fluticasone (FLONASE) 50 MCG/ACT nasal spray Spray 1-2 sprays into both nostrils daily as needed for rhinitis or allergies 16 g 0     glimepiride (AMARYL) 4 MG tablet Take 2 tablets (8 mg) by mouth every morning (before breakfast) 180 tablet 1     hydrochlorothiazide (HYDRODIURIL) 25 MG tablet TAKE 1 TABLET BY MOUTH EVERY DAY 90 tablet 1     insulin pen needle (B-D U/F) 31G X 5 MM miscellaneous USE as directed twice a day 100 each 11     liraglutide (VICTOZA PEN) 18 MG/3ML solution Inject 1.8 mg Subcutaneous daily 27 mL 3      losartan (COZAAR) 100 MG tablet TAKE 1 TABLET BY MOUTH EVERY DAY 90 tablet 1     metFORMIN (GLUCOPHAGE-XR) 500 MG 24 hr tablet Take 2 tablets (1,000 mg) by mouth 2 times daily (with meals) 360 tablet 1     omeprazole 20 MG tablet Take 1 tablet (20 mg) by mouth daily as needed 90 tablet 1     ONE TOUCH ULTRA test strip TEST TWICE DAILY 50 each 0     order for DME Equipment being ordered: Digital home blood pressure monitor kit 1 Device 0     ORDER FOR DME Equipment being ordered: blood pressure cuff/machine 1 Device 0       Review of Systems     as per HPI above/ vaginal itching and whitish discharge.  Past medical history, past surgical history, and family history reviewed and epic.  Patient lives alone.        Objective:   GENERAL: Healthy, alert and no distress  EYES: Eyes grossly normal to inspection.    RESP: No audible wheeze, cough, or visible cyanosis.   NEURO: alert and oriented.   PSYCH: Mentation appears normal, affect normal/bright, judgement and insight intact, normal speech.    In House Labs:     Hemoglobin A1C   Date Value Ref Range Status   06/04/2021 9.3 (H) 0 - 5.6 % Final     Comment:     Normal <5.7% Prediabetes 5.7-6.4%  Diabetes 6.5% or higher - adopted from ADA   consensus guidelines.  Reviewed: OK with previous     02/16/2021 10.9 (H) 0 - 5.6 % Final     Comment:     Results confirmed by repeat test  Normal <5.7% Prediabetes 5.7-6.4%  Diabetes 6.5% or higher - adopted from ADA   consensus guidelines.     02/21/2020 8.4 (H) 0 - 5.6 % Final     Comment:     Normal <5.7% Prediabetes 5.7-6.4%  Diabetes 6.5% or higher - adopted from ADA   consensus guidelines.         TSH   Date Value Ref Range Status   02/16/2021 2.34 0.40 - 4.00 mU/L Final   05/23/2017 2.28 0.40 - 4.00 mU/L Final   01/22/2015 1.82 0.40 - 4.00 mU/L Final     Comment:     Effective 7/30/2014, the reference range for this assay has changed to reflect   new instrumentation/methodology.     04/25/2013 1.86 0.4 - 5.0 mU/L Final    08/16/2011 1.21 0.4 - 5.0 mU/L Final       Creatinine   Date Value Ref Range Status   07/08/2021 1.30 (H) 0.52 - 1.04 mg/dL Final   GFR > 50     Recent Labs   Lab Test 02/16/21  1108 02/21/20  0726 01/22/15  0754 01/22/15  0754 04/25/13  0734   CHOL 137 139   < > 236* 228*   HDL 44* 32*   < > 46* 36*   LDL 74 80   < > 137* 160*   TRIG 96 135   < > 266* 161*   CHOLHDLRATIO  --   --   --  5.1* 6.3*    < > = values in this interval not displayed.       Video-Visit Details    Type of service:  Video Visit  All times are in your local time  1st VideoStart: 10/11/2021 09:17 am  Stop: 10/11/2021 09:42 am  Originating Location (pt. Location): Home  Platform used for Video Visit: [facility]      Again, thank you for allowing me to participate in the care of your patient.        Sincerely,        Amy Sosa MD

## 2021-10-11 NOTE — PROGRESS NOTES
Endocrinology virtual Visit    Chief Complaint: Diabetes     Information obtained from:Patient      Assessment/Treatment Plan:      Type 2 diabetes in the setting of stage III CKD-interval improvement of her home blood sugar after we have adjusted her antidiabetic medications.  I have advised to continue the following medications-Jardiance 10, Victoza 1.8 mg daily, glimepiride 8 mg daily and Metformin 2 g daily.  She is tolerating on the medication without any significant side effects.  She has had yeast infection from Jardiance initially which has resolved but.  Off note, c-peptide checked and it was WNL therefore stopped Levemir at her last visit.     Blood glucose readings reviewed in detail.  Overall, fasting blood sugar within the target range < 125  More than 90% of the time.  Bedtime blood sugar has stable 2.  Given CKD -it is desired to increase SGLT2 inhibitors.  However, given her history of longstanding urinary incontinence will defer now for now.  In addition, blood sugar optimally controlled on current medications therefore adjustment of empagliflozin was not made today.  Encouraged to continue regular exercise and healthy eating habits.  Chronic complications monitoring  Blood pressure well controlled historically.  On atorvastatin at the full dose.  Has microalbuminuria and currently on losartan 100 mg daily    Urinary incontinence-referral to urology/gynecology for additional intervention.  Kegel exercises did not help.    Patient Instructions     Verlinda,  Continue your current medications without any change.   #1 Jardiance or empagliflozin 10 mg daily.    2.  Victoza to 1.8 mg daily  3.  Metformin and glimepiride at the same dose without any change.    Please schedule blood work for A1c. Lab scheduling to schedule at any [facility] lab locations: [phone] select option [phone]    Please notify our office if you notice blood sugar readings below 70.    Orders Placed This Encounter    Procedures     Hemoglobin A1c     Adult Urology Referral     Weight loss and exercise are recommended in the management of Type 2 diabetes.  Caloric restriction, portion control and physical activity are evidence based strategies for life style changes. Here is a starting point in your case:     Please keep a food journal of what you eat, calories in what you eat, and bring the journal with you to your next appointment.    Consider using applications for smart phones such as [employer], [employer], [employer], LoseIt, Tap&Track, and RelaxM. To keep food journal and track your exercise.     Focus on wet volumetrics, meaning, eat more foods that are high in water and fiber such as fruits and vegetables in order to get that full feeling. These are also good for your overall health as well.    Progressively increase physical activity to 45 minutes, including combination of cardio & resistance training x 5 days weekly. Start at 10-15 minutes per day and progressively work towards this goal.         [facility]  Ana Gregorio RN, Diabetes Educator: [phone]  Clinic Nurses LORE [employer]; CMA's: Wilbert Oliveira Yang Mee   Clinic Fax: [phone]  On-Call Endocrine at the [place] (after hours/weekends): [phone] option 4  Scheduling Line: [phone]       I will contact the patient with the test results.  Return to clinic in 4 months.    Test and/or medications prescribed today:  Orders Placed This Encounter   Procedures     Hemoglobin A1c     Adult Urology Referral         Amy Sosa MD  Staff Endocrinologist    Division of Endocrinology and Diabetes      Subjective:         HPI: Adrian Cruz is a 66 year old female with history of type 2 diabetes who is here for a follow up.     Type 2 diabetes essential diagnosed in 1997.  Patient is currently on:  Jardiance 10 mg daily  Victoza 1.8 mg daily  Metformin 2 g daily  Glimepiride 8 mg  daily    GFR above 45-50.  Patient has a known CKD stage IIIa  Blood sugar readings over the last few days as documented below   Date        Before Breakfast        After Dinner             10/01/21 - 115                                   160   10/01/21 - 93                                     171                       10/01/21 - 108                                  180                       10/01/21 - 110                                  190                             10/01/21 - 97                                     145   10/01/21 - 103                                   155   10/01/21 - 115                                   140   10/01/21 - 125                                   155     Patient lives alone.  Patient reported that the worsening A1c is likely related to lifestyle changes she has had over the last 1 year.  Due to COVID-19 her eating habits has changed. She has started walking now.     Reports urinary incontinence. Kegel exercises didn't help.         Allergies   Allergen Reactions     Ace Inhibitors Cough     Glucophage [Biguanides] Cramps     Stomach upset       Current Outpatient Medications   Medication Sig Dispense Refill     amLODIPine (NORVASC) 10 MG tablet TAKE 1 TABLET(10 MG) BY MOUTH DAILY FOR BLOOD PRESSURE 90 tablet 1     aspirin (ASA) 81 MG EC tablet TAKE 1 TABLET (81 MG) BY MOUTH EVERY DAY 90 tablet 2     atorvastatin (LIPITOR) 80 MG tablet TAKE 1 TABLET BY MOUTH EVERY DAY 90 tablet 1     cetirizine (ZYRTEC) 10 MG tablet Take 1 tablet (10 mg) by mouth every evening as needed for allergies 90 tablet 1     empagliflozin (JARDIANCE) 10 MG TABS tablet Take 1 tablet (10 mg) by mouth daily 30 tablet 3     fluticasone (FLONASE) 50 MCG/ACT nasal spray Spray 1-2 sprays into both nostrils daily as needed for rhinitis or allergies 16 g 0     glimepiride (AMARYL) 4 MG tablet Take 2 tablets (8 mg) by mouth every morning (before breakfast) 180 tablet 1     hydrochlorothiazide (HYDRODIURIL) 25 MG  tablet TAKE 1 TABLET BY MOUTH EVERY DAY 90 tablet 1     insulin pen needle (B-D U/F) 31G X 5 MM miscellaneous USE as directed twice a day 100 each 11     liraglutide (VICTOZA PEN) 18 MG/3ML solution Inject 1.8 mg Subcutaneous daily 27 mL 3     losartan (COZAAR) 100 MG tablet TAKE 1 TABLET BY MOUTH EVERY DAY 90 tablet 1     metFORMIN (GLUCOPHAGE-XR) 500 MG 24 hr tablet Take 2 tablets (1,000 mg) by mouth 2 times daily (with meals) 360 tablet 1     omeprazole 20 MG tablet Take 1 tablet (20 mg) by mouth daily as needed 90 tablet 1     ONE TOUCH ULTRA test strip TEST TWICE DAILY 50 each 0     order for DME Equipment being ordered: Digital home blood pressure monitor kit 1 Device 0     ORDER FOR DME Equipment being ordered: blood pressure cuff/machine 1 Device 0       Review of Systems     as per HPI above/ vaginal itching and whitish discharge.  Past medical history, past surgical history, and family history reviewed and epic.  Patient lives alone.        Objective:   GENERAL: Healthy, alert and no distress  EYES: Eyes grossly normal to inspection.    RESP: No audible wheeze, cough, or visible cyanosis.   NEURO: alert and oriented.   PSYCH: Mentation appears normal, affect normal/bright, judgement and insight intact, normal speech.    In House Labs:     Hemoglobin A1C   Date Value Ref Range Status   06/04/2021 9.3 (H) 0 - 5.6 % Final     Comment:     Normal <5.7% Prediabetes 5.7-6.4%  Diabetes 6.5% or higher - adopted from ADA   consensus guidelines.  Reviewed: OK with previous     02/16/2021 10.9 (H) 0 - 5.6 % Final     Comment:     Results confirmed by repeat test  Normal <5.7% Prediabetes 5.7-6.4%  Diabetes 6.5% or higher - adopted from ADA   consensus guidelines.     02/21/2020 8.4 (H) 0 - 5.6 % Final     Comment:     Normal <5.7% Prediabetes 5.7-6.4%  Diabetes 6.5% or higher - adopted from ADA   consensus guidelines.         TSH   Date Value Ref Range Status   02/16/2021 2.34 0.40 - 4.00 mU/L Final   05/23/2017 2.28  0.40 - 4.00 mU/L Final   01/22/2015 1.82 0.40 - 4.00 mU/L Final     Comment:     Effective 7/30/2014, the reference range for this assay has changed to reflect   new instrumentation/methodology.     04/25/2013 1.86 0.4 - 5.0 mU/L Final   08/16/2011 1.21 0.4 - 5.0 mU/L Final       Creatinine   Date Value Ref Range Status   07/08/2021 1.30 (H) 0.52 - 1.04 mg/dL Final   GFR > 50     Recent Labs   Lab Test 02/16/21  1108 02/21/20  0726 01/22/15  0754 01/22/15  0754 04/25/13  0734   CHOL 137 139   < > 236* 228*   HDL 44* 32*   < > 46* 36*   LDL 74 80   < > 137* 160*   TRIG 96 135   < > 266* 161*   CHOLHDLRATIO  --   --   --  5.1* 6.3*    < > = values in this interval not displayed.       Video-Visit Details    Type of service:  Video Visit  All times are in your local time  1st VideoStart: 10/11/2021 09:17 am  Stop: 10/11/2021 09:42 am  Originating Location (pt. Location): Home  Platform used for Video Visit: [employer]

## 2021-10-13 ENCOUNTER — TELEPHONE (OUTPATIENT)
Dept: ENDOCRINOLOGY | Facility: CLINIC | Age: 66
End: 2021-10-13

## 2021-10-13 NOTE — TELEPHONE ENCOUNTER
10/13 1st attempt.  LVM for patient to schedule a 4 month follow up visit with Dr. Sosa around 2/11/22.    Please assist patient in scheduling when she calls back.    Thanks    Marilyn Marroquin  Pediatric Specialty /Adult Endocrinology  [facility]

## 2021-10-20 ENCOUNTER — LAB (OUTPATIENT)
Dept: LAB | Facility: CLINIC | Age: 66
End: 2021-10-20
Payer: COMMERCIAL

## 2021-10-20 DIAGNOSIS — E11.22 TYPE 2 DIABETES MELLITUS WITH STAGE 3A CHRONIC KIDNEY DISEASE, WITH LONG-TERM CURRENT USE OF INSULIN (H): ICD-10-CM

## 2021-10-20 DIAGNOSIS — Z12.11 SCREEN FOR COLON CANCER: ICD-10-CM

## 2021-10-20 DIAGNOSIS — N18.31 TYPE 2 DIABETES MELLITUS WITH STAGE 3A CHRONIC KIDNEY DISEASE, WITH LONG-TERM CURRENT USE OF INSULIN (H): ICD-10-CM

## 2021-10-20 DIAGNOSIS — Z79.4 TYPE 2 DIABETES MELLITUS WITH STAGE 3A CHRONIC KIDNEY DISEASE, WITH LONG-TERM CURRENT USE OF INSULIN (H): ICD-10-CM

## 2021-10-20 LAB — HBA1C MFR BLD: 11.1 % (ref 0–5.6)

## 2021-10-20 PROCEDURE — 36415 COLL VENOUS BLD VENIPUNCTURE: CPT

## 2021-10-20 PROCEDURE — 83036 HEMOGLOBIN GLYCOSYLATED A1C: CPT

## 2021-10-25 ENCOUNTER — TELEPHONE (OUTPATIENT)
Dept: ENDOCRINOLOGY | Facility: CLINIC | Age: 66
End: 2021-10-25

## 2021-10-25 NOTE — TELEPHONE ENCOUNTER
Attempted to call the patient and schedule appointments, patient did not answer. Left message for the patient to call back and schedule.    Roxanne Busby [facility]  Adult Endocrinology  [facility]

## 2021-10-25 NOTE — RESULT ENCOUNTER NOTE
Adrian,    The A1c level was very high. The results didn't match the home BG readings. We need to discuss next steps -  adding insulin therapy at this time. Please schedule a clinic visit at your convenience. Please bring your glucometer at the time of your appointment. Please let me know if any questions.     Thank you,   Amy Sosa MD

## 2021-10-25 NOTE — TELEPHONE ENCOUNTER
----- Message from Amy Sosa MD sent at 10/25/2021 10:27 AM CDT -----  Please schedule pt with CDE in 1-2 weeks and with me in-person first available.     Thank you,   Amy Sosa MD

## 2021-10-28 NOTE — PROGRESS NOTES
Name: Adrian Cruz    MRN: [id number]   YOB: 1955                 Chief Complaint:   Urinary incontinence         Assessment and Plan:   66 year old female with mixed urinary incontinence, urge predominant. We discussed management options including first line with behavioral/dietary modification, and second line with medications including anticholinergics or mirabegron, as well as pelvic floor physical therapy. Also discussed how her DM and Jardiance medication can play a role in worsening lower urinary tract symptoms. After discussion of risks/benefits of each option, she elects to proceed as follows:  -Start Vesicare 10 mg once daily. Side effects reviewed.  -Continue to limit fluids before bed and limit bladder irritants in general.  -Continue with tight glycemic control.     Follow up in 2 months to reassess, sooner as needed.     Milagro Moore PA-C  October 29, 2021          History of Present Illness:   Adrian Cruz is a 66 year old female with PMH of DM2 (on Jardiance) with CKD stage III seen today via virtual visit in consultation from Dr. Sosa for evaluation of urinary incontinence. This has been ongoing since May 2021. She describes urinary urgency and urge incontinence before she can get to the bathroom. Also describes some stress urinary incontinence with coughing, laughing, sneezing, etc. She has been wearing pads and goes through 2 per day.     She has tried Kegel exercises which have not helped. Denies any dysuria, gross hematuria, or history of UTI. She feels to empty her bladder well without straining to void.    She drinks a lot of fluids during the day, but limits fluids before bed. She typically wakes 0-1 times per night at night to void and denies nocturnal urinary incontinence.          Past Medical History:     Past Medical History:   Diagnosis Date     GERD (gastroesophageal reflux disease)      History of elevated lipids      Lower extremity edema       NIDDM (non-insulin dependent diabetes mellitus) 1997     Rhinitis             Past Surgical History:     Past Surgical History:   Procedure Laterality Date     LASER SURGERY OF EYE Right 2015            Social History:     Social History     Tobacco Use     Smoking status: Never Smoker     Smokeless tobacco: Never Used   Substance Use Topics     Alcohol use: No            Family History:     Family History   Problem Relation Age of Onset     Diabetes Mother      Heart Disease Mother         chf and pacemaker     Genitourinary Problems Mother         ESRD     Diabetes Father      C.A.D. Father         age 49     Cerebrovascular Disease Father      Asthma Sister      Asthma Son      Asthma Daughter      Heart Disease Daughter      Glaucoma No family hx of      Macular Degeneration No family hx of             Allergies:     Allergies   Allergen Reactions     Ace Inhibitors Cough     Glucophage [Biguanides] Cramps     Stomach upset            Medications:     Current Outpatient Medications   Medication Sig     amLODIPine (NORVASC) 10 MG tablet TAKE 1 TABLET(10 MG) BY MOUTH DAILY FOR BLOOD PRESSURE     aspirin (ASA) 81 MG EC tablet TAKE 1 TABLET (81 MG) BY MOUTH EVERY DAY     atorvastatin (LIPITOR) 80 MG tablet TAKE 1 TABLET BY MOUTH EVERY DAY     cetirizine (ZYRTEC) 10 MG tablet Take 1 tablet (10 mg) by mouth every evening as needed for allergies     empagliflozin (JARDIANCE) 10 MG TABS tablet Take 1 tablet (10 mg) by mouth daily     fluticasone (FLONASE) 50 MCG/ACT nasal spray Spray 1-2 sprays into both nostrils daily as needed for rhinitis or allergies     glimepiride (AMARYL) 4 MG tablet Take 2 tablets (8 mg) by mouth every morning (before breakfast)     hydrochlorothiazide (HYDRODIURIL) 25 MG tablet TAKE 1 TABLET BY MOUTH EVERY DAY     insulin pen needle (B-D U/F) 31G X 5 MM miscellaneous USE as directed twice a day     liraglutide (VICTOZA PEN) 18 MG/3ML solution Inject 1.8 mg Subcutaneous daily     losartan  (COZAAR) 100 MG tablet TAKE 1 TABLET BY MOUTH EVERY DAY     metFORMIN (GLUCOPHAGE-XR) 500 MG 24 hr tablet Take 2 tablets (1,000 mg) by mouth 2 times daily (with meals)     omeprazole 20 MG tablet Take 1 tablet (20 mg) by mouth daily as needed     ONE TOUCH ULTRA test strip TEST TWICE DAILY     order for DME Equipment being ordered: Digital home blood pressure monitor kit     ORDER FOR DME Equipment being ordered: blood pressure cuff/machine     No current facility-administered medications for this visit.             Review of Systems:    ROS: 14 point ROS neg other than the symptoms noted above in the HPI and PMH.          Physical Exam:   GENERAL: Healthy, alert and no distress  EYES: Eyes grossly normal to inspection.  No discharge or erythema, or obvious scleral/conjunctival abnormalities.  RESP: No audible wheeze, cough, or visible cyanosis.  No visible retractions or increased work of breathing.    SKIN: Visible skin clear. No significant rash, abnormal pigmentation or lesions.  NEURO: Cranial nerves grossly intact.  Mentation and speech appropriate for age.  PSYCH: Mentation appears normal, affect normal/bright, judgement and insight intact, normal speech and appearance well-groomed.       Labs:      Lab Results   Component Value Date    A1C 11.1 10/20/2021    A1C 9.3 06/04/2021    A1C 10.9 02/16/2021    A1C 8.4 02/21/2020    A1C 7.7 06/18/2019    A1C 10.2 02/19/2019       Lab Results   Component Value Date    CR 1.30 07/08/2021    CR 1.25 06/04/2021    CR 1.12 02/16/2021    CR 1.38 02/21/2020    CR 1.07 02/19/2019           Imaging:    No recent  imaging.        Video Start Time: 9:28 AM  Video-Visit Details    Type of service:  Video Visit    Video End Time:9:42 AM    Originating Location (pt. Location): Home    Distant Location (provider location):  [facility]    Platform used for Video Visit: [employer]      23 minutes spent on the date of the encounter doing chart review, review of  test results, patient visit and documentation

## 2021-10-29 ENCOUNTER — MYC MEDICAL ADVICE (OUTPATIENT)
Dept: DERMATOLOGY | Facility: CLINIC | Age: 66
End: 2021-10-29

## 2021-10-29 ENCOUNTER — VIRTUAL VISIT (OUTPATIENT)
Dept: UROLOGY | Facility: CLINIC | Age: 66
End: 2021-10-29
Attending: INTERNAL MEDICINE
Payer: COMMERCIAL

## 2021-10-29 DIAGNOSIS — N39.41 URGE INCONTINENCE: Primary | ICD-10-CM

## 2021-10-29 PROCEDURE — 99203 OFFICE O/P NEW LOW 30 MIN: CPT | Mod: 95 | Performed by: PHYSICIAN ASSISTANT

## 2021-10-29 RX ORDER — SOLIFENACIN SUCCINATE 10 MG/1
10 TABLET, FILM COATED ORAL DAILY
Qty: 30 TABLET | Refills: 11 | Status: SHIPPED | OUTPATIENT
Start: 2021-10-29 | End: 2022-12-30

## 2021-10-29 NOTE — PATIENT INSTRUCTIONS
UROLOGY CLINIC VISIT PATIENT INSTRUCTIONS    Start taking solifenacin (Vesicare) 10 mg once daily in the morning to help with your urinary symptoms of urgency and incontinence.    Let us know if the medication is expensive or not covered by insurance so that we can send an alternative that is more affordable.    Follow up in 2 months to recheck, sooner with any problems.     If you have any issues, questions or concerns in the meantime, do not hesitate to contact us at [phone] or via [employer].     It was a pleasure meeting with you today.  Thank you for allowing me and my team the privilege of caring for you today.  YOU are the reason we are here, and I truly hope we provided you with the excellent service you deserve.  Please let us know if there is anything else we can do for you so that we can be sure you are leaving completely satisfied with your care experience.

## 2021-11-01 ENCOUNTER — TELEPHONE (OUTPATIENT)
Dept: ENDOCRINOLOGY | Facility: CLINIC | Age: 66
End: 2021-11-01

## 2021-11-01 DIAGNOSIS — E11.22 TYPE 2 DIABETES MELLITUS WITH STAGE 3A CHRONIC KIDNEY DISEASE, WITH LONG-TERM CURRENT USE OF INSULIN (H): Primary | ICD-10-CM

## 2021-11-01 DIAGNOSIS — N18.31 TYPE 2 DIABETES MELLITUS WITH STAGE 3A CHRONIC KIDNEY DISEASE, WITH LONG-TERM CURRENT USE OF INSULIN (H): Primary | ICD-10-CM

## 2021-11-01 DIAGNOSIS — Z79.4 TYPE 2 DIABETES MELLITUS WITH STAGE 3A CHRONIC KIDNEY DISEASE, WITH LONG-TERM CURRENT USE OF INSULIN (H): Primary | ICD-10-CM

## 2021-11-01 RX ORDER — GLUCOSAMINE HCL/CHONDROITIN SU 500-400 MG
1 CAPSULE ORAL 2 TIMES DAILY
Qty: 100 STRIP | Refills: 11 | Status: SHIPPED | OUTPATIENT
Start: 2021-11-01

## 2021-11-01 RX ORDER — BISMUTH SUBSALICYLATE 262MG/15ML
SUSPENSION, ORAL (FINAL DOSE FORM) ORAL
Qty: 100 EACH | Refills: 11 | Status: SHIPPED | OUTPATIENT
Start: 2021-11-01

## 2021-11-01 NOTE — TELEPHONE ENCOUNTER
Patient contacted and scheduled.    Shilpa Paredes [facility]  Adult Endocrinology  [facility]

## 2021-11-01 NOTE — CONFIDENTIAL NOTE
Writer will confirm with Dr. Sosa if prescription should be for twice daily testing.      Idania Pena RN  Endocrine Care Coordinator  [facility]

## 2021-11-01 NOTE — TELEPHONE ENCOUNTER
Dr Sosa sent a staff message requesting to assist patient with appointment 1-2 weeks with diabetic educator and next available with Dr Sosa. Writer called patient and assisted with both appointments.     Patient states she was recently out of town and lost her glucometer so is not checking her blood sugar at this time. She was using a different glucometer, before it was lost, that insurance did not cover. She is okay with clinic sending her a new Rx for glucometer and supplies that insurance does covers. Then she has recheck blood sugar again right away to have some numbers to go over with the diabetic educator at upcoming appointment.    Shilpa Paredes [facility]  Adult Endocrinology  [facility]

## 2021-11-05 NOTE — TELEPHONE ENCOUNTER
2nd attempt to schedule as noted below. Message left for patient to return call and schedule.      Return visit with Milagro wolff around 12/29/21        Cindy Denisse  Surgical Specialties Procedure   [facility]  11/5/2021 2:36 PM

## 2021-11-15 ENCOUNTER — ALLIED HEALTH/NURSE VISIT (OUTPATIENT)
Dept: EDUCATION SERVICES | Facility: CLINIC | Age: 66
End: 2021-11-15
Payer: COMMERCIAL

## 2021-11-15 DIAGNOSIS — Z79.4 TYPE 2 DIABETES MELLITUS WITH STAGE 3A CHRONIC KIDNEY DISEASE, WITH LONG-TERM CURRENT USE OF INSULIN (H): Primary | ICD-10-CM

## 2021-11-15 DIAGNOSIS — E11.22 TYPE 2 DIABETES MELLITUS WITH DIABETIC CHRONIC KIDNEY DISEASE (H): Primary | ICD-10-CM

## 2021-11-15 DIAGNOSIS — E11.22 TYPE 2 DIABETES MELLITUS WITH STAGE 3A CHRONIC KIDNEY DISEASE, WITH LONG-TERM CURRENT USE OF INSULIN (H): Primary | ICD-10-CM

## 2021-11-15 DIAGNOSIS — N18.31 TYPE 2 DIABETES MELLITUS WITH STAGE 3A CHRONIC KIDNEY DISEASE, WITH LONG-TERM CURRENT USE OF INSULIN (H): Primary | ICD-10-CM

## 2021-11-15 PROCEDURE — G0108 DIAB MANAGE TRN  PER INDIV: HCPCS

## 2021-11-15 RX ORDER — FLUCONAZOLE 150 MG/1
TABLET ORAL
Qty: 2 TABLET | Refills: 0 | Status: SHIPPED | OUTPATIENT
Start: 2021-11-15 | End: 2022-05-13

## 2021-11-22 DIAGNOSIS — N39.41 URGE INCONTINENCE: ICD-10-CM

## 2021-11-23 RX ORDER — SOLIFENACIN SUCCINATE 10 MG/1
10 TABLET, FILM COATED ORAL DAILY
Qty: 30 TABLET | Refills: 11 | Status: CANCELLED | OUTPATIENT
Start: 2021-11-23

## 2021-11-23 NOTE — TELEPHONE ENCOUNTER
Call to [facility] pharmacy after receiving refill request, message left of refill having been sent 10/29/21 for 30 tabs with 11 refills, pharmacy confirmed receiving receipt, refill declined

## 2021-11-23 NOTE — PROGRESS NOTES
Diabetes Self Management Training: Individual Review Visit    Adrian Cruz presents today for education and evaluation of glucose control related to Type 2 diabetes.    She is accompanied by self    Patient's diabetes management related comments/concerns: Improving bg levels.     Patient's emotional response to diabetes: expresses readiness to learn    Patient would like this visit to be focused around the following diabetes-related behaviors and goals: Healthy Eating, Taking Medication.     ASSESSMENT:  Patient diagnosed with Type 2 Diabetes in . Referred to [facility] by her Endo, for review of diabetes self mgmt care. Co morbidities include CKD - 3, HTN. Lives alone. Retired. Wt: 198#.    Pt c/o yeast infection. Requests Rx.     Current Diabetes Management per Patient:  Taking diabetes medications? yes: see below.    Diabetes Medication(s)     Biguanides       metFORMIN (GLUCOPHAGE-XR) 500 MG 24 hr tablet    Take 2 tablets (1,000 mg) by mouth 2 times daily (with meals)    Sodium-Glucose Co-Transporter 2 (SGLT2) Inhibitors       empagliflozin (JARDIANCE) 10 MG TABS tablet    Take 1 tablet (10 mg) by mouth daily    Sulfonylureas       glimepiride (AMARYL) 4 MG tablet    Take 2 tablets (8 mg) by mouth every morning (before breakfast)    Incretin Mimetic Agents (GLP-1 Receptor Agonists)       liraglutide (VICTOZA PEN) 18 MG/3ML solution    Inject 1.8 mg Subcutaneous daily          Do you have any difficulty affording your medications or glucose monitoring supplies? No.    BG RESULTS: Per meter report:  Ave B. 36% readings above target. 64% in target. 0% below target.   Highest readin. Lowest readin  Ave checks per day: 0.4.    BG values are: Not in goal  Patient's most recent   Lab Results   Component Value Date    A1C 11.1 10/20/2021    A1C 9.3 2021    is not meeting goal of < 6.5.    Nutrition:  Patient typically wakes up around 6am. Goes to be around 11pm. Eats 3 meals per  "day.    Breakfast - 7am: Does not eat breakfast daily but likes oatmeal or grits if she has breakfast.   Lunch -12pm: Mixed green salad or cheeseburger. Occasionaly eats out with friends.    Dinner - Fish/seafood with noodles, broccoli or cauliflower or roasted vegetables.    Snacks - Fruit or nuts. Occasional dessert like cheesecake or ice cream.   Beverages: Coffee in the morning. Water during the day. No alcohol.      Cultural/Sikhism diet restrictions: No     Physical Activity:    [place]ed walking 2x[place] the mall[place] Tries to do 4-5000 steps at a time.    Diabetes Complications:  Not discussed today.    Recent health service and resource utilization related to diabetes (hyperglycemia, hypoglycemia, etc):   None    Vitals:  LMP  (LMP Unknown)   Estimated body mass index is 39.05 kg/m  as calculated from the following:    Height as of 6/4/21: 1.543 m (5' 0.75\").    Weight as of 6/4/21: 93 kg (205 lb).   Last 3 BP:   BP Readings from Last 3 Encounters:   06/04/21 131/69   02/16/21 130/79   02/21/20 138/70       History   Smoking Status     Never Smoker   Smokeless Tobacco     Never Used       Labs:  Lab Results   Component Value Date    A1C 11.1 10/20/2021    A1C 9.3 06/04/2021     Lab Results   Component Value Date     06/04/2021     Lab Results   Component Value Date    LDL 74 02/16/2021     HDL Cholesterol   Date Value Ref Range Status   02/16/2021 44 (L) >49 mg/dL Final   ]  GFR Estimate   Date Value Ref Range Status   07/08/2021 42 (L) >60 mL/min/[1.73_m2] Final     Comment:     Non  GFR Calc  Starting 12/18/2018, serum creatinine based estimated GFR (eGFR) will be   calculated using the Chronic Kidney Disease Epidemiology Collaboration   (CKD-EPI) equation.       GFR Estimate If Black   Date Value Ref Range Status   07/08/2021 49 (L) >60 mL/min/[1.73_m2] Final     Comment:      GFR Calc  Starting 12/18/2018, serum creatinine based estimated GFR (eGFR) will be " "  calculated using the Chronic Kidney Disease Epidemiology Collaboration   (CKD-EPI) equation.       Lab Results   Component Value Date    CR 1.30 07/08/2021       Socio/Economic/Cultural Considerations:    Support system: Lives alone.     Cultural Influences/Ethnic Background:  Not  or     Health Literacy/Numeracy:  \"With diabetes, it's helpful to use forms and log books to write down blood sugars and what you're eating at times to help understand how foods affect your blood sugars. With this in mind how confident are you at filling out medical forms, such as these, by yourself?  Not Assessed    Health Beliefs and Attitudes:   Patient Activation Measure Survey Score:  WAYNE Score (Last Two) 8/16/2011   WAYNE Raw Score 39   Activation Score 56.4   WAYNE Level 3     Barriers to Learning: None.     INTERVENTION:   Education provided today on:  Healthy Eating: Pt stated she changed eating habits after visit with Endo in Oct.   Being Active: Has started to exercise since visit with Endo in Oct.   Monitoring: Continue to monitor Bg.   Taking Medication: Continue current medication regimen. Endo considering re-starting pt on a basal insulin.     Opportunities for ongoing education and support in diabetes-self management were discussed.    Pt verbalized understanding of concepts discussed and recommendations provided today.       Education Materials Provided:  No new materials provided today    PLAN:  Healthy Eating: Contiune current healthy eating plan.   Being Active: Incease walking to 3-4x/week.   Monitoring: Check Bg every other day, alternating between fasting and 2 hrs post meal.   Taking Medication: Continue current medication regimen.     Dr Sosa advised of yeast infection. Request Rx be sent in for diflucan 150mg.       FOLLOW-UP:  Has scheduled return appt with Endo in Feb.     Ongoing plan for education and support: Pt will report Bg readings in two weeks. If need to start basal insulin, Cde will " fill out Pt Assistance forms. Pt currently in donut hole.     Time Spent: 45 minutes  Encounter Type: Individual    Adrian Cruz comes into clinic today at the request of Dr Sosa, Ordering Provider for Pt Teaching , bg, medication review.     This service provided today was under the supervising provider of the day Dr Mccall, who was available if needed.    Any diabetes medication dose changes were made via the CDE Protocol and Collaborative Practice Agreement with the patient's endocrinology provider. A copy of this encounter was shared with the provider.    Ana Gregorio RN, BSN, [facility]

## 2021-12-17 ENCOUNTER — TELEPHONE (OUTPATIENT)
Dept: ENDOCRINOLOGY | Facility: CLINIC | Age: 66
End: 2021-12-17

## 2021-12-17 ENCOUNTER — ALLIED HEALTH/NURSE VISIT (OUTPATIENT)
Dept: EDUCATION SERVICES | Facility: CLINIC | Age: 66
End: 2021-12-17
Payer: COMMERCIAL

## 2021-12-17 DIAGNOSIS — E11.22 TYPE 2 DIABETES MELLITUS WITH DIABETIC CHRONIC KIDNEY DISEASE (H): Primary | ICD-10-CM

## 2021-12-17 DIAGNOSIS — E11.22 TYPE 2 DIABETES MELLITUS WITH STAGE 3A CHRONIC KIDNEY DISEASE, WITH LONG-TERM CURRENT USE OF INSULIN (H): ICD-10-CM

## 2021-12-17 DIAGNOSIS — Z79.4 TYPE 2 DIABETES MELLITUS WITH STAGE 3A CHRONIC KIDNEY DISEASE, WITH LONG-TERM CURRENT USE OF INSULIN (H): ICD-10-CM

## 2021-12-17 DIAGNOSIS — N18.31 TYPE 2 DIABETES MELLITUS WITH STAGE 3A CHRONIC KIDNEY DISEASE, WITH LONG-TERM CURRENT USE OF INSULIN (H): ICD-10-CM

## 2021-12-17 PROCEDURE — G0108 DIAB MANAGE TRN  PER INDIV: HCPCS

## 2021-12-17 NOTE — TELEPHONE ENCOUNTER
12/17/21: Writer spoke with patient and informed her of the below information. Pt plans to call Aida's office on Monday, Jan 3rd.     Ana Gregorio RN, BSN, CDE   [facility]    ------------------------------------------------------------------------------------------------------------------------------------------------------------------------------------------------    ----- Message from Kristie Vasquez sent at 12/17/2021 10:19 AM CST -----  Regarding: RE: Patient Assistance  Please have Adrian  call my office at [phone].  It is the patient responsibility to contact us.    There is an assistance program for both the Victoza & Jardiance!  enrollments begin after Jan 1, 2022.    Happy Holidays!  Aida  ----- Message -----  From: Ana rGegorio RN  Sent: 12/17/2021   9:57 AM CST  To: Kristie Vasquez  Subject: Patient Assistance                               Hi Aida,    I am hoping to get Patient Assistance for the above patient for two medications: Victoza and Jardiance. Please let me know what you need from me to assist with this process. I did let the patient know that she might be hearing from you in the near future.     Much Thanks,  Ana Gregorio

## 2021-12-23 NOTE — PROGRESS NOTES
Diabetes Self Management Training: Follow-up Visit    Adrian Cruz presents today for education and evaluation of glucose control, related to Type 2 diabetes.    She is accompanied by self    Patient's diabetes management related comments/concerns: Improving bg levels.     Patient would like this visit to be focused around the following diabetes-related behaviors and goals: Taking Medication.     ASSESSMENT:  Patient met with Cde on 11/15/21 for review of diabetes self mgmt education. Diagnosed with Type 2 Diabetes, 1997. Co-morbidities include CKD 3 and HTN. Retired. Lives alone.     Weight: 11/15/21: 198#. Weight today: 192#.     Current Diabetes Management per Patient:  Taking diabetes medications?  Yes: see below.       Diabetes Medication(s)     Biguanides       metFORMIN (GLUCOPHAGE-XR) 500 MG 24 hr tablet    Take 2 tablets (1,000 mg) by mouth 2 times daily (with meals)    Sodium-Glucose Co-Transporter 2 (SGLT2) Inhibitors       empagliflozin (JARDIANCE) 10 MG TABS tablet    Take 1 tablet (10 mg) by mouth daily    Sulfonylureas       glimepiride (AMARYL) 4 MG tablet    Take 2 tablets (8 mg) by mouth every morning (before breakfast)    Incretin Mimetic Agents (GLP-1 Receptor Agonists)       liraglutide (VICTOZA PEN) 18 MG/3ML solution    Inject 1.8 mg Subcutaneous daily          Do you have any difficulty affording your medications or glucose monitoring supplies? No.     Patient glucose self monitoring as follows:One the average, 1x/day. 61% readings in target. 39% readings above target. 0% below target.  Fasting readings: . Post-Meal: 116-481.     BG values are: Not in goal  Patient's most recent   Lab Results   Component Value Date    A1C 11.1 10/20/2021    A1C 9.3 06/04/2021    is not meeting goal of < 6.5.    Nutrition:  Patient verballizes she continues to follow healthy eating plan discussed at visit on 11/15/21.     Physical Activity:    Has increased walking to 3-4 times a week as  "recommended.     Diabetes Complications:  Not discussed today.    Recent health service and resource utilization related to diabetes (hyperglycemia, hypoglycemia, etc.):  None    Vitals:  LMP  (LMP Unknown)   Estimated body mass index is 39.05 kg/m  as calculated from the following:    Height as of 6/4/21: 1.543 m (5' 0.75\").    Weight as of 6/4/21: 93 kg (205 lb).   Last 3 BP:   BP Readings from Last 3 Encounters:   06/04/21 131/69   02/16/21 130/79   02/21/20 138/70       History   Smoking Status     Never Smoker   Smokeless Tobacco     Never Used       Labs:  Lab Results   Component Value Date    A1C 11.1 10/20/2021    A1C 9.3 06/04/2021     Lab Results   Component Value Date     06/04/2021     Lab Results   Component Value Date    LDL 74 02/16/2021     HDL Cholesterol   Date Value Ref Range Status   02/16/2021 44 (L) >49 mg/dL Final   ]  GFR Estimate   Date Value Ref Range Status   07/08/2021 42 (L) >60 mL/min/[1.73_m2] Final     Comment:     Non  GFR Calc  Starting 12/18/2018, serum creatinine based estimated GFR (eGFR) will be   calculated using the Chronic Kidney Disease Epidemiology Collaboration   (CKD-EPI) equation.       GFR Estimate If Black   Date Value Ref Range Status   07/08/2021 49 (L) >60 mL/min/[1.73_m2] Final     Comment:      GFR Calc  Starting 12/18/2018, serum creatinine based estimated GFR (eGFR) will be   calculated using the Chronic Kidney Disease Epidemiology Collaboration   (CKD-EPI) equation.       Lab Results   Component Value Date    CR 1.30 07/08/2021       Health Beliefs and Attitudes:   Patient Activation Measure Survey Score:  WAYNE Score (Last Two) 8/16/2011   WAYNE Raw Score 39   Activation Score 56.4   WAYNE Level 3       Stage of Change: PREPARATION (Decided to change - considering how)    Progress toward meeting diabetes-related behavioral goals:    GOALS % Met Goal   Healthy Eating     Physical Activity     Monitoring  80% - asked to check " "1x/day. Almost there.    Medication Taking     Problem Solving     Healthy Coping     Risk Reduction         Diabetes knowledge and skills assessment:     Patient is knowledgeable in diabetes management concepts related to: Monitoring, but reminded when checking bg post-meal, must wait at least 2 hrs.    Patient needs further education on the following diabetes management concepts: Taking Medication, Healthy Eating, Being Active.    Barriers to Learning: None.     INTERVENTION:    Education provided today on:  - Taking Medication: When to take medications. Pt advised average bg has increased since last visit. Pt admits to missing medication \" a few times\" since last visit. Pt able to visualize from report review how missing medication results in immediate bg increase.   - Being Active: effects of movement on bg levels.     Opportunities for ongoing education and support in diabetes-self management were discussed.    Pt verbalized understanding of concepts discussed and recommendations provided today.       Education Materials Provided:  No new materials provided today    PLAN:  - Taking Medication: Pt advised to set up a pill box to help her not forget to take her medication. Set this up in bathroom so she can visualize it daily.   - Being Active: Keep up exercise regimen. Consistency encourages weight loss and improves bg levels.     Pt given contact information for Aida Caraballo, Pt . Pt to contact Aida to discuss financial assistance to help pay for Jardiance and Victoza.     FOLLOW-UP:  Has f/u appt with Vicente in Feb.     Ongoing plan for education and support: As needed.     Time Spent: 30 minutes  Encounter Type: Individual    Any diabetes medication dose changes were made via the CDE Protocol and Collaborative Practice Agreement with the patient's endocrinology provider. A copy of this encounter was shared with the provider.    Adrian Cruz comes into clinic today at the request " of Dr Sosa, Ordering Provider for Pt Teaching and bg review.     This service provided today was under the supervising provider of the day Dr Crowder, who was available if needed.    Ana Gregorio, RN, BSN, CDE   [facility]

## 2021-12-30 DIAGNOSIS — E78.5 HYPERLIPIDEMIA LDL GOAL <100: ICD-10-CM

## 2021-12-31 RX ORDER — ATORVASTATIN CALCIUM 80 MG/1
TABLET, FILM COATED ORAL
Qty: 90 TABLET | Refills: 0 | Status: SHIPPED | OUTPATIENT
Start: 2021-12-31 | End: 2022-04-07

## 2022-01-21 NOTE — PROGRESS NOTES
Adrian is a 66 year old who is being evaluated via a billable video visit.      How would you like to obtain your AVS? MyChart  If the video visit is dropped, the invitation should be resent by: Send to e-mail at: [email or web address]  Will anyone else be joining your video visit? No      Video Start Time:   Video-Visit Details    Type of service:  Video Visit    Video End Time:    Originating Location (pt. Location):     Distant Location (provider location):  [facility]     Platform used for Video Visit:   Darwin Mendes CMA  ,   [facility]  Progress Note    SUBJECTIVE:     Chief Complaint   Patient presents with    Urinary Tract Infection       HPI   Gina Clayton  is a 31 y.o. female here for c/o dysuria, increased urinary frequency, for the past 2 days. No fevers, chills, bladder/flank pain. Patient has no other new complaints/problems at this time.      Review of patient's allergies indicates:  No Known Allergies    Past Medical History:   Diagnosis Date    Migraine      Past Surgical History:   Procedure Laterality Date    ADENOIDECTOMY      TONSILLECTOMY      VAGINAL DELIVERY      x 2     Family History   Problem Relation Age of Onset    Prostate cancer Paternal Grandfather     Lung cancer Maternal Grandmother     Breast cancer Maternal Aunt     No Known Problems Mother     No Known Problems Father     No Known Problems Sister     No Known Problems Brother     No Known Problems Maternal Uncle     No Known Problems Paternal Aunt     No Known Problems Paternal Uncle     No Known Problems Maternal Grandfather     No Known Problems Paternal Grandmother     Amblyopia Neg Hx     Blindness Neg Hx     Cancer Neg Hx     Cataracts Neg Hx     Diabetes Neg Hx     Glaucoma Neg Hx     Hypertension Neg Hx     Macular degeneration Neg Hx     Retinal detachment Neg Hx     Strabismus Neg Hx     Stroke Neg Hx     Thyroid disease Neg Hx      Social History     Tobacco Use    Smoking status: Never Smoker    Smokeless tobacco: Never Used   Substance Use Topics    Alcohol use: No    Drug use: No        Review of Systems   Constitutional: Negative for chills and fever.   Gastrointestinal: Negative for abdominal pain, nausea and vomiting.   Genitourinary: Positive for dysuria, frequency and urgency. Negative for flank pain and hematuria.   All other systems reviewed and are negative.    OBJECTIVE:   There were no vitals filed for this visit.  There is no height or weight on file to calculate BMI.    Physical  Exam  Constitutional:       General: She is not in acute distress.     Appearance: She is not toxic-appearing or diaphoretic.   HENT:      Head: Normocephalic and atraumatic.   Eyes:      General:         Right eye: No foreign body.         Left eye: No foreign body.      Conjunctiva/sclera: Conjunctivae normal.      Right eye: Right conjunctiva is not injected. No exudate or hemorrhage.     Left eye: Left conjunctiva is not injected. No exudate or hemorrhage.  Pulmonary:      Effort: Pulmonary effort is normal. No respiratory distress.   Neurological:      Mental Status: She is oriented to person, place, and time. She is not lethargic.   Psychiatric:         Mood and Affect: Affect is not labile, blunt or inappropriate.         Behavior: Behavior is not agitated.         Old records were reviewed. Labs and/or images were independently reviewed.    ASSESSMENT     1. Urinary tract infection without hematuria, site unspecified        PLAN:     Urinary tract infection without hematuria, site unspecified  -     nitrofurantoin, macrocrystal-monohydrate, (MACROBID) 100 MG capsule; Take 1 capsule (100 mg total) by mouth 2 (two) times daily.  Dispense: 14 capsule; Refill: 0  -     Instructed patient to drink plenty of water, and to take medications as prescribed. Advised patient to call or RTC if symptoms persist or worsen.    RTC PRN    Segundo Gifford MD  01/21/2022 10:08 AM

## 2022-01-25 DIAGNOSIS — I10 HYPERTENSION GOAL BP (BLOOD PRESSURE) < 140/90: ICD-10-CM

## 2022-01-25 NOTE — TELEPHONE ENCOUNTER
"Requested Prescriptions   Pending Prescriptions Disp Refills     losartan (COZAAR) 100 MG tablet [Pharmacy Med Name: LOSARTAN POTASSIUM 100 MG TAB] 90 tablet 1     Sig: TAKE 1 TABLET BY MOUTH EVERY DAY       Angiotensin-II Receptors Failed - 1/25/2022 12:14 AM        Failed - Normal serum creatinine on file in past 12 months     Recent Labs   Lab Test 07/08/21  1610   CR 1.30*       Ok to refill medication if creatinine is low          Passed - Last blood pressure under 140/90 in past 12 months     BP Readings from Last 3 Encounters:   06/04/21 131/69   02/16/21 130/79   02/21/20 138/70             Passed - Recent (12 mo) or future (30 days) visit within the authorizing provider's specialty     Patient has had an office visit with the authorizing provider or a provider within the authorizing providers department within the previous 12 mos or has a future within next 30 days. See \"Patient Info\" tab in inbasket, or \"Choose Columns\" in Meds & Orders section of the refill encounter.              Passed - Medication is active on med list        Passed - Patient is age 18 or older        Passed - No active pregnancy on record        Passed - Normal serum potassium on file in past 12 months     Recent Labs   Lab Test 07/08/21  1610   POTASSIUM 4.2              Passed - No positive pregnancy test in past 12 months             Diane GARCIAN, RN    "

## 2022-01-26 RX ORDER — LOSARTAN POTASSIUM 100 MG/1
TABLET ORAL
Qty: 90 TABLET | Refills: 1 | Status: SHIPPED | OUTPATIENT
Start: 2022-01-26 | End: 2022-05-13

## 2022-03-11 DIAGNOSIS — Z79.4 TYPE 2 DIABETES MELLITUS WITH STAGE 3A CHRONIC KIDNEY DISEASE, WITH LONG-TERM CURRENT USE OF INSULIN (H): ICD-10-CM

## 2022-03-11 DIAGNOSIS — E11.22 TYPE 2 DIABETES MELLITUS WITH STAGE 3A CHRONIC KIDNEY DISEASE, WITH LONG-TERM CURRENT USE OF INSULIN (H): ICD-10-CM

## 2022-03-11 DIAGNOSIS — N18.31 TYPE 2 DIABETES MELLITUS WITH STAGE 3A CHRONIC KIDNEY DISEASE, WITH LONG-TERM CURRENT USE OF INSULIN (H): ICD-10-CM

## 2022-03-11 NOTE — TELEPHONE ENCOUNTER
"  Requested Prescriptions   Pending Prescriptions Disp Refills    metFORMIN (GLUCOPHAGE-XR) 500 MG 24 hr tablet [Pharmacy Med Name: METFORMIN HCL  MG TABLET] 360 tablet 1     Sig: TAKE 4 TABLETS BY MOUTH EVERY DAY WITH BREAKFAST        Biguanide Agents Failed - 3/11/2022 12:14 AM        Failed - Patient has documented A1c within the specified period of time.     If HgbA1C is 8 or greater, it needs to be on file within the past 3 months.  If less than 8, must be on file within the past 6 months.     Recent Labs   Lab Test 10/20/21  1015   A1C 11.1*             Failed - Recent (6 mo) or future (30 days) visit within the authorizing provider's specialty     Patient had office visit in the last 6 months or has a visit in the next 30 days with authorizing provider or within the authorizing provider's specialty.  See \"Patient Info\" tab in inbasket, or \"Choose Columns\" in Meds & Orders section of the refill encounter.            Passed - Patient is age 10 or older        Passed - Patient's CR is NOT>1.4 OR Patient's EGFR is NOT<45 within past 12 mos.       Recent Labs   Lab Test 07/08/21  1610   GFRESTIMATED 42*   GFRESTBLACK 49*       Recent Labs   Lab Test 07/08/21  1610   CR 1.30*             Passed - Patient does NOT have a diagnosis of CHF.        Passed - Medication is active on med list        Passed - Patient is not pregnant        Passed - Patient has not had a positive pregnancy test within the past 12 mos.            glimepiride (AMARYL) 4 MG tablet [Pharmacy Med Name: GLIMEPIRIDE 4 MG TABLET] 180 tablet 1     Sig: TAKE 2 TABLETS BY MOUTH EVERY MORNING BEFORE A MEAL        Sulfonylurea Agents Failed - 3/11/2022 12:14 AM        Failed - Patient has documented A1c within the specified period of time.     If HgbA1C is 8 or greater, it needs to be on file within the past 3 months.  If less than 8, must be on file within the past 6 months.     Recent Labs   Lab Test 10/20/21  1015   A1C 11.1*             Failed " "- Patient has a recent creatinine (normal) within the past 12 mos.     Recent Labs   Lab Test 07/08/21  1610   CR 1.30*       Ok to refill medication if creatinine is low          Failed - Recent (6 mo) or future (30 days) visit within the authorizing provider's specialty     Patient had office visit in the last 6 months or has a visit in the next 30 days with authorizing provider or within the authorizing provider's specialty.  See \"Patient Info\" tab in inbasket, or \"Choose Columns\" in Meds & Orders section of the refill encounter.            Passed - Medication is active on med list        Passed - Patient is age 18 or older        Passed - No active pregnancy on record        Passed - Patient has not had a positive pregnancy test within the past 12 mos.              "

## 2022-03-14 RX ORDER — METFORMIN HCL 500 MG
1000 TABLET, EXTENDED RELEASE 24 HR ORAL
Qty: 180 TABLET | Refills: 0 | Status: SHIPPED | OUTPATIENT
Start: 2022-03-14 | End: 2022-06-19

## 2022-03-14 RX ORDER — GLIMEPIRIDE 4 MG/1
TABLET ORAL
Qty: 180 TABLET | Refills: 0 | Status: SHIPPED | OUTPATIENT
Start: 2022-03-14 | End: 2022-06-14

## 2022-03-16 DIAGNOSIS — N18.31 TYPE 2 DIABETES MELLITUS WITH STAGE 3A CHRONIC KIDNEY DISEASE, WITH LONG-TERM CURRENT USE OF INSULIN (H): ICD-10-CM

## 2022-03-16 DIAGNOSIS — E11.22 TYPE 2 DIABETES MELLITUS WITH STAGE 3A CHRONIC KIDNEY DISEASE, WITH LONG-TERM CURRENT USE OF INSULIN (H): ICD-10-CM

## 2022-03-16 DIAGNOSIS — Z79.4 TYPE 2 DIABETES MELLITUS WITH STAGE 3A CHRONIC KIDNEY DISEASE, WITH LONG-TERM CURRENT USE OF INSULIN (H): ICD-10-CM

## 2022-03-17 NOTE — TELEPHONE ENCOUNTER
Writer confirmed with patient she received voicemail to reduce Metformin dose.    She had no further questions at this time.    Barbara Niño RN  Adult and Pediatric Endocrinology   [facility]

## 2022-03-18 NOTE — TELEPHONE ENCOUNTER
10/11/2021  [facility] Amy Maradiaga MD    Endocrinology, Diabetes, and Metabolism      empagliflozin (JARDIANCE) 10 MG TABS tablet  Last Written Prescription Date:  12/17/21  Last Fill Quantity: 30   # refills: 1    Routing refill request to provider for review/approval because: A1C

## 2022-04-03 DIAGNOSIS — E78.5 HYPERLIPIDEMIA LDL GOAL <100: ICD-10-CM

## 2022-04-04 NOTE — TELEPHONE ENCOUNTER
Routing refill request to provider for review/approval because:  Labs not current:  LDL  Nitza Trejo RN

## 2022-04-07 RX ORDER — ATORVASTATIN CALCIUM 80 MG/1
TABLET, FILM COATED ORAL
Qty: 90 TABLET | Refills: 0 | Status: SHIPPED | OUTPATIENT
Start: 2022-04-07 | End: 2022-05-13

## 2022-04-14 ENCOUNTER — TELEPHONE (OUTPATIENT)
Dept: FAMILY MEDICINE | Facility: CLINIC | Age: 67
End: 2022-04-14

## 2022-05-03 ENCOUNTER — TRANSFERRED RECORDS (OUTPATIENT)
Dept: HEALTH INFORMATION MANAGEMENT | Facility: CLINIC | Age: 67
End: 2022-05-03
Payer: COMMERCIAL

## 2022-05-03 LAB — RETINOPATHY: POSITIVE

## 2022-05-08 ENCOUNTER — HEALTH MAINTENANCE LETTER (OUTPATIENT)
Age: 67
End: 2022-05-08

## 2022-05-13 ENCOUNTER — OFFICE VISIT (OUTPATIENT)
Dept: FAMILY MEDICINE | Facility: CLINIC | Age: 67
End: 2022-05-13
Payer: COMMERCIAL

## 2022-05-13 VITALS
SYSTOLIC BLOOD PRESSURE: 131 MMHG | WEIGHT: 191.4 LBS | HEIGHT: 61 IN | DIASTOLIC BLOOD PRESSURE: 76 MMHG | RESPIRATION RATE: 16 BRPM | OXYGEN SATURATION: 95 % | HEART RATE: 84 BPM | BODY MASS INDEX: 36.14 KG/M2 | TEMPERATURE: 95.9 F

## 2022-05-13 DIAGNOSIS — E66.01 MORBID OBESITY (H): ICD-10-CM

## 2022-05-13 DIAGNOSIS — N18.31 TYPE 2 DIABETES MELLITUS WITH STAGE 3A CHRONIC KIDNEY DISEASE, WITH LONG-TERM CURRENT USE OF INSULIN (H): ICD-10-CM

## 2022-05-13 DIAGNOSIS — Z12.11 SCREEN FOR COLON CANCER: ICD-10-CM

## 2022-05-13 DIAGNOSIS — I10 HYPERTENSION GOAL BP (BLOOD PRESSURE) < 140/90: ICD-10-CM

## 2022-05-13 DIAGNOSIS — E78.5 HYPERLIPIDEMIA LDL GOAL <100: ICD-10-CM

## 2022-05-13 DIAGNOSIS — J30.9 ALLERGIC SINUSITIS: ICD-10-CM

## 2022-05-13 DIAGNOSIS — Z00.00 ENCOUNTER FOR MEDICARE ANNUAL WELLNESS EXAM: Primary | ICD-10-CM

## 2022-05-13 DIAGNOSIS — Z12.31 VISIT FOR SCREENING MAMMOGRAM: ICD-10-CM

## 2022-05-13 DIAGNOSIS — Z23 HIGH PRIORITY FOR 2019-NCOV VACCINE: ICD-10-CM

## 2022-05-13 DIAGNOSIS — Z79.4 TYPE 2 DIABETES MELLITUS WITH STAGE 3A CHRONIC KIDNEY DISEASE, WITH LONG-TERM CURRENT USE OF INSULIN (H): ICD-10-CM

## 2022-05-13 DIAGNOSIS — E28.39 ESTROGEN DEFICIENCY: ICD-10-CM

## 2022-05-13 DIAGNOSIS — E11.22 TYPE 2 DIABETES MELLITUS WITH STAGE 3A CHRONIC KIDNEY DISEASE, WITH LONG-TERM CURRENT USE OF INSULIN (H): ICD-10-CM

## 2022-05-13 DIAGNOSIS — Z13.220 SCREENING FOR HYPERLIPIDEMIA: ICD-10-CM

## 2022-05-13 DIAGNOSIS — Z23 ENCOUNTER FOR IMMUNIZATION: ICD-10-CM

## 2022-05-13 LAB
ALT SERPL W P-5'-P-CCNC: 18 U/L (ref 0–50)
ANION GAP SERPL CALCULATED.3IONS-SCNC: 6 MMOL/L (ref 3–14)
BUN SERPL-MCNC: 25 MG/DL (ref 7–30)
CALCIUM SERPL-MCNC: 9 MG/DL (ref 8.5–10.1)
CHLORIDE BLD-SCNC: 104 MMOL/L (ref 94–109)
CHOLEST SERPL-MCNC: 123 MG/DL
CO2 SERPL-SCNC: 28 MMOL/L (ref 20–32)
CREAT SERPL-MCNC: 1.2 MG/DL (ref 0.52–1.04)
CREAT UR-MCNC: 93 MG/DL
FASTING STATUS PATIENT QL REPORTED: YES
GFR SERPL CREATININE-BSD FRML MDRD: 49 ML/MIN/1.73M2
GLUCOSE BLD-MCNC: 123 MG/DL (ref 70–99)
HBA1C MFR BLD: 9.9 % (ref 0–5.6)
HDLC SERPL-MCNC: 44 MG/DL
HGB BLD-MCNC: 12.5 G/DL (ref 11.7–15.7)
LDLC SERPL CALC-MCNC: 61 MG/DL
MICROALBUMIN UR-MCNC: 151 MG/L
MICROALBUMIN/CREAT UR: 162.37 MG/G CR (ref 0–25)
NONHDLC SERPL-MCNC: 79 MG/DL
POTASSIUM BLD-SCNC: 3.9 MMOL/L (ref 3.4–5.3)
SODIUM SERPL-SCNC: 138 MMOL/L (ref 133–144)
TRIGL SERPL-MCNC: 90 MG/DL
TSH SERPL DL<=0.005 MIU/L-ACNC: 1.12 MU/L (ref 0.4–4)

## 2022-05-13 PROCEDURE — 91305 COVID-19,PF,PFIZER (12+ YRS): CPT | Performed by: PREVENTIVE MEDICINE

## 2022-05-13 PROCEDURE — 83036 HEMOGLOBIN GLYCOSYLATED A1C: CPT | Performed by: PREVENTIVE MEDICINE

## 2022-05-13 PROCEDURE — G0438 PPPS, INITIAL VISIT: HCPCS | Performed by: PREVENTIVE MEDICINE

## 2022-05-13 PROCEDURE — 36415 COLL VENOUS BLD VENIPUNCTURE: CPT | Performed by: PREVENTIVE MEDICINE

## 2022-05-13 PROCEDURE — 80061 LIPID PANEL: CPT | Performed by: PREVENTIVE MEDICINE

## 2022-05-13 PROCEDURE — 84460 ALANINE AMINO (ALT) (SGPT): CPT | Performed by: PREVENTIVE MEDICINE

## 2022-05-13 PROCEDURE — 99214 OFFICE O/P EST MOD 30 MIN: CPT | Mod: 25 | Performed by: PREVENTIVE MEDICINE

## 2022-05-13 PROCEDURE — 0054A COVID-19,PF,PFIZER (12+ YRS): CPT | Performed by: PREVENTIVE MEDICINE

## 2022-05-13 PROCEDURE — 82043 UR ALBUMIN QUANTITATIVE: CPT | Performed by: PREVENTIVE MEDICINE

## 2022-05-13 PROCEDURE — 80048 BASIC METABOLIC PNL TOTAL CA: CPT | Performed by: PREVENTIVE MEDICINE

## 2022-05-13 PROCEDURE — 90714 TD VACC NO PRESV 7 YRS+ IM: CPT | Performed by: PREVENTIVE MEDICINE

## 2022-05-13 PROCEDURE — 84443 ASSAY THYROID STIM HORMONE: CPT | Performed by: PREVENTIVE MEDICINE

## 2022-05-13 PROCEDURE — 85018 HEMOGLOBIN: CPT | Performed by: PREVENTIVE MEDICINE

## 2022-05-13 PROCEDURE — 90471 IMMUNIZATION ADMIN: CPT | Performed by: PREVENTIVE MEDICINE

## 2022-05-13 RX ORDER — ATORVASTATIN CALCIUM 80 MG/1
80 TABLET, FILM COATED ORAL DAILY
Qty: 90 TABLET | Refills: 3 | Status: SHIPPED | OUTPATIENT
Start: 2022-05-13 | End: 2023-06-07

## 2022-05-13 RX ORDER — FLUTICASONE PROPIONATE 50 MCG
1-2 SPRAY, SUSPENSION (ML) NASAL DAILY PRN
Qty: 16 G | Refills: 0 | Status: SHIPPED | OUTPATIENT
Start: 2022-05-13 | End: 2022-06-07

## 2022-05-13 RX ORDER — AMLODIPINE BESYLATE 10 MG/1
TABLET ORAL
Qty: 90 TABLET | Refills: 3 | Status: SHIPPED | OUTPATIENT
Start: 2022-05-13 | End: 2023-05-16

## 2022-05-13 RX ORDER — CETIRIZINE HYDROCHLORIDE 10 MG/1
10 TABLET ORAL
Qty: 90 TABLET | Refills: 1 | Status: SHIPPED | OUTPATIENT
Start: 2022-05-13 | End: 2022-11-15

## 2022-05-13 RX ORDER — HYDROCHLOROTHIAZIDE 25 MG/1
25 TABLET ORAL DAILY
Qty: 90 TABLET | Refills: 3 | Status: SHIPPED | OUTPATIENT
Start: 2022-05-13 | End: 2023-05-16

## 2022-05-13 RX ORDER — LOSARTAN POTASSIUM 100 MG/1
100 TABLET ORAL DAILY
Qty: 90 TABLET | Refills: 3 | Status: SHIPPED | OUTPATIENT
Start: 2022-05-13 | End: 2023-06-07

## 2022-05-13 ASSESSMENT — ENCOUNTER SYMPTOMS
HEMATURIA: 0
SHORTNESS OF BREATH: 0
HEADACHES: 0
HEARTBURN: 0
EYE PAIN: 0
NERVOUS/ANXIOUS: 0
ARTHRALGIAS: 0
MYALGIAS: 0
ABDOMINAL PAIN: 0
NAUSEA: 0
DYSURIA: 0
CHILLS: 0
WEAKNESS: 0
JOINT SWELLING: 0
FREQUENCY: 1
PALPITATIONS: 0
HEMATOCHEZIA: 0
FEVER: 0
CONSTIPATION: 0
SORE THROAT: 0
DIZZINESS: 0
BREAST MASS: 0
DIARRHEA: 0
COUGH: 1
PARESTHESIAS: 0

## 2022-05-13 ASSESSMENT — ACTIVITIES OF DAILY LIVING (ADL): CURRENT_FUNCTION: NO ASSISTANCE NEEDED

## 2022-05-13 ASSESSMENT — PAIN SCALES - GENERAL: PAINLEVEL: NO PAIN (0)

## 2022-05-13 NOTE — PATIENT INSTRUCTIONS
To schedule the Bone Density scan and mammogram, please call [phone].  Thank you  Radha Madrigal MD MPH

## 2022-05-13 NOTE — RESULT ENCOUNTER NOTE
Adrian,     Three month glucose number is improved from last check to 9.9 but not at goal. Please continue to work with your Diabetes specialist.     Hemoglobin is normal, you are not anemic.    Urine sample is showing abnormal protein, but this is improved from last check. I expect further improvement as your blood glucose improves. Continue Losartan, this will help protect the kidneys.     Other labs are pending.     Please do not hesitate to call us at [phone] if you have any questions or concerns.    Thank you,    Radha Madrigal MD MPH

## 2022-05-13 NOTE — NURSING NOTE
Prior to immunization administration, verified patients identity using patient s name and date of birth. Please see Immunization Activity for additional information.     Screening Questionnaire for Adult Immunization    Are you sick today?   No   Do you have allergies to medications, food, a vaccine component or latex?   No   Have you ever had a serious reaction after receiving a vaccination?   No   Do you have a long-term health problem with heart, lung, kidney, or metabolic disease (e.g., diabetes), asthma, a blood disorder, no spleen, complement component deficiency, a cochlear implant, or a spinal fluid leak?  Are you on long-term aspirin therapy?   No   Do you have cancer, leukemia, HIV/AIDS, or any other immune system problem?   No   Do you have a parent, brother, or sister with an immune system problem?   No   In the past 3 months, have you taken medications that affect  your immune system, such as prednisone, other steroids, or anticancer drugs; drugs for the treatment of rheumatoid arthritis, Crohn s disease, or psoriasis; or have you had radiation treatments?   No   Have you had a seizure, or a brain or other nervous system problem?   No   During the past year, have you received a transfusion of blood or blood    products, or been given immune (gamma) globulin or antiviral drug?   No   For women: Are you pregnant or is there a chance you could become       pregnant during the next month?   No   Have you received any vaccinations in the past 4 weeks?   No     Immunization questionnaire answers were all negative.        Per orders of Dr. Madrigal, injection of Td and Pfizer booster given by Cherelle Concepcion RN. Patient instructed to remain in clinic for 15 minutes afterwards, and to report any adverse reaction to me immediately.       Screening performed by Cherelle Concepcion RN on 5/13/2022 at 11:01 AM.

## 2022-05-13 NOTE — PROGRESS NOTES
"SUBJECTIVE:   Adrian Cruz is a 66 year old female who presents for Preventive Visit.    Patient has been advised of split billing requirements and indicates understanding: Yes  Are you in the first 12 months of your Medicare coverage?  No    Healthy Habits:     In general, how would you rate your overall health?  Good    Frequency of exercise:  4-5 days/week    Duration of exercise:  30-45 minutes    Do you usually eat at least 4 servings of fruit and vegetables a day, include whole grains    & fiber and avoid regularly eating high fat or \"junk\" foods?  Yes    Taking medications regularly:  Yes    Medication side effects:  None    Ability to successfully perform activities of daily living:  No assistance needed    Home Safety:  No safety concerns identified    Hearing Impairment:  No hearing concerns    In the past 6 months, have you been bothered by leaking of urine?  No    In general, how would you rate your overall mental or emotional health?  Excellent      PHQ-2 Total Score: 0    Additional concerns today:  No    Do you feel safe in your environment? Yes    Have you ever done Advance Care Planning? (For example, a Health Directive, POLST, or a discussion with a medical provider or your loved ones about your wishes): No, advance care planning information given to patient to review.  Patient declined advance care planning discussion at this time.    Fall risk  Fallen 2 or more times in the past year?: (P) No  Any fall with injury in the past year?: (P) No  click delete button to remove this line now  Cognitive Screening   1) Repeat 3 items (Leader, Season, Table)    2) Clock draw: NORMAL  3) 3 item recall: Recalls 3 objects  Results: 3 items recalled: COGNITIVE IMPAIRMENT LESS LIKELY    Mini-CogTM Copyright RAVEN Parada. Licensed by the author for use in [facility]; reprinted with permission (alva@.[facility]). All rights reserved.      Do you have sleep apnea, excessive snoring or daytime " drowsiness?: yes    Reviewed and updated as needed this visit by clinical staff   Tobacco  Allergies  Meds  Problems  Med Hx  Surg Hx  Fam Hx  Soc   Hx          Reviewed and updated as needed this visit by Provider   Tobacco  Allergies  Meds  Problems  Med Hx  Surg Hx  Fam Hx           Social History     Tobacco Use     Smoking status: Never Smoker     Smokeless tobacco: Never Used   Substance Use Topics     Alcohol use: No     If you drink alcohol do you typically have >3 drinks per day or >7 drinks per week? No    Alcohol Use 5/13/2022   Prescreen: >3 drinks/day or >7 drinks/week? Not Applicable   Prescreen: >3 drinks/day or >7 drinks/week? -       Diabetes Follow-up    -managed by Endo  -136 this am   -has been in the donut hole for medicare and has high cost of medication specifically Jardiance and Victoza     Hyperlipidemia Follow-Up      Are you regularly taking any medication or supplement to lower your cholesterol?   Yes- statin    Are you having muscle aches or other side effects that you think could be caused by your cholesterol lowering medication?  No    Hypertension Follow-up      Do you check your blood pressure regularly outside of the clinic? No     Are you following a low salt diet? Yes    Are your blood pressures ever more than 140 on the top number (systolic) OR more   than 90 on the bottom number (diastolic), for example 140/90? No      Incontinence:  -saw urology  -was started on Vesicare 10 mg daily      Vaccines:  -4th covid   -Td  -Shingrix    Weight:  -has lost weight   -diet changes  -joined [employer] 3 times a week     Lots of sinuses drainage+  Stated about 2 weeks ago  Took sinus medication OTC  Sneezing+  No sick contacts  In home covid test was negative.  No fever  No severe headaches  No emesis       Is in the donut hole right now  Tier 3 for Jardiance and Victoza   Looking for help on cost of medications     BP Readings from Last 2 Encounters:   05/13/22 131/76   06/04/21  131/69     Hemoglobin A1C POCT (%)   Date Value   06/04/2021 9.3 (H)   02/16/2021 10.9 (H)     Hemoglobin A1C (%)   Date Value   10/20/2021 11.1 (H)     LDL Cholesterol Calculated (mg/dL)   Date Value   02/16/2021 74   02/21/2020 80         Current providers sharing in care for this patient include:   Patient Care Team:  Radha Madrigal MD as PCP - General (Family Practice)  Radha Madrigal MD as Assigned PCP  Emma Martinez RPH as Pharmacist  Amy Sosa MD as Assigned Endocrinology Provider  Milagro Mooer PA-C as Physician Assistant (Urology)  Amy Sosa MD as Referring Physician (Endocrinology, Diabetes, and Metabolism)  Milagro Moore PA-C as Assigned Surgical Provider    The following health maintenance items are reviewed in Epic and correct as of today:  Health Maintenance Due   Topic Date Due     DEXA  Never done     URINALYSIS  Never done     ZOSTER IMMUNIZATION (1 of 2) Never done     Pneumococcal Vaccine: 65+ Years (2 - PCV) 01/22/2016     COLORECTAL CANCER SCREENING  10/28/2020     DIABETIC FOOT EXAM  02/21/2021     BMP  12/04/2021     LIPID  02/16/2022     MICROALBUMIN  02/16/2022     FALL RISK ASSESSMENT  02/16/2022     A1C  04/20/2022     HEMOGLOBIN  02/16/2022     MAMMO SCREENING  03/16/2022     Lab work is in process  Labs reviewed in EPIC  BP Readings from Last 3 Encounters:   05/13/22 131/76   06/04/21 131/69   02/16/21 130/79    Wt Readings from Last 3 Encounters:   05/13/22 86.8 kg (191 lb 6.4 oz)   06/04/21 93 kg (205 lb)   02/16/21 93.4 kg (206 lb)                  Patient Active Problem List   Diagnosis     Hyperlipidemia LDL goal <100     Type 2 diabetes mellitus with stage 3 chronic kidney disease, with long-term current use of insulin (H)     Hypertension goal BP (blood pressure) < 140/90     Cataract     Diabetes mellitus with background retinopathy (H)     Advanced directives, counseling/discussion     Health Care Home     Morbid obesity (H)     Obesity,  Class II, BMI 35-39.9     Past Surgical History:   Procedure Laterality Date     LASER SURGERY OF EYE Right 2015       Social History     Tobacco Use     Smoking status: Never Smoker     Smokeless tobacco: Never Used   Substance Use Topics     Alcohol use: No     Family History   Problem Relation Age of Onset     Diabetes Mother      Heart Disease Mother         chf and pacemaker     Genitourinary Problems Mother         ESRD     Diabetes Father      C.A.D. Father         age 49     Cerebrovascular Disease Father      Asthma Sister      Asthma Son      Asthma Daughter      Heart Disease Daughter      Glaucoma No family hx of      Macular Degeneration No family hx of          Current Outpatient Medications   Medication Sig Dispense Refill     amLODIPine (NORVASC) 10 MG tablet TAKE 1 TABLET(10 MG) BY MOUTH DAILY FOR BLOOD PRESSURE 90 tablet 3     atorvastatin (LIPITOR) 80 MG tablet Take 1 tablet (80 mg) by mouth daily 90 tablet 3     blood glucose monitoring (NO BRAND SPECIFIED) meter device kit Use to test blood sugar 2 times daily or as directed. 1 kit 0     blood glucose monitoring (ULTRA THIN 30G) lancets Use to test blood sugar 2 times daily or as directed. Per insurance 100 each 11     cetirizine (ZYRTEC) 10 MG tablet Take 1 tablet (10 mg) by mouth every evening as needed for allergies 90 tablet 1     empagliflozin (JARDIANCE) 10 MG TABS tablet Take 1 tablet (10 mg) by mouth daily 90 tablet 1     fluticasone (FLONASE) 50 MCG/ACT nasal spray Spray 1-2 sprays into both nostrils daily as needed for rhinitis or allergies 16 g 0     glimepiride (AMARYL) 4 MG tablet TAKE 2 TABLETS BY MOUTH EVERY MORNING BEFORE A MEAL 180 tablet 0     Glucose Blood (BLOOD GLUCOSE TEST STRIPS) STRP 1 strip by Lancet route 2 times daily 100 strip 11     hydrochlorothiazide (HYDRODIURIL) 25 MG tablet Take 1 tablet (25 mg) by mouth daily 90 tablet 3     insulin pen needle (B-D U/F) 31G X 5 MM miscellaneous USE as directed twice a day 100  each 11     liraglutide (VICTOZA PEN) 18 MG/3ML solution Inject 1.8 mg Subcutaneous daily 27 mL 3     losartan (COZAAR) 100 MG tablet Take 1 tablet (100 mg) by mouth daily 90 tablet 3     metFORMIN (GLUCOPHAGE-XR) 500 MG 24 hr tablet Take 2 tablets (1,000 mg) by mouth daily (with dinner) 180 tablet 0     omeprazole 20 MG tablet Take 1 tablet (20 mg) by mouth daily as needed 90 tablet 1     order for DME Equipment being ordered: Digital home blood pressure monitor kit 1 Device 0     ORDER FOR DME Equipment being ordered: blood pressure cuff/machine 1 Device 0     solifenacin (VESICARE) 10 MG tablet Take 1 tablet (10 mg) by mouth daily 30 tablet 11     Allergies   Allergen Reactions     Ace Inhibitors Cough     Glucophage [Biguanides] Cramps     Stomach upset     Recent Labs   Lab Test 10/20/21  1015 07/08/21  1610 06/04/21  1002 02/16/21  1108 02/21/20  0726 02/19/19  0724 10/30/18  0810 04/27/18  0704 12/15/17  0705 05/23/17  0744 10/17/16  1244 05/05/16  0730 05/04/15  0807 01/22/15  0754   A1C 11.1*  --  9.3* 10.9* 8.4*   < > 9.7* 10.2*   < > 9.2*   < > 7.7*   < > 14.5*   LDL  --   --   --  74 80  --  76 51  --  75   < > 73  --  137*   HDL  --   --   --  44* 32*  --   --  43*  --   --   --  42*  --  46*   TRIG  --   --   --  96 135  --   --  106  --   --   --  120  --  266*   ALT  --   --   --  18  --   --   --   --   --   --   --  15  --  16   CR  --  1.30* 1.25* 1.12* 1.38*   < > 1.10*  --    < > 1.17*   < > 1.34*   < > 0.85   GFRESTIMATED  --  42* 45* 51* 40*   < > 50*  --    < > 47*   < > 40*   < > 68   GFRESTBLACK  --  49* 52* 59* 46*   < > 60*  --    < > 57*   < > 49*   < > 83   POTASSIUM  --  4.2 3.7 3.8 4.2   < > 4.1  --    < > 4.2   < > 4.3   < > 4.5   TSH  --   --   --  2.34  --   --   --   --   --  2.28  --   --   --  1.82    < > = values in this interval not displayed.      Pneumonia Vaccine:For adults 65 years or older who do not have an immunocompromising condition, cerebrospinal fluid leak, or  "cochlear implant and want to receive PPSV23 ONLY: Administer 1 dose of PPSV23. Anyone who received any doses of PPSV23 before age 65 should receive 1 final dose of the vaccine at age 65 or older. Administer this last dose at least 5 years after the prior PPSV23 dose.  Mammogram Screening: Mammogram Screening: Recommended mammography every 1-2 years with patient discussion and risk factor consideration    Breast CA Risk Assessment (FHS-7) 5/13/2022   Do you have a family history of breast, colon, or ovarian cancer? No / Unknown         Mammogram Screening: Recommended mammography every 1-2 years with patient discussion and risk factor consideration  Pertinent mammograms are reviewed under the imaging tab.    Review of Systems   Constitutional: Negative for chills and fever.   HENT: Negative for congestion, ear pain, hearing loss and sore throat.    Eyes: Negative for pain and visual disturbance.   Respiratory: Positive for cough. Negative for shortness of breath.    Cardiovascular: Negative for chest pain, palpitations and peripheral edema.   Gastrointestinal: Negative for abdominal pain, constipation, diarrhea, heartburn, hematochezia and nausea.   Breasts:  Negative for tenderness, breast mass and discharge.   Genitourinary: Positive for frequency. Negative for dysuria, genital sores, hematuria, pelvic pain, urgency, vaginal bleeding and vaginal discharge.   Musculoskeletal: Negative for arthralgias, joint swelling and myalgias.   Skin: Negative for rash.   Neurological: Negative for dizziness, weakness, headaches and paresthesias.   Psychiatric/Behavioral: Negative for mood changes. The patient is not nervous/anxious.        OBJECTIVE:   /76 (BP Location: Left arm, Patient Position: Sitting, Cuff Size: Adult Regular)   Pulse 84   Temp (!) 95.9  F (35.5  C) (Tympanic)   Resp 16   Ht 1.54 m (5' 0.63\")   Wt 86.8 kg (191 lb 6.4 oz)   LMP  (LMP Unknown)   SpO2 95%   BMI 36.61 kg/m   Estimated body mass " "index is 36.61 kg/m  as calculated from the following:    Height as of this encounter: 1.54 m (5' 0.63\").    Weight as of this encounter: 86.8 kg (191 lb 6.4 oz).  Physical Exam  GENERAL APPEARANCE: healthy, alert and no distress  EYES: Eyes grossly normal to inspection and conjunctivae and sclerae normal  RESP: lungs clear to auscultation - no rales, rhonchi or wheezes  CV: regular rates and rhythm, normal S1 S2  ABDOMEN: soft, non-tender and no rebound or guarding   MS: extremities normal- no gross deformities noted   SKIN: no suspicious lesions or rashes  NEURO: Normal strength and tone, mentation intact and speech normal  PSYCH: mentation appears normal  Diabetic foot exam: normal DP and PT pulses, no trophic changes or ulcerative lesions and normal sensory exam    Diagnostic Test Results:  Labs reviewed in Epic  No results found for this or any previous visit (from the past 24 hour(s)).    ASSESSMENT / PLAN:   Adrian was seen today for physical and imm/inj.    Diagnoses and all orders for this visit:    Encounter for Medicare annual wellness exam  -     REVIEW OF HEALTH MAINTENANCE PROTOCOL ORDERS  -preventive guidelines reviewed     Morbid obesity (H)  -   Working on weight loss  -has joined [employer]   -     ALT  -     TSH with free T4 reflex    Wt Readings from Last 2 Encounters:   05/13/22 86.8 kg (191 lb 6.4 oz)   06/04/21 93 kg (205 lb)       Type 2 diabetes mellitus with stage 3a chronic kidney disease, with long-term current use of insulin (H)  -    Managed by Endocrine   -     Primary Care - Care Coordination Referral; Future  -     BASIC METABOLIC PANEL  -     Albumin Random Urine Quantitative with Creat Ratio  -     HEMOGLOBIN A1C  -     Hemoglobin  -     TSH with free T4 reflex  -we reviewed USPSTF guidelines for Aspirin for primary prevention, discontinue Aspirin 81 mg daily   -needs assistance with cost of medication as in donut hole for Medicare  -help needed with Victoza and Jardiance   -may need " referral to Nephrology in the near future     Lab Results   Component Value Date    A1C 11.1 10/20/2021    A1C 9.3 06/04/2021    A1C 10.9 02/16/2021    A1C 8.4 02/21/2020    A1C 7.7 06/18/2019    A1C 10.2 02/19/2019       Hyperlipidemia LDL goal <100  -    Refills on medication provided   -     atorvastatin (LIPITOR) 80 MG tablet; Take 1 tablet (80 mg) by mouth daily  -     Hemoglobin  -     ALT    The 10-year ASCVD risk score (Juan A BUENO Jr., et al., 2013) is: 16.3%    Values used to calculate the score:      Age: 66 years      Sex: Female      Is Non- : No      Diabetic: Yes      Tobacco smoker: No      Systolic Blood Pressure: 131 mmHg      Is BP treated: Yes      HDL Cholesterol: 44 mg/dL      Total Cholesterol: 137 mg/dL    Hypertension goal BP (blood pressure) < 140/90  -    At goal  -continue current medication   -     amLODIPine (NORVASC) 10 MG tablet; TAKE 1 TABLET(10 MG) BY MOUTH DAILY FOR BLOOD PRESSURE  -     hydrochlorothiazide (HYDRODIURIL) 25 MG tablet; Take 1 tablet (25 mg) by mouth daily  -     losartan (COZAAR) 100 MG tablet; Take 1 tablet (100 mg) by mouth daily  -     BASIC METABOLIC PANEL  -     Hemoglobin    Screen for colon cancer  -    Screening guidelines reviewed   -     Fecal colorectal cancer screen (FIT)    Screening for hyperlipidemia  -     Lipid panel reflex to direct LDL Fasting    Visit for screening mammogram  -     MA Screen Bilateral w/Mikey; Future    Estrogen deficiency  -     DEXA HIP/PELVIS/SPINE - Future; Future    Encounter for immunization  -     TD PRESERV FREE, IM (7+ YRS) (DECAVAC/TENIVAC)    High priority for 2019-nCoV vaccine  -     COVID-19,PF,PFIZER (12+ Yrs GRAY LABEL)    Allergic sinusitis  -     cetirizine (ZYRTEC) 10 MG tablet; Take 1 tablet (10 mg) by mouth every evening as needed for allergies  -     fluticasone (FLONASE) 50 MCG/ACT nasal spray; Spray 1-2 sprays into both nostrils daily as needed for rhinitis or  "allergies      COUNSELING:  Reviewed preventive health counseling, as reflected in patient instructions       Regular exercise       Healthy diet/nutrition       Vision screening       Fall risk prevention       Immunizations    Vaccinated for: Td             Colon cancer screening    Estimated body mass index is 36.61 kg/m  as calculated from the following:    Height as of this encounter: 1.54 m (5' 0.63\").    Weight as of this encounter: 86.8 kg (191 lb 6.4 oz).    Weight management plan: Discussed healthy diet and exercise guidelines    She reports that she has never smoked. She has never used smokeless tobacco.      Appropriate preventive services were discussed with this patient, including applicable screening as appropriate for cardiovascular disease, diabetes, osteopenia/osteoporosis, and glaucoma.  As appropriate for age/gender, discussed screening for colorectal cancer, prostate cancer, breast cancer, and cervical cancer. Checklist reviewing preventive services available has been given to the patient.    Reviewed patients plan of care and provided an AVS. The Basic Care Plan (routine screening as documented in Health Maintenance) for Adrian meets the Care Plan requirement. This Care Plan has been established and reviewed with the Patient.    Counseling Resources:  ATP IV Guidelines  Pooled Cohorts Equation Calculator  Breast Cancer Risk Calculator  Breast Cancer: Medication to Reduce Risk  FRAX Risk Assessment  ICSI Preventive Guidelines  Dietary Guidelines for Americans, 2010  [employer]'s MyPlate  ASA Prophylaxis  Lung CA Screening    Radha Madrigal MD MPH    [facility]    Identified Health Risks:  "

## 2022-05-16 ENCOUNTER — PATIENT OUTREACH (OUTPATIENT)
Dept: CARE COORDINATION | Facility: CLINIC | Age: 67
End: 2022-05-16
Payer: COMMERCIAL

## 2022-05-16 NOTE — RESULT ENCOUNTER NOTE
Verlinda,     Thyroid function, electrolytes and ALT liver test are normal.  Kidney function is low but stable for you. Good control of diabetes will prevent further damage to your kidneys.  LDL cholesterol is at goal for you.     Please do not hesitate to call us at [phone] if you have any questions or concerns.    Thank you,    Radha Madrigal MD MPH

## 2022-05-16 NOTE — PROGRESS NOTES
[facility]       Clinical Data: Care Coordinator Outreach  Outreach attempted x 1.No answer  Plan:   Care Coordinator will try to reach patient again in 1-2 business days.

## 2022-05-17 ENCOUNTER — PATIENT OUTREACH (OUTPATIENT)
Dept: NURSING | Facility: CLINIC | Age: 67
End: 2022-05-17
Payer: COMMERCIAL

## 2022-05-17 NOTE — PROGRESS NOTES
"Community Health Worker Initial Outreach    CHW Initial Information Gathering:  Referral Source: PCP  Preferred Hospital: [facility]  [phone]  Preferred Urgent Care: [facility] [place], [phone]  Current living arrangement:: I live alone  Type of residence:: Apartment  Community Resources: None  Supplies Currently Used at Home: Diabetic Supplies  Equipment Currently Used at Home: none  Informal Support system:: Friends  No PCP office visit in Past Year: No  Transportation means:: Regular car  CHW Additional Questions  MyChart active?: Yes  Patient sent Social Determinants of Health questionnaire?: Yes    Patient accepts CC: Yes. Patient scheduled for assessment with CC RN on 5/18 at 2 pm. Patient noted desire to discuss medication affordibility, tariqy in the \"donut hole\"'.       "

## 2022-05-18 ENCOUNTER — PATIENT OUTREACH (OUTPATIENT)
Dept: NURSING | Facility: CLINIC | Age: 67
End: 2022-05-18
Attending: PREVENTIVE MEDICINE
Payer: COMMERCIAL

## 2022-05-18 DIAGNOSIS — N18.31 TYPE 2 DIABETES MELLITUS WITH STAGE 3A CHRONIC KIDNEY DISEASE, WITH LONG-TERM CURRENT USE OF INSULIN (H): ICD-10-CM

## 2022-05-18 DIAGNOSIS — E11.22 TYPE 2 DIABETES MELLITUS WITH STAGE 3A CHRONIC KIDNEY DISEASE, WITH LONG-TERM CURRENT USE OF INSULIN (H): ICD-10-CM

## 2022-05-18 DIAGNOSIS — Z79.4 TYPE 2 DIABETES MELLITUS WITH STAGE 3A CHRONIC KIDNEY DISEASE, WITH LONG-TERM CURRENT USE OF INSULIN (H): ICD-10-CM

## 2022-05-18 ASSESSMENT — ACTIVITIES OF DAILY LIVING (ADL): DEPENDENT_IADLS:: INDEPENDENT

## 2022-05-18 NOTE — LETTER
Dear Adrian,    I am a clinic care coordinator who works with Radha Madrigal MD at [facility] . I wanted to thank you for spending the time to talk with me.  Below is a description of clinic care coordination and how I can further assist you.      The clinic care coordination team is made up of a registered nurse,  and community health worker who understand the health care system. The goal of clinic care coordination is to help you manage your health and improve access to the health care system in the most efficient manner. The team can assist you in meeting your health care goals by providing education, coordinating services, strengthening the communication among your providers and supporting you with any resource needs.    Please feel free to contact the Community Health WorkerZahraa at [phone]  with any questions or concerns. We are focused on providing you with the highest-quality healthcare experience possible and that all starts with you.     Sincerely,     Trupti Riley RN, BSN, PHN Care Coordinator  [facility]

## 2022-05-18 NOTE — PROGRESS NOTES
Clinic Care Coordination Contact    Clinic Care Coordination Contact  OUTREACH    Referral Information:  Referral Source: PCP    Primary Diagnosis: Psychosocial (Medication affordability)    Chief Complaint   Patient presents with     Clinic Care Coordination - Initial     Universal Utilization: Clinic Utilization  No PCP office visit in Past Year: No  Utilization    Hospital Admissions  0             ED Visits  0             No Show Count (past year)  0                Current as of: 5/17/2022 11:52 PM            Clinical Concerns:  Current Medical Concerns: care team referral for med affordability.   Patients health insurance plan has jardiance and voctoza as a tier 3 medication for her plan.   In donut hole, cannot afford 169/month per med. Patient has 2 options, can find financial assistance, or can transition off current expensive meds on to a cheaper drug patient can afford. Patient wishes to try to find a way to afford drugs.   Patient called the manufacture and does not meed qualification for assistance.   Patient was given:  -[facility] pharmacy assistance line Phone: [phone]  -[facility] Linkage line [phone]  -[place] for discount coupon.   -patient has current diabetic education referral and has worked with ondina murrieta in the past. Patient will reach out to her if needing to look for cheaper drugs. DM scheduling line was given per patients referral.   Patient declined enrolling in CC. CC intro letter was sent via [employer]. Patient verbalized she was confident to carry out action steps.   Current Behavioral Concerns: na    Education Provided to patient: as above.   Pain  Pain (GOAL):: No  Health Maintenance Reviewed:    Clinical Pathway: None    Medication Management:  Medication review status na    Functional Status:  Dependent ADLs:: Independent  Dependent IADLs:: Independent  Bed or wheelchair confined:: No  Mobility Status: Independent    Living Situation:  Current living arrangement:: I live  alone  Type of residence:: Apartment    Lifestyle & Psychosocial Needs:    Social Determinants of Health     Tobacco Use: Low Risk      Smoking Tobacco Use: Never Smoker     Smokeless Tobacco Use: Never Used   Alcohol Use: Not At Risk     Frequency of Alcohol Consumption: Never     Average Number of Drinks: Patient does not drink     Frequency of Binge Drinking: Never   Financial Resource Strain: Low Risk      Difficulty of Paying Living Expenses: Not very hard   Food Insecurity: No Food Insecurity     Worried About Running Out of Food in the Last Year: Never true     Ran Out of Food in the Last Year: Never true   Transportation Needs: No Transportation Needs     Lack of Transportation (Medical): No     Lack of Transportation (Non-Medical): No   Physical Activity: Sufficiently Active     Days of Exercise per Week: 4 days     Minutes of Exercise per Session: 40 min   Stress: No Stress Concern Present     Feeling of Stress : Not at all   Social Connections: Unknown     Frequency of Communication with Friends and Family: More than three times a week     Frequency of Social Gatherings with Friends and Family: Three times a week     Attends Scientology Services: More than 4 times per year     Active Member of Clubs or Organizations: Yes     Attends Club or Organization Meetings: Not on file     Marital Status: Patient refused   Intimate Partner Violence: Not on file   Depression: Not at risk     PHQ-2 Score: 0   Housing Stability: Low Risk      Unable to Pay for Housing in the Last Year: No     Number of Places Lived in the Last Year: 1     Unstable Housing in the Last Year: No     Diet:: Diabetic diet, Low cholesterol, Low saturated fat, No added salt  Inadequate nutrition (GOAL):: No  Tube Feeding: No  Inadequate activity/exercise (GOAL):: No  Significant changes in sleep pattern (GOAL): No  Transportation means:: Regular car     Scientology or spiritual beliefs that impact treatment:: No  Mental health DX:: No  Informal  Support system:: Randi based, Family, Friends     Resources and Interventions:  Current Resources: as above  Community Resources: None  Supplies Currently Used at Home: Diabetic Supplies  Equipment Currently Used at Home: none  Employment Status: retired    Referrals Placed: Community Resources, Senior Linkage Line    Goals: na  Patient/Caregiver understanding: yes   Future Appointments              In 1 week [facility]1 [facility]          Plan: 1. Patient verbalized good understanding of above resources and action steps. Patient will follow recommendations.  2. Care Coordination goals not identified at this time. Patient may be referred to Care Coordination in the future if additional needs arise.    3. Patient encouraged to contact the care team if situation changes and assistance is needed.   4. No follow-up planned.    Trupti iRley RN, BSN, PHN Care Coordinator  [facility]   Phone: [phone]

## 2022-05-27 ENCOUNTER — ANCILLARY PROCEDURE (OUTPATIENT)
Dept: BONE DENSITY | Facility: CLINIC | Age: 67
End: 2022-05-27
Attending: PREVENTIVE MEDICINE
Payer: COMMERCIAL

## 2022-05-27 DIAGNOSIS — E28.39 ESTROGEN DEFICIENCY: ICD-10-CM

## 2022-05-27 PROCEDURE — 77081 DXA BONE DENSITY APPENDICULR: CPT | Mod: 59 | Performed by: RADIOLOGY

## 2022-05-27 PROCEDURE — 77080 DXA BONE DENSITY AXIAL: CPT | Performed by: RADIOLOGY

## 2022-06-13 DIAGNOSIS — N18.31 TYPE 2 DIABETES MELLITUS WITH STAGE 3A CHRONIC KIDNEY DISEASE, WITH LONG-TERM CURRENT USE OF INSULIN (H): ICD-10-CM

## 2022-06-13 DIAGNOSIS — E11.22 TYPE 2 DIABETES MELLITUS WITH STAGE 3A CHRONIC KIDNEY DISEASE, WITH LONG-TERM CURRENT USE OF INSULIN (H): ICD-10-CM

## 2022-06-13 DIAGNOSIS — Z79.4 TYPE 2 DIABETES MELLITUS WITH STAGE 3A CHRONIC KIDNEY DISEASE, WITH LONG-TERM CURRENT USE OF INSULIN (H): ICD-10-CM

## 2022-06-15 DIAGNOSIS — Z79.4 TYPE 2 DIABETES MELLITUS WITH STAGE 3A CHRONIC KIDNEY DISEASE, WITH LONG-TERM CURRENT USE OF INSULIN (H): ICD-10-CM

## 2022-06-15 DIAGNOSIS — E11.22 TYPE 2 DIABETES MELLITUS WITH STAGE 3A CHRONIC KIDNEY DISEASE, WITH LONG-TERM CURRENT USE OF INSULIN (H): ICD-10-CM

## 2022-06-15 DIAGNOSIS — N18.31 TYPE 2 DIABETES MELLITUS WITH STAGE 3A CHRONIC KIDNEY DISEASE, WITH LONG-TERM CURRENT USE OF INSULIN (H): ICD-10-CM

## 2022-06-16 RX ORDER — LIRAGLUTIDE 6 MG/ML
1.8 INJECTION SUBCUTANEOUS DAILY
Qty: 27 ML | Refills: 0 | Status: SHIPPED | OUTPATIENT
Start: 2022-06-16 | End: 2023-01-09

## 2022-06-16 NOTE — TELEPHONE ENCOUNTER
VICTOZA PEN 18 MG/3ML soln  Last Written Prescription Date:  4/27/21  Last Fill Quantity: 27ML,   # refills: 3  Last Office Visit : 10/11/21  Future Office visit:  NONE  Routing refill request to provider for review/approval because:  ABN CR   90 DAY RF PENDING    Creatinine   Date Value Ref Range Status   05/13/2022 1.20 (H) 0.52 - 1.04 mg/dL Final   07/08/2021 1.30 (H) 0.52 - 1.04 mg/dL Final

## 2022-06-19 RX ORDER — METFORMIN HCL 500 MG
1000 TABLET, EXTENDED RELEASE 24 HR ORAL
Qty: 180 TABLET | Refills: 0 | Status: SHIPPED | OUTPATIENT
Start: 2022-06-19 | End: 2022-10-26

## 2022-06-19 NOTE — TELEPHONE ENCOUNTER
"  metFORMIN (GLUCOPHAGE-XR) 500 MG 24 hr tablet    Last Written Prescription Date:  3/14/22  Last Fill Quantity: 180,   # refills: 0  Last Office Visit : 10/11/21  Future Office visit:  None    \"  Return to clinic in 4 months.\"    Scheduling has been notified to contact the pt for appointment.        "

## 2022-06-20 NOTE — TELEPHONE ENCOUNTER
6/20 Patient is currently scheduled for this appointment.     Catherine carrillo Procedure   Orthopedics, Podiatry, Sports Medicine, ENT/Eye Specialties  [facility]   [phone]

## 2022-07-02 ENCOUNTER — HEALTH MAINTENANCE LETTER (OUTPATIENT)
Age: 67
End: 2022-07-02

## 2022-07-15 ENCOUNTER — ANCILLARY PROCEDURE (OUTPATIENT)
Dept: MAMMOGRAPHY | Facility: CLINIC | Age: 67
End: 2022-07-15
Attending: PREVENTIVE MEDICINE
Payer: COMMERCIAL

## 2022-07-15 DIAGNOSIS — Z12.31 VISIT FOR SCREENING MAMMOGRAM: ICD-10-CM

## 2022-07-15 PROCEDURE — 77063 BREAST TOMOSYNTHESIS BI: CPT | Mod: TC | Performed by: RADIOLOGY

## 2022-07-15 PROCEDURE — 77067 SCR MAMMO BI INCL CAD: CPT | Mod: TC | Performed by: RADIOLOGY

## 2022-11-15 DIAGNOSIS — J30.9 ALLERGIC SINUSITIS: ICD-10-CM

## 2022-11-15 RX ORDER — CETIRIZINE HYDROCHLORIDE 10 MG/1
10 TABLET ORAL
Qty: 90 TABLET | Refills: 1 | Status: SHIPPED | OUTPATIENT
Start: 2022-11-15

## 2022-11-21 NOTE — PROGRESS NOTES
Rosalindakeyanadeja Cruz  is being evaluated via a billable video visit.      How would you like to obtain your AVS? [facility]  For the video visit, send the invitation by: Send to e-mail at: [email or web address]  Will anyone else be joining your video visit? No      Outcome for 11/21/22 8:04 AM: Med[employer] message sent  Sabrina Avila, VF  Outcome for 11/25/22 11:05 AM: Left Voicemail   Hazel Tello, VF   Outcome for 11/25/22 3:02 PM: Per patient, will send BG readings via MedaNext  Hazel Tello, VF  Outcome for 11/28/22 7:45 AM: Glucose Readings sent via MedaNext  Sabrina Avila, VF

## 2022-11-25 ENCOUNTER — TELEPHONE (OUTPATIENT)
Dept: ENDOCRINOLOGY | Facility: CLINIC | Age: 67
End: 2022-11-25

## 2022-11-27 ASSESSMENT — ENCOUNTER SYMPTOMS
POOR WOUND HEALING: 0
NAIL CHANGES: 0
SKIN CHANGES: 0

## 2022-11-28 ENCOUNTER — VIRTUAL VISIT (OUTPATIENT)
Dept: ENDOCRINOLOGY | Facility: CLINIC | Age: 67
End: 2022-11-28
Payer: COMMERCIAL

## 2022-11-28 ENCOUNTER — TELEPHONE (OUTPATIENT)
Dept: ENDOCRINOLOGY | Facility: CLINIC | Age: 67
End: 2022-11-28

## 2022-11-28 ENCOUNTER — TELEPHONE (OUTPATIENT)
Dept: MULTI SPECIALTY CLINIC | Facility: CLINIC | Age: 67
End: 2022-11-28

## 2022-11-28 DIAGNOSIS — N18.31 STAGE 3A CHRONIC KIDNEY DISEASE (H): Primary | ICD-10-CM

## 2022-11-28 DIAGNOSIS — Z79.4 TYPE 2 DIABETES MELLITUS WITH STAGE 3A CHRONIC KIDNEY DISEASE, WITH LONG-TERM CURRENT USE OF INSULIN (H): Primary | ICD-10-CM

## 2022-11-28 DIAGNOSIS — E66.01 MORBID OBESITY (H): ICD-10-CM

## 2022-11-28 DIAGNOSIS — N18.31 TYPE 2 DIABETES MELLITUS WITH STAGE 3A CHRONIC KIDNEY DISEASE, WITH LONG-TERM CURRENT USE OF INSULIN (H): Primary | ICD-10-CM

## 2022-11-28 DIAGNOSIS — N18.31 STAGE 3A CHRONIC KIDNEY DISEASE (H): ICD-10-CM

## 2022-11-28 DIAGNOSIS — E11.22 TYPE 2 DIABETES MELLITUS WITH STAGE 3A CHRONIC KIDNEY DISEASE, WITH LONG-TERM CURRENT USE OF INSULIN (H): Primary | ICD-10-CM

## 2022-11-28 PROCEDURE — 99214 OFFICE O/P EST MOD 30 MIN: CPT | Mod: 95 | Performed by: INTERNAL MEDICINE

## 2022-11-28 RX ORDER — METFORMIN HCL 500 MG
1000 TABLET, EXTENDED RELEASE 24 HR ORAL
Qty: 180 TABLET | Refills: 1 | Status: SHIPPED | OUTPATIENT
Start: 2022-11-28 | End: 2023-08-22

## 2022-11-28 RX ORDER — GLIMEPIRIDE 4 MG/1
8 TABLET ORAL
Qty: 180 TABLET | Refills: 1 | Status: SHIPPED | OUTPATIENT
Start: 2022-11-28 | End: 2023-01-09 | Stop reason: ALTCHOICE

## 2022-11-28 NOTE — PATIENT INSTRUCTIONS
Charlesda,  Continue your current medications without any change.   #1 Jardiance or empagliflozin 10 mg daily.    2.  Stop Victoza to 1.8 mg daily - this is changed to semaglutide 1 mg every 7 days   3.  Metformin and glimepiride at the same dose without any change.    Please schedule blood work for A1c. Lab scheduling to schedule at any [place] lab locations: [phone] select option [phone]    Please notify our office if you notice blood sugar readings below 70.    Orders Placed This Encounter   Procedures    Hemoglobin A1c    Basic metabolic panel    Adult Nephrology  Referral     Weight loss and exercise are recommended in the management of Type 2 diabetes.  Caloric restriction, portion control and physical activity are evidence based strategies for life style changes. Here is a starting point in your case:   Please keep a food journal of what you eat, calories in what you eat, and bring the journal with you to your next appointment.  Consider using applications for smart phones such as [employer], [employer], [employer], LoseIt, Tap&Track, and RelaxM. To keep food journal and track your exercise.   Focus on wet volumetrics, meaning, eat more foods that are high in water and fiber such as fruits and vegetables in order to get that full feeling. These are also good for your overall health as well.  Progressively increase physical activity to 45 minutes, including combination of cardio & resistance training x 5 days weekly. Start at 10-15 minutes per day and progressively work towards this goal.

## 2022-11-28 NOTE — TELEPHONE ENCOUNTER
[facility]    Phone Message    May a detailed message be left on voicemail: yes     Reason for Call: Order(s): Other:   Reason for requested: Labs   Date needed: 11/28/2022  Provider name:      Patient is being seeing  in February, please place lab orders.         Action Taken: Message routed to:  [facility] Neph    Travel Screening: Not Applicable

## 2022-11-28 NOTE — PROGRESS NOTES
Endocrinology virtual Visit    Chief Complaint: Video Visit     Information obtained from:Patient      Assessment/Treatment Plan:      Type 2 diabetes in the setting of stage III CKD-interval improvement of her home blood sugar. A1c pending.  I have advised to continue the following medications-Jardiance 10, Victoza 1.8 mg daily change to semaglutide 1 mg every 7 days, glimepiride 8 mg daily and Metformin 1 g daily.  She is tolerating on the medication without any significant side effects.  She has had yeast infection from Jardiance initially which has resolved. If A1c is high and BMP stable; will adjust Jardiance and metformin dose.   Off note, c-peptide checked and it was WNL.     Blood glucose readings reviewed in detail.  Overall, fasting blood sugar within the target range < 125  More than 80% of the time.  Bedtime blood sugar not checked. Will assess A1c. Arlene CGM prescribed. Weight down to 185.   Encouraged to continue regular exercise and healthy eating habits.  Chronic complications monitoring  Blood pressure well controlled historically.  On atorvastatin at the full dose.  Has microalbuminuria and currently on losartan 100 mg daily    Plan:  Metformin 1000 mg daily  Jardiance 10 mg daily  Semaglutide 1 mg every 7 days  Glimipride 8 mg daily     CKD stage 3b: referral to nephrology.     Urinary incontinence- previously referral to urology/gynecology for additional intervention.     I will contact the patient with the test results.  Return to clinic in 6 months.    Test and/or medications prescribed today:  Orders Placed This Encounter   Procedures     Hemoglobin A1c     Basic metabolic panel     Adult Nephrology  Referral         Amy Sosa MD  Staff Endocrinologist    Division of Endocrinology and Diabetes      Subjective:         HPI: Adrian Cruz is a 66 year old female with history of type 2 diabetes who is here for a follow up.     Type 2 diabetes essential diagnosed in  1997.  Patient is currently on:  Jardiance 10 mg daily  Victoza 1.8 mg daily  Metformin 2 g daily  Glimepiride 8 mg daily    GFR above 45-50.  Patient has a known CKD stage IIIa  Blood sugar readings over the last few days as documented below  Date        Before Breakfast        After Dinner             11/14/22 - 178                                 265   11/15/22 - 117                                 *                   11/16/22 - 203                                282                  11/17/22 - 215                                 *                             11/18/22 - 205                                *  11/19/22 - 70                                  *  11/20/22 - 97                                 *  11/21/22 - 116                               *  11/22/22 - *                                    *  11/23/22 - *                                      *  11/24/22 - 119                                 *  11/25/22 - 120                                 *  11/26/22 - *                                       *  *I did not test      Patient lives alone.  Reports urinary incontinence. Kegel exercises didn't help.     Answers for HPI/ROS submitted by the patient on 11/27/2022  General Symptoms: No  Skin Symptoms: Yes  HENT Symptoms: No  EYE SYMPTOMS: No  HEART SYMPTOMS: No  LUNG SYMPTOMS: No  INTESTINAL SYMPTOMS: No  URINARY SYMPTOMS: No  GYNECOLOGIC SYMPTOMS: No  BREAST SYMPTOMS: No  SKELETAL SYMPTOMS: No  BLOOD SYMPTOMS: No  NERVOUS SYSTEM SYMPTOMS: No  MENTAL HEALTH SYMPTOMS: No  Changes in hair: Yes  Changes in moles/birth marks: No  Itching: No  Rashes: No  Changes in nails: No  Acne: No  Hair in places you don't want it: Yes  Change in facial hair: Yes  Warts: No  Non-healing sores: No  Scarring: No  Flaking of skin: Yes  Color changes of hands/feet in cold : No  Sun sensitivity: No  Skin thickening: No        Allergies   Allergen Reactions     Ace Inhibitors Cough     Glucophage [Biguanides] Cramps     Stomach upset        Current Outpatient Medications   Medication Sig Dispense Refill     amLODIPine (NORVASC) 10 MG tablet TAKE 1 TABLET(10 MG) BY MOUTH DAILY FOR BLOOD PRESSURE 90 tablet 3     atorvastatin (LIPITOR) 80 MG tablet Take 1 tablet (80 mg) by mouth daily 90 tablet 3     blood glucose monitoring (NO BRAND SPECIFIED) meter device kit Use to test blood sugar 2 times daily or as directed. 1 kit 0     blood glucose monitoring (ULTRA THIN 30G) lancets Use to test blood sugar 2 times daily or as directed. Per insurance 100 each 11     cetirizine (ZYRTEC) 10 MG tablet TAKE 1 TABLET (10 MG) BY MOUTH EVERY EVENING AS NEEDED FOR ALLERGIES 90 tablet 1     Continuous Blood Gluc  (FREESTYLE RODNEY 2 READER) JOSE 1 each once for 1 dose Use to read blood sugars per 's instructions. 1 each 0     Continuous Blood Gluc Sensor (FREESTYLE RODNEY 2 SENSOR) MISC 1 each every 14 days Use 1 sensor every 14 days. Use to read blood sugars per 's instructions. 2 each 5     fluticasone (FLONASE) 50 MCG/ACT nasal spray SPRAY 1-2 SPRAYS INTO BOTH NOSTRILS DAILY AS NEEDED FOR RHINITIS OR ALLERGIES 48 mL 2     glimepiride (AMARYL) 4 MG tablet Take 2 tablets (8 mg) by mouth every morning (before breakfast) 180 tablet 1     Glucose Blood (BLOOD GLUCOSE TEST STRIPS) STRP 1 strip by Lancet route 2 times daily 100 strip 11     hydrochlorothiazide (HYDRODIURIL) 25 MG tablet Take 1 tablet (25 mg) by mouth daily 90 tablet 3     insulin pen needle (B-D U/F) 31G X 5 MM miscellaneous USE as directed twice a day 100 each 11     JARDIANCE 10 MG TABS tablet TAKE 1 TABLET (10 MG) BY MOUTH DAILY. 30 tablet 1     liraglutide (VICTOZA PEN) 18 MG/3ML solution Inject 1.8 mg Subcutaneous daily 27 mL 0     losartan (COZAAR) 100 MG tablet Take 1 tablet (100 mg) by mouth daily 90 tablet 3     metFORMIN (GLUCOPHAGE XR) 500 MG 24 hr tablet Take 2 tablets (1,000 mg) by mouth daily (with dinner) 180 tablet 1     omeprazole 20 MG tablet Take 1 tablet  (20 mg) by mouth daily as needed 90 tablet 1     order for DME Equipment being ordered: Digital home blood pressure monitor kit 1 Device 0     ORDER FOR DME Equipment being ordered: blood pressure cuff/machine 1 Device 0     Semaglutide, 1 MG/DOSE, 4 MG/3ML SOPN Inject 1 mg Subcutaneous every 7 days 9 mL 1     solifenacin (VESICARE) 10 MG tablet Take 1 tablet (10 mg) by mouth daily 30 tablet 11       Review of Systems     as per HPI above/ vaginal itching and whitish discharge.  Past medical history, past surgical history, and family history reviewed and epic.  Patient lives alone.        Objective:   GENERAL: Healthy, alert and no distress  EYES: Eyes grossly normal to inspection.    RESP: No audible wheeze, cough, or visible cyanosis.   NEURO: alert and oriented.   PSYCH: Mentation appears normal, affect normal/bright, judgement and insight intact, normal speech.  Wt Readings from Last 10 Encounters:   05/13/22 86.8 kg (191 lb 6.4 oz)   06/04/21 93 kg (205 lb)   02/16/21 93.4 kg (206 lb)   02/21/20 90.7 kg (200 lb)   06/18/19 92.5 kg (204 lb)   02/19/19 94.3 kg (208 lb)   10/30/18 93.4 kg (206 lb)   04/27/18 91.4 kg (201 lb 9.6 oz)   02/08/18 89.4 kg (197 lb)   12/15/17 92.1 kg (203 lb)       In House Labs:     Hemoglobin A1C   Date Value Ref Range Status   05/13/2022 9.9 (H) 0.0 - 5.6 % Final     Comment:     Normal <5.7%   Prediabetes 5.7-6.4%    Diabetes 6.5% or higher     Note: Adopted from ADA consensus guidelines.   10/20/2021 11.1 (H) 0.0 - 5.6 % Final     Comment:     Normal <5.7%   Prediabetes 5.7-6.4%    Diabetes 6.5% or higher     Note: Adopted from ADA consensus guidelines.   06/04/2021 9.3 (H) 0 - 5.6 % Final     Comment:     Normal <5.7% Prediabetes 5.7-6.4%  Diabetes 6.5% or higher - adopted from ADA   consensus guidelines.  Reviewed: OK with previous     02/16/2021 10.9 (H) 0 - 5.6 % Final     Comment:     Results confirmed by repeat test  Normal <5.7% Prediabetes 5.7-6.4%  Diabetes 6.5% or higher  - adopted from ADA   consensus guidelines.     02/21/2020 8.4 (H) 0 - 5.6 % Final     Comment:     Normal <5.7% Prediabetes 5.7-6.4%  Diabetes 6.5% or higher - adopted from ADA   consensus guidelines.         TSH   Date Value Ref Range Status   05/13/2022 1.12 0.40 - 4.00 mU/L Final   02/16/2021 2.34 0.40 - 4.00 mU/L Final   05/23/2017 2.28 0.40 - 4.00 mU/L Final   01/22/2015 1.82 0.40 - 4.00 mU/L Final     Comment:     Effective 7/30/2014, the reference range for this assay has changed to reflect   new instrumentation/methodology.     04/25/2013 1.86 0.4 - 5.0 mU/L Final   08/16/2011 1.21 0.4 - 5.0 mU/L Final       Creatinine   Date Value Ref Range Status   05/13/2022 1.20 (H) 0.52 - 1.04 mg/dL Final   07/08/2021 1.30 (H) 0.52 - 1.04 mg/dL Final   GFR > 50     Recent Labs   Lab Test 02/16/21  1108 02/21/20  0726 01/22/15  0754 01/22/15  0754 04/25/13  0734   CHOL 137 139   < > 236* 228*   HDL 44* 32*   < > 46* 36*   LDL 74 80   < > 137* 160*   TRIG 96 135   < > 266* 161*   CHOLHDLRATIO  --   --   --  5.1* 6.3*    < > = values in this interval not displayed.       Video-Visit Details    Type of service:  Video Visit    Video Start Time: 9:08 AM    Video End Time:9:23 AM    Originating Location (pt. Location): Home    Distant Location (provider location):  Off-site.     Platform used for Video Visit: [email or web address]

## 2022-11-28 NOTE — LETTER
11/28/2022         RE: Adrian Cruz  [address]  [facility] [address]        Dear Colleague,    Thank you for referring your patient, Adrian Cruz, to the [facility]. Please see a copy of my visit note below.    Adrian Cruz  is being evaluated via a billable video visit.      How would you like to obtain your AVS? [employer]  For the video visit, send the invitation by: Send to e-mail at: [email or web address]  Will anyone else be joining your video visit? No      Outcome for 11/21/22 8:04 AM: [employer] message sent  ADAL Corey  Outcome for 11/25/22 11:05 AM: Left Voicemail   ADAL Posadas   Outcome for 11/25/22 3:02 PM: Per patient, will send BG readings via [employer]  ADAL Posadas  Outcome for 11/28/22 7:45 AM: Glucose Readings sent via [employer]  ADAL Corey            Endocrinology virtual Visit    Chief Complaint: Video Visit     Information obtained from:Patient      Assessment/Treatment Plan:      Type 2 diabetes in the setting of stage III CKD-interval improvement of her home blood sugar. A1c pending.  I have advised to continue the following medications-Jardiance 10, Victoza 1.8 mg daily change to semaglutide 1 mg every 7 days, glimepiride 8 mg daily and Metformin 1 g daily.  She is tolerating on the medication without any significant side effects.  She has had yeast infection from Jardiance initially which has resolved. If A1c is high and BMP stable; will adjust Jardiance and metformin dose.   Off note, c-peptide checked and it was WNL.     Blood glucose readings reviewed in detail.  Overall, fasting blood sugar within the target range < 125  More than 80% of the time.  Bedtime blood sugar not checked. Will assess A1c. Arlene CGM prescribed. Weight down to 185.   Encouraged to continue regular exercise and healthy eating habits.  Chronic complications monitoring  Blood pressure well controlled historically.  On  atorvastatin at the full dose.  Has microalbuminuria and currently on losartan 100 mg daily    Plan:  Metformin 1000 mg daily  Jardiance 10 mg daily  Semaglutide 1 mg every 7 days  Glimipride 8 mg daily     CKD stage 3b: referral to nephrology.     Urinary incontinence- previously referral to urology/gynecology for additional intervention.     I will contact the patient with the test results.  Return to clinic in 6 months.    Test and/or medications prescribed today:  Orders Placed This Encounter   Procedures     Hemoglobin A1c     Basic metabolic panel     Adult Nephrology  Referral         Amy Sosa MD  Staff Endocrinologist    Division of Endocrinology and Diabetes      Subjective:         HPI: Adrian Cruz is a 66 year old female with history of type 2 diabetes who is here for a follow up.     Type 2 diabetes essential diagnosed in 1997.  Patient is currently on:  Jardiance 10 mg daily  Victoza 1.8 mg daily  Metformin 2 g daily  Glimepiride 8 mg daily    GFR above 45-50.  Patient has a known CKD stage IIIa  Blood sugar readings over the last few days as documented below  Date        Before Breakfast        After Dinner             11/14/22 - 178                                 265   11/15/22 - 117                                 *                   11/16/22 - 203                                282                  11/17/22 - 215                                 *                             11/18/22 - 205                                *  11/19/22 - 70                                  *  11/20/22 - 97                                 *  11/21/22 - 116                               *  11/22/22 - *                                    *  11/23/22 - *                                      *  11/24/22 - 119                                 *  11/25/22 - 120                                 *  11/26/22 - *                                       *  *I did not test      Patient lives alone.  Reports  urinary incontinence. Kegel exercises didn't help.     Answers for HPI/ROS submitted by the patient on 11/27/2022  General Symptoms: No  Skin Symptoms: Yes  HENT Symptoms: No  EYE SYMPTOMS: No  HEART SYMPTOMS: No  LUNG SYMPTOMS: No  INTESTINAL SYMPTOMS: No  URINARY SYMPTOMS: No  GYNECOLOGIC SYMPTOMS: No  BREAST SYMPTOMS: No  SKELETAL SYMPTOMS: No  BLOOD SYMPTOMS: No  NERVOUS SYSTEM SYMPTOMS: No  MENTAL HEALTH SYMPTOMS: No  Changes in hair: Yes  Changes in moles/birth marks: No  Itching: No  Rashes: No  Changes in nails: No  Acne: No  Hair in places you don't want it: Yes  Change in facial hair: Yes  Warts: No  Non-healing sores: No  Scarring: No  Flaking of skin: Yes  Color changes of hands/feet in cold : No  Sun sensitivity: No  Skin thickening: No        Allergies   Allergen Reactions     Ace Inhibitors Cough     Glucophage [Biguanides] Cramps     Stomach upset       Current Outpatient Medications   Medication Sig Dispense Refill     amLODIPine (NORVASC) 10 MG tablet TAKE 1 TABLET(10 MG) BY MOUTH DAILY FOR BLOOD PRESSURE 90 tablet 3     atorvastatin (LIPITOR) 80 MG tablet Take 1 tablet (80 mg) by mouth daily 90 tablet 3     blood glucose monitoring (NO BRAND SPECIFIED) meter device kit Use to test blood sugar 2 times daily or as directed. 1 kit 0     blood glucose monitoring (ULTRA THIN 30G) lancets Use to test blood sugar 2 times daily or as directed. Per insurance 100 each 11     cetirizine (ZYRTEC) 10 MG tablet TAKE 1 TABLET (10 MG) BY MOUTH EVERY EVENING AS NEEDED FOR ALLERGIES 90 tablet 1     Continuous Blood Gluc  (FREESTYLE RODNEY 2 READER) JOSE 1 each once for 1 dose Use to read blood sugars per 's instructions. 1 each 0     Continuous Blood Gluc Sensor (FREESTYLE RODNEY 2 SENSOR) [facility] 1 each every 14 days Use 1 sensor every 14 days. Use to read blood sugars per 's instructions. 2 each 5     fluticasone (FLONASE) 50 MCG/ACT nasal spray SPRAY 1-2 SPRAYS INTO BOTH NOSTRILS DAILY  AS NEEDED FOR RHINITIS OR ALLERGIES 48 mL 2     glimepiride (AMARYL) 4 MG tablet Take 2 tablets (8 mg) by mouth every morning (before breakfast) 180 tablet 1     Glucose Blood (BLOOD GLUCOSE TEST STRIPS) STRP 1 strip by Lancet route 2 times daily 100 strip 11     hydrochlorothiazide (HYDRODIURIL) 25 MG tablet Take 1 tablet (25 mg) by mouth daily 90 tablet 3     insulin pen needle (B-D U/F) 31G X 5 MM miscellaneous USE as directed twice a day 100 each 11     JARDIANCE 10 MG TABS tablet TAKE 1 TABLET (10 MG) BY MOUTH DAILY. 30 tablet 1     liraglutide (VICTOZA PEN) 18 MG/3ML solution Inject 1.8 mg Subcutaneous daily 27 mL 0     losartan (COZAAR) 100 MG tablet Take 1 tablet (100 mg) by mouth daily 90 tablet 3     metFORMIN (GLUCOPHAGE XR) 500 MG 24 hr tablet Take 2 tablets (1,000 mg) by mouth daily (with dinner) 180 tablet 1     omeprazole 20 MG tablet Take 1 tablet (20 mg) by mouth daily as needed 90 tablet 1     order for DME Equipment being ordered: Digital home blood pressure monitor kit 1 Device 0     ORDER FOR DME Equipment being ordered: blood pressure cuff/machine 1 Device 0     Semaglutide, 1 MG/DOSE, 4 MG/3ML SOPN Inject 1 mg Subcutaneous every 7 days 9 mL 1     solifenacin (VESICARE) 10 MG tablet Take 1 tablet (10 mg) by mouth daily 30 tablet 11       Review of Systems     as per HPI above/ vaginal itching and whitish discharge.  Past medical history, past surgical history, and family history reviewed and epic.  Patient lives alone.        Objective:   GENERAL: Healthy, alert and no distress  EYES: Eyes grossly normal to inspection.    RESP: No audible wheeze, cough, or visible cyanosis.   NEURO: alert and oriented.   PSYCH: Mentation appears normal, affect normal/bright, judgement and insight intact, normal speech.  Wt Readings from Last 10 Encounters:   05/13/22 86.8 kg (191 lb 6.4 oz)   06/04/21 93 kg (205 lb)   02/16/21 93.4 kg (206 lb)   02/21/20 90.7 kg (200 lb)   06/18/19 92.5 kg (204 lb)   02/19/19  94.3 kg (208 lb)   10/30/18 93.4 kg (206 lb)   04/27/18 91.4 kg (201 lb 9.6 oz)   02/08/18 89.4 kg (197 lb)   12/15/17 92.1 kg (203 lb)       In House Labs:     Hemoglobin A1C   Date Value Ref Range Status   05/13/2022 9.9 (H) 0.0 - 5.6 % Final     Comment:     Normal <5.7%   Prediabetes 5.7-6.4%    Diabetes 6.5% or higher     Note: Adopted from ADA consensus guidelines.   10/20/2021 11.1 (H) 0.0 - 5.6 % Final     Comment:     Normal <5.7%   Prediabetes 5.7-6.4%    Diabetes 6.5% or higher     Note: Adopted from ADA consensus guidelines.   06/04/2021 9.3 (H) 0 - 5.6 % Final     Comment:     Normal <5.7% Prediabetes 5.7-6.4%  Diabetes 6.5% or higher - adopted from ADA   consensus guidelines.  Reviewed: OK with previous     02/16/2021 10.9 (H) 0 - 5.6 % Final     Comment:     Results confirmed by repeat test  Normal <5.7% Prediabetes 5.7-6.4%  Diabetes 6.5% or higher - adopted from ADA   consensus guidelines.     02/21/2020 8.4 (H) 0 - 5.6 % Final     Comment:     Normal <5.7% Prediabetes 5.7-6.4%  Diabetes 6.5% or higher - adopted from ADA   consensus guidelines.         TSH   Date Value Ref Range Status   05/13/2022 1.12 0.40 - 4.00 mU/L Final   02/16/2021 2.34 0.40 - 4.00 mU/L Final   05/23/2017 2.28 0.40 - 4.00 mU/L Final   01/22/2015 1.82 0.40 - 4.00 mU/L Final     Comment:     Effective 7/30/2014, the reference range for this assay has changed to reflect   new instrumentation/methodology.     04/25/2013 1.86 0.4 - 5.0 mU/L Final   08/16/2011 1.21 0.4 - 5.0 mU/L Final       Creatinine   Date Value Ref Range Status   05/13/2022 1.20 (H) 0.52 - 1.04 mg/dL Final   07/08/2021 1.30 (H) 0.52 - 1.04 mg/dL Final   GFR > 50     Recent Labs   Lab Test 02/16/21  1108 02/21/20  0726 01/22/15  0754 01/22/15  0754 04/25/13  0734   CHOL 137 139   < > 236* 228*   HDL 44* 32*   < > 46* 36*   LDL 74 80   < > 137* 160*   TRIG 96 135   < > 266* 161*   CHOLHDLRATIO  --   --   --  5.1* 6.3*    < > = values in this interval not  displayed.       Video-Visit Details    Type of service:  Video Visit    Video Start Time: 9:08 AM    Video End Time:9:23 AM    Originating Location (pt. Location): Home    Distant Location (provider location):  Off-site.     Platform used for Video Visit: [employer]        Again, thank you for allowing me to participate in the care of your patient.        Sincerely,        Amy Sosa MD

## 2022-11-28 NOTE — TELEPHONE ENCOUNTER
Emailed patient their portion of the form. Attached a copy of their insurance card to make it easier for them.

## 2022-12-01 NOTE — TELEPHONE ENCOUNTER
LmX1 for patient to see if they had any questions on the application and to see if they sent into the company yet.

## 2022-12-05 NOTE — TELEPHONE ENCOUNTER
Spoke to patient, she called in with a few questions on the application. She is going to email me once she mails it into the company

## 2022-12-06 ENCOUNTER — LAB (OUTPATIENT)
Dept: LAB | Facility: CLINIC | Age: 67
End: 2022-12-06
Payer: COMMERCIAL

## 2022-12-06 DIAGNOSIS — N18.31 TYPE 2 DIABETES MELLITUS WITH STAGE 3A CHRONIC KIDNEY DISEASE, WITH LONG-TERM CURRENT USE OF INSULIN (H): ICD-10-CM

## 2022-12-06 DIAGNOSIS — Z79.4 TYPE 2 DIABETES MELLITUS WITH STAGE 3A CHRONIC KIDNEY DISEASE, WITH LONG-TERM CURRENT USE OF INSULIN (H): ICD-10-CM

## 2022-12-06 DIAGNOSIS — E11.22 TYPE 2 DIABETES MELLITUS WITH STAGE 3A CHRONIC KIDNEY DISEASE, WITH LONG-TERM CURRENT USE OF INSULIN (H): ICD-10-CM

## 2022-12-06 LAB — HBA1C MFR BLD: 11.6 % (ref 0–5.6)

## 2022-12-06 PROCEDURE — 83036 HEMOGLOBIN GLYCOSYLATED A1C: CPT

## 2022-12-06 PROCEDURE — 36415 COLL VENOUS BLD VENIPUNCTURE: CPT

## 2022-12-06 PROCEDURE — 80048 BASIC METABOLIC PNL TOTAL CA: CPT

## 2022-12-07 LAB
ANION GAP SERPL CALCULATED.3IONS-SCNC: 6 MMOL/L (ref 3–14)
BUN SERPL-MCNC: 26 MG/DL (ref 7–30)
CALCIUM SERPL-MCNC: 9 MG/DL (ref 8.5–10.1)
CHLORIDE BLD-SCNC: 106 MMOL/L (ref 94–109)
CO2 SERPL-SCNC: 28 MMOL/L (ref 20–32)
CREAT SERPL-MCNC: 1.32 MG/DL (ref 0.52–1.04)
GFR SERPL CREATININE-BSD FRML MDRD: 44 ML/MIN/1.73M2
GLUCOSE BLD-MCNC: 170 MG/DL (ref 70–99)
POTASSIUM BLD-SCNC: 3.9 MMOL/L (ref 3.4–5.3)
SODIUM SERPL-SCNC: 140 MMOL/L (ref 133–144)

## 2022-12-08 NOTE — RESULT ENCOUNTER NOTE
1.  Please schedule patient with CDE; next available.  #2 CDE team, this patient is currently on an insulin therapy.  However, due to hemoglobin A1c which is significantly elevated; we need to switch glimepiride to basal insulin.  At the time of appointment please go over insulin injection techniques, hypoglycemia prevention and management and other diabetes education as you see fit.  After visit with you; we will send a prescription for insulin glargine or Lantus and discontinue glimepiride.  Please send me visit note and I will add the prescription at that time.  Thank you for seeing the patient.  Dear Adrian,     Here are your recent results.  The kidney function result indicates chronic kidney disease is stage III as previously noted.  Please to schedule appointment with nephrology or kidney provider.  Referral has been placed.  Your hemoglobin A1c is significantly high.  Optimal glycemic or blood sugar control is very important for the management of chronic kidney disease.  Based on hemoglobin A1c, we need to start insulin therapy instead of the glimepiride.  For insulin injection teaching and other education; I recommend following up with diabetes educator.  Please continue to monitor blood sugar 2-3 times per day and bring the blood sugar readings at the time of your  visit.  Please let us know if you have any questions or concerns.    Regards,  Amy Sosa MD

## 2022-12-09 ENCOUNTER — TELEPHONE (OUTPATIENT)
Dept: ENDOCRINOLOGY | Facility: CLINIC | Age: 67
End: 2022-12-09

## 2022-12-09 NOTE — TELEPHONE ENCOUNTER
Received message from CDAna LARA, as follows:       Ana Gregorio RN  You; [facility] Certified Diabetes Educators [facility]; Amy Sosa MD 8 minutes ago (3:00 PM)     WB  [employer] message sent to patient regarding scheduling an appt.     Ana Pena, RN  Endocrine Care Coordinator  [facility]

## 2022-12-09 NOTE — TELEPHONE ENCOUNTER
----- Message from Amy Sosa MD sent at 12/8/2022 11:39 AM CST -----  1.  Please schedule patient with CDE; next available.  #2 CDE team, this patient is currently on an insulin therapy.  However, due to hemoglobin A1c which is significantly elevated; we need to switch glimepiride to basal insulin.  At the time of appointment please go over insulin injection techniques, hypoglycemia prevention and management and other diabetes education as you see fit.  After visit with you; we will send a prescription for insulin glargine or Lantus and discontinue glimepiride.  Please send me visit note and I will add the prescription at that time.  Thank you for seeing the patient.  Dear Adrian,      Here are your recent results.  The kidney function result indicates chronic kidney disease is stage III as previously noted.  Please to schedule appointment with nephrology or kidney provider.  Referral has been placed.  Your hemoglobin A1c is significantly high.  Optimal glycemic or blood sugar control is very important for the management of chronic kidney disease.  Based on hemoglobin A1c, we need to start insulin therapy instead of the glimepiride.  For insulin injection teaching and other education; I recommend following up with diabetes educator.  Please continue to monitor blood sugar 2-3 times per day and bring the blood sugar readings at the time of your  visit.  Please let us know if you have any questions or concerns.     Regards,  Amy Sosa MD

## 2022-12-09 NOTE — TELEPHONE ENCOUNTER
Patient advised of results and recommendations from Dr. Sosa. Patient verbalizes understanding and agrees to plan. Patient is scheduled with Nephrology on 2/22/23.      Patient notes that she does know how to give injections. Writer reviewed with patient the importance of seeing CDE for diabetes education and refresher on injection training. Patient verbalizes understanding and agrees to plan. Writer will copy CDE team on this encounter with request to reach out to her to schedule next available CDE visit for diabetes education to review changing from glimepiride to basal insulin.  as per Dr. Bell. At the time of appointment please go over insulin injection techniques, hypoglycemia prevention and management and other diabetes education as you see fit.       Patient also wanted writer to confirm with Dr. Sosa that she is to continue Ozempic?      Will send to Dr. Sosa for confirmation.       Idania Pena, RN  Endocrine Care Coordinator  [facility]

## 2022-12-12 NOTE — RESULT ENCOUNTER NOTE
Rosalindamakayla Cruz is going out of [facility] 12/14 and returning 1/7/23.  No appointments available tomorrow.  Scheduled for diabetes education with Ana Gregorio RN, [facility] on 1/9/23.

## 2022-12-16 ENCOUNTER — TELEPHONE (OUTPATIENT)
Dept: ENDOCRINOLOGY | Facility: CLINIC | Age: 67
End: 2022-12-16

## 2022-12-16 NOTE — TELEPHONE ENCOUNTER
Left Voicemail (2nd Attempt) for the patient to call back and schedule the following:    Appointment type: return  Provider: dr. yousif   Return date: 5/28/2023   Specialty phone number: [phone]   Additonal Notes: Return in about 6 months (around 5/28/2023).    Catherine carrillo Procedure   Orthopedics, Podiatry, Sports Medicine, Ent ,Eye , Audiology, Adult Endocrine & Diabetes, Nutrition & Medication Therapy Management Specialties   [facility]

## 2022-12-23 ENCOUNTER — TELEPHONE (OUTPATIENT)
Dept: ENDOCRINOLOGY | Facility: CLINIC | Age: 67
End: 2022-12-23

## 2022-12-23 NOTE — TELEPHONE ENCOUNTER
Codie NERISSA Written Order received and filled out to the best of writer's ability and emailed to provider.     Cayla Enrique on 12/23/2022 at 10:52 AM

## 2022-12-29 DIAGNOSIS — Z79.4 TYPE 2 DIABETES MELLITUS WITH STAGE 3A CHRONIC KIDNEY DISEASE, WITH LONG-TERM CURRENT USE OF INSULIN (H): ICD-10-CM

## 2022-12-29 DIAGNOSIS — E11.22 TYPE 2 DIABETES MELLITUS WITH STAGE 3A CHRONIC KIDNEY DISEASE, WITH LONG-TERM CURRENT USE OF INSULIN (H): ICD-10-CM

## 2022-12-29 DIAGNOSIS — N18.31 TYPE 2 DIABETES MELLITUS WITH STAGE 3A CHRONIC KIDNEY DISEASE, WITH LONG-TERM CURRENT USE OF INSULIN (H): ICD-10-CM

## 2023-01-06 DIAGNOSIS — E11.22 TYPE 2 DIABETES MELLITUS WITH STAGE 3A CHRONIC KIDNEY DISEASE, WITH LONG-TERM CURRENT USE OF INSULIN (H): ICD-10-CM

## 2023-01-06 DIAGNOSIS — Z79.4 TYPE 2 DIABETES MELLITUS WITH STAGE 3A CHRONIC KIDNEY DISEASE, WITH LONG-TERM CURRENT USE OF INSULIN (H): ICD-10-CM

## 2023-01-06 DIAGNOSIS — N18.31 TYPE 2 DIABETES MELLITUS WITH STAGE 3A CHRONIC KIDNEY DISEASE, WITH LONG-TERM CURRENT USE OF INSULIN (H): ICD-10-CM

## 2023-01-06 NOTE — TELEPHONE ENCOUNTER
[facility] Call Center    Phone Message    May a detailed message be left on voicemail: yes     Reason for Call: Other: [facility] Called in and asked for the forms that were sent and placed into the Basket for provider on 12/27/2022.  Please send those back as soon as possible.  If they do not recieve these by 01/10/2023, these will go to Cancel.  Fax # is [phone] (Clinic Notes), Fax # [phone] (Detailed Written Order)       Action Taken: Other: Endo    Travel Screening: Not Applicable

## 2023-01-06 NOTE — TELEPHONE ENCOUNTER
I have not received the signed form for patient from provider.  Form re-emailed to provider [employer].      Cayla Enrique on 1/6/2023 at 10:11 AM

## 2023-01-08 ENCOUNTER — HEALTH MAINTENANCE LETTER (OUTPATIENT)
Age: 68
End: 2023-01-08

## 2023-01-09 ENCOUNTER — ALLIED HEALTH/NURSE VISIT (OUTPATIENT)
Dept: EDUCATION SERVICES | Facility: CLINIC | Age: 68
End: 2023-01-09
Payer: COMMERCIAL

## 2023-01-09 DIAGNOSIS — E11.22 TYPE 2 DIABETES MELLITUS WITH STAGE 3A CHRONIC KIDNEY DISEASE, WITH LONG-TERM CURRENT USE OF INSULIN (H): Primary | ICD-10-CM

## 2023-01-09 DIAGNOSIS — N18.31 TYPE 2 DIABETES MELLITUS WITH STAGE 3A CHRONIC KIDNEY DISEASE, WITH LONG-TERM CURRENT USE OF INSULIN (H): Primary | ICD-10-CM

## 2023-01-09 DIAGNOSIS — Z79.4 TYPE 2 DIABETES MELLITUS WITH STAGE 3A CHRONIC KIDNEY DISEASE, WITH LONG-TERM CURRENT USE OF INSULIN (H): Primary | ICD-10-CM

## 2023-01-09 PROCEDURE — G0108 DIAB MANAGE TRN  PER INDIV: HCPCS

## 2023-01-09 RX ORDER — INSULIN GLARGINE 100 [IU]/ML
24 INJECTION, SOLUTION SUBCUTANEOUS EVERY 24 HOURS
Qty: 30 ML | Refills: 1 | Status: SHIPPED | OUTPATIENT
Start: 2023-01-09 | End: 2023-09-11

## 2023-01-09 NOTE — PROGRESS NOTES
Diabetes Self-Management Education & Support    Presents for: Individual review    Type of Service: In Person Visit    Assessment Type:   ASSESSMENT:  Patient diagnosed with Type 2 diabetes in 1997. Referred to Cde to review insulin administration technique as well as current diabetes medication regimen. At previous visit with endocrinologist, patient was advised to continue Jardiance 10mg daily and Metformin 1000mg daily with dinner. Advised to finish current supply, Victoza 1.8mg daily, then change to Ozempic 1mg every 7 days. Start Lantus insulin daily. Once Lantus is started, stop Glimepiride.     Patient verbalizes she has about 2 weeks worth of Victoza left. Knows to start Ozempic once Victoza is finished. Reviewed Ozempic dose, storage and injection administration.     Cde spoke Dr Sosa today. Pt was advised to start Lantus: 24 units daily. Insulin storage, and injection administration reviewed. Rx sent to pharmacy per Dr Sosa Pt aware. Pt reminded to stop Glimepiride once she starts Lantus.    Reviewed s/s and proper tx hypoglycemia. Pt encouraged to keep glucose tablets at bedside table.     Patient verbalizes she heard from [facility] and should receivce Dominik 2 Cgms within the next week. Phone shara was downloaded and set up today. Pt knows to call clinic to schedule appt for training once sensor arrives.     Patient requested to be weighed today. Current weight: 180#. Patient has lost more than 10#  is pleased with weight loss.      Patient's most recent   Lab Results   Component Value Date    A1C 11.6 12/06/2022    A1C 9.3 06/04/2021     is not meeting goal of <7.0    Diabetes knowledge and skills assessment:   Patient is knowledgeable in diabetes management concepts related to: Healthy Eating, Being Active, Monitoring and Taking Medication    Continue education with the following diabetes management concepts: Problem Solving    Based on learning assessment above, most appropriate setting for  "further diabetes education would be: Individual setting.      PLAN  - Start Lantus 24 units daily  - Stop Glimepiride once you start Lantus  - Continue taking Jardiance and Metformin as prescribed.   - Once Victoza is finished change to Ozempic    Topics to cover at upcoming visits: Monitoring, Taking Medication and Problem Solving    Follow-up:   Pt to call clinic to schedule Dominik teaching.     Education Materials Provided:  No new materials provided today      SUBJECTIVE/OBJECTIVE:  Presents for: Individual review  Accompanied by: Self  Diabetes education in the past 24mo: Yes  Focus of Visit: Taking Medication  Diabetes type: Type 2  Date of diagnosis: 1997  Disease course: Stable  How confident are you filling out medical forms by yourself:: Quite a bit  Diabetes management related comments/concerns: None at this time.  Transportation concerns: No  Difficulty affording diabetes medication?: No  Difficulty affording diabetes testing supplies?: No  Other concerns:: None  Cultural Influences/Ethnic Background:  Not  or       Diabetes Symptoms & Complications:  Fatigue: No  Neuropathy: No  Polydipsia: No  Polyphagia: No  Polyuria: No  Visual change: No  Slow healing wounds: No  Other: No  Weight trend: Decreasing (Current weight as of today: 180#)  Complications assessed today?: No  Autonomic neuropathy: No  CVA: No  Heart disease: No  Nephropathy: Yes  Peripheral neuropathy: No  Peripheral Vascular Disease: No  Retinopathy: No  Sexual dysfunction: ---    Vitals:  LMP  (LMP Unknown)   Estimated body mass index is 36.61 kg/m  as calculated from the following:    Height as of 5/13/22: 1.54 m (5' 0.63\").    Weight as of 5/13/22: 86.8 kg (191 lb 6.4 oz).   Weight as of 01/09/23: 180#      Last 3 BP:   BP Readings from Last 3 Encounters:   05/13/22 131/76   06/04/21 131/69   02/16/21 130/79       History   Smoking Status     Never   Smokeless Tobacco     Never       Labs:  Lab Results   Component Value " Date    A1C 11.6 12/06/2022    A1C 9.3 06/04/2021     Lab Results   Component Value Date     12/06/2022     06/04/2021     Lab Results   Component Value Date    LDL 61 05/13/2022    LDL 74 02/16/2021     HDL Cholesterol   Date Value Ref Range Status   02/16/2021 44 (L) >49 mg/dL Final     Direct Measure HDL   Date Value Ref Range Status   05/13/2022 44 (L) >=50 mg/dL Final   ]  GFR Estimate   Date Value Ref Range Status   12/06/2022 44 (L) >60 mL/min/1.73m2 Final     Comment:     Effective December 21, 2021 eGFRcr in adults is calculated using the 2021 CKD-EPI creatinine equation which includes age and gender (Zuleyka et al., NEJ, DOI: 10.1056/HGDDuo4244750)   07/08/2021 42 (L) >60 mL/min/[1.73_m2] Final     Comment:     Non  GFR Calc  Starting 12/18/2018, serum creatinine based estimated GFR (eGFR) will be   calculated using the Chronic Kidney Disease Epidemiology Collaboration   (CKD-EPI) equation.       GFR Estimate If Black   Date Value Ref Range Status   07/08/2021 49 (L) >60 mL/min/[1.73_m2] Final     Comment:      GFR Calc  Starting 12/18/2018, serum creatinine based estimated GFR (eGFR) will be   calculated using the Chronic Kidney Disease Epidemiology Collaboration   (CKD-EPI) equation.       Lab Results   Component Value Date    CR 1.32 12/06/2022    CR 1.30 07/08/2021     No results found for: MICROALBUMIN    Healthy Eating:  Healthy Eating Assessed Today: No  Cultural/Cheondoism diet restrictions?: No  Meal planning/habits: Avoiding sweets, Low carb, Heart healthy, Low salt  How many times a week on average do you eat food made away from home (restaurant/take-out)?: 2  Meals include: Lunch, Dinner, Morning Snack, Afternoon Snack, Evening Snack  Beverages: Water, Tea, Coffee, Diet soda    Being Active:  Being Active Assessed Today: No  Exercise:: Yes  Days per week of moderate to strenuous exercise (like a brisk walk): 4  On average, minutes per day of exercise  at this level: 30  How intense was your typical exercise? : Light (like stretching or slow walking)  Exercise Minutes per Week: 120  Barrier to exercise: None    Monitoring:  Monitoring Assessed Today: No (Pt forgot to bring meter to appt.)  Did patient bring glucose meter to appointment? : No  Blood Glucose Meter: Accu-chek  Times checking blood sugar at home (number): 2  Times checking blood sugar at home (per): Day  Blood glucose trend: Fluctuating    Taking Medications:  Diabetes Medication(s)     Biguanides       metFORMIN (GLUCOPHAGE XR) 500 MG 24 hr tablet    Take 2 tablets (1,000 mg) by mouth daily (with dinner)    Insulin       insulin glargine (LANTUS SOLOSTAR) 100 UNIT/ML pen    Inject 24 Units Subcutaneous every 24 hours    Sodium-Glucose Co-Transporter 2 (SGLT2) Inhibitors       empagliflozin (JARDIANCE) 25 MG TABS tablet    Take 1 tablet (25 mg) by mouth daily    Incretin Mimetic Agents       Semaglutide, 1 MG/DOSE, 4 MG/3ML SOPN    Inject 1 mg Subcutaneous every 7 days          Current Treatments: Diet, Insulin Injections, Oral Medication (taken by mouth)    Problem Solving:  Not assessed today.      Reducing Risks:  Reducing Risks Assessed Today: Yes (Reviewed s/s and tx hypoglycemia)  Diabetes Risks: Age over 45 years, Family History, Ethnicity  CAD Risks: Diabetes Mellitus  Has dilated eye exam at least once a year?: Yes  Sees dentist every 6 months?: Yes  Feet checked by healthcare provider in the last year?: Yes    Healthy Coping:  Healthy Coping Assessed Today: No  Emotional response to diabetes: Ready to learn  Informal Support system:: Family  Stage of change: ACTION (Actively working towards change)  Support resources: None    Patient Activation Measure Survey Score:  WAYNE Score (Last Two) 8/16/2011   WAYNE Raw Score 39   Activation Score 56.4   WAYNE Level 3       Time Spent: 60 minutes  Encounter Type: Individual    Any diabetes medication dose changes were made via the CDE Protocol per the  patient's endocrinology provider. A copy of this encounter was shared with the provider.    Rosalindakeyanadeja Cruz comes into clinic today at the request of Dr Sosa, Ordering Provider for      This service provided today was under the supervising provider of the day Dr Pulliam, who was available if needed.    Ana Gregorio, RN, BSN, [facility]   Certified Diabetes Care/  [facility]

## 2023-01-09 NOTE — TELEPHONE ENCOUNTER
Type 2 diabetes in the setting of stage III CKD-  Hemoglobin A1C   Date Value Ref Range Status   12/06/2022 11.6 (H) 0.0 - 5.6 % Final     Comment:     Normal <5.7%   Prediabetes 5.7-6.4%    Diabetes 6.5% or higher     Note: Adopted from ADA consensus guidelines.   A1c high despite multiple DM medications.   C-peptide normal.     Plan:  Metformin 1000 mg daily  Increase Jardiance to 25 mg daily  Semaglutide 1 mg every 7 days  Stop Glimipride 8 mg daily   Start Lantus 24 units daily.   Please follow up with CDE for further titration of Insulin based on fasting Blood sugar.   Amy Sosa MD

## 2023-01-09 NOTE — PATIENT INSTRUCTIONS
Start Lantus insulin - take once a day: 24 units.   Once Victoza is finished, start Ozempic, 1mg injection, once every severn days.   Stop Glimepiride once you start Lantus insulin.   Continue taking Jardiance and Metformin as prescribed.  Call clinic to schedule appt with Cde once Dominik 2 sensor is shipped to your home.       Ana Gregorio RN, BSN, [facility]   Certified Diabetes Care/  [facility]

## 2023-01-10 ENCOUNTER — MEDICAL CORRESPONDENCE (OUTPATIENT)
Dept: HEALTH INFORMATION MANAGEMENT | Facility: CLINIC | Age: 68
End: 2023-01-10

## 2023-01-10 NOTE — TELEPHONE ENCOUNTER
Forms signed and faxed to [phone]. Chart notes faxed to [phone].  Left message for Mahnaz to call back if any additional information is needed.     Cayla Enrique on 1/10/2023 at 1:36 PM

## 2023-01-10 NOTE — TELEPHONE ENCOUNTER
If there are any questions regarding this request, please reach out to Mahnaz at [facility]. Her phone number is [phone], ext [phone]. Thank you.

## 2023-01-11 NOTE — TELEPHONE ENCOUNTER
Mahnaz from [facility] called back requesting a print out copy of the Rx for Dominik sensor, transmitter and . She will fax a form requesting this to the clinic today. Awaiting fax. In the meantime, routing for Rx to be on file so writer can print to fax.    Shilpa Paredes [facility]  Adult Endocrinology  [facility]

## 2023-01-18 NOTE — TELEPHONE ENCOUNTER
Ramila from [employer] called yaima JAVIER message. Patient approved for Ozempic.      Free Drug Application Approved  Effective Dates: 1/1/23-12/31/23  Patient notified:   Additional Information: Ozempic order was placed today with their pharmacy, asked to allow up to 30 business days to ship.

## 2023-01-27 ENCOUNTER — TRANSFERRED RECORDS (OUTPATIENT)
Dept: HEALTH INFORMATION MANAGEMENT | Facility: CLINIC | Age: 68
End: 2023-01-27

## 2023-01-30 ENCOUNTER — PATIENT OUTREACH (OUTPATIENT)
Dept: FAMILY MEDICINE | Facility: CLINIC | Age: 68
End: 2023-01-30
Payer: COMMERCIAL

## 2023-01-30 NOTE — TELEPHONE ENCOUNTER
Patient Quality Outreach    Patient is due for the following:   Colorectal Cancer screening      Topic Date Due     Zoster (Shingles) Vaccine (1 of 2) Never done     Pneumococcal Vaccine (2 - PCV) 01/22/2016     COVID-19 Vaccine (5 - Booster for Pfizer series) 07/08/2022     Flu Vaccine (1) Never done       Next Steps:   Patient has upcoming appointment, these items will be addressed at that time.    Type of outreach:    Chart review performed, no outreach needed.      Questions for provider review:    None     Marielena Mccarthy

## 2023-02-04 DIAGNOSIS — N39.41 URGE INCONTINENCE: ICD-10-CM

## 2023-02-06 ENCOUNTER — ALLIED HEALTH/NURSE VISIT (OUTPATIENT)
Dept: EDUCATION SERVICES | Facility: CLINIC | Age: 68
End: 2023-02-06
Payer: COMMERCIAL

## 2023-02-06 DIAGNOSIS — Z79.4 TYPE 2 DIABETES MELLITUS WITH STAGE 3A CHRONIC KIDNEY DISEASE, WITH LONG-TERM CURRENT USE OF INSULIN (H): Primary | ICD-10-CM

## 2023-02-06 DIAGNOSIS — N18.31 TYPE 2 DIABETES MELLITUS WITH STAGE 3A CHRONIC KIDNEY DISEASE, WITH LONG-TERM CURRENT USE OF INSULIN (H): Primary | ICD-10-CM

## 2023-02-06 DIAGNOSIS — E11.22 TYPE 2 DIABETES MELLITUS WITH STAGE 3A CHRONIC KIDNEY DISEASE, WITH LONG-TERM CURRENT USE OF INSULIN (H): Primary | ICD-10-CM

## 2023-02-06 PROCEDURE — G0108 DIAB MANAGE TRN  PER INDIV: HCPCS

## 2023-02-06 RX ORDER — SOLIFENACIN SUCCINATE 10 MG/1
10 TABLET, FILM COATED ORAL DAILY
Qty: 30 TABLET | Refills: 1 | OUTPATIENT
Start: 2023-02-06

## 2023-02-06 NOTE — PROGRESS NOTES
Diabetes Self-Management Education & Support    Presents for: Individual review    Type of Service: In Person Visit       Sensor was inserted with no resistance or bleeding at insertion site.    CGM-specific education:   Freestyle Dominik sensor: insertion technique, sensor site location and rotation, insulin administration in relation to sensor placement, sensor wear, reasons to remove sensor (MRI, CT, diathermy), Vitamin C & Aspirin effects on sensor, Dominik [place]: frequency of scanning sensor, length of time data is visible, use of built in glucose meter, Precision X-tra test strips, Use of trends and graphs for pattern management and problem solving and Dosing insulin based on sensor glucose results         ASSESSMENT:  Adrian Cruz is currently taking 24 units of Lantus. She stopped the glimiperide. Adrian confirmed taking Metformin XR 1000 mg every evening with supper.  She completed the Victoza and now starting the Ozempic 1 mg once weekly. She also takes Jardiance 25 mg once daily.  She has been testing her blood sugars twice a day. Fasting blood sugars in the 70's and 2 hr post meal blood sugars in the 90's.  Weight is down to 181 pounds so she feels good about that.    Pt verbalized understanding of concepts discussed and recommendations provided today.    Continue education with the following diabetes management concepts: Healthy Eating, Being Active, Monitoring and Taking Medication    PLAN    We will decrease the Lantus starting tonight from 24 to now taking 22 units per day.  Topics to cover at upcoming visits: Being Active, Monitoring and Taking Medication    Follow-up: 4-6 weeks    See Care Plan for co-developed, patient-state behavior change goals.  AVS provided for patient today.    Education Materials Provided:  Living Healthy with Diabetes      SUBJECTIVE/OBJECTIVE:  Presents for: Individual review  Accompanied by: Self  Diabetes education in the past 24mo: Yes  Focus of Visit:  "CGM  Diabetes type: Type 2  Date of diagnosis: 1997  Disease course: Stable  How confident are you filling out medical forms by yourself:: Quite a bit  Diabetes management related comments/concerns: None at this time.  Transportation concerns: No  Difficulty affording diabetes medication?: No  Difficulty affording diabetes testing supplies?: No  Other concerns:: None  Cultural Influences/Ethnic Background:  Not  or       Diabetes Symptoms & Complications:  Fatigue: No  Neuropathy: No  Polydipsia: No  Polyphagia: No  Polyuria: No  Visual change: No  Slow healing wounds: No  Other: No  Weight trend: Decreasing (Current weight as of today: 180#)  Complications assessed today?: No  Autonomic neuropathy: No  CVA: No  Heart disease: No  Nephropathy: Yes  Peripheral neuropathy: No  Peripheral Vascular Disease: No  Retinopathy: No  Sexual dysfunction: No    Patient Problem List and Family Medical History reviewed for relevant medical history, current medical status, and diabetes risk factors.    Vitals:  LMP  (LMP Unknown)   Estimated body mass index is 36.61 kg/m  as calculated from the following:    Height as of 5/13/22: 1.54 m (5' 0.63\").    Weight as of 5/13/22: 86.8 kg (191 lb 6.4 oz).   Last 3 BP:   BP Readings from Last 3 Encounters:   05/13/22 131/76   06/04/21 131/69   02/16/21 130/79       History   Smoking Status     Never   Smokeless Tobacco     Never       Labs:  Lab Results   Component Value Date    A1C 11.6 12/06/2022    A1C 9.3 06/04/2021     Lab Results   Component Value Date     12/06/2022     06/04/2021     Lab Results   Component Value Date    LDL 61 05/13/2022    LDL 74 02/16/2021     HDL Cholesterol   Date Value Ref Range Status   02/16/2021 44 (L) >49 mg/dL Final     Direct Measure HDL   Date Value Ref Range Status   05/13/2022 44 (L) >=50 mg/dL Final   ]  GFR Estimate   Date Value Ref Range Status   12/06/2022 44 (L) >60 mL/min/1.73m2 Final     Comment:     Effective " December 21, 2021 eGFRcr in adults is calculated using the 2021 CKD-EPI creatinine equation which includes age and gender (Zuleyka mckeon al., NEJM, DOI: 10.1056/KFECnr3909383)   07/08/2021 42 (L) >60 mL/min/[1.73_m2] Final     Comment:     Non  GFR Calc  Starting 12/18/2018, serum creatinine based estimated GFR (eGFR) will be   calculated using the Chronic Kidney Disease Epidemiology Collaboration   (CKD-EPI) equation.       GFR Estimate If Black   Date Value Ref Range Status   07/08/2021 49 (L) >60 mL/min/[1.73_m2] Final     Comment:      GFR Calc  Starting 12/18/2018, serum creatinine based estimated GFR (eGFR) will be   calculated using the Chronic Kidney Disease Epidemiology Collaboration   (CKD-EPI) equation.       Lab Results   Component Value Date    CR 1.32 12/06/2022    CR 1.30 07/08/2021     No results found for: MICROALBUMIN    Healthy Eating:  Healthy Eating Assessed Today: No  Cultural/Hinduism diet restrictions?: No  Meal planning/habits: Avoiding sweets, Low carb, Heart healthy, Low salt  How many times a week on average do you eat food made away from home (restaurant/take-out)?: 2  Meals include: Lunch, Dinner, Morning Snack, Afternoon Snack, Evening Snack  Beverages: Water, Tea, Coffee, Diet soda    Being Active:  Being Active Assessed Today: No  Exercise:: Yes  Days per week of moderate to strenuous exercise (like a brisk walk): 4  On average, minutes per day of exercise at this level: 30  How intense was your typical exercise? : Light (like stretching or slow walking)  Exercise Minutes per Week: 120  Barrier to exercise: None    Monitoring:  Monitoring Assessed Today: No (Pt forgot to bring meter to appt.)  Did patient bring glucose meter to appointment? : No  Blood Glucose Meter: Accu-chek  Times checking blood sugar at home (number): 2  Times checking blood sugar at home (per): Day  Blood glucose trend: Fluctuating        Taking Medications:  Diabetes Medication(s)      Biguanides       metFORMIN (GLUCOPHAGE XR) 500 MG 24 hr tablet    Take 2 tablets (1,000 mg) by mouth daily (with dinner)    Insulin       insulin glargine (LANTUS SOLOSTAR) 100 UNIT/ML pen    Inject 24 Units Subcutaneous every 24 hours    Sodium-Glucose Co-Transporter 2 (SGLT2) Inhibitors       empagliflozin (JARDIANCE) 25 MG TABS tablet    Take 1 tablet (25 mg) by mouth daily    Incretin Mimetic Agents       Semaglutide, 1 MG/DOSE, 4 MG/3ML SOPN    Inject 1 mg Subcutaneous every 7 days            Reducing Risks:  Reducing Risks Assessed Today: Yes (Reviewed s/s and tx hypoglycemia)  Diabetes Risks: Age over 45 years, Family History, Ethnicity  CAD Risks: Diabetes Mellitus  Has dilated eye exam at least once a year?: Yes  Sees dentist every 6 months?: Yes  Feet checked by healthcare provider in the last year?: Yes    Healthy Coping:  Healthy Coping Assessed Today: No  Emotional response to diabetes: Ready to learn  Informal Support system:: Family  Stage of change: ACTION (Actively working towards change)  Support resources: None  Patient Activation Measure Survey Score:  WAYNE Score (Last Two) 8/16/2011   WAYNE Raw Score 39   Activation Score 56.4   WAYNE Level 3         Care Plan and Education Provided:  There are no care plans that you recently modified to display for this patient.    Time Spent: 60 minutes  Encounter Type: Individual    Any diabetes medication dose changes were made via the CDE Protocol per the patient's endocrinology provider. A copy of this encounter was shared with the provider.

## 2023-02-10 ENCOUNTER — TELEPHONE (OUTPATIENT)
Dept: UROLOGY | Facility: CLINIC | Age: 68
End: 2023-02-10

## 2023-02-10 NOTE — TELEPHONE ENCOUNTER
Message sent to scheduling team with request to contact patient to assist in scheduling a follow up video visit with Milagro Moore PA-C.    Sabrina Jain RN, BSN

## 2023-02-13 NOTE — TELEPHONE ENCOUNTER
2/13 Patient is scheduled for video visit. Confirmed that patient will be in [place] for call, and advised patient to call ahead of time if she is out of state.     Rhoda carrillo Procedure   Dermatology, Surgery, Urology  [facility]

## 2023-02-24 ENCOUNTER — VIRTUAL VISIT (OUTPATIENT)
Dept: UROLOGY | Facility: CLINIC | Age: 68
End: 2023-02-24
Payer: COMMERCIAL

## 2023-02-24 DIAGNOSIS — N39.41 URGE INCONTINENCE: Primary | ICD-10-CM

## 2023-02-24 PROCEDURE — 99212 OFFICE O/P EST SF 10 MIN: CPT | Mod: VID | Performed by: PHYSICIAN ASSISTANT

## 2023-02-24 RX ORDER — SOLIFENACIN SUCCINATE 10 MG/1
10 TABLET, FILM COATED ORAL DAILY
Qty: 30 TABLET | Refills: 11 | Status: SHIPPED | OUTPATIENT
Start: 2023-02-24 | End: 2024-02-16

## 2023-02-24 NOTE — PROGRESS NOTES
Name: Adrian Cruz    MRN: [id number]   YOB: 1955                 Chief Complaint:   Urinary incontinence         Assessment and Plan:   67 year old female with mixed urinary incontinence, urge predominant. Doing well with significant improvement on Vesicare. She would like to continue with the same.  -Continue Vesicare 10 mg daily. Refilled.  -Follow up in 1 year, sooner as needed.     Milagro Moore PA-C  February 24, 2023          History of Present Illness:   Adrian Cruz is a 67 year old female with PMH of DM2 (on Jardiance) with CKD stage III seen today via virtual visit for follow up of urinary incontinence.    Seen in initial consultation (virtually) on 10/29/21. Reviewed history from this date as follows:  This has been ongoing since May 2021. She describes urinary urgency and urge incontinence before she can get to the bathroom. Also describes some stress urinary incontinence with coughing, laughing, sneezing, etc. She has been wearing pads and goes through 2 per day.     She has tried Kegel exercises which have not helped. Denies any dysuria, gross hematuria, or history of UTI. She feels to empty her bladder well without straining to void.    She drinks a lot of fluids during the day, but limits fluids before bed. She typically wakes 0-1 times per night at night to void and denies nocturnal urinary incontinence.     On that date, she was started on Vesicare 10 mg once daily and recommended to moderate fluid intake with limiting fluids before bed.    TODAY   2/24/23:   She reports that the medication is working very well and she is pleased with her current voiding.  Needs refills.          Past Medical History:     Past Medical History:   Diagnosis Date     GERD (gastroesophageal reflux disease)      History of elevated lipids      Lower extremity edema      NIDDM (non-insulin dependent diabetes mellitus) 1997     Rhinitis             Past Surgical History:     Past  Surgical History:   Procedure Laterality Date     LASER SURGERY OF EYE Right 2015            Social History:     Social History     Tobacco Use     Smoking status: Never     Smokeless tobacco: Never   Substance Use Topics     Alcohol use: No            Family History:     Family History   Problem Relation Age of Onset     Diabetes Mother      Heart Disease Mother         chf and pacemaker     Genitourinary Problems Mother         ESRD     Diabetes Father      C.A.D. Father         age 49     Cerebrovascular Disease Father      Asthma Sister      Asthma Son      Asthma Daughter      Heart Disease Daughter      Glaucoma No family hx of      Macular Degeneration No family hx of             Allergies:     Allergies   Allergen Reactions     Ace Inhibitors Cough     Glucophage [Biguanides] Cramps     Stomach upset            Medications:     Current Outpatient Medications   Medication Sig     amLODIPine (NORVASC) 10 MG tablet TAKE 1 TABLET(10 MG) BY MOUTH DAILY FOR BLOOD PRESSURE     atorvastatin (LIPITOR) 80 MG tablet Take 1 tablet (80 mg) by mouth daily     blood glucose monitoring (NO BRAND SPECIFIED) meter device kit Use to test blood sugar 2 times daily or as directed.     blood glucose monitoring (ULTRA THIN 30G) lancets Use to test blood sugar 2 times daily or as directed. Per insurance     cetirizine (ZYRTEC) 10 MG tablet TAKE 1 TABLET (10 MG) BY MOUTH EVERY EVENING AS NEEDED FOR ALLERGIES     Continuous Blood Gluc  (FREESTYLE RODNEY 2 READER) JOSE 1 each See Admin Instructions Use multiple times daily to read glucose levels.     Continuous Blood Gluc Sensor (FREESTYLE RODNEY 2 SENSOR) MISC 1 each every 14 days Use 1 sensor every 14 days. Use to read blood sugars per 's instructions.     empagliflozin (JARDIANCE) 25 MG TABS tablet Take 1 tablet (25 mg) by mouth daily     fluticasone (FLONASE) 50 MCG/ACT nasal spray SPRAY 1-2 SPRAYS INTO BOTH NOSTRILS DAILY AS NEEDED FOR RHINITIS OR ALLERGIES      Glucose Blood (BLOOD GLUCOSE TEST STRIPS) STRP 1 strip by Lancet route 2 times daily     hydrochlorothiazide (HYDRODIURIL) 25 MG tablet Take 1 tablet (25 mg) by mouth daily     insulin glargine (LANTUS SOLOSTAR) 100 UNIT/ML pen Inject 24 Units Subcutaneous every 24 hours     losartan (COZAAR) 100 MG tablet Take 1 tablet (100 mg) by mouth daily     metFORMIN (GLUCOPHAGE XR) 500 MG 24 hr tablet Take 2 tablets (1,000 mg) by mouth daily (with dinner)     omeprazole 20 MG tablet Take 1 tablet (20 mg) by mouth daily as needed     order for DME Equipment being ordered: Digital home blood pressure monitor kit     ORDER FOR DME Equipment being ordered: blood pressure cuff/machine     Semaglutide, 1 MG/DOSE, 4 MG/3ML SOPN Inject 1 mg Subcutaneous every 7 days     solifenacin (VESICARE) 10 MG tablet Take 1 tablet (10 mg) by mouth daily Complete follow up appointment for additional refills.     No current facility-administered medications for this visit.             Review of Systems:    ROS: 14 point ROS neg other than the symptoms noted above in the HPI and PMH.          Physical Exam:   GENERAL: Healthy, alert and no distress  EYES: Eyes grossly normal to inspection.  No discharge or erythema, or obvious scleral/conjunctival abnormalities.  RESP: No audible wheeze, cough, or visible cyanosis.  No visible retractions or increased work of breathing.    SKIN: Visible skin clear. No significant rash, abnormal pigmentation or lesions.  NEURO: Cranial nerves grossly intact.  Mentation and speech appropriate for age.  PSYCH: Mentation appears normal, affect normal/bright, judgement and insight intact, normal speech and appearance well-groomed.       Labs:      Lab Results   Component Value Date    A1C 11.6 12/06/2022    A1C 9.9 05/13/2022    A1C 11.1 10/20/2021    A1C 9.3 06/04/2021    A1C 10.9 02/16/2021    A1C 8.4 02/21/2020    A1C 7.7 06/18/2019    A1C 10.2 02/19/2019       Creatinine   Date Value Ref Range Status   12/06/2022  1.32 (H) 0.52 - 1.04 mg/dL Final   07/08/2021 1.30 (H) 0.52 - 1.04 mg/dL Final         Imaging:    No recent  imaging.            Video-Visit Details    Type of service:  Video Visit   Video Start Time: 1:57 PM  Video End Time:2:01 PM    Originating Location (pt. Location): Home    Distant Location (provider location):  Off-site  Platform used for Video Visit: [employer]      16 minutes spent on the date of the encounter doing chart review, review of test results, patient visit and documentation

## 2023-02-24 NOTE — NURSING NOTE
Is the patient currently in the state of [place]? YES    Visit mode:VIDEO    If the visit is dropped, the patient can be reconnected by: VIDEO VISIT: Send to e-mail at: [email or web address]    Will anyone else be joining the visit? NO      How would you like to obtain your AVS? MyChart    Are changes needed to the allergy or medication list? NO    Reason for visit: med check follow-up

## 2023-02-24 NOTE — PATIENT INSTRUCTIONS
UROLOGY CLINIC VISIT PATIENT INSTRUCTIONS    Your medication - solifenacin (Vesicare) was refilled.    Follow up in 1 year.    If you have any issues, questions or concerns in the meantime, do not hesitate to contact us at [phone] or via [employer].     It was a pleasure meeting with you today.  Thank you for allowing me and my team the privilege of caring for you today.  YOU are the reason we are here, and I truly hope we provided you with the excellent service you deserve.  Please let us know if there is anything else we can do for you so that we can be sure you are leaving completely satisfied with your care experience.

## 2023-03-28 ENCOUNTER — TRANSFERRED RECORDS (OUTPATIENT)
Dept: HEALTH INFORMATION MANAGEMENT | Facility: CLINIC | Age: 68
End: 2023-03-28
Payer: COMMERCIAL

## 2023-03-28 LAB — RETINOPATHY: POSITIVE

## 2023-04-12 ENCOUNTER — TELEPHONE (OUTPATIENT)
Dept: ENDOCRINOLOGY | Facility: CLINIC | Age: 68
End: 2023-04-12
Payer: COMMERCIAL

## 2023-04-12 NOTE — TELEPHONE ENCOUNTER
Please go to media tab and print ozempic refill, complete ,sign , and fax to the company. Company requested a refill.

## 2023-04-12 NOTE — TELEPHONE ENCOUNTER
Form filled out to the best of writer's ability and emailed to provider for signing.     Cayla Enrique on 4/12/2023 at 2:52 PM

## 2023-04-18 NOTE — TELEPHONE ENCOUNTER
Form signed by Dr. Rouse and faxed to Tracy [employer] [facility] @ [phone].    Roxanne Busby CMA  Adult Endocrinology  [facility] [place]

## 2023-04-20 ENCOUNTER — PATIENT OUTREACH (OUTPATIENT)
Dept: CARE COORDINATION | Facility: CLINIC | Age: 68
End: 2023-04-20
Payer: COMMERCIAL

## 2023-04-21 NOTE — TELEPHONE ENCOUNTER
DIAGNOSIS: Stage 3a chronic kidney disease    DATE RECEIVED: 06.19.2023   NOTES STATUS DETAILS   OFFICE NOTE from referring provider Internal 11.28.2023 Amy Sosa MD   OFFICE NOTE from other specialist  Internal 06.18.2019 Radha Madrigal MD   *Only VASCULITIS or LUPUS gather office notes for the following     *PULMONARY       *ENT     *DERMATOLOGY     *RHEUMATOLOGY     DISCHARGE SUMMARY from hospital     DISCHARGE REPORT from the ER     MEDICATION LIST Internal    IMAGING  (NEED IMAGES AND REPORTS)     KIDNEY CT SCAN     KIDNEY ULTRASOUND     MR ABDOMEN     NUCLEAR MEDICINE RENAL     LABS     CBC     CMP     BMP Internal 12.06.2022   UA     URINE PROTEIN     RENAL PANEL     BIOPSY     KIDNEY BIOPSY

## 2023-04-26 ENCOUNTER — TRANSFERRED RECORDS (OUTPATIENT)
Dept: HEALTH INFORMATION MANAGEMENT | Facility: CLINIC | Age: 68
End: 2023-04-26
Payer: COMMERCIAL

## 2023-04-26 LAB — RETINOPATHY: POSITIVE

## 2023-05-04 ENCOUNTER — PATIENT OUTREACH (OUTPATIENT)
Dept: CARE COORDINATION | Facility: CLINIC | Age: 68
End: 2023-05-04
Payer: COMMERCIAL

## 2023-05-25 NOTE — LETTER
Wound Evaluation    SUBJECTIVE  Pt c/o pain to (B) thighs/upper legs as well as severe pain to buttocks, especially with moving in bed    History of Present Illness: Chief Complaint   Chief Complaint   Patient presents with   • Wound Infection       Wound History  52-year-old female with morbid obesity (BMI 53), severe PAD, status post bilateral AKA's, CHF, hypertension who was admitted to our facility in April and had debridement of bilateral legs on 4/17/2023.  She was followed by our team during the last admission and treated with a wound VAC on the right and calcium alginate on the left stump.  Sacral and gluteal moisture related dermatitis superficial lesions with fungal component were treated and followed by us as well during the last admission.  She is now back with fevers and foul-smelling left thigh wound.  She reports pain in both sides, left worse than right.  Right AKA wound was treated with negative pressure wound therapy at the [facility] nursing facility.  The machine was disconnected but the foam was left in the wound at the tube tail hanging.  Last NPWT dressing change was done 48 hours ago    Allergies  ALLERGIES:  Bactrim ds    Past Medical History  Past Medical History:   Diagnosis Date   • Anemia    • Cardiomyopathy (CMD)    • Congestive heart failure with left ventricular systolic dysfunction (CMD) 10/18/2022   • HTN (hypertension)    • Morbid obesity (CMD)    • PAD (peripheral artery disease) (CMD)    • RAD (reactive airway disease)    • Transfusion reaction         Surgical History  Past Surgical History:   Procedure Laterality Date   • Hysterectomy     • Leg amputation through knee Right 12/02/2020    right above   • Leg amputation through knee Left 03/2022    pad   • Wound debridement Right     multiple R AKA wound debridements          Recent labwork shows:  Lab Results   Component Value Date    WBC 13.0 (H) 05/25/2023    HGB 8.3 (L) 05/25/2023    HCT 28.4 (L)  October 30, 2018      Adrian Cruz  [address]  [place] [facility] [address]        Dear ,    We are writing to inform you of your test results.  Urine sample showed abnormal protein (sign of early kidney damage). This should improve with better glucose control.   LDL cholesterol is at goal for you.   Electrolytes are normal.   Kidney function is low but stable for you. Avoid over the counter Advil, Aleve or Ibuprofen.   Otherwise, plan of care and follow up as discussed in clinic.         Resulted Orders   BASIC METABOLIC PANEL   Result Value Ref Range    Sodium 139 133 - 144 mmol/L    Potassium 4.1 3.4 - 5.3 mmol/L    Chloride 104 94 - 109 mmol/L    Carbon Dioxide 29 20 - 32 mmol/L    Anion Gap 6 3 - 14 mmol/L    Glucose 124 (H) 70 - 99 mg/dL    Urea Nitrogen 24 7 - 30 mg/dL    Creatinine 1.10 (H) 0.52 - 1.04 mg/dL    GFR Estimate 50 (L) >60 mL/min/1.7m2      Comment:      Non  GFR Calc    GFR Estimate If Black 60 (L) >60 mL/min/1.7m2      Comment:       GFR Calc    Calcium 8.6 8.5 - 10.1 mg/dL   HEMOGLOBIN A1C   Result Value Ref Range    Hemoglobin A1C 9.7 (H) 0 - 5.6 %      Comment:      Normal <5.7% Prediabetes 5.7-6.4%  Diabetes 6.5% or higher - adopted from ADA   consensus guidelines.     LDL cholesterol direct   Result Value Ref Range    LDL Cholesterol Direct 76 <100 mg/dL      Comment:      Desirable:       <100 mg/dl   Albumin Random Urine Quantitative with Creat Ratio   Result Value Ref Range    Creatinine Urine 71 mg/dL    Albumin Urine mg/L 134 mg/L    Albumin Urine mg/g Cr 187.68 (H) 0 - 25 mg/g Cr       If you have any questions or concerns, please call the clinic at the number listed above.       Sincerely,        Radha Madrigal MD                 05/25/2023    BUN 4 (L) 05/25/2023    CREATININE 0.42 (L) 05/25/2023    TOTPROTEIN 8.3 (H) 05/25/2023    ALBUMIN 1.4 (L) 05/25/2023    INR 1.1 04/17/2023    CRP 21.0 (H) 04/13/2023    RESR >120 (H) 04/13/2023    HGBA1C 5.6 05/24/2023     No results available in last 24 hours     Wound Assessments     05/25/23 1119   Pain   Pain Assessment Tool Pain Associated Behaviors   Pain Associated Behaviors   Vocalizations Verbal outbursts   Facial Expressions Grimacing;Tightly closed eyes   Non-Verbal Behaviors Tense/rigid;Rubbing   Pain/ Comfort Interventions   Pain Interventions Premedicate for activity;Systematic relaxation;Positioning   Comforting Interventions Repositioning;Distraction   Wound Leg Right Amputation;AKA   Date First Assessed/Time First Assessed: 05/24/23 1200   Present on Original Admission: Yes  Location: Leg  Laterality: Right  Modifier: Amputation;AKA   Wound Care Team Consult Date 05/25/23   Present at Time of This Admission Yes   Dressing Assessment Intact;Drainage present   Dressing Activity Changed   Dressing Changed On   05/25/23   Wound Exudate Serosanguineous;Mild odor   Cleansing Agent Normal saline   Wound Bed/Tissue Type Pink;Red;Granulated   Periwound Condition Normal   Wound Edge Poorly defined   Wound Status   (initial PTWC assessment)   Wound Dressing   (1/4 strength Dakin's moist gauze, 4x4s, ABDs, kerlix)   Wound Thigh Left Anterior;Distal;Amputation   Date First Assessed/Time First Assessed: 05/24/23 1200   Present on Original Admission: Yes  Location: Thigh  Laterality: Left  Modifier: Anterior;Distal;Amputation   Wound Care Team Consult Date 05/25/23   Present at Time of This Admission Yes   Dressing Assessment Intact;Drainage present   Dressing Activity Changed   Dressing Changed On   05/25/23   Wound Exudate Serosanguineous;Mild odor   Cleansing Agent Normal saline   Wound Bed/Tissue Type Pink;Yellow;Black;Necrotic tissue, eschar   Periwound Condition Normal   Wound Edge Poorly  defined   Wound Status   (initial PTWC assessment)   Wound Dressing   (1/4 strength Dakin's moist gauze, 4x4s, ABDs, kerlix)   Wound Last Measured 05/25/23   Wound Length (cm) 11 cm   Wound Width (cm) 20.5 cm   Wound Depth (cm) 2.8 cm   Wound Surface Area (cm^2) 225.5 cm^2   Wound Volume (cm^3) 631.4 cm^3   Wound/Ostomy Follow-up (HBO Wound Care Staff Use Only)   Patient Seen Today Yes by PT   Requires IP PT Wound Care follow-up? Yes   PT Wound Care Follow-up Date 05/26/23   Pain Screening   Patient Currently in Pain Yes     (R) medial thigh:  14.7 x 17 x 1.2 cm  (R) distal residual limb:  4.5 x 9.2 x 0.8     05/25/23 1119   Treatment   Wound Location (R) and (L) upper leg   Treatment Type Ultrasonic Mist;Ultraviolet Light   Ultrasonic Mist   Ultrasound Mist Location as above   Ultrasound Mist Time (min) 14  (6 mins (L), 8 mins total to (R) leg wounds)   Ultraviolet Light   Ultraviolet Light Location as above   Ultraviolet Light Time (min) 5  (1 min x 5 to accommodate for area)   Goals   Goals-Short Term (4-6 weeks) Reduce wound size by 25%      05/25/23 1120   Treatment   Wound Location (B) buttocks/sacrum   Treatment Type Ultraviolet Light   Ultraviolet Light   Ultraviolet Light Location as above   Ultraviolet Light Time (min) 3  (1 min x 3 for area)   Goals   Goals-Short Term (4-6 weeks) Wound closure       ASSESSMENT  Pt seen for evaluation of (B) thigh/upper leg wounds and sacral/buttock skin breakdown and rash.  (R) medial and distal upper leg wounds demo healthy pink granular tissue throughout, very minimal thin slough in superior aspect of medial thigh wound.  Mild odor noted with removed dressings; periwound skin normal in appearance.  (L) thigh wound demos dry necrotic tissue in superior aspect with more distal aspect demonstrating mix of pink healthy tissue and dull yellow non-viable tissue.  Mild odor also noted from this wound with dressing removal.  Superior periwound with mild darkening, otherwise  periwound is normal in skin color, no significant erythema observed, no fluctuance on palpation.  Sacral/buttock area with large area of zinc oxide cream present; small superficial openings observed throughout medial aspect of buttocks and gluteal cleft, primarily healthy red tissue observed.  UVC treatment performed to all of these wounds for antimicrobial effects.  US mist performed to (B) thigh wounds for microcirculatory support.  Will plan to add treatment to inferior abdominal wound at next session.  Dressings applied as per orders.  PTWC will cont to follow.  Wound Prognosis: Fair    Goals  Wound closure     Plan  US mist, UVC treatments  Treatment frequency: 3-5x/wk  Discharge recommendations: TBD based on wound progress    Ronnie Gil, PT  5/25/2023

## 2023-06-07 ENCOUNTER — VIRTUAL VISIT (OUTPATIENT)
Dept: FAMILY MEDICINE | Facility: CLINIC | Age: 68
End: 2023-06-07
Payer: COMMERCIAL

## 2023-06-07 DIAGNOSIS — Z79.4 TYPE 2 DIABETES MELLITUS WITH STAGE 3A CHRONIC KIDNEY DISEASE, WITH LONG-TERM CURRENT USE OF INSULIN (H): Primary | ICD-10-CM

## 2023-06-07 DIAGNOSIS — E11.22 TYPE 2 DIABETES MELLITUS WITH STAGE 3A CHRONIC KIDNEY DISEASE, WITH LONG-TERM CURRENT USE OF INSULIN (H): Primary | ICD-10-CM

## 2023-06-07 DIAGNOSIS — N18.31 TYPE 2 DIABETES MELLITUS WITH STAGE 3A CHRONIC KIDNEY DISEASE, WITH LONG-TERM CURRENT USE OF INSULIN (H): Primary | ICD-10-CM

## 2023-06-07 DIAGNOSIS — Z12.31 VISIT FOR SCREENING MAMMOGRAM: ICD-10-CM

## 2023-06-07 DIAGNOSIS — Z12.11 SCREEN FOR COLON CANCER: ICD-10-CM

## 2023-06-07 DIAGNOSIS — E66.01 MORBID OBESITY (H): ICD-10-CM

## 2023-06-07 DIAGNOSIS — I10 HYPERTENSION GOAL BP (BLOOD PRESSURE) < 140/90: ICD-10-CM

## 2023-06-07 DIAGNOSIS — E78.5 HYPERLIPIDEMIA LDL GOAL <100: ICD-10-CM

## 2023-06-07 DIAGNOSIS — N18.31 STAGE 3A CHRONIC KIDNEY DISEASE (H): ICD-10-CM

## 2023-06-07 PROCEDURE — 99214 OFFICE O/P EST MOD 30 MIN: CPT | Mod: VID | Performed by: PREVENTIVE MEDICINE

## 2023-06-07 RX ORDER — HYDROCHLOROTHIAZIDE 25 MG/1
25 TABLET ORAL DAILY
Qty: 90 TABLET | Refills: 3 | Status: SHIPPED | OUTPATIENT
Start: 2023-06-07 | End: 2024-04-10

## 2023-06-07 RX ORDER — LOSARTAN POTASSIUM 100 MG/1
100 TABLET ORAL DAILY
Qty: 90 TABLET | Refills: 3 | Status: SHIPPED | OUTPATIENT
Start: 2023-06-07 | End: 2024-04-10

## 2023-06-07 RX ORDER — ATORVASTATIN CALCIUM 80 MG/1
80 TABLET, FILM COATED ORAL DAILY
Qty: 90 TABLET | Refills: 3 | Status: SHIPPED | OUTPATIENT
Start: 2023-06-07 | End: 2024-04-10

## 2023-06-07 RX ORDER — AMLODIPINE BESYLATE 10 MG/1
TABLET ORAL
Qty: 90 TABLET | Refills: 3 | Status: SHIPPED | OUTPATIENT
Start: 2023-06-07 | End: 2024-04-10

## 2023-06-07 NOTE — PROGRESS NOTES
Adrian is a 67 year old who is being evaluated via a billable video visit.      How would you like to obtain your AVS? MyChart  If the video visit is dropped, the invitation should be resent by: Send to e-mail at: [email or web address]  Will anyone else be joining your video visit? No          Assessment & Plan     Type 2 diabetes mellitus with stage 3a chronic kidney disease, with long-term current use of insulin (H)  -Due for follow-up with endocrine  - Hemoglobin A1c  - TSH with free T4 reflex    Lab Results   Component Value Date    A1C 11.6 12/06/2022    A1C 9.9 05/13/2022    A1C 11.1 10/20/2021    A1C 9.3 06/04/2021    A1C 10.9 02/16/2021    A1C 8.4 02/21/2020    A1C 7.7 06/18/2019    A1C 10.2 02/19/2019       Morbid obesity (H)  -Has been getting more walking done, goes to the gym 3 times a week    Wt Readings from Last 2 Encounters:   05/13/22 86.8 kg (191 lb 6.4 oz)   06/04/21 93 kg (205 lb)     Stage 3a chronic kidney disease (H)  -Scheduled to see renal on June 19  -will be getting labs before appointment   -on Losartan   -On Jardiance     Hypertension goal BP (blood pressure) < 140/90  -Refills on medication provided  - amLODIPine (NORVASC) 10 MG tablet  Dispense: 90 tablet; Refill: 3  - hydrochlorothiazide (HYDRODIURIL) 25 MG tablet  Dispense: 90 tablet; Refill: 3  - losartan (COZAAR) 100 MG tablet  Dispense: 90 tablet; Refill: 3    Hyperlipidemia LDL goal <100  -Refills on medication provided, continue statin  - Lipid panel reflex to direct LDL Non-fasting  - atorvastatin (LIPITOR) 80 MG tablet  Dispense: 90 tablet; Refill: 3    The ASCVD Risk score (Lianne DK, et al., 2019) failed to calculate for the following reasons:    The systolic blood pressure is missing    The valid total cholesterol range is 130 to 320 mg/dL    Screen for colon cancer  - Fecal colorectal cancer screen FIT - Future (S+30)    Visit for screening mammogram  - MA Screen Bilateral w/Mikey      Ordering of each unique  test  Prescription drug management  25 minutes spent by me on the date of the encounter doing chart review, history and exam, documentation and further activities per the note         Radha Madrigal MD MPH    [facility] 67 [facility], presenting for the following health issues:  Recheck Medication, Diabetes, and Hypertension        6/7/2023    10:29 AM   Additional Questions   Roomed by Haylie     History of Present Illness       Reason for visit:  Annual visit    She eats 4 or more servings of fruits and vegetables daily.She consumes 1 sweetened beverage(s) daily. She exercises with enough effort to increase her heart rate 5 days per week.   She is taking medications regularly.     Has been seen by endocrine for management of diabetes.    Seen by urology for mixed urinary incontinence.  Has been on Vesicare 10 mg daily  Last saw urology February 23    Is scheduled to see Nephrology on 6/19/23  Will be getting labs 6/16/23    Has been taking medication daily  Started Ozempic and readings are in range   No nausea     Exercise+ walking  Gym membership 3 times a week  Diabetes Follow-up     -managed by Endo  -needs to follow up with Endocrine  -74 is the lowest reading  Average 105 mg/dl     Hyperlipidemia Follow-Up       Are you regularly taking any medication or supplement to lower your cholesterol?   Yes- statin    Are you having muscle aches or other side effects that you think could be caused by your cholesterol lowering medication?  No     Hypertension Follow-up       Do you check your blood pressure regularly outside of the clinic? No     Are you following a low salt diet? Yes    Are your blood pressures ever more than 140 on the top number (systolic) OR more       than 90 on the bottom number (diastolic), for example 140/90? No  140/78 last check at store        Review of Systems   Constitutional, HEENT, cardiovascular, pulmonary, gi and gu systems are  negative, except as otherwise noted.      Objective    Vitals - Patient Reported  Pain Score: No Pain (0)        Physical Exam   GENERAL: Healthy, alert and no distress  EYES: Eyes grossly normal to inspection.  No discharge or erythema, or obvious scleral/conjunctival abnormalities.  RESP: No audible wheeze, cough, or visible cyanosis.  No visible retractions or increased work of breathing.    SKIN: Visible skin clear. No significant rash, abnormal pigmentation or lesions.  NEURO: Cranial nerves grossly intact.  Mentation and speech appropriate for age.  PSYCH: Mentation appears normal, affect normal/bright, judgement and insight intact, normal speech and appearance well-groomed.    No results found for this or any previous visit (from the past 24 hour(s)).            Video-Visit Details    Type of service:  Video Visit   Video Start Time: 4:54 PM  Video End Time:5:03 PM    Originating Location (pt. Location): Home    Distant Location (provider location):  On-site  Platform used for Video Visit: [employer]

## 2023-06-12 DIAGNOSIS — E66.01 MORBID OBESITY (H): ICD-10-CM

## 2023-06-12 DIAGNOSIS — N18.31 TYPE 2 DIABETES MELLITUS WITH STAGE 3A CHRONIC KIDNEY DISEASE, WITH LONG-TERM CURRENT USE OF INSULIN (H): ICD-10-CM

## 2023-06-12 DIAGNOSIS — E11.22 TYPE 2 DIABETES MELLITUS WITH STAGE 3A CHRONIC KIDNEY DISEASE, WITH LONG-TERM CURRENT USE OF INSULIN (H): ICD-10-CM

## 2023-06-12 DIAGNOSIS — Z79.4 TYPE 2 DIABETES MELLITUS WITH STAGE 3A CHRONIC KIDNEY DISEASE, WITH LONG-TERM CURRENT USE OF INSULIN (H): ICD-10-CM

## 2023-06-15 ENCOUNTER — PATIENT OUTREACH (OUTPATIENT)
Dept: CARE COORDINATION | Facility: CLINIC | Age: 68
End: 2023-06-15
Payer: COMMERCIAL

## 2023-06-15 NOTE — TELEPHONE ENCOUNTER
6/15 Called and spoke to patient, appointment is currently scheduled.     Catherine carrillo Procedure   Orthopedics, Podiatry, Sports Medicine, Ent ,Eye , Audiology, Adult Endocrine & Diabetes, Nutrition & Medication Therapy Management Specialties   [facility]

## 2023-06-15 NOTE — TELEPHONE ENCOUNTER
OZEMPIC (1 MG/DOSE) 4MG/3ML SOPN      Last Written Prescription Date:  11/28/22  Last Fill Quantity: 9ml,   # refills: 1  Last Office Visit : 11/28/22  Future Office visit:  none    Routing refill request to provider for review/approval because:  Overdue for office visit  Abnormal creatinine  Overdue for A1c    Routing to Endo back up provider for review

## 2023-06-16 ENCOUNTER — TELEPHONE (OUTPATIENT)
Dept: FAMILY MEDICINE | Facility: CLINIC | Age: 68
End: 2023-06-16

## 2023-06-16 ENCOUNTER — LAB (OUTPATIENT)
Dept: LAB | Facility: CLINIC | Age: 68
End: 2023-06-16
Payer: COMMERCIAL

## 2023-06-16 DIAGNOSIS — Z79.4 TYPE 2 DIABETES MELLITUS WITH STAGE 3A CHRONIC KIDNEY DISEASE, WITH LONG-TERM CURRENT USE OF INSULIN (H): ICD-10-CM

## 2023-06-16 DIAGNOSIS — E78.5 HYPERLIPIDEMIA LDL GOAL <100: ICD-10-CM

## 2023-06-16 DIAGNOSIS — E11.22 TYPE 2 DIABETES MELLITUS WITH STAGE 3A CHRONIC KIDNEY DISEASE, WITH LONG-TERM CURRENT USE OF INSULIN (H): ICD-10-CM

## 2023-06-16 DIAGNOSIS — N18.31 STAGE 3A CHRONIC KIDNEY DISEASE (H): ICD-10-CM

## 2023-06-16 DIAGNOSIS — N18.31 TYPE 2 DIABETES MELLITUS WITH STAGE 3A CHRONIC KIDNEY DISEASE, WITH LONG-TERM CURRENT USE OF INSULIN (H): ICD-10-CM

## 2023-06-16 DIAGNOSIS — Z12.11 SCREEN FOR COLON CANCER: ICD-10-CM

## 2023-06-16 LAB
ALBUMIN MFR UR ELPH: 18.9 MG/DL
ALBUMIN SERPL BCG-MCNC: 3.5 G/DL (ref 3.5–5.2)
ALBUMIN UR-MCNC: NEGATIVE MG/DL
ANION GAP SERPL CALCULATED.3IONS-SCNC: 11 MMOL/L (ref 7–15)
APPEARANCE UR: CLEAR
BILIRUB UR QL STRIP: NEGATIVE
BUN SERPL-MCNC: 21.7 MG/DL (ref 8–23)
CALCIUM SERPL-MCNC: 9.1 MG/DL (ref 8.8–10.2)
CHLORIDE SERPL-SCNC: 106 MMOL/L (ref 98–107)
CHOLEST SERPL-MCNC: 132 MG/DL
COLOR UR AUTO: YELLOW
CREAT SERPL-MCNC: 1.32 MG/DL (ref 0.51–0.95)
CREAT UR-MCNC: 75.4 MG/DL
CREAT UR-MCNC: 75.4 MG/DL
DEPRECATED CALCIDIOL+CALCIFEROL SERPL-MC: 19 UG/L (ref 20–75)
DEPRECATED HCO3 PLAS-SCNC: 26 MMOL/L (ref 22–29)
ERYTHROCYTE [DISTWIDTH] IN BLOOD BY AUTOMATED COUNT: 15.6 % (ref 10–15)
GFR SERPL CREATININE-BSD FRML MDRD: 44 ML/MIN/1.73M2
GLUCOSE SERPL-MCNC: 105 MG/DL (ref 70–99)
GLUCOSE UR STRIP-MCNC: >=1000 MG/DL
HBA1C MFR BLD: 6.3 % (ref 0–5.6)
HCT VFR BLD AUTO: 41.7 % (ref 35–47)
HDLC SERPL-MCNC: 42 MG/DL
HGB BLD-MCNC: 13 G/DL (ref 11.7–15.7)
HGB UR QL STRIP: NEGATIVE
KETONES UR STRIP-MCNC: NEGATIVE MG/DL
LDLC SERPL CALC-MCNC: 74 MG/DL
LEUKOCYTE ESTERASE UR QL STRIP: NEGATIVE
MCH RBC QN AUTO: 26.4 PG (ref 26.5–33)
MCHC RBC AUTO-ENTMCNC: 31.2 G/DL (ref 31.5–36.5)
MCV RBC AUTO: 85 FL (ref 78–100)
MICROALBUMIN UR-MCNC: 70.8 MG/L
MICROALBUMIN/CREAT UR: 93.9 MG/G CR (ref 0–25)
NITRATE UR QL: NEGATIVE
NONHDLC SERPL-MCNC: 90 MG/DL
PH UR STRIP: 6.5 [PH] (ref 5–7)
PHOSPHATE SERPL-MCNC: 3.8 MG/DL (ref 2.5–4.5)
PLATELET # BLD AUTO: 330 10E3/UL (ref 150–450)
POTASSIUM SERPL-SCNC: 3.9 MMOL/L (ref 3.4–5.3)
PROT/CREAT 24H UR: 0.25 MG/MG CR (ref 0–0.2)
PTH-INTACT SERPL-MCNC: 84 PG/ML (ref 15–65)
RBC # BLD AUTO: 4.92 10E6/UL (ref 3.8–5.2)
RBC #/AREA URNS AUTO: NORMAL /HPF
SODIUM SERPL-SCNC: 143 MMOL/L (ref 136–145)
SP GR UR STRIP: 1.01 (ref 1–1.03)
TRIGL SERPL-MCNC: 78 MG/DL
TSH SERPL DL<=0.005 MIU/L-ACNC: 2.73 UIU/ML (ref 0.3–4.2)
UROBILINOGEN UR STRIP-ACNC: 1 E.U./DL
WBC # BLD AUTO: 6.2 10E3/UL (ref 4–11)
WBC #/AREA URNS AUTO: NORMAL /HPF

## 2023-06-16 PROCEDURE — 83970 ASSAY OF PARATHORMONE: CPT

## 2023-06-16 PROCEDURE — 82043 UR ALBUMIN QUANTITATIVE: CPT

## 2023-06-16 PROCEDURE — 83036 HEMOGLOBIN GLYCOSYLATED A1C: CPT

## 2023-06-16 PROCEDURE — 84443 ASSAY THYROID STIM HORMONE: CPT

## 2023-06-16 PROCEDURE — 82306 VITAMIN D 25 HYDROXY: CPT

## 2023-06-16 PROCEDURE — 80061 LIPID PANEL: CPT

## 2023-06-16 PROCEDURE — 81001 URINALYSIS AUTO W/SCOPE: CPT

## 2023-06-16 PROCEDURE — 84156 ASSAY OF PROTEIN URINE: CPT

## 2023-06-16 PROCEDURE — 85027 COMPLETE CBC AUTOMATED: CPT

## 2023-06-16 PROCEDURE — 82570 ASSAY OF URINE CREATININE: CPT

## 2023-06-16 PROCEDURE — 80069 RENAL FUNCTION PANEL: CPT

## 2023-06-16 PROCEDURE — 36415 COLL VENOUS BLD VENIPUNCTURE: CPT

## 2023-06-16 NOTE — RESULT ENCOUNTER NOTE
Adrian,     3-month glucose number Hemoglobin A1c has improved significantly from 11.6 to 6.3.  This is an excellent improvement.    Please do not hesitate to call us at [phone] if you have any questions or concerns.    Thank you,    Radha Madrigal MD MPH

## 2023-06-16 NOTE — TELEPHONE ENCOUNTER
Patient Quality Outreach    Patient is due for the following:   Diabetes -  Microalbumin and Foot Exam  Colon Cancer Screening  Physical Annual Wellness Visit      Topic Date Due     Zoster (Shingles) Vaccine (1 of 2) Never done     Pneumococcal Vaccine (2 - PCV) 01/22/2016     COVID-19 Vaccine (5 - Pfizer series) 07/08/2022       Next Steps:   Schedule a Annual Wellness Visit    Type of outreach:    Sent [employer] message.      Questions for provider review:    None           Mary John MA

## 2023-06-19 ENCOUNTER — VIRTUAL VISIT (OUTPATIENT)
Dept: NEPHROLOGY | Facility: CLINIC | Age: 68
End: 2023-06-19
Attending: INTERNAL MEDICINE
Payer: COMMERCIAL

## 2023-06-19 ENCOUNTER — PRE VISIT (OUTPATIENT)
Dept: NEPHROLOGY | Facility: CLINIC | Age: 68
End: 2023-06-19

## 2023-06-19 VITALS — WEIGHT: 174 LBS | BODY MASS INDEX: 33.28 KG/M2

## 2023-06-19 DIAGNOSIS — E55.9 VITAMIN D DEFICIENCY: Primary | ICD-10-CM

## 2023-06-19 DIAGNOSIS — N18.31 STAGE 3A CHRONIC KIDNEY DISEASE (H): ICD-10-CM

## 2023-06-19 PROCEDURE — 99204 OFFICE O/P NEW MOD 45 MIN: CPT | Mod: VID | Performed by: INTERNAL MEDICINE

## 2023-06-19 RX ORDER — CHOLECALCIFEROL (VITAMIN D3) 50 MCG
1 TABLET ORAL DAILY
Qty: 30 TABLET | Refills: 3 | Status: SHIPPED | OUTPATIENT
Start: 2023-06-19 | End: 2024-01-12

## 2023-06-19 RX ORDER — SEMAGLUTIDE 1.34 MG/ML
INJECTION, SOLUTION SUBCUTANEOUS
Qty: 12 ML | Refills: 0 | Status: SHIPPED | OUTPATIENT
Start: 2023-06-19 | End: 2023-09-11 | Stop reason: DRUGHIGH

## 2023-06-19 RX ORDER — ERGOCALCIFEROL 1.25 MG/1
50000 CAPSULE, LIQUID FILLED ORAL WEEKLY
Qty: 8 CAPSULE | Refills: 0 | Status: SHIPPED | OUTPATIENT
Start: 2023-06-19 | End: 2023-08-08

## 2023-06-19 ASSESSMENT — PAIN SCALES - GENERAL: PAINLEVEL: NO PAIN (0)

## 2023-06-19 NOTE — NURSING NOTE
Is the patient currently in the state of [place]? YES    Visit mode:VIDEO    If the visit is dropped, the patient can be reconnected by: VIDEO VISIT: Send to e-mail at: [email or web address]    Will anyone else be joining the visit? NO      How would you like to obtain your AVS? MyChart    Are changes needed to the allergy or medication list? YES: ozempic 1 mg needs to be added.     Reason for visit: No chief complaint on file.

## 2023-06-19 NOTE — LETTER
6/19/2023       RE: Adrian Cruz  [address]  [facility] [address]     Dear Colleague,    Thank you for referring your patient, Adrian Cruz, to the [facility] [place] [facility]. Please see a copy of my visit note below.          Nephrology Initial Consult  June 19, 2023      Adrian Cruz MRN:[id number] YOB: 1955  Date of Admission:(Not on file)  Primary care provider: Radha Madrigal  Requesting physician: Amy Sosa MD      REASON FOR CONSULT: CKD stage 3       HISTORY OF PRESENT ILLNESS:    She has had an elevated creatinine going back to 2016 and since then she has had fluctuations but mostly has remained at a baseline of 1.3 with eGFR of 44. She has not had UA's in the past and does not have an active urinary sediment currently. She has had persistent albuminuria since 2015 which has slowly worsened overtime and now has UPCR of 0.25 g/g.     She was diagnosed with DM type 2 in 1997 and has retinopathy which is being treated. She has had quite poor control of her DM given her high A1C's. She has been recently started on insulin again and has been on Jardiance for about 2 years.    She reports her blood pressure used to be real high, on review of her charts,in the range of 160-160's systolics occasionally and has been better now. Last week she checked it and was 148/78 mm of Hg. She denies having any LE edema. She denies excessive Na intake but her feet start swelling when she eats potato chips.   She sees urology for urinary incontinence and has been started on Vesicare.     Patient denies any LE edema, exertional dyspnea/orthopnea/PND, dizziness, lightheadedness, nausea, vomiting or diarrhea.   Denies use of OTC NSAIDS or other non prescribed meds, recent exposure to abx or contrast, obstructive urinary symptoms, skin rashes, joint pains, fevers, passing kidney  stones, recurrent sinus infections or UTI's       PAST MEDICAL HISTORY:  Reviewed with patient on 06/19/2023   As per HPI    MEDICATIONS:  Reviewed with the patient in detail    ALLERGIES:    Reviewed with the patient in detail    REVIEW OF SYSTEMS:  A comprehensive of systems was negative except as noted above.    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 Baseline creatinine of 1.3 mg/dl with eGFR of 44. UA without active sediment, UPCR of 0.25 g/g.   Her blood pressures are above the goal of 130/90 mm of Hg, while she remains on amlodipine 10 mg daily, losartan 100 mg daily and hydrochlorothiazide 25 mg daily. She reports of being euvolemic on exam.   She has mild secondary hyperparathyroidism and vit D deficiency.     She does not have other metabolic complications of CKD.     She is now on Jardiance and is having her insulin adjusted with consideration of Ozempic for better glycemic control.     Recommendations today:    --please check your blood pressures daily for 2 weeks and send the readings to us via [employer]   --start taking ergocalciferol 50,000 international unit(s) weekly for 8 weeks followed by cholecalciferol 2000 international unit(s) daily.     F/up in clinic in 3 months      Anne Jalloh MD

## 2023-06-19 NOTE — RESULT ENCOUNTER NOTE
Adrian,    LDL cholesterol is at goal for you.  Thyroid function is normal.     Please do not hesitate to call us at [phone] if you have any questions or concerns.    Thank you,    Radha Madrigal MD MPH

## 2023-06-19 NOTE — PATIENT INSTRUCTIONS
--please check your blood pressures daily for 2 weeks and send the readings to us via [employer]   --start taking ergocalciferol 50,000 international unit(s) weekly for 8 weeks followed by cholecalciferol 2000 international unit(s) daily. (Vit D tablets)

## 2023-06-19 NOTE — PROGRESS NOTES
Virtual Visit Details    Type of service:  Video Visit   Video Start Time: 8.06 am  Video End Time:8.22 am    Originating Location (pt. Location): Home    Distant Location (provider location):  Off-site  Platform used for Video Visit: Well         Nephrology Initial Consult  June 19, 2023      Adrian Cruz MRN:[id number] YOB: 1955  Date of Admission:(Not on file)  Primary care provider: Radha Madrigal  Requesting physician: Amy Sosa MD      REASON FOR CONSULT: CKD stage 3       HISTORY OF PRESENT ILLNESS:    She has had an elevated creatinine going back to 2016 and since then she has had fluctuations but mostly has remained at a baseline of 1.3 with eGFR of 44. She has not had UA's in the past and does not have an active urinary sediment currently. She has had persistent albuminuria since 2015 which has slowly worsened overtime and now has UPCR of 0.25 g/g.     She was diagnosed with DM type 2 in 1997 and has retinopathy which is being treated. She has had quite poor control of her DM given her high A1C's. She has been recently started on insulin again and has been on Jardiance for about 2 years.    She reports her blood pressure used to be real high, on review of her charts,in the range of 160-160's systolics occasionally and has been better now. Last week she checked it and was 148/78 mm of Hg. She denies having any LE edema. She denies excessive Na intake but her feet start swelling when she eats potato chips.   She sees urology for urinary incontinence and has been started on Vesicare.     Patient denies any LE edema, exertional dyspnea/orthopnea/PND, dizziness, lightheadedness, nausea, vomiting or diarrhea.   Denies use of OTC NSAIDS or other non prescribed meds, recent exposure to abx or contrast, obstructive urinary symptoms, skin rashes, joint pains, fevers, passing kidney stones, recurrent sinus infections or UTI's       PAST MEDICAL HISTORY:  Reviewed with patient on  06/19/2023   As per HPI    MEDICATIONS:  Reviewed with the patient in detail    ALLERGIES:    Reviewed with the patient in detail    REVIEW OF SYSTEMS:  A comprehensive of systems was negative except as noted above.    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 Baseline creatinine of 1.3 mg/dl with eGFR of 44. UA without active sediment, UPCR of 0.25 g/g.   Her blood pressures are above the goal of 130/90 mm of Hg, while she remains on amlodipine 10 mg daily, losartan 100 mg daily and hydrochlorothiazide 25 mg daily. She reports of being euvolemic on exam.   She has mild secondary hyperparathyroidism and vit D deficiency.     She does not have other metabolic complications of CKD.     She is now on Jardiance and is having her insulin adjusted with consideration of Ozempic for better glycemic control.     Recommendations today:    --please check your blood pressures daily for 2 weeks and send the readings to us via [employer]   --start taking ergocalciferol 50,000 international unit(s) weekly for 8 weeks followed by cholecalciferol 2000 international unit(s) daily.     F/up in clinic in 3 months      Anne Jalloh MD

## 2023-06-20 NOTE — TELEPHONE ENCOUNTER
Company sent in another request for the refill. Please complete refill form added to the media tab a fax directly to the company. My team doesn't handle refills only handles getting the patient enrolled in patient assistance. More than likely you'll have to dig in the media tab since it was already added. It should be dated 04/12/2023.  I do see that your prior note of the doctor signing off . It either has missing information on it or they never got. Please make sure all the patient and provider information is filled out meaning their address and phone number and the other stuff. Please note the date on the top of the page faxed for the request

## 2023-06-21 NOTE — TELEPHONE ENCOUNTER
PAP form completed and faxed again to [phone] with confirmation.        Roxanne Busby CMA  Adult Endocrinology  [facility], [place]

## 2023-07-15 ENCOUNTER — HEALTH MAINTENANCE LETTER (OUTPATIENT)
Age: 68
End: 2023-07-15

## 2023-08-01 ENCOUNTER — ANCILLARY PROCEDURE (OUTPATIENT)
Dept: MAMMOGRAPHY | Facility: CLINIC | Age: 68
End: 2023-08-01
Attending: PREVENTIVE MEDICINE
Payer: COMMERCIAL

## 2023-08-01 DIAGNOSIS — Z12.31 VISIT FOR SCREENING MAMMOGRAM: ICD-10-CM

## 2023-08-01 PROCEDURE — 77067 SCR MAMMO BI INCL CAD: CPT | Mod: TC | Performed by: RADIOLOGY

## 2023-08-01 PROCEDURE — 77063 BREAST TOMOSYNTHESIS BI: CPT | Mod: TC | Performed by: RADIOLOGY

## 2023-08-18 DIAGNOSIS — Z79.4 TYPE 2 DIABETES MELLITUS WITH STAGE 3A CHRONIC KIDNEY DISEASE, WITH LONG-TERM CURRENT USE OF INSULIN (H): ICD-10-CM

## 2023-08-18 DIAGNOSIS — E11.22 TYPE 2 DIABETES MELLITUS WITH STAGE 3A CHRONIC KIDNEY DISEASE, WITH LONG-TERM CURRENT USE OF INSULIN (H): ICD-10-CM

## 2023-08-18 DIAGNOSIS — N18.31 TYPE 2 DIABETES MELLITUS WITH STAGE 3A CHRONIC KIDNEY DISEASE, WITH LONG-TERM CURRENT USE OF INSULIN (H): ICD-10-CM

## 2023-08-20 DIAGNOSIS — E11.22 TYPE 2 DIABETES MELLITUS WITH STAGE 3A CHRONIC KIDNEY DISEASE, WITH LONG-TERM CURRENT USE OF INSULIN (H): ICD-10-CM

## 2023-08-20 DIAGNOSIS — N18.31 TYPE 2 DIABETES MELLITUS WITH STAGE 3A CHRONIC KIDNEY DISEASE, WITH LONG-TERM CURRENT USE OF INSULIN (H): ICD-10-CM

## 2023-08-20 DIAGNOSIS — Z79.4 TYPE 2 DIABETES MELLITUS WITH STAGE 3A CHRONIC KIDNEY DISEASE, WITH LONG-TERM CURRENT USE OF INSULIN (H): ICD-10-CM

## 2023-08-22 RX ORDER — METFORMIN HCL 500 MG
TABLET, EXTENDED RELEASE 24 HR ORAL
Qty: 180 TABLET | Refills: 0 | Status: SHIPPED | OUTPATIENT
Start: 2023-08-22 | End: 2023-11-22

## 2023-08-22 NOTE — TELEPHONE ENCOUNTER
metFORMIN (GLUCOPHAGE XR) 500 MG 24 hr tablet       Biguanide Agents Passed      Amy Sosa MD  Endocrinology, Diabetes, and Metabolism    11/28/2022  [facility]

## 2023-08-24 NOTE — PROGRESS NOTES
Outcome for 08/24/23 12:34 PM: Data uploaded on [employer]  Sabrina Avila MA  Outcome for 09/07/23 1:55 PM: Data obtained via [employer] website  Sabrina Avila MA    Patient is showing 4/5 [place] met. BP out range - BP not on file.   Sabrina Avila MA

## 2023-08-24 NOTE — TELEPHONE ENCOUNTER
JARDIANCE 25 MG TABLET   Last office visit 11/28/2022  Future office visit  9/11/2023  90 Tabs, 3 Refills sent to pharm       Sodium Glucose Co-Transport Inhibitor Agents Psootd5408/20/2023 01:05 AM   Protocol Details Patient has documented A1c within the specified period of time.    No creatinine >1.4 or GFR <45 within the past 12 mos    Medication is active on med list    Patient is age 18 or older    Patient is not pregnant    Patient has documented normal Potassium within the last 12 mos.    Patient has no positive pregnancy test within the past 12 mos.    Recent (6 mo) or future (30 days) visit within the authorizing provider's specialty

## 2023-09-06 DIAGNOSIS — N18.31 STAGE 3A CHRONIC KIDNEY DISEASE (H): Primary | ICD-10-CM

## 2023-09-11 ENCOUNTER — TELEPHONE (OUTPATIENT)
Dept: ENDOCRINOLOGY | Facility: CLINIC | Age: 68
End: 2023-09-11

## 2023-09-11 ENCOUNTER — VIRTUAL VISIT (OUTPATIENT)
Dept: ENDOCRINOLOGY | Facility: CLINIC | Age: 68
End: 2023-09-11
Payer: COMMERCIAL

## 2023-09-11 DIAGNOSIS — E11.22 TYPE 2 DIABETES MELLITUS WITH STAGE 3A CHRONIC KIDNEY DISEASE, WITH LONG-TERM CURRENT USE OF INSULIN (H): Primary | ICD-10-CM

## 2023-09-11 DIAGNOSIS — N18.31 STAGE 3A CHRONIC KIDNEY DISEASE (H): ICD-10-CM

## 2023-09-11 DIAGNOSIS — N18.31 TYPE 2 DIABETES MELLITUS WITH STAGE 3A CHRONIC KIDNEY DISEASE, WITH LONG-TERM CURRENT USE OF INSULIN (H): Primary | ICD-10-CM

## 2023-09-11 DIAGNOSIS — Z79.4 TYPE 2 DIABETES MELLITUS WITH STAGE 3A CHRONIC KIDNEY DISEASE, WITH LONG-TERM CURRENT USE OF INSULIN (H): Primary | ICD-10-CM

## 2023-09-11 DIAGNOSIS — E66.01 MORBID OBESITY (H): ICD-10-CM

## 2023-09-11 PROCEDURE — 99214 OFFICE O/P EST MOD 30 MIN: CPT | Mod: VID | Performed by: INTERNAL MEDICINE

## 2023-09-11 NOTE — TELEPHONE ENCOUNTER
Health Call Center    Phone Message    May a detailed message be left on voicemail: yes     Reason for Call: Medication Question or concern regarding medication   Prescription Clarification  Name of Medication: Semaglutide, 2 MG/DOSE, (OZEMPIC) 8 MG/3ML pen   Prescribing Provider:    Pharmacy: [address]   What on the order needs clarification?       Please send medication to [address] not [facility]. Please and thank you!      Action Taken: Message routed to:  [facility] ENDO    Travel Screening: Not Applicable

## 2023-09-11 NOTE — TELEPHONE ENCOUNTER
Tracy [employer]isk PAP form completed and placed for Dr. Sosa to sign when back in the office on 9/12/23.    Roxanne Busby CMA  Adult Endocrinology  [facility], [place]

## 2023-09-11 NOTE — TELEPHONE ENCOUNTER
Patient calling stating script below needs to go to [facility] at fax number [phone] due to her being on patient assistance with this program. Tel: [phone]. Please call with questions thank you.

## 2023-09-11 NOTE — LETTER
9/11/2023         RE: Adrian Cruz  [address]  [facility] [address]        Dear Colleague,    Thank you for referring your patient, Adrian Curz, to the [facility]. Please see a copy of my visit note below.    Outcome for 08/24/23 12:34 PM: Data uploaded on [employer]  Sabrina Avila MA  Outcome for 09/07/23 1:55 PM: Data obtained via [employer] website  Sabrina Avila MA    Patient is showing 4/5 [place]CM met. BP out range - BP not on file.   Sabrina Avila MA      Endocrinology virtual Visit    Chief Complaint: No chief complaint on file.     Information obtained from:Patient      Assessment/Treatment Plan:      Type 2 diabetes in the setting of stage III CKD-  A1c has improved to 6.3% from 11.6 and weight loss of 30 pounds on Ozempic and life style changes.      Off note, c-peptide checked and it was WNL.     Blood glucose readings reviewed in detail.  Overall, average BG on CGM was 108 with predicted A1c of 5.9%. Chronic complications monitoring  Blood pressure well controlled historically.  On atorvastatin at the full dose.  Has microalbuminuria and currently on losartan 100 mg daily; CKD following with nephrology.     Plan:  Metformin 1000 mg daily  Jardiance 25 mg daily  Semaglutide 2 mg every 7 days  Stop insulin glargine.     Morbid Obesity: weight loss of 30 pounds. Weight loss and exercise are recommended in the management of Type 2 diabetes.  Caloric restriction, portion control and physical activity are evidence based strategies for life style changes. A1c has improved to 6.3% from 11.6 and weight loss of 30 pounds: Ozempic adjustment as documented above.     Test and/or medications prescribed today:  Orders Placed This Encounter   Procedures     Hemoglobin A1c         Amy Sosa MD  Staff Endocrinologist    Division of Endocrinology and Diabetes      Subjective:         HPI: Adrian Cruz is a 68 year old female with history  of type 2 diabetes who is here for a follow up.     Type 2 diabetes essential diagnosed in 1997.  Patient is currently on:  Jardiance 25 mg daily  Semaglutide 1 mg every 7 days   Metformin 1 g daily/GFR  Lantus 22 units daily     GFR 44.  Patient has a known CKD stage IIIa. Following with nephrology.     Patient lives alone.  Reports urinary incontinence. Kegel exercises didn't help.     Answers submitted by the patient for this visit:  Symptoms you have experienced in the last 30 days (Submitted on 9/8/2023)  General Symptoms: No  Skin Symptoms: No  HENT Symptoms: No  EYE SYMPTOMS: No  HEART SYMPTOMS: No  LUNG SYMPTOMS: No  INTESTINAL SYMPTOMS: No  URINARY SYMPTOMS: No  GYNECOLOGIC SYMPTOMS: No  BREAST SYMPTOMS: No  SKELETAL SYMPTOMS: No  BLOOD SYMPTOMS: No  NERVOUS SYSTEM SYMPTOMS: No  MENTAL HEALTH SYMPTOMS: No        Allergies   Allergen Reactions     Ace Inhibitors Cough     Glucophage [Metformin And Related] Cramps     Stomach upset       Current Outpatient Medications   Medication Sig Dispense Refill     Semaglutide, 2 MG/DOSE, (OZEMPIC) 8 MG/3ML pen Inject 2 mg Subcutaneous every 7 days 9 mL 3     amLODIPine (NORVASC) 10 MG tablet TAKE 1 TABLET(10 MG) BY MOUTH DAILY FOR BLOOD PRESSURE 90 tablet 3     atorvastatin (LIPITOR) 80 MG tablet Take 1 tablet (80 mg) by mouth daily 90 tablet 3     blood glucose monitoring (NO BRAND SPECIFIED) meter device kit Use to test blood sugar 2 times daily or as directed. 1 kit 0     blood glucose monitoring (ULTRA THIN 30G) lancets Use to test blood sugar 2 times daily or as directed. Per insurance 100 each 11     cetirizine (ZYRTEC) 10 MG tablet TAKE 1 TABLET (10 MG) BY MOUTH EVERY EVENING AS NEEDED FOR ALLERGIES 90 tablet 1     Continuous Blood Gluc  (FREESTYLE RODNEY 2 READER) JOSE 1 each See Admin Instructions Use multiple times daily to read glucose levels. 1 each 0     Continuous Blood Gluc Sensor (FREESTYLE RODNEY 2 SENSOR) MISC 1 each every 14 days Use 1 sensor  every 14 days. Use to read blood sugars per 's instructions. 2 each 5     empagliflozin (JARDIANCE) 25 MG TABS tablet Take 1 tablet (25 mg) by mouth daily 90 tablet 3     fluticasone (FLONASE) 50 MCG/ACT nasal spray SPRAY 1-2 SPRAYS INTO BOTH NOSTRILS DAILY AS NEEDED FOR RHINITIS OR ALLERGIES 48 mL 2     Glucose Blood (BLOOD GLUCOSE TEST STRIPS) STRP 1 strip by Lancet route 2 times daily 100 strip 11     hydrochlorothiazide (HYDRODIURIL) 25 MG tablet Take 1 tablet (25 mg) by mouth daily 90 tablet 3     losartan (COZAAR) 100 MG tablet Take 1 tablet (100 mg) by mouth daily 90 tablet 3     metFORMIN (GLUCOPHAGE XR) 500 MG 24 hr tablet Take 2 tablets (1,000 mg) by mouth daily (with dinner) - Oral 180 tablet 0     omeprazole 20 MG tablet Take 1 tablet (20 mg) by mouth daily as needed 90 tablet 1     order for DME Equipment being ordered: Digital home blood pressure monitor kit 1 Device 0     ORDER FOR DME Equipment being ordered: blood pressure cuff/machine 1 Device 0     solifenacin (VESICARE) 10 MG tablet Take 1 tablet (10 mg) by mouth daily 30 tablet 11     vitamin D3 (CHOLECALCIFEROL) 50 mcg (2000 units) tablet Take 1 tablet (50 mcg) by mouth daily 30 tablet 3       Review of Systems     as per HPI above/ vaginal itching and whitish discharge.  Past medical history, past surgical history, and family history reviewed and epic.  Patient lives alone.        Objective:   GENERAL: Healthy, alert and no distress  EYES: Eyes grossly normal to inspection.    RESP: No audible wheeze, cough, or visible cyanosis.   NEURO: alert and oriented.   PSYCH: Mentation appears normal, affect normal/bright, judgement and insight intact, normal speech.  Wt Readings from Last 10 Encounters:   06/19/23 78.9 kg (174 lb)   05/13/22 86.8 kg (191 lb 6.4 oz)   06/04/21 93 kg (205 lb)   02/16/21 93.4 kg (206 lb)   02/21/20 90.7 kg (200 lb)   06/18/19 92.5 kg (204 lb)   02/19/19 94.3 kg (208 lb)   10/30/18 93.4 kg (206 lb)   04/27/18  91.4 kg (201 lb 9.6 oz)   02/08/18 89.4 kg (197 lb)       In House Labs:     Hemoglobin A1C   Date Value Ref Range Status   06/16/2023 6.3 (H) 0.0 - 5.6 % Final     Comment:     Normal <5.7%   Prediabetes 5.7-6.4%    Diabetes 6.5% or higher     Note: Adopted from ADA consensus guidelines.   12/06/2022 11.6 (H) 0.0 - 5.6 % Final     Comment:     Normal <5.7%   Prediabetes 5.7-6.4%    Diabetes 6.5% or higher     Note: Adopted from ADA consensus guidelines.   05/13/2022 9.9 (H) 0.0 - 5.6 % Final     Comment:     Normal <5.7%   Prediabetes 5.7-6.4%    Diabetes 6.5% or higher     Note: Adopted from ADA consensus guidelines.   06/04/2021 9.3 (H) 0 - 5.6 % Final     Comment:     Normal <5.7% Prediabetes 5.7-6.4%  Diabetes 6.5% or higher - adopted from ADA   consensus guidelines.  Reviewed: OK with previous     02/16/2021 10.9 (H) 0 - 5.6 % Final     Comment:     Results confirmed by repeat test  Normal <5.7% Prediabetes 5.7-6.4%  Diabetes 6.5% or higher - adopted from ADA   consensus guidelines.     02/21/2020 8.4 (H) 0 - 5.6 % Final     Comment:     Normal <5.7% Prediabetes 5.7-6.4%  Diabetes 6.5% or higher - adopted from ADA   consensus guidelines.         TSH   Date Value Ref Range Status   06/16/2023 2.73 0.30 - 4.20 uIU/mL Final   05/13/2022 1.12 0.40 - 4.00 mU/L Final   02/16/2021 2.34 0.40 - 4.00 mU/L Final   05/23/2017 2.28 0.40 - 4.00 mU/L Final   01/22/2015 1.82 0.40 - 4.00 mU/L Final     Comment:     Effective 7/30/2014, the reference range for this assay has changed to reflect   new instrumentation/methodology.     04/25/2013 1.86 0.4 - 5.0 mU/L Final   08/16/2011 1.21 0.4 - 5.0 mU/L Final       Creatinine   Date Value Ref Range Status   06/16/2023 1.32 (H) 0.51 - 0.95 mg/dL Final   07/08/2021 1.30 (H) 0.52 - 1.04 mg/dL Final   GFR > 50     Recent Labs   Lab Test 02/16/21  1108 02/21/20  0726   CHOL 137 139   HDL 44* 32*   LDL 74 80   TRIG 96 135   CHOLHDLRATIO  --   --     < > = values in this interval not  displayed.     Video-Visit Details    Type of service:  Video Visit  Joined the call at 9/11/2023, 9:02:34 am.  Left the call at 9/11/2023, 9:16:38 am.  You were on the call for 14 minutes 3 seconds .    Distant Location (provider location):  Off-site.     Platform used for Video Visit: Jamison      Again, thank you for allowing me to participate in the care of your patient.        Sincerely,        Amy Sosa MD

## 2023-09-11 NOTE — PROGRESS NOTES
Endocrinology virtual Visit    Chief Complaint: No chief complaint on file.     Information obtained from:Patient      Assessment/Treatment Plan:      Type 2 diabetes in the setting of stage III CKD-  A1c has improved to 6.3% from 11.6 and weight loss of 30 pounds on Ozempic and life style changes.      Off note, c-peptide checked and it was WNL.     Blood glucose readings reviewed in detail.  Overall, average BG on CGM was 108 with predicted A1c of 5.9%. Chronic complications monitoring  Blood pressure well controlled historically.  On atorvastatin at the full dose.  Has microalbuminuria and currently on losartan 100 mg daily; CKD following with nephrology.     Plan:  Metformin 1000 mg daily  Jardiance 25 mg daily  Semaglutide 2 mg every 7 days  Stop insulin glargine.     Morbid Obesity: weight loss of 30 pounds. Weight loss and exercise are recommended in the management of Type 2 diabetes.  Caloric restriction, portion control and physical activity are evidence based strategies for life style changes. A1c has improved to 6.3% from 11.6 and weight loss of 30 pounds: Ozempic adjustment as documented above.     Test and/or medications prescribed today:  Orders Placed This Encounter   Procedures    Hemoglobin A1c         Amy Sosa MD  Staff Endocrinologist    Division of Endocrinology and Diabetes      Subjective:         HPI: Adrian Cruz is a 68 year old female with history of type 2 diabetes who is here for a follow up.     Type 2 diabetes essential diagnosed in 1997.  Patient is currently on:  Jardiance 25 mg daily  Semaglutide 1 mg every 7 days   Metformin 1 g daily/GFR  Lantus 22 units daily     GFR 44.  Patient has a known CKD stage IIIa. Following with nephrology.     Patient lives alone.  Reports urinary incontinence. Kegel exercises didn't help.     Answers submitted by the patient for this visit:  Symptoms you have experienced in the last 30 days (Submitted on 9/8/2023)  General Symptoms:  No  Skin Symptoms: No  HENT Symptoms: No  EYE SYMPTOMS: No  HEART SYMPTOMS: No  LUNG SYMPTOMS: No  INTESTINAL SYMPTOMS: No  URINARY SYMPTOMS: No  GYNECOLOGIC SYMPTOMS: No  BREAST SYMPTOMS: No  SKELETAL SYMPTOMS: No  BLOOD SYMPTOMS: No  NERVOUS SYSTEM SYMPTOMS: No  MENTAL HEALTH SYMPTOMS: No        Allergies   Allergen Reactions    Ace Inhibitors Cough    Glucophage [Metformin And Related] Cramps     Stomach upset       Current Outpatient Medications   Medication Sig Dispense Refill    Semaglutide, 2 MG/DOSE, (OZEMPIC) 8 MG/3ML pen Inject 2 mg Subcutaneous every 7 days 9 mL 3    amLODIPine (NORVASC) 10 MG tablet TAKE 1 TABLET(10 MG) BY MOUTH DAILY FOR BLOOD PRESSURE 90 tablet 3    atorvastatin (LIPITOR) 80 MG tablet Take 1 tablet (80 mg) by mouth daily 90 tablet 3    blood glucose monitoring (NO BRAND SPECIFIED) meter device kit Use to test blood sugar 2 times daily or as directed. 1 kit 0    blood glucose monitoring (ULTRA THIN 30G) lancets Use to test blood sugar 2 times daily or as directed. Per insurance 100 each 11    cetirizine (ZYRTEC) 10 MG tablet TAKE 1 TABLET (10 MG) BY MOUTH EVERY EVENING AS NEEDED FOR ALLERGIES 90 tablet 1    Continuous Blood Gluc  (FREESTYLE RODNEY 2 READER) JOSE 1 each See Admin Instructions Use multiple times daily to read glucose levels. 1 each 0    Continuous Blood Gluc Sensor (FREESTYLE RODNEY 2 SENSOR) MISC 1 each every 14 days Use 1 sensor every 14 days. Use to read blood sugars per 's instructions. 2 each 5    empagliflozin (JARDIANCE) 25 MG TABS tablet Take 1 tablet (25 mg) by mouth daily 90 tablet 3    fluticasone (FLONASE) 50 MCG/ACT nasal spray SPRAY 1-2 SPRAYS INTO BOTH NOSTRILS DAILY AS NEEDED FOR RHINITIS OR ALLERGIES 48 mL 2    Glucose Blood (BLOOD GLUCOSE TEST STRIPS) STRP 1 strip by Lancet route 2 times daily 100 strip 11    hydrochlorothiazide (HYDRODIURIL) 25 MG tablet Take 1 tablet (25 mg) by mouth daily 90 tablet 3    losartan (COZAAR) 100 MG  tablet Take 1 tablet (100 mg) by mouth daily 90 tablet 3    metFORMIN (GLUCOPHAGE XR) 500 MG 24 hr tablet Take 2 tablets (1,000 mg) by mouth daily (with dinner) - Oral 180 tablet 0    omeprazole 20 MG tablet Take 1 tablet (20 mg) by mouth daily as needed 90 tablet 1    order for DME Equipment being ordered: Digital home blood pressure monitor kit 1 Device 0    ORDER FOR DME Equipment being ordered: blood pressure cuff/machine 1 Device 0    solifenacin (VESICARE) 10 MG tablet Take 1 tablet (10 mg) by mouth daily 30 tablet 11    vitamin D3 (CHOLECALCIFEROL) 50 mcg (2000 units) tablet Take 1 tablet (50 mcg) by mouth daily 30 tablet 3       Review of Systems     as per HPI above/ vaginal itching and whitish discharge.  Past medical history, past surgical history, and family history reviewed and epic.  Patient lives alone.        Objective:   GENERAL: Healthy, alert and no distress  EYES: Eyes grossly normal to inspection.    RESP: No audible wheeze, cough, or visible cyanosis.   NEURO: alert and oriented.   PSYCH: Mentation appears normal, affect normal/bright, judgement and insight intact, normal speech.  Wt Readings from Last 10 Encounters:   06/19/23 78.9 kg (174 lb)   05/13/22 86.8 kg (191 lb 6.4 oz)   06/04/21 93 kg (205 lb)   02/16/21 93.4 kg (206 lb)   02/21/20 90.7 kg (200 lb)   06/18/19 92.5 kg (204 lb)   02/19/19 94.3 kg (208 lb)   10/30/18 93.4 kg (206 lb)   04/27/18 91.4 kg (201 lb 9.6 oz)   02/08/18 89.4 kg (197 lb)       In House Labs:     Hemoglobin A1C   Date Value Ref Range Status   06/16/2023 6.3 (H) 0.0 - 5.6 % Final     Comment:     Normal <5.7%   Prediabetes 5.7-6.4%    Diabetes 6.5% or higher     Note: Adopted from ADA consensus guidelines.   12/06/2022 11.6 (H) 0.0 - 5.6 % Final     Comment:     Normal <5.7%   Prediabetes 5.7-6.4%    Diabetes 6.5% or higher     Note: Adopted from ADA consensus guidelines.   05/13/2022 9.9 (H) 0.0 - 5.6 % Final     Comment:     Normal <5.7%   Prediabetes 5.7-6.4%     Diabetes 6.5% or higher     Note: Adopted from ADA consensus guidelines.   06/04/2021 9.3 (H) 0 - 5.6 % Final     Comment:     Normal <5.7% Prediabetes 5.7-6.4%  Diabetes 6.5% or higher - adopted from ADA   consensus guidelines.  Reviewed: OK with previous     02/16/2021 10.9 (H) 0 - 5.6 % Final     Comment:     Results confirmed by repeat test  Normal <5.7% Prediabetes 5.7-6.4%  Diabetes 6.5% or higher - adopted from ADA   consensus guidelines.     02/21/2020 8.4 (H) 0 - 5.6 % Final     Comment:     Normal <5.7% Prediabetes 5.7-6.4%  Diabetes 6.5% or higher - adopted from ADA   consensus guidelines.         TSH   Date Value Ref Range Status   06/16/2023 2.73 0.30 - 4.20 uIU/mL Final   05/13/2022 1.12 0.40 - 4.00 mU/L Final   02/16/2021 2.34 0.40 - 4.00 mU/L Final   05/23/2017 2.28 0.40 - 4.00 mU/L Final   01/22/2015 1.82 0.40 - 4.00 mU/L Final     Comment:     Effective 7/30/2014, the reference range for this assay has changed to reflect   new instrumentation/methodology.     04/25/2013 1.86 0.4 - 5.0 mU/L Final   08/16/2011 1.21 0.4 - 5.0 mU/L Final       Creatinine   Date Value Ref Range Status   06/16/2023 1.32 (H) 0.51 - 0.95 mg/dL Final   07/08/2021 1.30 (H) 0.52 - 1.04 mg/dL Final   GFR > 50     Recent Labs   Lab Test 02/16/21  1108 02/21/20  0726   CHOL 137 139   HDL 44* 32*   LDL 74 80   TRIG 96 135   CHOLHDLRATIO  --   --     < > = values in this interval not displayed.     Video-Visit Details    Type of service:  Video Visit  Joined the call at 9/11/2023, 9:02:34 am.  Left the call at 9/11/2023, 9:16:38 am.  You were on the call for 14 minutes 3 seconds .    Distant Location (provider location):  Off-site.     Platform used for Video Visit: Shoebox

## 2023-09-11 NOTE — PATIENT INSTRUCTIONS
Adrian,  #1 increase Ozempic to 2 mg every 7 days.     2.  Stop insulin glargine or lantus.    3.  Continue Metformin and Jardiance at the same dose.       Weight loss and exercise are recommended in the management of Type 2 diabetes.  Caloric restriction, portion control and physical activity are evidence based strategies for life style changes. A1c has improved to 6.3% from 11.6 and weight loss of 30 pounds: Keep it up!

## 2023-09-11 NOTE — NURSING NOTE
Is the patient currently in the state of [place]? YES    Visit mode:VIDEO    If the visit is dropped, the patient can be reconnected by: VIDEO VISIT: Text to cell phone:   Telephone Information:   Mobile [phone]       Will anyone else be joining the visit? NO  (If patient encounters technical issues they should call [phone]    How would you like to obtain your AVS? MyChart    Are changes needed to the allergy or medication list? Pt stated no changes to allergies and Pt stated no med changes    Reason for visit: No chief complaint on file.    Milagro AUGUSTINE

## 2023-09-13 NOTE — TELEPHONE ENCOUNTER
Provider reviewed and signed PAP form for [employer].     Faxed back: [phone]      Confirmed via Right Fax that transmission was successful.       Placed in [place] PAP file      Sonia Su CMA  Adult Endocrinology  [facility]

## 2023-09-15 ENCOUNTER — LAB (OUTPATIENT)
Dept: LAB | Facility: CLINIC | Age: 68
End: 2023-09-15
Payer: COMMERCIAL

## 2023-09-15 DIAGNOSIS — N18.31 STAGE 3A CHRONIC KIDNEY DISEASE (H): ICD-10-CM

## 2023-09-15 DIAGNOSIS — Z79.4 TYPE 2 DIABETES MELLITUS WITH STAGE 3A CHRONIC KIDNEY DISEASE, WITH LONG-TERM CURRENT USE OF INSULIN (H): ICD-10-CM

## 2023-09-15 DIAGNOSIS — N18.31 TYPE 2 DIABETES MELLITUS WITH STAGE 3A CHRONIC KIDNEY DISEASE, WITH LONG-TERM CURRENT USE OF INSULIN (H): ICD-10-CM

## 2023-09-15 DIAGNOSIS — E11.22 TYPE 2 DIABETES MELLITUS WITH STAGE 3A CHRONIC KIDNEY DISEASE, WITH LONG-TERM CURRENT USE OF INSULIN (H): ICD-10-CM

## 2023-09-15 LAB
ALBUMIN SERPL BCG-MCNC: 3.4 G/DL (ref 3.5–5.2)
ANION GAP SERPL CALCULATED.3IONS-SCNC: 9 MMOL/L (ref 7–15)
BUN SERPL-MCNC: 21.3 MG/DL (ref 8–23)
CALCIUM SERPL-MCNC: 9.1 MG/DL (ref 8.8–10.2)
CHLORIDE SERPL-SCNC: 108 MMOL/L (ref 98–107)
CREAT SERPL-MCNC: 1.24 MG/DL (ref 0.51–0.95)
DEPRECATED HCO3 PLAS-SCNC: 27 MMOL/L (ref 22–29)
EGFRCR SERPLBLD CKD-EPI 2021: 47 ML/MIN/1.73M2
ERYTHROCYTE [DISTWIDTH] IN BLOOD BY AUTOMATED COUNT: 14.6 % (ref 10–15)
GLUCOSE SERPL-MCNC: 83 MG/DL (ref 70–99)
HBA1C MFR BLD: 6.3 % (ref 0–5.6)
HCT VFR BLD AUTO: 42.9 % (ref 35–47)
HGB BLD-MCNC: 13.5 G/DL (ref 11.7–15.7)
MCH RBC QN AUTO: 26.8 PG (ref 26.5–33)
MCHC RBC AUTO-ENTMCNC: 31.5 G/DL (ref 31.5–36.5)
MCV RBC AUTO: 85 FL (ref 78–100)
PHOSPHATE SERPL-MCNC: 3.1 MG/DL (ref 2.5–4.5)
PLATELET # BLD AUTO: 326 10E3/UL (ref 150–450)
POTASSIUM SERPL-SCNC: 4.1 MMOL/L (ref 3.4–5.3)
RBC # BLD AUTO: 5.03 10E6/UL (ref 3.8–5.2)
SODIUM SERPL-SCNC: 144 MMOL/L (ref 136–145)
WBC # BLD AUTO: 5.6 10E3/UL (ref 4–11)

## 2023-09-15 PROCEDURE — 84156 ASSAY OF PROTEIN URINE: CPT

## 2023-09-15 PROCEDURE — 36415 COLL VENOUS BLD VENIPUNCTURE: CPT

## 2023-09-15 PROCEDURE — 85027 COMPLETE CBC AUTOMATED: CPT

## 2023-09-15 PROCEDURE — 80069 RENAL FUNCTION PANEL: CPT

## 2023-09-15 PROCEDURE — 81001 URINALYSIS AUTO W/SCOPE: CPT

## 2023-09-15 PROCEDURE — 83036 HEMOGLOBIN GLYCOSYLATED A1C: CPT

## 2023-09-16 LAB
ALBUMIN MFR UR ELPH: 24.9 MG/DL
ALBUMIN UR-MCNC: ABNORMAL MG/DL
APPEARANCE UR: CLEAR
BACTERIA #/AREA URNS HPF: ABNORMAL /HPF
BILIRUB UR QL STRIP: NEGATIVE
COLOR UR AUTO: YELLOW
CREAT UR-MCNC: 95.8 MG/DL
GLUCOSE UR STRIP-MCNC: NEGATIVE MG/DL
HGB UR QL STRIP: ABNORMAL
HYALINE CASTS #/AREA URNS LPF: ABNORMAL /LPF
KETONES UR STRIP-MCNC: NEGATIVE MG/DL
LEUKOCYTE ESTERASE UR QL STRIP: NEGATIVE
NITRATE UR QL: NEGATIVE
PH UR STRIP: 5.5 [PH] (ref 5–7)
PROT/CREAT 24H UR: 0.26 MG/MG CR (ref 0–0.2)
RBC #/AREA URNS AUTO: ABNORMAL /HPF
SP GR UR STRIP: 1.01 (ref 1–1.03)
SQUAMOUS #/AREA URNS AUTO: ABNORMAL /LPF
UROBILINOGEN UR STRIP-ACNC: 1 E.U./DL
WBC #/AREA URNS AUTO: ABNORMAL /HPF

## 2023-09-18 ENCOUNTER — VIRTUAL VISIT (OUTPATIENT)
Dept: NEPHROLOGY | Facility: CLINIC | Age: 68
End: 2023-09-18
Attending: INTERNAL MEDICINE
Payer: COMMERCIAL

## 2023-09-18 VITALS — WEIGHT: 174 LBS | BODY MASS INDEX: 33.28 KG/M2

## 2023-09-18 DIAGNOSIS — N18.31 STAGE 3A CHRONIC KIDNEY DISEASE (H): Primary | ICD-10-CM

## 2023-09-18 PROCEDURE — 99213 OFFICE O/P EST LOW 20 MIN: CPT | Mod: VID | Performed by: INTERNAL MEDICINE

## 2023-09-18 ASSESSMENT — PAIN SCALES - GENERAL: PAINLEVEL: NO PAIN (0)

## 2023-09-18 NOTE — LETTER
9/18/2023       RE: Adrian Cruz  [address]  [place] [address]     Dear Colleague,    Thank you for referring your patient, Adrian Cruz, to the [facility]PHROLOG[facility] [place] at [facility]. Please see a copy of my visit note below.      Nephrology Clinic Progress note   9/18/23    Adrian Cruz MRN:[id number] YOB: 1955  Date of Admission:(Not on file)  Primary care provider: Radha Madrigal  Requesting physician: Amy Sosa MD      REASON FOR CONSULT: CKD stage 3       HISTORY OF PRESENT ILLNESS:    She has had an elevated creatinine going back to 2016 and since then she has had fluctuations but mostly has remained at a baseline of 1.3 with eGFR of 44. She has not had UA's in the past and does not have an active urinary sediment currently. She has had persistent albuminuria since 2015 which has slowly worsened overtime and now has UPCR of 0.25 g/g.     She was diagnosed with DM type 2 in 1997 and has retinopathy which is being treated. She has had quite poor control of her DM given her high A1C's. She has been recently started on insulin again and has been on Jardiance for about 2 years.    She reports her blood pressure used to be real high, on review of her charts,in the range of 160-160's systolics occasionally and has been better now. Last week she checked it and was 148/78 mm of Hg. She denies having any LE edema. She denies excessive Na intake but her feet start swelling when she eats potato chips.   She sees urology for urinary incontinence and has been started on Vesicare.     Patient denies any LE edema, exertional dyspnea/orthopnea/PND, dizziness, lightheadedness, nausea, vomiting or diarrhea.   Denies use of OTC NSAIDS or other non prescribed meds, recent exposure to abx or contrast, obstructive urinary symptoms, skin rashes, joint pains, fevers, passing kidney stones,  recurrent sinus infections or UTI's       PAST MEDICAL HISTORY:  Reviewed with patient on 09/18/2023   As per HPI    MEDICATIONS:  Reviewed with the patient in detail    ALLERGIES:    Reviewed with the patient in detail    REVIEW OF SYSTEMS:  A comprehensive of systems was negative except as noted above.    SOCIAL HISTORY:   Reviewed with patient, no smoking and no alcohol use     FAMILY MEDICAL HISTORY:   Reviewed, no family history of need for dialysis, transplant or CKD    PHYSICAL EXAM:   Vital signs:Wt 78.9 kg (174 lb)   LMP  (LMP Unknown)   BMI 33.28 kg/m      Physical Exam     Interval history: She checked her blood pressures in the morning and were 148/70 mm Hg , was 128/68 mm of Hg. She reports most of the times her blood pressures are in 120's. She has now started taking her vit D. She has been tolerating her Jardiance and ozempic well.     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 Baseline creatinine of 1.3 mg/dl with eGFR of 44. UA without active sediment, UPCR of 0.25 g/g.   Her blood pressures are above the goal of 130/90 mm of Hg, while she remains on amlodipine 10 mg daily, losartan 100 mg daily and hydrochlorothiazide 25 mg daily. She reports of being euvolemic on exam.   She has mild secondary hyperparathyroidism and vit D deficiency.     She does not have other metabolic complications of CKD.     She is now on Jardiance and is having her insulin adjusted with consideration of Ozempic for better glycemic control.     We discussed that her creatinine remains at baseline and will not make any changes to her medications today    F/up in clinic in 6 months      Anne Jalloh MD

## 2023-09-18 NOTE — PROGRESS NOTES
Adrian Cruz is a 68 year old female who is being evaluated via a billable video visit.    Virtual Visit Details    Type of service:  Video Visit   Video Start Time:  3.35 pm  Video End Time: 3.39 pm    Originating Location (pt. Location): Home    Distant Location (provider location):  On-site   Platform used for Video Visit: Jamison

## 2023-09-18 NOTE — PROGRESS NOTES
Nephrology Clinic Progress note   9/18/23    Adrian Cruz MRN:[id number] YOB: 1955  Date of Admission:(Not on file)  Primary care provider: Radha Madrigal  Requesting physician: Amy Sosa MD      REASON FOR CONSULT: CKD stage 3       HISTORY OF PRESENT ILLNESS:    She has had an elevated creatinine going back to 2016 and since then she has had fluctuations but mostly has remained at a baseline of 1.3 with eGFR of 44. She has not had UA's in the past and does not have an active urinary sediment currently. She has had persistent albuminuria since 2015 which has slowly worsened overtime and now has UPCR of 0.25 g/g.     She was diagnosed with DM type 2 in 1997 and has retinopathy which is being treated. She has had quite poor control of her DM given her high A1C's. She has been recently started on insulin again and has been on Jardiance for about 2 years.    She reports her blood pressure used to be real high, on review of her charts,in the range of 160-160's systolics occasionally and has been better now. Last week she checked it and was 148/78 mm of Hg. She denies having any LE edema. She denies excessive Na intake but her feet start swelling when she eats potato chips.   She sees urology for urinary incontinence and has been started on Vesicare.     Patient denies any LE edema, exertional dyspnea/orthopnea/PND, dizziness, lightheadedness, nausea, vomiting or diarrhea.   Denies use of OTC NSAIDS or other non prescribed meds, recent exposure to abx or contrast, obstructive urinary symptoms, skin rashes, joint pains, fevers, passing kidney stones, recurrent sinus infections or UTI's       PAST MEDICAL HISTORY:  Reviewed with patient on 09/18/2023   As per HPI    MEDICATIONS:  Reviewed with the patient in detail    ALLERGIES:    Reviewed with the patient in detail    REVIEW OF SYSTEMS:  A comprehensive of systems was negative except as noted above.    SOCIAL HISTORY:   Reviewed  with patient, no smoking and no alcohol use     FAMILY MEDICAL HISTORY:   Reviewed, no family history of need for dialysis, transplant or CKD    PHYSICAL EXAM:   Vital signs:Wt 78.9 kg (174 lb)   LMP  (LMP Unknown)   BMI 33.28 kg/m      Physical Exam     Interval history: She checked her blood pressures in the morning and were 148/70 mm Hg , was 128/68 mm of Hg. She reports most of the times her blood pressures are in 120's. She has now started taking her vit D. She has been tolerating her Jardiance and ozempic well.     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 Baseline creatinine of 1.3 mg/dl with eGFR of 44. UA without active sediment, UPCR of 0.25 g/g.   Her blood pressures are above the goal of 130/90 mm of Hg, while she remains on amlodipine 10 mg daily, losartan 100 mg daily and hydrochlorothiazide 25 mg daily. She reports of being euvolemic on exam.   She has mild secondary hyperparathyroidism and vit D deficiency.     She does not have other metabolic complications of CKD.     She is now on Jardiance and is having her insulin adjusted with consideration of Ozempic for better glycemic control.     We discussed that her creatinine remains at baseline and will not make any changes to her medications today    F/up in clinic in 6 months      Anne Jalloh MD

## 2023-09-18 NOTE — NURSING NOTE
Is the patient currently in the state of [place]? YES    Visit mode:VIDEO    If the visit is dropped, the patient can be reconnected by: VIDEO VISIT: Text to cell phone:   Telephone Information:   Mobile [phone]       Will anyone else be joining the visit? NO  (If patient encounters technical issues they should call [phone]    How would you like to obtain your AVS? MyChart    Are changes needed to the allergy or medication list? No    Reason for visit: Video Visit (Recheck)    Mel AUGUSTINE

## 2023-11-18 DIAGNOSIS — N18.31 TYPE 2 DIABETES MELLITUS WITH STAGE 3A CHRONIC KIDNEY DISEASE, WITH LONG-TERM CURRENT USE OF INSULIN (H): ICD-10-CM

## 2023-11-18 DIAGNOSIS — E11.22 TYPE 2 DIABETES MELLITUS WITH STAGE 3A CHRONIC KIDNEY DISEASE, WITH LONG-TERM CURRENT USE OF INSULIN (H): ICD-10-CM

## 2023-11-18 DIAGNOSIS — Z79.4 TYPE 2 DIABETES MELLITUS WITH STAGE 3A CHRONIC KIDNEY DISEASE, WITH LONG-TERM CURRENT USE OF INSULIN (H): ICD-10-CM

## 2023-11-22 RX ORDER — METFORMIN HCL 500 MG
TABLET, EXTENDED RELEASE 24 HR ORAL
Qty: 180 TABLET | Refills: 1 | Status: SHIPPED | OUTPATIENT
Start: 2023-11-22 | End: 2024-04-20

## 2023-11-22 NOTE — TELEPHONE ENCOUNTER
metFORMIN (GLUCOPHAGE XR) 500 MG 24 hr tablet     Biguanide Agents Passed     Amy Sosa MD  Endocrinology, Diabetes, and Metabolism    9/11/2023  [facility]

## 2023-11-30 ENCOUNTER — TELEPHONE (OUTPATIENT)
Dept: ENDOCRINOLOGY | Facility: CLINIC | Age: 68
End: 2023-11-30

## 2023-11-30 ENCOUNTER — TELEPHONE (OUTPATIENT)
Dept: ENDOCRINOLOGY | Facility: CLINIC | Age: 68
End: 2023-11-30
Payer: COMMERCIAL

## 2023-11-30 NOTE — TELEPHONE ENCOUNTER
FREE DRUG APPLICATION INITIATED    Medication: OZEMPIC (2 MG/DOSE) 8 MG/3ML SC SOPN  Free Drug Program Name:  [employer]  Date Submitted: 11/30/2023 11:21 AM  Phone #: [phone]  Fax #: [phone] , expedited fax# [phone]  Additional Information: Patient gave me a verbal ok to electronically sign for her portion of the application.

## 2023-11-30 NOTE — TELEPHONE ENCOUNTER
Writer printed form from Media tab and emailed to provider for signature.    Provider will return to support staff at [place] once signed.       Provider will not be back in this office to 12/12/2023.       Sonia Su CMA  Adult Endocrinology   [facility]

## 2023-11-30 NOTE — TELEPHONE ENCOUNTER
----- Message from Padmini Pena RN sent at 11/30/2023  1:17 PM CST -----  Regarding: FW: Ozempic and Jardiance PAP providers portion    ----- Message -----  From: Elizabeth John RN  Sent: 11/30/2023   1:13 PM CST  To: [facility] Endocrinology Adult [place]  Subject: FW: Ozempic and Jardiance PAP providers port#    MG pt. Lisandra. The forms are in Media. They were not emailed to me.   ----- Message -----  From: Tanja Mera  Sent: 11/30/2023  12:51 PM CST  To: Elizabeth John RN  Subject: RE: Ozempic and Jardiance PAP providers port#    Forms have been added to media tab  ----- Message -----  From: Elizabeth John RN  Sent: 11/30/2023  11:47 AM CST  To: Tanja Mera  Subject: RE: Ozempic and Jardiance PAP providers port#    Hi. You can upload the forms to the Media tab and let us know to it needs attention or you can email to me [email or web address] , whichever is easiest for you.     Thank you.     Elizabeth   ----- Message -----  From: Tanja Mera  Sent: 11/30/2023  11:46 AM CST  To: [facility] Endocrinology Adult [facility]  Subject: Ozempic and Jardiance PAP providers portion      Hello,    I was wondering who I should e-mail to get providers portion of 2024's patient assistance program application. I'm working on patients portion with them but still need the providers portions to be taken care of.    Thank you,  Louise Mera, [facility]  Diabetes Weight Management Clinic Liaison     [facility]alth [facility] Specialty    [email or web address]  [email or web address]    Phone: [phone]  Fax: [phone]

## 2023-11-30 NOTE — TELEPHONE ENCOUNTER
Writer printed forms from Media tab; emailed forms to Provider for signature.       Provider will email back to support staff at [place] office once completed.        Sonia Su [facility]  Adult Endocrinology   [facility]

## 2023-11-30 NOTE — TELEPHONE ENCOUNTER
FREE DRUG APPLICATION INITIATED    Medication: JARDIANCE 25 MG PO TABS  Free Drug Program Name:  [employer]  Date Submitted: 11/30/2023 11:25 AM  Phone #: [phone]  Fax #: [phone]   Additional Information: E-mailed patient their portion of application, patient is aware to include proof of income documents.

## 2023-12-05 NOTE — TELEPHONE ENCOUNTER
FREE DRUG APPLICATION APPROVED    Medication: OZEMPIC (2 MG/DOSE) 8 MG/3ML SC SOPN  Program Name:  [employer]  Effective Date: 1/1/2024  Expiration Date: 12/31/2024  Pharmacy Filling the Rx:    Patient Notified: yes, spoke with patient on the phone.  Additional Information: Can start 2025 application 2 month prior to expiration.

## 2023-12-05 NOTE — TELEPHONE ENCOUNTER
Attached is a fax for enrollment approval for 2023, I e-mailed Ally who is with the company to get clarification due to the new application being for the 2024 enrollment year. Documenting that I am looking into this application.

## 2023-12-13 NOTE — TELEPHONE ENCOUNTER
12/13 Left message that Medication was ready for .   Also reminded that a cooler would be helpful to take medication home since its requires refrigeration.         Labeled and placed medication in Unit Medication Fridge.     Sonia Su CMA  Adult Endocrinology   [facility]

## 2023-12-13 NOTE — TELEPHONE ENCOUNTER
Patient presented to office and picked up 2 boxes of Ozempic received from [employer] Patient assistance program.         Sonia Su, [facility]  Adult Endocrinology   [facility]

## 2023-12-19 NOTE — TELEPHONE ENCOUNTER
Jeremias Kraft,     I'm just following up to see if the provider was able to sign off on both applications for the jardiance and ozempic

## 2023-12-29 NOTE — TELEPHONE ENCOUNTER
Spoke with patient and relayed that [employer] is requestions documents in regards to her income

## 2024-01-08 NOTE — TELEPHONE ENCOUNTER
FREE DRUG APPLICATION APPROVED    Medication: JARDIANCE 25 MG PO TABS  Program Name:  [employer]  Effective Date: 1/8/2024  Expiration Date: 12/31/2024  Pharmacy Filling the Rx:    Patient Notified: yes  Additional Information: will reach out to patient two months prior to expiration of coverage

## 2024-01-12 DIAGNOSIS — E55.9 VITAMIN D DEFICIENCY: ICD-10-CM

## 2024-01-12 NOTE — TELEPHONE ENCOUNTER
Medication refill request    Medication refill: vitamin D 2000 units    Last OV: 9/18/23  Next OV: There is no follow up appointment yet scheduled    Current labs:    Latest Reference Range & Units 06/16/23 08:29   Vitamin D Deficiency screening 20 - 75 ug/L 19 (L)   (L): Data is abnormally low          Routed to be signed      Liliana Landon LPN  Nephrology  [phone]

## 2024-01-15 ENCOUNTER — TELEPHONE (OUTPATIENT)
Dept: ENDOCRINOLOGY | Facility: CLINIC | Age: 69
End: 2024-01-15
Payer: COMMERCIAL

## 2024-01-15 RX ORDER — CHOLECALCIFEROL (VITAMIN D3) 50 MCG
1 TABLET ORAL DAILY
Qty: 90 TABLET | Refills: 3 | Status: SHIPPED | OUTPATIENT
Start: 2024-01-15

## 2024-01-15 NOTE — TELEPHONE ENCOUNTER
9/11/23 office visit has been faxed to Christine at [phone]      Roxanne Busby [facility]  Adult Endocrinology  [facility] [place]

## 2024-01-15 NOTE — TELEPHONE ENCOUNTER
[facility] Call Center    Phone Message    May a detailed message be left on voicemail: yes     Reason for Call: Other: Requires chart notes from last visit. Pls fax to [phone]     Action Taken: Other: endo    Travel Screening: Not Applicable

## 2024-01-16 ENCOUNTER — DOCUMENTATION ONLY (OUTPATIENT)
Dept: ENDOCRINOLOGY | Facility: CLINIC | Age: 69
End: 2024-01-16
Payer: COMMERCIAL

## 2024-01-16 NOTE — PROGRESS NOTES
[facility] progress notes from 9/15/2023 to present.     Patient was last seen 9/11/2023 and next appointment is scheduled for 4/08/2024.   Labeled request form.    Faxed back to [facility] @ [phone] noting last visit and next visit dates as noted above.       No clinical progress notes faxed at this time outside the request window.     Place in [place] file      Sonia Su CMA  Adult Endocrinology   [facility]

## 2024-02-14 NOTE — PROGRESS NOTES
Name: Adrian Cruz    MRN: [id number]   YOB: 1955                 Chief Complaint:   Urinary incontinence         Assessment and Plan:   67 year old female with mixed urinary incontinence, urge predominant. Doing well with significant improvement on Vesicare. She would like to continue with the same. Also with new low-grade microhematuria noted on UA in 9/2023. We discussed possible etiologies. Plan to recheck UA and if this shows persistent microhematuria, proceed with hematuria evaluation to include CT urogram and cystoscopy.  -Continue Vesicare 10 mg daily. Refilled.  -Repeat UA with micro reflex to culture. If >2 RBC/HPF, then CTU and cystoscopy.  -Follow up with me in 1 year, sooner as needed.     Milagro Moore PA-C  February 16, 2024          History of Present Illness:   Adrian Cruz is a 68 year old female with PMH of DM2 (on Jardiance) with CKD stage III seen today for follow up of urinary incontinence.    Seen in initial consultation (virtually) on 10/29/21. Reviewed history from this date as follows:  This has been ongoing since May 2021. She describes urinary urgency and urge incontinence before she can get to the bathroom. Also describes some stress urinary incontinence with coughing, laughing, sneezing, etc. She has been wearing pads and goes through 2 per day.     She has tried Kegel exercises which have not helped. Denies any dysuria, gross hematuria, or history of UTI. She feels to empty her bladder well without straining to void.    She drinks a lot of fluids during the day, but limits fluids before bed. She typically wakes 0-1 times per night at night to void and denies nocturnal urinary incontinence.     On that date, she was started on Vesicare 10 mg once daily and recommended to moderate fluid intake with limiting fluids before bed.    Last visit with me 2/24/23:   She reports that the medication is working very well and she is pleased with her current  voiding.  Needs refills.     TODAY  2/16/24:  Continues to do very well with Vesicare.  Per chart review, she had low grade microhematuria on UA in 9/2023. This was not present in 6/2023. She denies any gross hematuria. No history of kidney stones. No recent UTIs. No known family history of kidney/bladder cancer. She is a lifetime non-smoker.          Past Medical History:     Past Medical History:   Diagnosis Date    GERD (gastroesophageal reflux disease)     History of elevated lipids     Lower extremity edema     NIDDM (non-insulin dependent diabetes mellitus) 1997    Rhinitis             Past Surgical History:     Past Surgical History:   Procedure Laterality Date    LASER SURGERY OF EYE Right 2015            Social History:     Social History     Tobacco Use    Smoking status: Never    Smokeless tobacco: Never   Substance Use Topics    Alcohol use: No            Family History:     Family History   Problem Relation Age of Onset    Diabetes Mother     Heart Disease Mother         chf and pacemaker    Genitourinary Problems Mother         ESRD    Diabetes Father     C.A.D. Father         age 49    Cerebrovascular Disease Father     Asthma Sister     Asthma Son     Asthma Daughter     Heart Disease Daughter     Glaucoma No family hx of     Macular Degeneration No family hx of             Allergies:     Allergies   Allergen Reactions    Ace Inhibitors Cough    Glucophage [Metformin And Related] Cramps     Stomach upset            Medications:     Current Outpatient Medications   Medication Sig    amLODIPine (NORVASC) 10 MG tablet TAKE 1 TABLET(10 MG) BY MOUTH DAILY FOR BLOOD PRESSURE    atorvastatin (LIPITOR) 80 MG tablet Take 1 tablet (80 mg) by mouth daily    blood glucose monitoring (NO BRAND SPECIFIED) meter device kit Use to test blood sugar 2 times daily or as directed.    blood glucose monitoring (ULTRA THIN 30G) lancets Use to test blood sugar 2 times daily or as directed. Per insurance    cetirizine  (ZYRTEC) 10 MG tablet TAKE 1 TABLET (10 MG) BY MOUTH EVERY EVENING AS NEEDED FOR ALLERGIES    Continuous Blood Gluc  (FREESTYLE RODNEY 2 READER) JOSE 1 each See Admin Instructions Use multiple times daily to read glucose levels.    Continuous Blood Gluc Sensor (FREESTYLE RODNEY 2 SENSOR) MISC 1 each every 14 days Use 1 sensor every 14 days. Use to read blood sugars per 's instructions.    empagliflozin (JARDIANCE) 25 MG TABS tablet Take 1 tablet (25 mg) by mouth daily    fluticasone (FLONASE) 50 MCG/ACT nasal spray SPRAY 1-2 SPRAYS INTO BOTH NOSTRILS DAILY AS NEEDED FOR RHINITIS OR ALLERGIES    Glucose Blood (BLOOD GLUCOSE TEST STRIPS) STRP 1 strip by Lancet route 2 times daily    hydrochlorothiazide (HYDRODIURIL) 25 MG tablet Take 1 tablet (25 mg) by mouth daily    losartan (COZAAR) 100 MG tablet Take 1 tablet (100 mg) by mouth daily    metFORMIN (GLUCOPHAGE XR) 500 MG 24 hr tablet TAKE 2 TABLETS (1,000 MG) BY MOUTH DAILY (WITH DINNER)    omeprazole 20 MG tablet Take 1 tablet (20 mg) by mouth daily as needed    order for DME Equipment being ordered: Digital home blood pressure monitor kit    ORDER FOR DME Equipment being ordered: blood pressure cuff/machine    Semaglutide, 2 MG/DOSE, (OZEMPIC) 8 MG/3ML pen Inject 2 mg Subcutaneous every 7 days    solifenacin (VESICARE) 10 MG tablet Take 1 tablet (10 mg) by mouth daily    vitamin D3 (CHOLECALCIFEROL) 50 mcg (2000 units) tablet Take 1 tablet (50 mcg) by mouth daily     No current facility-administered medications for this visit.          Physical Exam:   LMP  (LMP Unknown)   GENERAL: Healthy, alert and no distress  EYES: Eyes grossly normal to inspection.  No discharge or erythema, or obvious scleral/conjunctival abnormalities.  RESP: No audible wheeze, cough, or visible cyanosis.  No visible retractions or increased work of breathing.    SKIN: Visible skin clear. No significant rash, abnormal pigmentation or lesions.  NEURO: Cranial nerves grossly  intact.  Mentation and speech appropriate for age.  PSYCH: Mentation appears normal, affect normal/bright, judgement and insight intact, normal speech and appearance well-groomed.       Labs:      Lab Results   Component Value Date    A1C 6.3 09/15/2023    A1C 6.3 06/16/2023    A1C 11.6 12/06/2022    A1C 9.9 05/13/2022    A1C 11.1 10/20/2021    A1C 9.3 06/04/2021    A1C 10.9 02/16/2021    A1C 8.4 02/21/2020    A1C 7.7 06/18/2019    A1C 10.2 02/19/2019       Creatinine   Date Value Ref Range Status   09/15/2023 1.24 (H) 0.51 - 0.95 mg/dL Final   07/08/2021 1.30 (H) 0.52 - 1.04 mg/dL Final       9/15/2023 - UA: trace blood, trace protein, 2-5 RBC, 0-5 WBC  6/16/2023 - UA: >1000 glucose, 0 RBC, 0 WBC      Imaging:    No recent  imaging.          15 minutes spent on the date of the encounter doing chart review, review of test results, interpretation of tests, patient visit, and documentation

## 2024-02-16 ENCOUNTER — OFFICE VISIT (OUTPATIENT)
Dept: UROLOGY | Facility: CLINIC | Age: 69
End: 2024-02-16
Payer: COMMERCIAL

## 2024-02-16 DIAGNOSIS — N39.41 URGE INCONTINENCE: Primary | ICD-10-CM

## 2024-02-16 DIAGNOSIS — R31.29 MICROSCOPIC HEMATURIA: ICD-10-CM

## 2024-02-16 LAB
ALBUMIN UR-MCNC: NEGATIVE MG/DL
APPEARANCE UR: CLEAR
BACTERIA #/AREA URNS HPF: ABNORMAL /HPF
BILIRUB UR QL STRIP: NEGATIVE
COLOR UR AUTO: ABNORMAL
GLUCOSE UR STRIP-MCNC: >1000 MG/DL
HGB UR QL STRIP: NEGATIVE
KETONES UR STRIP-MCNC: NEGATIVE MG/DL
LEUKOCYTE ESTERASE UR QL STRIP: ABNORMAL
NITRATE UR QL: NEGATIVE
PH UR STRIP: 5 [PH] (ref 5–7)
RBC #/AREA URNS AUTO: ABNORMAL /HPF
SKIP: ABNORMAL
SP GR UR STRIP: 1.02 (ref 1–1.03)
SQUAMOUS #/AREA URNS AUTO: ABNORMAL /LPF
UROBILINOGEN UR STRIP-MCNC: NORMAL MG/DL
WBC #/AREA URNS AUTO: ABNORMAL /HPF

## 2024-02-16 PROCEDURE — 99213 OFFICE O/P EST LOW 20 MIN: CPT | Performed by: PHYSICIAN ASSISTANT

## 2024-02-16 PROCEDURE — 81001 URINALYSIS AUTO W/SCOPE: CPT | Performed by: PHYSICIAN ASSISTANT

## 2024-02-16 RX ORDER — SOLIFENACIN SUCCINATE 10 MG/1
10 TABLET, FILM COATED ORAL DAILY
Qty: 90 TABLET | Refills: 3 | Status: SHIPPED | OUTPATIENT
Start: 2024-02-16 | End: 2024-04-24

## 2024-02-16 NOTE — PATIENT INSTRUCTIONS
UROLOGY CLINIC VISIT PATIENT INSTRUCTIONS    Follow up with Milagro Moore PA-C in 1 year.    Continue Vesicare (solifenacin) 10 mg daily.    Repeat urine sample to see if the blood in your urine has resolved. If you continue to have a trace of blood, we may recommend further testing to check your kidneys and bladder.    If you have any issues, questions or concerns in the meantime, do not hesitate to contact us at [phone] or via [employer]..     It was a pleasure meeting with you today.  Thank you for allowing me and my team the privilege of caring for you today.  YOU are the reason we are here, and I truly hope we provided you with the excellent service you deserve.  Please let us know if there is anything else we can do for you so that we can be sure you are leaving completely satisfied with your care experience.

## 2024-02-16 NOTE — NURSING NOTE
Adrian Cruz's goals for this visit include:   Chief Complaint   Patient presents with    RECHECK     return:1 year follow up urge incontinence       She requests these members of her care team be copied on today's visit information:       PCP: Radha Madrigal    Referring Provider:  No referring provider defined for this encounter.    LMP  (LMP Unknown)     Do you need any medication refills at today's visit?     Laura Lubin LPN on 2/16/2024 at 9:48 AM

## 2024-02-21 ENCOUNTER — TELEPHONE (OUTPATIENT)
Dept: ENDOCRINOLOGY | Facility: CLINIC | Age: 69
End: 2024-02-21
Payer: COMMERCIAL

## 2024-02-21 NOTE — TELEPHONE ENCOUNTER
Writer reached out to patient to notify her that medication from Tracy Nordisk  Patient Assistant program.     Ozempic arrived in office today and ready for Patient to .     Patient will be here before 4:30 pm 2/21/2024.       Labeled and placed in Endo fridge under secured look.       Sonia Su CMA  Adult Endocrinology   [facility]

## 2024-02-21 NOTE — TELEPHONE ENCOUNTER
Pt arrived at clinic and writer confirmed identity and gave patient Tarcy Nordick medication Ozempic.       Sonia Su, AVANI  Adult Endocrinology   [facility]

## 2024-03-11 ENCOUNTER — TRANSFERRED RECORDS (OUTPATIENT)
Dept: HEALTH INFORMATION MANAGEMENT | Facility: CLINIC | Age: 69
End: 2024-03-11
Payer: COMMERCIAL

## 2024-03-29 NOTE — PROGRESS NOTES
Outcome for 03/29/24 8:56 AM: [employer] message sent  Sabrina Avila MA  Outcome for 04/04/24 9:37 AM: Data obtained via [employer] website  Sabrina Avila MA    Patient is showing 4/5 [place]CM met. BP out range - BP not on file.  Sabrina Avila MA

## 2024-04-08 ENCOUNTER — VIRTUAL VISIT (OUTPATIENT)
Dept: ENDOCRINOLOGY | Facility: CLINIC | Age: 69
End: 2024-04-08
Payer: COMMERCIAL

## 2024-04-08 DIAGNOSIS — Z79.4 TYPE 2 DIABETES MELLITUS WITH STAGE 3A CHRONIC KIDNEY DISEASE, WITH LONG-TERM CURRENT USE OF INSULIN (H): Primary | ICD-10-CM

## 2024-04-08 DIAGNOSIS — N18.31 TYPE 2 DIABETES MELLITUS WITH STAGE 3A CHRONIC KIDNEY DISEASE, WITH LONG-TERM CURRENT USE OF INSULIN (H): Primary | ICD-10-CM

## 2024-04-08 DIAGNOSIS — N18.31 STAGE 3A CHRONIC KIDNEY DISEASE (H): ICD-10-CM

## 2024-04-08 DIAGNOSIS — E66.01 MORBID OBESITY (H): ICD-10-CM

## 2024-04-08 DIAGNOSIS — E11.22 TYPE 2 DIABETES MELLITUS WITH STAGE 3A CHRONIC KIDNEY DISEASE, WITH LONG-TERM CURRENT USE OF INSULIN (H): Primary | ICD-10-CM

## 2024-04-08 PROCEDURE — G2211 COMPLEX E/M VISIT ADD ON: HCPCS | Mod: 95 | Performed by: INTERNAL MEDICINE

## 2024-04-08 PROCEDURE — 99214 OFFICE O/P EST MOD 30 MIN: CPT | Mod: 95 | Performed by: INTERNAL MEDICINE

## 2024-04-08 NOTE — NURSING NOTE
Is the patient currently in the state of [place]? YES    Visit mode:VIDEO    If the visit is dropped, the patient can be reconnected by: VIDEO VISIT: Send to e-mail at: [email or web address]    Will anyone else be joining the visit? NO  (If patient encounters technical issues they should call [phone] :[phone])    How would you like to obtain your AVS? MyChart    Are changes needed to the allergy or medication list? No    Are refills needed on medications prescribed by this physician? YES    Reason for visit: Follow Up    Nataliya AUGUSTINE

## 2024-04-08 NOTE — LETTER
4/8/2024         RE: Adrian Cruz  [address]  [place] [address]        Dear Colleague,    Thank you for referring your patient, Adrian Cruz, to the [facility]. Please see a copy of my visit note below.    Outcome for 03/29/24 8:56 AM: [employer] message sent  Sabrina Avila MA  Outcome for 04/04/24 9:37 AM: Data obtained via [employer] website  Sabrina Avila MA    Patient is showing 4/5 [place]CM met. BP out range - BP not on file.  Sabrina Avila MA            Endocrinology virtual Visit    Chief Complaint: Follow Up     Information obtained from:Patient      Assessment/Treatment Plan:      Type 2 diabetes in the setting of stage III CKD-  A1c has improved to 6.3% fand weight loss of 30 pounds on Ozempic and life style changes.      Off note, c-peptide checked and it was WNL.     Blood glucose readings reviewed in detail.  Overall, average BG on CGM was 136 . Chronic complications monitoring  Blood pressure well controlled historically.  On atorvastatin at the full dose.  Has microalbuminuria and currently on losartan 100 mg daily; CKD following with nephrology.     Plan:  Metformin 1000 mg daily  Jardiance 25 mg daily  Semaglutide 2 mg every 7 days      Morbid Obesity: weight loss of 30 pounds. Weight loss and exercise are recommended in the management of Type 2 diabetes.  Caloric restriction, portion control and physical activity are evidence based strategies for life style changes.   Test and/or medications prescribed today:  Orders Placed This Encounter   Procedures     Basic metabolic panel     Hemoglobin A1c         Amy Sosa MD  Staff Endocrinologist    Division of Endocrinology and Diabetes      Subjective:         HPI: Adrian Cruz is a 68 year old female with history of type 2 diabetes who is here for a follow up.     Type 2 diabetes essential diagnosed in 1997.  Patient is currently on:  Jardiance 25 mg  daily  Semaglutide 2 mg every 7 days   Metformin 1 g daily/GFR    GFR 44.  Patient has a known CKD stage IIIa. Following with nephrology.     Patient lives alone.  Reports urinary incontinence. Kegel exercises didn't help.     Answers submitted by the patient for this visit:  General Symptoms: No  Skin Symptoms: No  HENT Symptoms: No  EYE SYMPTOMS: No  HEART SYMPTOMS: No  LUNG SYMPTOMS: No  INTESTINAL SYMPTOMS: No  URINARY SYMPTOMS: No  GYNECOLOGIC SYMPTOMS: No  BREAST SYMPTOMS: No  SKELETAL SYMPTOMS: No  BLOOD SYMPTOMS: No  NERVOUS SYSTEM SYMPTOMS: No  MENTAL HEALTH SYMPTOMS: No        Allergies   Allergen Reactions     Ace Inhibitors Cough     Glucophage [Metformin And Related] Cramps     Stomach upset       Current Outpatient Medications   Medication Sig Dispense Refill     amLODIPine (NORVASC) 10 MG tablet TAKE 1 TABLET(10 MG) BY MOUTH DAILY FOR BLOOD PRESSURE 90 tablet 3     atorvastatin (LIPITOR) 80 MG tablet Take 1 tablet (80 mg) by mouth daily 90 tablet 3     blood glucose monitoring (NO BRAND SPECIFIED) meter device kit Use to test blood sugar 2 times daily or as directed. 1 kit 0     blood glucose monitoring (ULTRA THIN 30G) lancets Use to test blood sugar 2 times daily or as directed. Per insurance 100 each 11     cetirizine (ZYRTEC) 10 MG tablet TAKE 1 TABLET (10 MG) BY MOUTH EVERY EVENING AS NEEDED FOR ALLERGIES 90 tablet 1     Continuous Blood Gluc  (FREESTYLE RODNEY 2 READER) JOSE 1 each See Admin Instructions Use multiple times daily to read glucose levels. 1 each 0     Continuous Blood Gluc Sensor (FREESTYLE RODNEY 2 SENSOR) MISC 1 each every 14 days Use 1 sensor every 14 days. Use to read blood sugars per 's instructions. 2 each 5     empagliflozin (JARDIANCE) 25 MG TABS tablet Take 1 tablet (25 mg) by mouth daily 90 tablet 3     fluticasone (FLONASE) 50 MCG/ACT nasal spray SPRAY 1-2 SPRAYS INTO BOTH NOSTRILS DAILY AS NEEDED FOR RHINITIS OR ALLERGIES 48 mL 2     Glucose Blood  (BLOOD GLUCOSE TEST STRIPS) STRP 1 strip by Lancet route 2 times daily 100 strip 11     hydrochlorothiazide (HYDRODIURIL) 25 MG tablet Take 1 tablet (25 mg) by mouth daily 90 tablet 3     losartan (COZAAR) 100 MG tablet Take 1 tablet (100 mg) by mouth daily 90 tablet 3     metFORMIN (GLUCOPHAGE XR) 500 MG 24 hr tablet TAKE 2 TABLETS (1,000 MG) BY MOUTH DAILY (WITH DINNER) 180 tablet 1     omeprazole 20 MG tablet Take 1 tablet (20 mg) by mouth daily as needed 90 tablet 1     order for DME Equipment being ordered: Digital home blood pressure monitor kit 1 Device 0     ORDER FOR DME Equipment being ordered: blood pressure cuff/machine 1 Device 0     Semaglutide, 2 MG/DOSE, (OZEMPIC) 8 MG/3ML pen Inject 2 mg Subcutaneous every 7 days 9 mL 3     solifenacin (VESICARE) 10 MG tablet Take 1 tablet (10 mg) by mouth daily 90 tablet 3     vitamin D3 (CHOLECALCIFEROL) 50 mcg (2000 units) tablet Take 1 tablet (50 mcg) by mouth daily 90 tablet 3       Review of Systems     as per HPI above/ vaginal itching and whitish discharge.  Past medical history, past surgical history, and family history reviewed and epic.  Patient lives alone.        Objective:   GENERAL: Healthy, alert and no distress  EYES: Eyes grossly normal to inspection.    RESP: No audible wheeze, cough, or visible cyanosis.   NEURO: alert and oriented.   PSYCH: Mentation appears normal, affect normal/bright, judgement and insight intact, normal speech.  Wt Readings from Last 10 Encounters:   09/18/23 78.9 kg (174 lb)   06/19/23 78.9 kg (174 lb)   05/13/22 86.8 kg (191 lb 6.4 oz)   06/04/21 93 kg (205 lb)   02/16/21 93.4 kg (206 lb)   02/21/20 90.7 kg (200 lb)   06/18/19 92.5 kg (204 lb)   02/19/19 94.3 kg (208 lb)   10/30/18 93.4 kg (206 lb)   04/27/18 91.4 kg (201 lb 9.6 oz)       In House Labs:     Hemoglobin A1C   Date Value Ref Range Status   09/15/2023 6.3 (H) 0.0 - 5.6 % Final     Comment:     Normal <5.7%   Prediabetes 5.7-6.4%    Diabetes 6.5% or higher      Note: Adopted from ADA consensus guidelines.   06/16/2023 6.3 (H) 0.0 - 5.6 % Final     Comment:     Normal <5.7%   Prediabetes 5.7-6.4%    Diabetes 6.5% or higher     Note: Adopted from ADA consensus guidelines.   12/06/2022 11.6 (H) 0.0 - 5.6 % Final     Comment:     Normal <5.7%   Prediabetes 5.7-6.4%    Diabetes 6.5% or higher     Note: Adopted from ADA consensus guidelines.   06/04/2021 9.3 (H) 0 - 5.6 % Final     Comment:     Normal <5.7% Prediabetes 5.7-6.4%  Diabetes 6.5% or higher - adopted from ADA   consensus guidelines.  Reviewed: OK with previous     02/16/2021 10.9 (H) 0 - 5.6 % Final     Comment:     Results confirmed by repeat test  Normal <5.7% Prediabetes 5.7-6.4%  Diabetes 6.5% or higher - adopted from ADA   consensus guidelines.     02/21/2020 8.4 (H) 0 - 5.6 % Final     Comment:     Normal <5.7% Prediabetes 5.7-6.4%  Diabetes 6.5% or higher - adopted from ADA   consensus guidelines.         TSH   Date Value Ref Range Status   06/16/2023 2.73 0.30 - 4.20 uIU/mL Final   05/13/2022 1.12 0.40 - 4.00 mU/L Final   02/16/2021 2.34 0.40 - 4.00 mU/L Final   05/23/2017 2.28 0.40 - 4.00 mU/L Final   01/22/2015 1.82 0.40 - 4.00 mU/L Final     Comment:     Effective 7/30/2014, the reference range for this assay has changed to reflect   new instrumentation/methodology.     04/25/2013 1.86 0.4 - 5.0 mU/L Final   08/16/2011 1.21 0.4 - 5.0 mU/L Final       Creatinine   Date Value Ref Range Status   09/15/2023 1.24 (H) 0.51 - 0.95 mg/dL Final   07/08/2021 1.30 (H) 0.52 - 1.04 mg/dL Final   GFR > 50     Recent Labs   Lab Test 02/16/21  1108 02/21/20  0726   CHOL 137 139   HDL 44* 32*   LDL 74 80   TRIG 96 135   CHOLHDLRATIO  --   --     < > = values in this interval not displayed.     Video-Visit Details    Type of service:  Video Visit  Joined the call at 4/8/2024, 7:33:36 am.  Left the call at 4/8/2024, 7:42:29 am.  You were on the call for 8 minutes 53 seconds .    Distant Location (provider location):   Off-site.     Platform used for Video Visit: [employer]   The Longitudinal plan of care for /diabetes/CKD/Morbid obesity was addressed during this visit. Due to added complexity of care, we will continue to support Verlinda , and the subsequent management of this condition(s) and with the ongoing continuity of care of this condition.        Again, thank you for allowing me to participate in the care of your patient.        Sincerely,        Amy Sosa MD

## 2024-04-08 NOTE — PROGRESS NOTES
Virtual Visit Details    Type of service:  Video Visit   Video Start Time: {video visit start/end time for provider to select:338303}  Video End Time:{video visit start/end time for provider to select:[id number]}    Originating Location (pt. Location): Home  {PROVIDER LOCATION On-site should be selected for visits conducted from your clinic location or adjoining [facility] hospital, academic office, or other nearby [facility] building. Off-site should be selected for all other provider locations, including home:[id number]}  Distant Location (provider location):  On-site  Platform used for Video Visit: [employer]

## 2024-04-08 NOTE — PROGRESS NOTES
Endocrinology virtual Visit    Chief Complaint: Follow Up     Information obtained from:Patient      Assessment/Treatment Plan:      Type 2 diabetes in the setting of stage III CKD-  A1c has improved to 6.3% fand weight loss of 30 pounds on Ozempic and life style changes.      Off note, c-peptide checked and it was WNL.     Blood glucose readings reviewed in detail.  Overall, average BG on CGM was 136 . Chronic complications monitoring  Blood pressure well controlled historically.  On atorvastatin at the full dose.  Has microalbuminuria and currently on losartan 100 mg daily; CKD following with nephrology.     Plan:  Metformin 1000 mg daily  Jardiance 25 mg daily  Semaglutide 2 mg every 7 days      Morbid Obesity: weight loss of 30 pounds. Weight loss and exercise are recommended in the management of Type 2 diabetes.  Caloric restriction, portion control and physical activity are evidence based strategies for life style changes.   Test and/or medications prescribed today:  Orders Placed This Encounter   Procedures    Basic metabolic panel    Hemoglobin A1c         Amy Sosa MD  Staff Endocrinologist    Division of Endocrinology and Diabetes      Subjective:         HPI: Adrian Cruz is a 68 year old female with history of type 2 diabetes who is here for a follow up.     Type 2 diabetes essential diagnosed in 1997.  Patient is currently on:  Jardiance 25 mg daily  Semaglutide 2 mg every 7 days   Metformin 1 g daily/GFR    GFR 44.  Patient has a known CKD stage IIIa. Following with nephrology.     Patient lives alone.  Reports urinary incontinence. Kegel exercises didn't help.     Answers submitted by the patient for this visit:  General Symptoms: No  Skin Symptoms: No  HENT Symptoms: No  EYE SYMPTOMS: No  HEART SYMPTOMS: No  LUNG SYMPTOMS: No  INTESTINAL SYMPTOMS: No  URINARY SYMPTOMS: No  GYNECOLOGIC SYMPTOMS: No  BREAST SYMPTOMS: No  SKELETAL SYMPTOMS: No  BLOOD SYMPTOMS: No  NERVOUS SYSTEM  SYMPTOMS: No  MENTAL HEALTH SYMPTOMS: No        Allergies   Allergen Reactions    Ace Inhibitors Cough    Glucophage [Metformin And Related] Cramps     Stomach upset       Current Outpatient Medications   Medication Sig Dispense Refill    amLODIPine (NORVASC) 10 MG tablet TAKE 1 TABLET(10 MG) BY MOUTH DAILY FOR BLOOD PRESSURE 90 tablet 3    atorvastatin (LIPITOR) 80 MG tablet Take 1 tablet (80 mg) by mouth daily 90 tablet 3    blood glucose monitoring (NO BRAND SPECIFIED) meter device kit Use to test blood sugar 2 times daily or as directed. 1 kit 0    blood glucose monitoring (ULTRA THIN 30G) lancets Use to test blood sugar 2 times daily or as directed. Per insurance 100 each 11    cetirizine (ZYRTEC) 10 MG tablet TAKE 1 TABLET (10 MG) BY MOUTH EVERY EVENING AS NEEDED FOR ALLERGIES 90 tablet 1    Continuous Blood Gluc  (FREESTYLE RODNEY 2 READER) JOSE 1 each See Admin Instructions Use multiple times daily to read glucose levels. 1 each 0    Continuous Blood Gluc Sensor (FREESTYLE RODNEY 2 SENSOR) MISC 1 each every 14 days Use 1 sensor every 14 days. Use to read blood sugars per 's instructions. 2 each 5    empagliflozin (JARDIANCE) 25 MG TABS tablet Take 1 tablet (25 mg) by mouth daily 90 tablet 3    fluticasone (FLONASE) 50 MCG/ACT nasal spray SPRAY 1-2 SPRAYS INTO BOTH NOSTRILS DAILY AS NEEDED FOR RHINITIS OR ALLERGIES 48 mL 2    Glucose Blood (BLOOD GLUCOSE TEST STRIPS) STRP 1 strip by Lancet route 2 times daily 100 strip 11    hydrochlorothiazide (HYDRODIURIL) 25 MG tablet Take 1 tablet (25 mg) by mouth daily 90 tablet 3    losartan (COZAAR) 100 MG tablet Take 1 tablet (100 mg) by mouth daily 90 tablet 3    metFORMIN (GLUCOPHAGE XR) 500 MG 24 hr tablet TAKE 2 TABLETS (1,000 MG) BY MOUTH DAILY (WITH DINNER) 180 tablet 1    omeprazole 20 MG tablet Take 1 tablet (20 mg) by mouth daily as needed 90 tablet 1    order for DME Equipment being ordered: Digital home blood pressure monitor kit 1 Device  0    ORDER FOR DME Equipment being ordered: blood pressure cuff/machine 1 Device 0    Semaglutide, 2 MG/DOSE, (OZEMPIC) 8 MG/3ML pen Inject 2 mg Subcutaneous every 7 days 9 mL 3    solifenacin (VESICARE) 10 MG tablet Take 1 tablet (10 mg) by mouth daily 90 tablet 3    vitamin D3 (CHOLECALCIFEROL) 50 mcg (2000 units) tablet Take 1 tablet (50 mcg) by mouth daily 90 tablet 3       Review of Systems     as per HPI above/ vaginal itching and whitish discharge.  Past medical history, past surgical history, and family history reviewed and epic.  Patient lives alone.        Objective:   GENERAL: Healthy, alert and no distress  EYES: Eyes grossly normal to inspection.    RESP: No audible wheeze, cough, or visible cyanosis.   NEURO: alert and oriented.   PSYCH: Mentation appears normal, affect normal/bright, judgement and insight intact, normal speech.  Wt Readings from Last 10 Encounters:   09/18/23 78.9 kg (174 lb)   06/19/23 78.9 kg (174 lb)   05/13/22 86.8 kg (191 lb 6.4 oz)   06/04/21 93 kg (205 lb)   02/16/21 93.4 kg (206 lb)   02/21/20 90.7 kg (200 lb)   06/18/19 92.5 kg (204 lb)   02/19/19 94.3 kg (208 lb)   10/30/18 93.4 kg (206 lb)   04/27/18 91.4 kg (201 lb 9.6 oz)       In House Labs:     Hemoglobin A1C   Date Value Ref Range Status   09/15/2023 6.3 (H) 0.0 - 5.6 % Final     Comment:     Normal <5.7%   Prediabetes 5.7-6.4%    Diabetes 6.5% or higher     Note: Adopted from ADA consensus guidelines.   06/16/2023 6.3 (H) 0.0 - 5.6 % Final     Comment:     Normal <5.7%   Prediabetes 5.7-6.4%    Diabetes 6.5% or higher     Note: Adopted from ADA consensus guidelines.   12/06/2022 11.6 (H) 0.0 - 5.6 % Final     Comment:     Normal <5.7%   Prediabetes 5.7-6.4%    Diabetes 6.5% or higher     Note: Adopted from ADA consensus guidelines.   06/04/2021 9.3 (H) 0 - 5.6 % Final     Comment:     Normal <5.7% Prediabetes 5.7-6.4%  Diabetes 6.5% or higher - adopted from ADA   consensus guidelines.  Reviewed: OK with previous      02/16/2021 10.9 (H) 0 - 5.6 % Final     Comment:     Results confirmed by repeat test  Normal <5.7% Prediabetes 5.7-6.4%  Diabetes 6.5% or higher - adopted from ADA   consensus guidelines.     02/21/2020 8.4 (H) 0 - 5.6 % Final     Comment:     Normal <5.7% Prediabetes 5.7-6.4%  Diabetes 6.5% or higher - adopted from ADA   consensus guidelines.         TSH   Date Value Ref Range Status   06/16/2023 2.73 0.30 - 4.20 uIU/mL Final   05/13/2022 1.12 0.40 - 4.00 mU/L Final   02/16/2021 2.34 0.40 - 4.00 mU/L Final   05/23/2017 2.28 0.40 - 4.00 mU/L Final   01/22/2015 1.82 0.40 - 4.00 mU/L Final     Comment:     Effective 7/30/2014, the reference range for this assay has changed to reflect   new instrumentation/methodology.     04/25/2013 1.86 0.4 - 5.0 mU/L Final   08/16/2011 1.21 0.4 - 5.0 mU/L Final       Creatinine   Date Value Ref Range Status   09/15/2023 1.24 (H) 0.51 - 0.95 mg/dL Final   07/08/2021 1.30 (H) 0.52 - 1.04 mg/dL Final   GFR > 50     Recent Labs   Lab Test 02/16/21  1108 02/21/20  0726   CHOL 137 139   HDL 44* 32*   LDL 74 80   TRIG 96 135   CHOLHDLRATIO  --   --     < > = values in this interval not displayed.     Video-Visit Details    Type of service:  Video Visit  Joined the call at 4/8/2024, 7:33:36 am.  Left the call at 4/8/2024, 7:42:29 am.  You were on the call for 8 minutes 53 seconds .    Distant Location (provider location):  Off-site.     Platform used for Video Visit: Jamison   The Longitudinal plan of care for /diabetes/CKD/Morbid obesity was addressed during this visit. Due to added complexity of care, we will continue to support Verlinda , and the subsequent management of this condition(s) and with the ongoing continuity of care of this condition.

## 2024-04-10 ENCOUNTER — DOCUMENTATION ONLY (OUTPATIENT)
Dept: ENDOCRINOLOGY | Facility: CLINIC | Age: 69
End: 2024-04-10
Payer: COMMERCIAL

## 2024-04-10 DIAGNOSIS — E78.5 HYPERLIPIDEMIA LDL GOAL <100: ICD-10-CM

## 2024-04-10 DIAGNOSIS — E11.22 TYPE 2 DIABETES MELLITUS WITH STAGE 3A CHRONIC KIDNEY DISEASE, WITH LONG-TERM CURRENT USE OF INSULIN (H): ICD-10-CM

## 2024-04-10 DIAGNOSIS — N18.31 TYPE 2 DIABETES MELLITUS WITH STAGE 3A CHRONIC KIDNEY DISEASE, WITH LONG-TERM CURRENT USE OF INSULIN (H): ICD-10-CM

## 2024-04-10 DIAGNOSIS — I10 HYPERTENSION GOAL BP (BLOOD PRESSURE) < 140/90: ICD-10-CM

## 2024-04-10 DIAGNOSIS — Z79.4 TYPE 2 DIABETES MELLITUS WITH STAGE 3A CHRONIC KIDNEY DISEASE, WITH LONG-TERM CURRENT USE OF INSULIN (H): ICD-10-CM

## 2024-04-10 RX ORDER — HYDROCHLOROTHIAZIDE 25 MG/1
25 TABLET ORAL DAILY
Qty: 90 TABLET | Refills: 0 | Status: SHIPPED | OUTPATIENT
Start: 2024-04-10 | End: 2024-09-06

## 2024-04-10 RX ORDER — ATORVASTATIN CALCIUM 80 MG/1
80 TABLET, FILM COATED ORAL DAILY
Qty: 90 TABLET | Refills: 2 | Status: SHIPPED | OUTPATIENT
Start: 2024-04-10

## 2024-04-10 RX ORDER — AMLODIPINE BESYLATE 10 MG/1
TABLET ORAL
Qty: 90 TABLET | Refills: 0 | Status: SHIPPED | OUTPATIENT
Start: 2024-04-10 | End: 2024-09-05

## 2024-04-10 RX ORDER — LOSARTAN POTASSIUM 100 MG/1
100 TABLET ORAL DAILY
Qty: 90 TABLET | Refills: 0 | Status: SHIPPED | OUTPATIENT
Start: 2024-04-10 | End: 2024-08-14

## 2024-04-10 NOTE — PROGRESS NOTES
[facility] medical requesting last 6 months of clinical progress notes to support need of Diabetes CGM supplies.       Printed last visit from 4/08/2024 along with CGM data collected for that appointment.   Faxed back to: [phone]  See image for Timestamp confirmation of successful transmission.       Placed in Warp 9 file          Sonia Su CMA  Adult Endocrinology   [facility]

## 2024-04-17 NOTE — TELEPHONE ENCOUNTER
metFORMIN (GLUCOPHAGE XR) 500 MG   Last Written Prescription Date:  11/22/23  Last Fill Quantity: 180,   # refills: 0  Last Office Visit : 4/8/24  Future Office visit:  10/30/24  Routing refill request to provider for review/approval because:  90 DAY REFILL PENDING   OVER DUE GFR & A1C    GFR Estimate   Date Value Ref Range Status   09/15/2023 47 (L) >60 mL/min/1.73m2 Final   07/08/2021 42 (L) >60 mL/min/[1.73_m2] Final     Comment:     Non  GFR Calc  Starting 12/18/2018, serum creatinine based estimated GFR (eGFR) will be   calculated using the Chronic Kidney Disease Epidemiology Collaboration   (CKD-EPI) equation.       GFR Estimate If Black   Date Value Ref Range Status   07/08/2021 49 (L) >60 mL/min/[1.73_m2] Final     Comment:      GFR Calc  Starting 12/18/2018, serum creatinine based estimated GFR (eGFR) will be   calculated using the Chronic Kidney Disease Epidemiology Collaboration   (CKD-EPI) equation.        Hemoglobin A1C   Date Value Ref Range Status   09/15/2023 6.3 (H) 0.0 - 5.6 % Final     Comment:     Normal <5.7%   Prediabetes 5.7-6.4%    Diabetes 6.5% or higher     Note: Adopted from ADA consensus guidelines.   06/04/2021 9.3 (H) 0 - 5.6 % Final     Comment:     Normal <5.7% Prediabetes 5.7-6.4%  Diabetes 6.5% or higher - adopted from ADA   consensus guidelines.  Reviewed: OK with previous

## 2024-04-20 ENCOUNTER — HEALTH MAINTENANCE LETTER (OUTPATIENT)
Age: 69
End: 2024-04-20

## 2024-04-20 RX ORDER — METFORMIN HCL 500 MG
TABLET, EXTENDED RELEASE 24 HR ORAL
Qty: 180 TABLET | Refills: 0 | Status: SHIPPED | OUTPATIENT
Start: 2024-04-20 | End: 2024-05-30

## 2024-04-24 ENCOUNTER — MYC REFILL (OUTPATIENT)
Dept: UROLOGY | Facility: CLINIC | Age: 69
End: 2024-04-24
Payer: COMMERCIAL

## 2024-04-24 DIAGNOSIS — N39.41 URGE INCONTINENCE: ICD-10-CM

## 2024-04-24 RX ORDER — SOLIFENACIN SUCCINATE 10 MG/1
10 TABLET, FILM COATED ORAL DAILY
Qty: 90 TABLET | Refills: 3 | Status: SHIPPED | OUTPATIENT
Start: 2024-04-24

## 2024-04-24 NOTE — TELEPHONE ENCOUNTER
"  Me   to Milagro Moore PA-C  [facility] [place]   [place]      4/24/24  3:19 PM  Hi Milagro, the patient states this prescription was discontinued.    I called the pharmacy and they had a note that said 2/19-canceled prescription by provider.  No name noted or additional information.      Per your last office visit notes from 2/16/24-\"Continue Vesicare 10 mg daily.  Refilled.\"    Am I ok to restart this prescription for the pt?  Thank you! Milagro Jasso PA-C   to Me  [facility] [place]   [facility]    4/24/24  3:22 PM  Not sure what happened. I did not cancel it. Yes we can refill. #90, 3 refills.  Thanks!  Milagro Moore PA-C  ______________________________________________    Refills sent.  Kait Valero RN         "

## 2024-05-06 ENCOUNTER — TRANSFERRED RECORDS (OUTPATIENT)
Dept: MULTI SPECIALTY CLINIC | Facility: CLINIC | Age: 69
End: 2024-05-06

## 2024-05-07 ENCOUNTER — DOCUMENTATION ONLY (OUTPATIENT)
Dept: ENDOCRINOLOGY | Facility: CLINIC | Age: 69
End: 2024-05-07
Payer: COMMERCIAL

## 2024-05-07 NOTE — PROGRESS NOTES
Provider reviewed and emailed back to this writer.   Printed and Faxed to [employer] [facility]  Fax: [phone]  See image for Timestamp confirmation of successful transmission.     Failed 1st attempt. resent    Placed in [employer] [facility] file.         Sonia Su [facility]  Adult Endocrinology   [facility]

## 2024-05-07 NOTE — PROGRESS NOTES
[employer] Patient Assistance program form requesting refill Ozempic medication.   Fax back to: [phone]    Writer completed form to best of abilities. Emailed to provider to review and sign if approved.       Sonia Su CMA  Adult Endocrinology   [facility]

## 2024-05-22 ENCOUNTER — LAB (OUTPATIENT)
Dept: LAB | Facility: CLINIC | Age: 69
End: 2024-05-22
Payer: COMMERCIAL

## 2024-05-22 DIAGNOSIS — N18.31 TYPE 2 DIABETES MELLITUS WITH STAGE 3A CHRONIC KIDNEY DISEASE, WITH LONG-TERM CURRENT USE OF INSULIN (H): ICD-10-CM

## 2024-05-22 DIAGNOSIS — N18.30 TYPE 2 DIABETES MELLITUS WITH STAGE 3 CHRONIC KIDNEY DISEASE, WITH LONG-TERM CURRENT USE OF INSULIN (H): ICD-10-CM

## 2024-05-22 DIAGNOSIS — N18.31 STAGE 3A CHRONIC KIDNEY DISEASE (H): ICD-10-CM

## 2024-05-22 DIAGNOSIS — E11.3299 DIABETES MELLITUS WITH BACKGROUND RETINOPATHY (H): Primary | ICD-10-CM

## 2024-05-22 DIAGNOSIS — E11.22 TYPE 2 DIABETES MELLITUS WITH STAGE 3 CHRONIC KIDNEY DISEASE, WITH LONG-TERM CURRENT USE OF INSULIN (H): ICD-10-CM

## 2024-05-22 DIAGNOSIS — Z79.4 TYPE 2 DIABETES MELLITUS WITH STAGE 3A CHRONIC KIDNEY DISEASE, WITH LONG-TERM CURRENT USE OF INSULIN (H): ICD-10-CM

## 2024-05-22 DIAGNOSIS — Z79.4 TYPE 2 DIABETES MELLITUS WITH STAGE 3 CHRONIC KIDNEY DISEASE, WITH LONG-TERM CURRENT USE OF INSULIN (H): ICD-10-CM

## 2024-05-22 DIAGNOSIS — E11.22 TYPE 2 DIABETES MELLITUS WITH STAGE 3A CHRONIC KIDNEY DISEASE, WITH LONG-TERM CURRENT USE OF INSULIN (H): ICD-10-CM

## 2024-05-22 LAB — HBA1C MFR BLD: 7.5 % (ref 0–5.6)

## 2024-05-22 PROCEDURE — 80061 LIPID PANEL: CPT

## 2024-05-22 PROCEDURE — 80048 BASIC METABOLIC PNL TOTAL CA: CPT

## 2024-05-22 PROCEDURE — 36415 COLL VENOUS BLD VENIPUNCTURE: CPT

## 2024-05-22 PROCEDURE — 83036 HEMOGLOBIN GLYCOSYLATED A1C: CPT

## 2024-05-23 LAB
ANION GAP SERPL CALCULATED.3IONS-SCNC: 11 MMOL/L (ref 7–15)
BUN SERPL-MCNC: 24.8 MG/DL (ref 8–23)
CALCIUM SERPL-MCNC: 9 MG/DL (ref 8.8–10.2)
CHLORIDE SERPL-SCNC: 105 MMOL/L (ref 98–107)
CHOLEST SERPL-MCNC: 127 MG/DL
CREAT SERPL-MCNC: 1.25 MG/DL (ref 0.51–0.95)
DEPRECATED HCO3 PLAS-SCNC: 24 MMOL/L (ref 22–29)
EGFRCR SERPLBLD CKD-EPI 2021: 46 ML/MIN/1.73M2
FASTING STATUS PATIENT QL REPORTED: YES
FASTING STATUS PATIENT QL REPORTED: YES
GLUCOSE SERPL-MCNC: 115 MG/DL (ref 70–99)
HDLC SERPL-MCNC: 43 MG/DL
LDLC SERPL CALC-MCNC: 71 MG/DL
NONHDLC SERPL-MCNC: 84 MG/DL
POTASSIUM SERPL-SCNC: 4.3 MMOL/L (ref 3.4–5.3)
SODIUM SERPL-SCNC: 140 MMOL/L (ref 135–145)
TRIGL SERPL-MCNC: 65 MG/DL

## 2024-05-23 NOTE — PROGRESS NOTES
Pt arrived at clinic today to  Medication from [employer] [facility].     Verified Name and Date of Birth       Patient was handed medication.       Sonia Su, [facility]  Adult Endocrinology   [facility]

## 2024-05-23 NOTE — RESULT ENCOUNTER NOTE
Hello -    Here are my comments about your recent results:  -Kidney function (GFR) is decreased.  ADVISE: Follow-up with your kidney provider as you are currently doing.  -Sodium is normal.  -Potassium is normal.  -Calcium is normal.  -Glucose is elevated due to your diabetes.  -A1C (test of diabetes control the last 2-3 months) is slightly above your goal. Please continue with your current plan as we have discussed at your recent visit and also please make an appointment with the diabetes educator at your convenience.  Please let us know if you have any questions or concerns.     Regards,  Amy Sosa MD

## 2024-05-23 NOTE — PROGRESS NOTES
Medication received from [employer] Patient Assistance program  Writer spoke with patient and she is aware of hours that pick can occur.     Labeled and placed in Adult Endocrinology Medication Fridge with secured entry.         Sonia Su CMA  Adult Endocrinology   [facility]

## 2024-05-30 DIAGNOSIS — N18.31 TYPE 2 DIABETES MELLITUS WITH STAGE 3A CHRONIC KIDNEY DISEASE, WITH LONG-TERM CURRENT USE OF INSULIN (H): ICD-10-CM

## 2024-05-30 DIAGNOSIS — Z79.4 TYPE 2 DIABETES MELLITUS WITH STAGE 3A CHRONIC KIDNEY DISEASE, WITH LONG-TERM CURRENT USE OF INSULIN (H): ICD-10-CM

## 2024-05-30 DIAGNOSIS — E11.22 TYPE 2 DIABETES MELLITUS WITH STAGE 3A CHRONIC KIDNEY DISEASE, WITH LONG-TERM CURRENT USE OF INSULIN (H): ICD-10-CM

## 2024-05-30 RX ORDER — METFORMIN HCL 500 MG
TABLET, EXTENDED RELEASE 24 HR ORAL
Qty: 200 TABLET | Refills: 3 | Status: SHIPPED | OUTPATIENT
Start: 2024-05-30

## 2024-05-30 NOTE — TELEPHONE ENCOUNTER
Patient has [facility] coverage and with this insurance plan, the patient is eligible to receive certain prescriptions as a 100-day supply at the 90-day supply cost.      Prescriptions to be updated to 100-day supply: metformin    New prescription needed given insufficient refills/quantity so pended for prescriber review and signature.   Last office visit 4/8/24 and last labs 5/22/24.     Thank you!      Lizzie Bautista, PharmD, [facility]  Population Health Pharmacist  [phone]

## 2024-05-30 NOTE — TELEPHONE ENCOUNTER
GFR Estimate   Date Value Ref Range Status   05/22/2024 46 (L) >60 mL/min/1.73m2 Final   07/08/2021 42 (L) >60 mL/min/[1.73_m2] Final     Comment:     Non  GFR Calc  Starting 12/18/2018, serum creatinine based estimated GFR (eGFR) will be   calculated using the Chronic Kidney Disease Epidemiology Collaboration   (CKD-EPI) equation.

## 2024-06-07 ENCOUNTER — OFFICE VISIT (OUTPATIENT)
Dept: FAMILY MEDICINE | Facility: CLINIC | Age: 69
End: 2024-06-07
Attending: PREVENTIVE MEDICINE
Payer: COMMERCIAL

## 2024-06-07 VITALS
HEART RATE: 81 BPM | HEIGHT: 61 IN | RESPIRATION RATE: 16 BRPM | OXYGEN SATURATION: 100 % | SYSTOLIC BLOOD PRESSURE: 116 MMHG | WEIGHT: 170.6 LBS | BODY MASS INDEX: 32.21 KG/M2 | DIASTOLIC BLOOD PRESSURE: 67 MMHG

## 2024-06-07 DIAGNOSIS — Z79.4 TYPE 2 DIABETES MELLITUS WITH STAGE 3A CHRONIC KIDNEY DISEASE, WITH LONG-TERM CURRENT USE OF INSULIN (H): ICD-10-CM

## 2024-06-07 DIAGNOSIS — E11.22 TYPE 2 DIABETES MELLITUS WITH STAGE 3A CHRONIC KIDNEY DISEASE, WITH LONG-TERM CURRENT USE OF INSULIN (H): ICD-10-CM

## 2024-06-07 DIAGNOSIS — Z23 ENCOUNTER FOR IMMUNIZATION: ICD-10-CM

## 2024-06-07 DIAGNOSIS — I10 HYPERTENSION GOAL BP (BLOOD PRESSURE) < 140/90: ICD-10-CM

## 2024-06-07 DIAGNOSIS — Z12.11 SCREEN FOR COLON CANCER: ICD-10-CM

## 2024-06-07 DIAGNOSIS — E78.5 HYPERLIPIDEMIA LDL GOAL <100: ICD-10-CM

## 2024-06-07 DIAGNOSIS — N18.31 TYPE 2 DIABETES MELLITUS WITH STAGE 3A CHRONIC KIDNEY DISEASE, WITH LONG-TERM CURRENT USE OF INSULIN (H): ICD-10-CM

## 2024-06-07 DIAGNOSIS — E11.3299 DIABETES MELLITUS WITH BACKGROUND RETINOPATHY (H): ICD-10-CM

## 2024-06-07 DIAGNOSIS — N18.31 STAGE 3A CHRONIC KIDNEY DISEASE (H): ICD-10-CM

## 2024-06-07 DIAGNOSIS — Z00.00 ENCOUNTER FOR MEDICARE ANNUAL WELLNESS EXAM: Primary | ICD-10-CM

## 2024-06-07 PROCEDURE — 90480 ADMN SARSCOV2 VAC 1/ONLY CMP: CPT | Performed by: PREVENTIVE MEDICINE

## 2024-06-07 PROCEDURE — 99213 OFFICE O/P EST LOW 20 MIN: CPT | Mod: 25 | Performed by: PREVENTIVE MEDICINE

## 2024-06-07 PROCEDURE — G0009 ADMIN PNEUMOCOCCAL VACCINE: HCPCS | Performed by: PREVENTIVE MEDICINE

## 2024-06-07 PROCEDURE — G0439 PPPS, SUBSEQ VISIT: HCPCS | Performed by: PREVENTIVE MEDICINE

## 2024-06-07 PROCEDURE — 90677 PCV20 VACCINE IM: CPT | Performed by: PREVENTIVE MEDICINE

## 2024-06-07 PROCEDURE — 91320 SARSCV2 VAC 30MCG TRS-SUC IM: CPT | Performed by: PREVENTIVE MEDICINE

## 2024-06-07 RX ORDER — RESPIRATORY SYNCYTIAL VIRUS VACCINE 120MCG/0.5
0.5 KIT INTRAMUSCULAR ONCE
Qty: 1 EACH | Refills: 0 | Status: CANCELLED | OUTPATIENT
Start: 2024-06-07 | End: 2024-06-07

## 2024-06-07 SDOH — HEALTH STABILITY: PHYSICAL HEALTH: ON AVERAGE, HOW MANY MINUTES DO YOU ENGAGE IN EXERCISE AT THIS LEVEL?: 20 MIN

## 2024-06-07 SDOH — HEALTH STABILITY: PHYSICAL HEALTH: ON AVERAGE, HOW MANY DAYS PER WEEK DO YOU ENGAGE IN MODERATE TO STRENUOUS EXERCISE (LIKE A BRISK WALK)?: 3 DAYS

## 2024-06-07 ASSESSMENT — PAIN SCALES - GENERAL: PAINLEVEL: NO PAIN (0)

## 2024-06-07 ASSESSMENT — SOCIAL DETERMINANTS OF HEALTH (SDOH): HOW OFTEN DO YOU GET TOGETHER WITH FRIENDS OR RELATIVES?: THREE TIMES A WEEK

## 2024-06-07 NOTE — PATIENT INSTRUCTIONS
"Preventive Care Advice   This is general advice we often give to help people stay healthy. Your care team may have specific advice just for you. Please talk to your care team about your own preventive care needs.  Lifestyle  Exercise at least 150 minutes each week (30 minutes a day, 5 days a week).  Do muscle strengthening activities 2 days a week. These help control your weight and prevent disease.  No smoking.  Wear sunscreen to prevent skin cancer.  Have your home tested for radon every 2 to 5 years. Radon is a colorless, odorless gas that can harm your lungs. To learn more, go to www.health.[facility].mn. and search for \"Radon in Homes.\"  Keep guns unloaded and locked up in a safe place like a safe or gun vault, or, use a gun lock and hide the keys. Always lock away bullets separately. To learn more, visit [employer].mn.gov and search for \"safe gun storage.\"  Nutrition  Eat 5 or more servings of fruits and vegetables each day.  Try wheat bread, brown rice and whole grain pasta (instead of white bread, rice, and pasta).  Get enough calcium and vitamin D. Check the label on foods and aim for 100% of the RDA (recommended daily allowance).  Regular exams  Have a dental exam and cleaning every 6 months.  See your health care team every year to talk about:  Any changes in your health.  Any medicines your care team has prescribed.  Preventive care, family planning, and ways to prevent chronic diseases.  Shots (vaccines)   HPV shots (up to age 26), if you've never had them before.  Hepatitis B shots (up to age 59), if you've never had them before.  COVID-19 shot: Get this shot when it's due.  Flu shot: Get a flu shot every year.  Tetanus shot: Get a tetanus shot every 10 years.  Pneumococcal, hepatitis A, and RSV shots: Ask your care team if you need these based on your risk.  Shingles shot (for age 50 and up).  General health tests  Diabetes screening:  Starting at age 35, Get screened for diabetes at least every 3 years.  If " you are younger than age 35, ask your care team if you should be screened for diabetes.  Cholesterol test: At age 39, start having a cholesterol test every 5 years, or more often if advised.  Bone density scan (DEXA): At age 50, ask your care team if you should have this scan for osteoporosis (brittle bones).  Hepatitis C: Get tested at least once in your life.  Abdominal aortic aneurysm screening: Talk to your doctor about having this screening if you:  Have ever smoked; and  Are biologically male; and  Are between the ages of 65 and 75.  STIs (sexually transmitted infections)  Before age 24: Ask your care team if you should be screened for STIs.  After age 24: Get screened for STIs if you're at risk. You are at risk for STIs (including HIV) if:  You are sexually active with more than one person.  You don't use condoms every time.  You or a partner was diagnosed with a sexually transmitted infection.  If you are at risk for HIV, ask about PrEP medicine to prevent HIV.  Get tested for HIV at least once in your life, whether you are at risk for HIV or not.  Cancer screening tests  Cervical cancer screening: If you have a cervix, begin getting regular cervical cancer screening tests at age 21. Most people who have regular screenings with normal results can stop after age 65. Talk about this with your provider.  Breast cancer scan (mammogram): If you've ever had breasts, begin having regular mammograms starting at age 40. This is a scan to check for breast cancer.  Colon cancer screening: It is important to start screening for colon cancer at age 45.  Have a colonoscopy test every 10 years (or more often if you're at risk) Or, ask your provider about stool tests like a FIT test every year or Cologuard test every 3 years.  To learn more about your testing options, visit: www.[employer]/534598.pdf.  For help making a decision, visit: gail/ab69719.  Prostate cancer screening test: If you have a prostate and are age 55  to 69, ask your provider if you would benefit from a yearly prostate cancer screening test.  Lung cancer screening: If you are a current or former smoker age 50 to 80, ask your care team if ongoing lung cancer screenings are right for you.  For informational purposes only. Not to replace the advice of your health care provider. Copyright   2023 [employer]. All rights reserved. Clinically reviewed by the [facility] Transitions Program. Code [employer] 008997 - REV 04/24.

## 2024-06-07 NOTE — PROGRESS NOTES
Preventive Care Visit  [facility] FREDY Madrigal MD, Family Medicine  Jun 7, 2024      Assessment & Plan     Encounter for Medicare annual wellness exam  -Preventive guidelines reviewed  -Mammogram done in August 2023  -DEXA scan done 5/22, showed osteopenia  - PRIMARY CARE FOLLOW-UP SCHEDULING  - REVIEW OF HEALTH MAINTENANCE PROTOCOL ORDERS  - PRIMARY CARE FOLLOW-UP SCHEDULING    Diabetes mellitus with background retinopathy (H)  -Followed by ophthalmology outside of [place]  -Diabetes is managed by endocrine  -Has follow-up scheduled with endocrine      Lab Results   Component Value Date    A1C 7.5 05/22/2024    A1C 6.3 09/15/2023    A1C 6.3 06/16/2023    A1C 11.6 12/06/2022    A1C 9.9 05/13/2022    A1C 9.3 06/04/2021    A1C 10.9 02/16/2021    A1C 8.4 02/21/2020    A1C 7.7 06/18/2019    A1C 10.2 02/19/2019       Stage 3a chronic kidney disease (H)  -Followed by nephrology, last visit 9/23    Type 2 diabetes mellitus with stage 3a chronic kidney disease, with long-term current use of insulin (H)  -Per endocrine  -Patient has lost almost 30 pounds, is on semaglutide and on empagliflozin    Hypertension goal BP (blood pressure) < 140/90  -Blood pressure is at goal today  -Continue amlodipine 10 mg, losartan 100 mg, and hydrochlorothiazide 25 mg daily    Hyperlipidemia LDL goal <100  -Continue statin atorvastatin 80 mg daily    LDL Cholesterol Calculated   Date Value Ref Range Status   05/22/2024 71 <=100 mg/dL Final   02/16/2021 74 <100 mg/dL Final     Comment:     Desirable:       <100 mg/dl       Screen for colon cancer  -Due for colonoscopy  - Colonoscopy Screening  Referral    Encounter for immunization  -Vaccinations completed  -Additional vaccines such as Shingrix and RSV are recommended to be administered at the pharmacy per Medicare guidelines  - Pneumococcal 20 Valent Conjugate (PCV20)  - COVID-19 12+ (2023-24) (PFIZER)  -Patient stated that she will be traveling to the  [place] in the next few weeks and was wondering if she needs any additional vaccinations.  Checked CDC website, discussed that best to be seen at the travel clinic.  Based on CDC guidelines, should be up-to-date on routine immunizations, hepatitis B and hepatitis A is also recommended.            BMI  Estimated body mass index is 32.23 kg/m  as calculated from the following:    Height as of this encounter: 1.549 m (5' 1\").    Weight as of this encounter: 77.4 kg (170 lb 9.6 oz).   Weight management plan: Discussed healthy diet and exercise guidelines    Counseling  Appropriate preventive services were discussed with this patient, including applicable screening as appropriate for fall prevention, nutrition, physical activity, Tobacco-use cessation, weight loss and cognition.  Checklist reviewing preventive services available has been given to the patient.  Reviewed patient's diet, addressing concerns and/or questions.   She is at risk for lack of exercise and has been provided with information to increase physical activity for the benefit of her well-being.   The patient was instructed to see the dentist every 6 months.       Regular exercise    Nissa Campbell is a 68 year old, presenting for the following:  Physical        6/7/2024     9:54 AM   Additional Questions   Roomed by liberty   Accompanied by self         Health Care Directive  Patient does not have a Health Care Directive or Living Will: Discussed advance care planning with patient; information given to patient to review.    HPI  Discuss veins; patient has lost weight, has noticed that veins of hands are more prominent, discussed normal change, there may be some thinning of the skin too. No pain or associated skin changes     Hypertension Follow-up    Do you check your blood pressure regularly outside of the clinic? No   Are you following a low salt diet? Yes  Are your blood pressures ever more than 140 on the top number (systolic) OR more   than 90 " on the bottom number (diastolic), for example 140/90? No    Chronic Kidney Disease Follow-up    Do you take any over the counter pain medicine?: No      Hand veins are more prominent.  No chest pain  Exercise some+  Eye clinic 3/24.      Wt Readings from Last 2 Encounters:   06/07/24 77.4 kg (170 lb 9.6 oz)   09/18/23 78.9 kg (174 lb)      Has lost about 30 pounds         6/7/2024   General Health   How would you rate your overall physical health? Good   Feel stress (tense, anxious, or unable to sleep) Not at all         6/7/2024   Nutrition   Diet: Other   If other, please elaborate: low sugar and carbs         6/7/2024   Exercise   Days per week of moderate/strenous exercise 3 days   Average minutes spent exercising at this level 20 min         6/7/2024   Social Factors   Frequency of gathering with friends or relatives Three times a week   Worry food won't last until get money to buy more No   Food not last or not have enough money for food? No   Do you have housing?  Yes   Are you worried about losing your housing? No   Lack of transportation? No   Unable to get utilities (heat,electricity)? No         6/7/2024   Fall Risk   Fallen 2 or more times in the past year? No   Trouble with walking or balance? No          6/7/2024   Activities of Daily Living- Home Safety   Needs help with the following daily activites None of the above   Safety concerns in the home None of the above         6/7/2024   Dental   Dentist two times every year? (!) NO         6/7/2024   Hearing Screening   Hearing concerns? None of the above         6/7/2024   Driving Risk Screening   Patient/family members have concerns about driving No         6/7/2024   General Alertness/Fatigue Screening   Have you been more tired than usual lately? No         6/7/2024   Urinary Incontinence Screening   Bothered by leaking urine in past 6 months No         6/7/2024   TB Screening   Were you born outside of the [place]? No         Today's PHQ-2 Score:        6/7/2024     9:53 AM   PHQ-2 ( 1999 Pfizer)   Q1: Little interest or pleasure in doing things 0   Q2: Feeling down, depressed or hopeless 0   PHQ-2 Score 0   Q1: Little interest or pleasure in doing things Not at all   Q2: Feeling down, depressed or hopeless Not at all   PHQ-2 Score 0           6/7/2024   Substance Use   Alcohol more than 3/day or more than 7/wk No   Do you have a current opioid prescription? No   How severe/bad is pain from 1 to 10? 0/10 (No Pain)   Do you use any other substances recreationally? No     Social History     Tobacco Use    Smoking status: Never    Smokeless tobacco: Never   Vaping Use    Vaping status: Never Used   Substance Use Topics    Alcohol use: No    Drug use: No           8/1/2023   LAST FHS-7 RESULTS   1st degree relative breast or ovarian cancer No    No   Any relative bilateral breast cancer No    No   Any male have breast cancer No    No   Any ONE woman have BOTH breast AND ovarian cancer No    No   Any woman with breast cancer before 50yrs No    No   2 or more relatives with breast AND/OR ovarian cancer No    No   2 or more relatives with breast AND/OR bowel cancer No    No        Mammogram Screening - After age 74- determine frequency with patient based on health status, life expectancy and patient goals      History of abnormal Pap smear: No - age 65 or older with adequate negative prior screening test results (3 consecutive negative cytology results, 2 consecutive negative cotesting results, or 2 consecutive negative HrHPV test results within 10 years, with the most recent test occurring within the recommended screening interval for the test used)        Latest Ref Rng & Units 5/5/2016     8:30 AM 5/5/2016    12:00 AM   PAP / HPV   PAP (Historical)   NIL    HPV 16 DNA NEG Negative     HPV 18 DNA NEG Negative     Other HR HPV NEG Negative       ASCVD Risk   The ASCVD Risk score (Lianne BRENNAN, et al., 2019) failed to calculate for the following reasons:    The  valid total cholesterol range is 130 to 320 mg/dL          Reviewed and updated as needed this visit by Provider   Tobacco  Allergies  Meds  Problems  Med Hx  Surg Hx  Fam Hx            Past Medical History:   Diagnosis Date    GERD (gastroesophageal reflux disease)     History of elevated lipids     Lower extremity edema     NIDDM (non-insulin dependent diabetes mellitus) 1997    Rhinitis      Past Surgical History:   Procedure Laterality Date    LASER SURGERY OF EYE Right 2015     Labs reviewed in EPIC  BP Readings from Last 3 Encounters:   06/07/24 116/67   05/13/22 131/76   06/04/21 131/69    Wt Readings from Last 3 Encounters:   06/07/24 77.4 kg (170 lb 9.6 oz)   09/18/23 78.9 kg (174 lb)   06/19/23 78.9 kg (174 lb)                  Patient Active Problem List   Diagnosis    Hyperlipidemia LDL goal <100    Type 2 diabetes mellitus with stage 3 chronic kidney disease, with long-term current use of insulin (H)    Hypertension goal BP (blood pressure) < 140/90    Cataract    Diabetes mellitus with background retinopathy (H)    Advanced directives, counseling/discussion    Health Care Home    Morbid obesity (H)    Obesity, Class II, BMI 35-39.9     Past Surgical History:   Procedure Laterality Date    LASER SURGERY OF EYE Right 2015       Social History     Tobacco Use    Smoking status: Never    Smokeless tobacco: Never   Substance Use Topics    Alcohol use: No     Family History   Problem Relation Age of Onset    Diabetes Mother     Heart Disease Mother         chf and pacemaker    Genitourinary Problems Mother         ESRD    Diabetes Father     C.A.D. Father         age 49    Cerebrovascular Disease Father     Asthma Sister     Asthma Son     Asthma Daughter     Heart Disease Daughter     Glaucoma No family hx of     Macular Degeneration No family hx of          Current Outpatient Medications   Medication Sig Dispense Refill    amLODIPine (NORVASC) 10 MG tablet TAKE 1 TABLET(10 MG) BY MOUTH DAILY FOR  BLOOD PRESSURE 90 tablet 0    atorvastatin (LIPITOR) 80 MG tablet TAKE 1 TABLET BY MOUTH EVERY DAY 90 tablet 2    blood glucose monitoring (NO BRAND SPECIFIED) meter device kit Use to test blood sugar 2 times daily or as directed. 1 kit 0    blood glucose monitoring (ULTRA THIN 30G) lancets Use to test blood sugar 2 times daily or as directed. Per insurance 100 each 11    cetirizine (ZYRTEC) 10 MG tablet TAKE 1 TABLET (10 MG) BY MOUTH EVERY EVENING AS NEEDED FOR ALLERGIES 90 tablet 1    Continuous Blood Gluc  (FREESTYLE RODNEY 2 READER) JOSE 1 each See Admin Instructions Use multiple times daily to read glucose levels. 1 each 0    Continuous Blood Gluc Sensor (FREESTYLE RODNEY 2 SENSOR) MISC 1 each every 14 days Use 1 sensor every 14 days. Use to read blood sugars per 's instructions. 2 each 5    empagliflozin (JARDIANCE) 25 MG TABS tablet Take 1 tablet (25 mg) by mouth daily 90 tablet 3    fluticasone (FLONASE) 50 MCG/ACT nasal spray SPRAY 1-2 SPRAYS INTO BOTH NOSTRILS DAILY AS NEEDED FOR RHINITIS OR ALLERGIES 48 mL 2    Glucose Blood (BLOOD GLUCOSE TEST STRIPS) STRP 1 strip by Lancet route 2 times daily 100 strip 11    hydrochlorothiazide (HYDRODIURIL) 25 MG tablet TAKE 1 TABLET BY MOUTH EVERY DAY 90 tablet 0    losartan (COZAAR) 100 MG tablet TAKE 1 TABLET BY MOUTH EVERY DAY 90 tablet 0    metFORMIN (GLUCOPHAGE XR) 500 MG 24 hr tablet TAKE 2 TABLETS BY MOUTH DAILY WITH DINNER 200 tablet 3    omeprazole 20 MG tablet Take 1 tablet (20 mg) by mouth daily as needed 90 tablet 1    order for DME Equipment being ordered: Digital home blood pressure monitor kit 1 Device 0    ORDER FOR DME Equipment being ordered: blood pressure cuff/machine 1 Device 0    Semaglutide, 2 MG/DOSE, (OZEMPIC) 8 MG/3ML pen Inject 2 mg Subcutaneous every 7 days 9 mL 3    solifenacin (VESICARE) 10 MG tablet Take 1 tablet (10 mg) by mouth daily 90 tablet 3    vitamin D3 (CHOLECALCIFEROL) 50 mcg (2000 units) tablet Take 1 tablet  (50 mcg) by mouth daily 90 tablet 3     Allergies   Allergen Reactions    Ace Inhibitors Cough    Glucophage [Metformin And Related] Cramps     Stomach upset     Recent Labs   Lab Test 05/22/24  1431 09/15/23  1007 06/16/23  0829 12/06/22  1403 05/13/22  1112 10/20/21  1015 07/08/21  1610 06/04/21  1002 02/16/21  1108 10/17/16  1244 05/05/16  0730   A1C 7.5* 6.3* 6.3*   < > 9.9*   < >  --  9.3* 10.9*   < > 7.7*   LDL 71  --  74  --  61  --   --   --  74   < > 73   HDL 43*  --  42*  --  44*  --   --   --  44*   < > 42*   TRIG 65  --  78  --  90  --   --   --  96   < > 120   ALT  --   --   --   --  18  --   --   --  18  --  15   CR 1.25* 1.24* 1.32*   < > 1.20*  --  1.30* 1.25* 1.12*   < > 1.34*   GFRESTIMATED 46* 47* 44*   < > 49*  --  42* 45* 51*   < > 40*   GFRESTBLACK  --   --   --   --   --   --  49* 52* 59*   < > 49*   POTASSIUM 4.3 4.1 3.9   < > 3.9  --  4.2 3.7 3.8   < > 4.3   TSH  --   --  2.73  --  1.12  --   --   --  2.34   < >  --     < > = values in this interval not displayed.      Current providers sharing in care for this patient include:  Patient Care Team:  Radha Madrigal MD as PCP - General (Family Practice)  Radha Madrigal MD as Assigned PCP  Emma Martinez [facility] as Pharmacist  Amy Sosa MD as Assigned Endocrinology Provider  Milagro Moore PA-C as Physician Assistant (Urology)  Amy Sosa MD as Referring Physician (Endocrinology, Diabetes, and Metabolism)  Milagro Moore PA-C as Assigned Surgical Provider  Blue, Ana, RN as Diabetes Educator (Diabetes Education)  Anne Jalloh MD as Assigned Nephrology Provider    The following health maintenance items are reviewed in Epic and correct as of today:  Health Maintenance   Topic Date Due    ZOSTER IMMUNIZATION (1 of 2) Never done    RSV VACCINE (Pregnancy & 60+) (1 - 1-dose 60+ series) Never done    Pneumococcal Vaccine: 65+ Years (2 of 2 - PCV) 01/22/2016    COLORECTAL CANCER SCREENING  10/28/2020     "DIABETIC FOOT EXAM  05/13/2023    COVID-19 Vaccine (5 - 2023-24 season) 09/01/2023    EYE EXAM  04/26/2024    MICROALBUMIN  06/16/2024    MAMMO SCREENING  08/01/2024    INFLUENZA VACCINE (Season Ended) 09/01/2024    HEMOGLOBIN  09/15/2024    A1C  11/22/2024    BMP  11/22/2024    LIPID  05/22/2025    MEDICARE ANNUAL WELLNESS VISIT  06/07/2025    ANNUAL REVIEW OF HM ORDERS  06/07/2025    FALL RISK ASSESSMENT  06/07/2025    ADVANCE CARE PLANNING  05/13/2027    DTAP/TDAP/TD IMMUNIZATION (3 - Td or Tdap) 05/13/2032    DEXA  05/27/2037    HEPATITIS C SCREENING  Completed    PHQ-2 (once per calendar year)  Completed    URINALYSIS  Completed    IPV IMMUNIZATION  Aged Out    HPV IMMUNIZATION  Aged Out    MENINGITIS IMMUNIZATION  Aged Out    RSV MONOCLONAL ANTIBODY  Aged Out         Review of Systems  Constitutional, HEENT, cardiovascular, pulmonary, gi and gu systems are negative, except as otherwise noted.     Objective    Exam  /67 (BP Location: Left arm, Patient Position: Sitting, Cuff Size: Adult Regular)   Pulse 81   Resp 16   Ht 1.549 m (5' 1\")   Wt 77.4 kg (170 lb 9.6 oz)   LMP  (LMP Unknown)   SpO2 100%   BMI 32.23 kg/m     Estimated body mass index is 32.23 kg/m  as calculated from the following:    Height as of this encounter: 1.549 m (5' 1\").    Weight as of this encounter: 77.4 kg (170 lb 9.6 oz).    Physical Exam  GENERAL APPEARANCE: healthy, alert and no distress  EYES: Eyes grossly normal to inspection and conjunctivae and sclerae normal  RESP: lungs clear to auscultation - no rales, rhonchi or wheezes  CV: regular rates and rhythm, normal S1 S2, no S3 or S4 and no murmur, click or rub  ABDOMEN: soft, non-tender and no rebound or guarding   MS: extremities normal- no gross deformities noted and peripheral pulses normal  SKIN: no suspicious lesions or rashes  NEURO: Normal strength and tone, mentation intact and speech normal  PSYCH: mentation appears normal  Diabetic foot exam: normal DP and PT " pulses, no trophic changes or ulcerative lesions, and normal sensory exam             6/7/2024   Mini Cog   Clock Draw Score 2 Normal   3 Item Recall 3 objects recalled   Mini Cog Total Score 5              Signed Electronically by: Radha Madrigal MD MPH

## 2024-06-11 ENCOUNTER — TELEPHONE (OUTPATIENT)
Dept: ENDOCRINOLOGY | Facility: CLINIC | Age: 69
End: 2024-06-11
Payer: COMMERCIAL

## 2024-06-11 NOTE — TELEPHONE ENCOUNTER
Advised pt of Dr Sosa' recommendations and request for CDE visit.  Will send contact info in [employer] message for CDE scheduling    Rizwan Betancourt, EMT      ----- Message from Amy Sosa MD sent at 5/30/2024  1:29 PM CDT -----  Team - please call patient with results.  If did not see [employer] message truly.  Hello -     Here are my comments about your recent results:  -Kidney function (GFR) is decreased.  ADVISE: Follow-up with your kidney provider as you are currently doing.  -Sodium is normal.  -Potassium is normal.  -Calcium is normal.  -Glucose is elevated due to your diabetes.  -A1C (test of diabetes control the last 2-3 months) is slightly above your goal. Please continue with your current plan as we have discussed at your recent visit and also please make an appointment with the diabetes educator at your convenience.  Please let us know if you have any questions or concerns.     Regards,  Amy Sosa MD

## 2024-07-02 ENCOUNTER — PATIENT OUTREACH (OUTPATIENT)
Dept: CARE COORDINATION | Facility: CLINIC | Age: 69
End: 2024-07-02
Payer: COMMERCIAL

## 2024-07-10 ENCOUNTER — PATIENT OUTREACH (OUTPATIENT)
Dept: CARE COORDINATION | Facility: CLINIC | Age: 69
End: 2024-07-10
Payer: COMMERCIAL

## 2024-07-30 ENCOUNTER — PATIENT OUTREACH (OUTPATIENT)
Dept: CARE COORDINATION | Facility: CLINIC | Age: 69
End: 2024-07-30
Payer: COMMERCIAL

## 2024-08-07 ENCOUNTER — DOCUMENTATION ONLY (OUTPATIENT)
Dept: ENDOCRINOLOGY | Facility: CLINIC | Age: 69
End: 2024-08-07
Payer: COMMERCIAL

## 2024-08-14 DIAGNOSIS — I10 HYPERTENSION GOAL BP (BLOOD PRESSURE) < 140/90: ICD-10-CM

## 2024-08-14 RX ORDER — LOSARTAN POTASSIUM 100 MG/1
100 TABLET ORAL DAILY
Qty: 90 TABLET | Refills: 1 | Status: SHIPPED | OUTPATIENT
Start: 2024-08-14

## 2024-08-23 NOTE — PROGRESS NOTES
Medication from Tracy Nordisk received in [place] office.   Writer labeled and notified patient that medication was ready for  and hours were given that staff would be available.  4 boxes of Ozempic 8 mg/3 mL prefilled pen       Placed in secured medication room in fridge shared with Peds Speciality.       Sonia Su CMA  Adult Endocrinology   [facility]

## 2024-08-23 NOTE — PROGRESS NOTES
Patient presented to clinic this afternoon, writer confirmed  for patient and last name.   Handed over 4 boxes of Ozempic and reminded patient to ask for refill when she starts the second to last box.       Sonia Su CMA  Adult Endocrinology   [facility]

## 2024-08-26 DIAGNOSIS — J30.9 ALLERGIC SINUSITIS: ICD-10-CM

## 2024-08-26 RX ORDER — FLUTICASONE PROPIONATE 50 MCG
1-2 SPRAY, SUSPENSION (ML) NASAL DAILY PRN
Qty: 48 ML | Refills: 2 | Status: SHIPPED | OUTPATIENT
Start: 2024-08-26

## 2024-08-26 NOTE — TELEPHONE ENCOUNTER
Last refill 9/9/2022.  Clinic RN: Please contact patient because patient should have run out of this medication. Confirm patient is taking this medication as prescribed. Document findings and route refill encounter to provider for approval or denial.

## 2024-08-26 NOTE — TELEPHONE ENCOUNTER
Spoke with patient, confirms still using prescription, only uses prn. Routing to provider, please sign pended orders if appropriate. Justus Quintero RN, BSN

## 2024-09-05 DIAGNOSIS — I10 HYPERTENSION GOAL BP (BLOOD PRESSURE) < 140/90: ICD-10-CM

## 2024-09-05 RX ORDER — AMLODIPINE BESYLATE 10 MG/1
TABLET ORAL
Qty: 90 TABLET | Refills: 3 | Status: SHIPPED | OUTPATIENT
Start: 2024-09-05

## 2024-09-06 DIAGNOSIS — I10 HYPERTENSION GOAL BP (BLOOD PRESSURE) < 140/90: ICD-10-CM

## 2024-09-06 RX ORDER — HYDROCHLOROTHIAZIDE 25 MG/1
25 TABLET ORAL DAILY
Qty: 90 TABLET | Refills: 0 | Status: SHIPPED | OUTPATIENT
Start: 2024-09-06

## 2024-09-09 ENCOUNTER — TELEPHONE (OUTPATIENT)
Dept: ENDOCRINOLOGY | Facility: CLINIC | Age: 69
End: 2024-09-09
Payer: COMMERCIAL

## 2024-10-25 ENCOUNTER — TELEPHONE (OUTPATIENT)
Dept: ENDOCRINOLOGY | Facility: CLINIC | Age: 69
End: 2024-10-25
Payer: COMMERCIAL

## 2024-10-25 ENCOUNTER — DOCUMENTATION ONLY (OUTPATIENT)
Dept: ENDOCRINOLOGY | Facility: CLINIC | Age: 69
End: 2024-10-25
Payer: COMMERCIAL

## 2024-10-25 NOTE — PROGRESS NOTES
Type of form:  Detailed written order CGM   From:   Christine   Fax:  [phone]  Supplies requested on form:  Diabetes CGM sensors Printed clinical notes from 10/28/2024 w/A1C results and 4/08/2024 clinical notes   Provider: Amy Sosa   Date provider will be in clinic to sign:  10/29/2024  Labeled form and completed to the best of this writers ability.    Placed in provider's file     Sonia Su CMA  Adult Endocrinology   [facility]

## 2024-10-25 NOTE — PROGRESS NOTES
Outcome for 10/25/24 12:45 PM:  Spoke with patient. Pt states she has not been wearing dominik sensor but will put new on on today. Will obtain data on 10/28/24 prior to appt.  Sabrina Avila MA  Outcome for 10/28/24 6:40 AM: Data uploaded on Dominike Bobbi Grossaint, VF        Patient is showing 5/5 [place] met.   Sabrina Avila MA

## 2024-10-25 NOTE — TELEPHONE ENCOUNTER
Spoke with patient. Pt's irving data is only showing up to 10/20/24. Pt states she has not been wearing sensor but will put new sensor on today. Will obtain irving data on 10/28/24 prior to pt's appt. Sabrina Avila CMA on 10/25/2024 at 12:49 PM

## 2024-10-28 ENCOUNTER — TELEPHONE (OUTPATIENT)
Dept: ENDOCRINOLOGY | Facility: CLINIC | Age: 69
End: 2024-10-28

## 2024-10-28 ENCOUNTER — VIRTUAL VISIT (OUTPATIENT)
Dept: ENDOCRINOLOGY | Facility: CLINIC | Age: 69
End: 2024-10-28
Payer: COMMERCIAL

## 2024-10-28 ENCOUNTER — LAB (OUTPATIENT)
Dept: LAB | Facility: CLINIC | Age: 69
End: 2024-10-28
Payer: COMMERCIAL

## 2024-10-28 DIAGNOSIS — N18.31 STAGE 3A CHRONIC KIDNEY DISEASE (H): ICD-10-CM

## 2024-10-28 DIAGNOSIS — Z79.4 TYPE 2 DIABETES MELLITUS WITH STAGE 3A CHRONIC KIDNEY DISEASE, WITH LONG-TERM CURRENT USE OF INSULIN (H): ICD-10-CM

## 2024-10-28 DIAGNOSIS — N18.32 CHRONIC KIDNEY DISEASE, STAGE 3B (H): ICD-10-CM

## 2024-10-28 DIAGNOSIS — E11.22 TYPE 2 DIABETES MELLITUS WITH STAGE 3A CHRONIC KIDNEY DISEASE, WITH LONG-TERM CURRENT USE OF INSULIN (H): ICD-10-CM

## 2024-10-28 DIAGNOSIS — N18.31 TYPE 2 DIABETES MELLITUS WITH STAGE 3A CHRONIC KIDNEY DISEASE, WITH LONG-TERM CURRENT USE OF INSULIN (H): Primary | ICD-10-CM

## 2024-10-28 DIAGNOSIS — E11.22 TYPE 2 DIABETES MELLITUS WITH STAGE 3A CHRONIC KIDNEY DISEASE, WITH LONG-TERM CURRENT USE OF INSULIN (H): Primary | ICD-10-CM

## 2024-10-28 DIAGNOSIS — E11.3519 TYPE 2 DIABETES MELLITUS WITH PROLIFERATIVE RETINOPATHY AND MACULAR EDEMA, WITHOUT LONG-TERM CURRENT USE OF INSULIN, UNSPECIFIED LATERALITY (H): ICD-10-CM

## 2024-10-28 DIAGNOSIS — N18.31 TYPE 2 DIABETES MELLITUS WITH STAGE 3A CHRONIC KIDNEY DISEASE, WITH LONG-TERM CURRENT USE OF INSULIN (H): ICD-10-CM

## 2024-10-28 DIAGNOSIS — Z79.4 TYPE 2 DIABETES MELLITUS WITH STAGE 3A CHRONIC KIDNEY DISEASE, WITH LONG-TERM CURRENT USE OF INSULIN (H): Primary | ICD-10-CM

## 2024-10-28 DIAGNOSIS — E66.01 MORBID OBESITY (H): ICD-10-CM

## 2024-10-28 LAB
EST. AVERAGE GLUCOSE BLD GHB EST-MCNC: 166 MG/DL
HBA1C MFR BLD: 7.4 % (ref 0–5.6)

## 2024-10-28 PROCEDURE — 82570 ASSAY OF URINE CREATININE: CPT

## 2024-10-28 PROCEDURE — 83036 HEMOGLOBIN GLYCOSYLATED A1C: CPT

## 2024-10-28 PROCEDURE — 82043 UR ALBUMIN QUANTITATIVE: CPT

## 2024-10-28 PROCEDURE — G2211 COMPLEX E/M VISIT ADD ON: HCPCS | Mod: 95 | Performed by: INTERNAL MEDICINE

## 2024-10-28 PROCEDURE — 99214 OFFICE O/P EST MOD 30 MIN: CPT | Mod: 95 | Performed by: INTERNAL MEDICINE

## 2024-10-28 PROCEDURE — 84443 ASSAY THYROID STIM HORMONE: CPT

## 2024-10-28 PROCEDURE — 80048 BASIC METABOLIC PNL TOTAL CA: CPT

## 2024-10-28 PROCEDURE — 36415 COLL VENOUS BLD VENIPUNCTURE: CPT

## 2024-10-28 NOTE — NURSING NOTE
Current patient location: [address] [place] [address] [address]  [facility] [address]    Is the patient currently in the state of [place]? YES    Visit mode:VIDEO    If the visit is dropped, the patient can be reconnected by: VIDEO VISIT: Text to cell phone:   Telephone Information:   Mobile [phone]       Will anyone else be joining the visit? NO  (If patient encounters technical issues they should call [phone]    Are changes needed to the allergy or medication list? No, Pt stated no changes to allergies, and Pt stated no med changes    Are refills needed on medications prescribed by this physician? NO    Rooming Documentation:  Not applicable    Reason for visit: RECHECK    Stacey AUGUSTINE

## 2024-10-28 NOTE — LETTER
10/28/2024      Adrian Cruz  [address]  [facility] [address]      Dear Colleague,    Thank you for referring your patient, Ardian Cruz, to the [facility]. Please see a copy of my visit note below.      Outcome for 10/25/24 12:45 PM:  Spoke with patient. Pt states she has not been wearing irving sensor but will put new on on today. Will obtain data on 10/28/24 prior to appt.  Sabrina Avila MA  Outcome for 10/28/24 6:40 AM: Data uploaded on [employer]  Bobbi Grossaint, VF        Patient is showing 5/5 MNCM met.   Sabrina Avila MA                    Endocrinology virtual Visit    Chief Complaint: RECHECK     Information obtained from:Patient      Assessment/Treatment Plan:      Type 2 diabetes in the setting of stage III CKD-  A1c has improved to 6.3% fand weight loss of 30+ pounds on Ozempic and life style changes.      Off note, c-peptide checked and it was WNL.     Blood glucose readings reviewed.  Overall, average BG on CGM was 143 . Chronic complications monitoring  Blood pressure well controlled historically.  On atorvastatin at the full dose.  Has microalbuminuria and currently on losartan 100 mg daily; CKD following with nephrology.   Eye exam UTD    Plan:  Jardiance 25 mg daily  Semaglutide 2 mg every 7 days      Morbid Obesity: weight loss of 30 pounds. Weight loss and exercise are recommended in the management of Type 2 diabetes.  Caloric restriction, portion control and physical activity are evidence based strategies for life style changes.     CKD stage IIIb - check follow up BMP. Continue losartan and Jardiance. Schedule follow up with nephrology.     Test and/or medications prescribed today:  Orders Placed This Encounter   Procedures     Hemoglobin A1c     Basic metabolic panel     Albumin Random Urine Quantitative with Creat Ratio     TSH with free T4 reflex         Amy Sosa MD  Staff Endocrinologist    Division of Endocrinology  and Diabetes      Subjective:         HPI: Adrian Cruz is a 69 year old female with history of type 2 diabetes who is here for a follow up.     Type 2 diabetes essential diagnosed in 1997.  Patient is currently on:  Jardiance 25 mg daily  Semaglutide 2 mg every 7 days     GFR 45.  Patient has a known CKD stage IIIa. Following with nephrology.     Patient lives alone.  Reports urinary incontinence. Kegel exercises didn't help.       General Symptoms: No  Skin Symptoms: No  HENT Symptoms: No  EYE SYMPTOMS: No  HEART SYMPTOMS: No  LUNG SYMPTOMS: No  INTESTINAL SYMPTOMS: No  URINARY SYMPTOMS: No  GYNECOLOGIC SYMPTOMS: No  BREAST SYMPTOMS: No  SKELETAL SYMPTOMS: No  BLOOD SYMPTOMS: No  NERVOUS SYSTEM SYMPTOMS: No  MENTAL HEALTH SYMPTOMS: No        Allergies   Allergen Reactions     Ace Inhibitors Cough     Glucophage [Metformin And Related] Cramps     Stomach upset       Current Outpatient Medications   Medication Sig Dispense Refill     empagliflozin (JARDIANCE) 25 MG TABS tablet Take 1 tablet (25 mg) by mouth daily. 90 tablet 3     Semaglutide, 2 MG/DOSE, (OZEMPIC) 8 MG/3ML pen Inject 2 mg subcutaneously every 7 days. 9 mL 3     amLODIPine (NORVASC) 10 MG tablet TAKE 1 TABLET(10 MG) BY MOUTH DAILY FOR BLOOD PRESSURE 90 tablet 3     atorvastatin (LIPITOR) 80 MG tablet TAKE 1 TABLET BY MOUTH EVERY DAY 90 tablet 2     blood glucose monitoring (NO BRAND SPECIFIED) meter device kit Use to test blood sugar 2 times daily or as directed. 1 kit 0     blood glucose monitoring (ULTRA THIN 30G) lancets Use to test blood sugar 2 times daily or as directed. Per insurance 100 each 11     cetirizine (ZYRTEC) 10 MG tablet TAKE 1 TABLET (10 MG) BY MOUTH EVERY EVENING AS NEEDED FOR ALLERGIES 90 tablet 1     Continuous Blood Gluc  (FREESTYLE RODNEY 2 READER) JOSE 1 each See Admin Instructions Use multiple times daily to read glucose levels. 1 each 0     Continuous Blood Gluc Sensor (FREESTYLE RODNEY 2 SENSOR) MISC 1 each  every 14 days Use 1 sensor every 14 days. Use to read blood sugars per 's instructions. 2 each 5     fluticasone (FLONASE) 50 MCG/ACT nasal spray SPRAY 1-2 SPRAYS INTO BOTH NOSTRILS DAILY AS NEEDED FOR RHINITIS OR ALLERGIES 48 mL 2     Glucose Blood (BLOOD GLUCOSE TEST STRIPS) STRP 1 strip by Lancet route 2 times daily 100 strip 11     hydrochlorothiazide (HYDRODIURIL) 25 MG tablet TAKE 1 TABLET BY MOUTH EVERY DAY 90 tablet 0     losartan (COZAAR) 100 MG tablet TAKE 1 TABLET BY MOUTH EVERY DAY 90 tablet 1     omeprazole 20 MG tablet Take 1 tablet (20 mg) by mouth daily as needed 90 tablet 1     order for DME Equipment being ordered: Digital home blood pressure monitor kit 1 Device 0     ORDER FOR DME Equipment being ordered: blood pressure cuff/machine 1 Device 0     solifenacin (VESICARE) 10 MG tablet Take 1 tablet (10 mg) by mouth daily 90 tablet 3     vitamin D3 (CHOLECALCIFEROL) 50 mcg (2000 units) tablet Take 1 tablet (50 mcg) by mouth daily 90 tablet 3       Review of Systems     as per HPI above/ vaginal itching and whitish discharge.  Past medical history, past surgical history, and family history reviewed and epic.  Patient lives alone.        Objective:   GENERAL: Healthy, alert and no distress  EYES: Eyes grossly normal to inspection.    RESP: No audible wheeze, cough, or visible cyanosis.   NEURO: alert and oriented.   PSYCH: Mentation appears normal, affect normal/bright, judgement and insight intact, normal speech.  Wt Readings from Last 10 Encounters:   06/07/24 77.4 kg (170 lb 9.6 oz)   09/18/23 78.9 kg (174 lb)   06/19/23 78.9 kg (174 lb)   05/13/22 86.8 kg (191 lb 6.4 oz)   06/04/21 93 kg (205 lb)   02/16/21 93.4 kg (206 lb)   02/21/20 90.7 kg (200 lb)   06/18/19 92.5 kg (204 lb)   02/19/19 94.3 kg (208 lb)   10/30/18 93.4 kg (206 lb)       In House Labs:     Hemoglobin A1C   Date Value Ref Range Status   05/22/2024 7.5 (H) 0.0 - 5.6 % Final     Comment:     Normal <5.7%   Prediabetes  5.7-6.4%    Diabetes 6.5% or higher     Note: Adopted from ADA consensus guidelines.   09/15/2023 6.3 (H) 0.0 - 5.6 % Final     Comment:     Normal <5.7%   Prediabetes 5.7-6.4%    Diabetes 6.5% or higher     Note: Adopted from ADA consensus guidelines.   06/16/2023 6.3 (H) 0.0 - 5.6 % Final     Comment:     Normal <5.7%   Prediabetes 5.7-6.4%    Diabetes 6.5% or higher     Note: Adopted from ADA consensus guidelines.   06/04/2021 9.3 (H) 0 - 5.6 % Final     Comment:     Normal <5.7% Prediabetes 5.7-6.4%  Diabetes 6.5% or higher - adopted from ADA   consensus guidelines.  Reviewed: OK with previous     02/16/2021 10.9 (H) 0 - 5.6 % Final     Comment:     Results confirmed by repeat test  Normal <5.7% Prediabetes 5.7-6.4%  Diabetes 6.5% or higher - adopted from ADA   consensus guidelines.     02/21/2020 8.4 (H) 0 - 5.6 % Final     Comment:     Normal <5.7% Prediabetes 5.7-6.4%  Diabetes 6.5% or higher - adopted from ADA   consensus guidelines.         TSH   Date Value Ref Range Status   06/16/2023 2.73 0.30 - 4.20 uIU/mL Final   05/13/2022 1.12 0.40 - 4.00 mU/L Final   02/16/2021 2.34 0.40 - 4.00 mU/L Final   05/23/2017 2.28 0.40 - 4.00 mU/L Final   01/22/2015 1.82 0.40 - 4.00 mU/L Final     Comment:     Effective 7/30/2014, the reference range for this assay has changed to reflect   new instrumentation/methodology.     04/25/2013 1.86 0.4 - 5.0 mU/L Final   08/16/2011 1.21 0.4 - 5.0 mU/L Final       Creatinine   Date Value Ref Range Status   05/22/2024 1.25 (H) 0.51 - 0.95 mg/dL Final   07/08/2021 1.30 (H) 0.52 - 1.04 mg/dL Final   GFR > 50       Video-Visit Details    Type of service:  Video Visit  Joined the call at 10/28/2024, 7:59:09 am.  Left the call at 10/28/2024, 8:08:26 am.  You were on the call for 9 minutes 17 seconds .  Distant Location (provider location):  Off-site.   Platform used for Video Visit: [employer]   The Longitudinal plan of care for /diabetes/CKD/Morbid obesity was addressed during this visit.  Due to added complexity of care, we will continue to support Verlinda , and the subsequent management of this condition(s) and with the ongoing continuity of care of this condition.        Again, thank you for allowing me to participate in the care of your patient.        Sincerely,        Amy Sosa MD

## 2024-10-28 NOTE — TELEPHONE ENCOUNTER
Left Voicemail (1st Attempt) for the patient to call back and schedule the following:    Appointment type: return  Provider: dr. yousif  Return date: 4/28/2025  Specialty phone number: [phone]  Additonal Notes: Return in about 6 months (around 4/28/2025).    [facility]   Orthopedics, Podiatry, Sports Medicine, Ent ,Eye , Audiology, Adult Endocrine & Diabetes, Nutrition & Medication Therapy Management Specialties   [facility]

## 2024-10-28 NOTE — PROGRESS NOTES
Endocrinology virtual Visit    Chief Complaint: RECHECK     Information obtained from:Patient      Assessment/Treatment Plan:      Type 2 diabetes in the setting of stage III CKD-  A1c has improved to 6.3% fand weight loss of 30+ pounds on Ozempic and life style changes.      Off note, c-peptide checked and it was WNL.     Blood glucose readings reviewed.  Overall, average BG on CGM was 143 . Chronic complications monitoring  Blood pressure well controlled historically.  On atorvastatin at the full dose.  Has microalbuminuria and currently on losartan 100 mg daily; CKD following with nephrology.   Eye exam UTD    Plan:  Jardiance 25 mg daily  Semaglutide 2 mg every 7 days      Morbid Obesity: weight loss of 30 pounds. Weight loss and exercise are recommended in the management of Type 2 diabetes.  Caloric restriction, portion control and physical activity are evidence based strategies for life style changes.     CKD stage IIIb - check follow up BMP. Continue losartan and Jardiance. Schedule follow up with nephrology.     Test and/or medications prescribed today:  Orders Placed This Encounter   Procedures    Hemoglobin A1c    Basic metabolic panel    Albumin Random Urine Quantitative with Creat Ratio    TSH with free T4 reflex         Amy Sosa MD  Staff Endocrinologist    Division of Endocrinology and Diabetes      Subjective:         HPI: Adrian Cruz is a 69 year old female with history of type 2 diabetes who is here for a follow up.     Type 2 diabetes essential diagnosed in 1997.  Patient is currently on:  Jardiance 25 mg daily  Semaglutide 2 mg every 7 days     GFR 45.  Patient has a known CKD stage IIIa. Following with nephrology.     Patient lives alone.  Reports urinary incontinence. Kegel exercises didn't help.       General Symptoms: No  Skin Symptoms: No  HENT Symptoms: No  EYE SYMPTOMS: No  HEART SYMPTOMS: No  LUNG SYMPTOMS: No  INTESTINAL SYMPTOMS: No  URINARY SYMPTOMS: No  GYNECOLOGIC  SYMPTOMS: No  BREAST SYMPTOMS: No  SKELETAL SYMPTOMS: No  BLOOD SYMPTOMS: No  NERVOUS SYSTEM SYMPTOMS: No  MENTAL HEALTH SYMPTOMS: No        Allergies   Allergen Reactions    Ace Inhibitors Cough    Glucophage [Metformin And Related] Cramps     Stomach upset       Current Outpatient Medications   Medication Sig Dispense Refill    empagliflozin (JARDIANCE) 25 MG TABS tablet Take 1 tablet (25 mg) by mouth daily. 90 tablet 3    Semaglutide, 2 MG/DOSE, (OZEMPIC) 8 MG/3ML pen Inject 2 mg subcutaneously every 7 days. 9 mL 3    amLODIPine (NORVASC) 10 MG tablet TAKE 1 TABLET(10 MG) BY MOUTH DAILY FOR BLOOD PRESSURE 90 tablet 3    atorvastatin (LIPITOR) 80 MG tablet TAKE 1 TABLET BY MOUTH EVERY DAY 90 tablet 2    blood glucose monitoring (NO BRAND SPECIFIED) meter device kit Use to test blood sugar 2 times daily or as directed. 1 kit 0    blood glucose monitoring (ULTRA THIN 30G) lancets Use to test blood sugar 2 times daily or as directed. Per insurance 100 each 11    cetirizine (ZYRTEC) 10 MG tablet TAKE 1 TABLET (10 MG) BY MOUTH EVERY EVENING AS NEEDED FOR ALLERGIES 90 tablet 1    Continuous Blood Gluc  (FREESTYLE RODNEY 2 READER) JOSE 1 each See Admin Instructions Use multiple times daily to read glucose levels. 1 each 0    Continuous Blood Gluc Sensor (FREESTYLE RODNEY 2 SENSOR) MISC 1 each every 14 days Use 1 sensor every 14 days. Use to read blood sugars per 's instructions. 2 each 5    fluticasone (FLONASE) 50 MCG/ACT nasal spray SPRAY 1-2 SPRAYS INTO BOTH NOSTRILS DAILY AS NEEDED FOR RHINITIS OR ALLERGIES 48 mL 2    Glucose Blood (BLOOD GLUCOSE TEST STRIPS) STRP 1 strip by Lancet route 2 times daily 100 strip 11    hydrochlorothiazide (HYDRODIURIL) 25 MG tablet TAKE 1 TABLET BY MOUTH EVERY DAY 90 tablet 0    losartan (COZAAR) 100 MG tablet TAKE 1 TABLET BY MOUTH EVERY DAY 90 tablet 1    omeprazole 20 MG tablet Take 1 tablet (20 mg) by mouth daily as needed 90 tablet 1    order for DME Equipment  being ordered: Digital home blood pressure monitor kit 1 Device 0    ORDER FOR DME Equipment being ordered: blood pressure cuff/machine 1 Device 0    solifenacin (VESICARE) 10 MG tablet Take 1 tablet (10 mg) by mouth daily 90 tablet 3    vitamin D3 (CHOLECALCIFEROL) 50 mcg (2000 units) tablet Take 1 tablet (50 mcg) by mouth daily 90 tablet 3       Review of Systems     as per HPI above/ vaginal itching and whitish discharge.  Past medical history, past surgical history, and family history reviewed and epic.  Patient lives alone.        Objective:   GENERAL: Healthy, alert and no distress  EYES: Eyes grossly normal to inspection.    RESP: No audible wheeze, cough, or visible cyanosis.   NEURO: alert and oriented.   PSYCH: Mentation appears normal, affect normal/bright, judgement and insight intact, normal speech.  Wt Readings from Last 10 Encounters:   06/07/24 77.4 kg (170 lb 9.6 oz)   09/18/23 78.9 kg (174 lb)   06/19/23 78.9 kg (174 lb)   05/13/22 86.8 kg (191 lb 6.4 oz)   06/04/21 93 kg (205 lb)   02/16/21 93.4 kg (206 lb)   02/21/20 90.7 kg (200 lb)   06/18/19 92.5 kg (204 lb)   02/19/19 94.3 kg (208 lb)   10/30/18 93.4 kg (206 lb)       In House Labs:     Hemoglobin A1C   Date Value Ref Range Status   05/22/2024 7.5 (H) 0.0 - 5.6 % Final     Comment:     Normal <5.7%   Prediabetes 5.7-6.4%    Diabetes 6.5% or higher     Note: Adopted from ADA consensus guidelines.   09/15/2023 6.3 (H) 0.0 - 5.6 % Final     Comment:     Normal <5.7%   Prediabetes 5.7-6.4%    Diabetes 6.5% or higher     Note: Adopted from ADA consensus guidelines.   06/16/2023 6.3 (H) 0.0 - 5.6 % Final     Comment:     Normal <5.7%   Prediabetes 5.7-6.4%    Diabetes 6.5% or higher     Note: Adopted from ADA consensus guidelines.   06/04/2021 9.3 (H) 0 - 5.6 % Final     Comment:     Normal <5.7% Prediabetes 5.7-6.4%  Diabetes 6.5% or higher - adopted from ADA   consensus guidelines.  Reviewed: OK with previous     02/16/2021 10.9 (H) 0 - 5.6 %  Final     Comment:     Results confirmed by repeat test  Normal <5.7% Prediabetes 5.7-6.4%  Diabetes 6.5% or higher - adopted from ADA   consensus guidelines.     02/21/2020 8.4 (H) 0 - 5.6 % Final     Comment:     Normal <5.7% Prediabetes 5.7-6.4%  Diabetes 6.5% or higher - adopted from ADA   consensus guidelines.         TSH   Date Value Ref Range Status   06/16/2023 2.73 0.30 - 4.20 uIU/mL Final   05/13/2022 1.12 0.40 - 4.00 mU/L Final   02/16/2021 2.34 0.40 - 4.00 mU/L Final   05/23/2017 2.28 0.40 - 4.00 mU/L Final   01/22/2015 1.82 0.40 - 4.00 mU/L Final     Comment:     Effective 7/30/2014, the reference range for this assay has changed to reflect   new instrumentation/methodology.     04/25/2013 1.86 0.4 - 5.0 mU/L Final   08/16/2011 1.21 0.4 - 5.0 mU/L Final       Creatinine   Date Value Ref Range Status   05/22/2024 1.25 (H) 0.51 - 0.95 mg/dL Final   07/08/2021 1.30 (H) 0.52 - 1.04 mg/dL Final   GFR > 50       Video-Visit Details    Type of service:  Video Visit  Joined the call at 10/28/2024, 7:59:09 am.  Left the call at 10/28/2024, 8:08:26 am.  You were on the call for 9 minutes 17 seconds .  Distant Location (provider location):  Off-site.   Platform used for Video Visit: [employer]   The Longitudinal plan of care for /diabetes/CKD/Morbid obesity was addressed during this visit. Due to added complexity of care, we will continue to support Verlinda , and the subsequent management of this condition(s) and with the ongoing continuity of care of this condition.

## 2024-10-28 NOTE — PATIENT INSTRUCTIONS
Orders Placed This Encounter   Procedures    Hemoglobin A1c    Basic metabolic panel    Albumin Random Urine Quantitative with Creat Ratio    TSH with free T4 reflex    Complete blood work today and we will contact you with next steps.

## 2024-10-29 ENCOUNTER — MEDICAL CORRESPONDENCE (OUTPATIENT)
Dept: HEALTH INFORMATION MANAGEMENT | Facility: CLINIC | Age: 69
End: 2024-10-29

## 2024-10-29 LAB
ANION GAP SERPL CALCULATED.3IONS-SCNC: 13 MMOL/L (ref 7–15)
BUN SERPL-MCNC: 25.8 MG/DL (ref 8–23)
CALCIUM SERPL-MCNC: 9.5 MG/DL (ref 8.8–10.4)
CHLORIDE SERPL-SCNC: 100 MMOL/L (ref 98–107)
CREAT SERPL-MCNC: 1.5 MG/DL (ref 0.51–0.95)
CREAT UR-MCNC: 75.2 MG/DL
EGFRCR SERPLBLD CKD-EPI 2021: 37 ML/MIN/1.73M2
GLUCOSE SERPL-MCNC: 143 MG/DL (ref 70–99)
HCO3 SERPL-SCNC: 24 MMOL/L (ref 22–29)
MICROALBUMIN UR-MCNC: 25.3 MG/L
MICROALBUMIN/CREAT UR: 33.64 MG/G CR (ref 0–25)
POTASSIUM SERPL-SCNC: 4.4 MMOL/L (ref 3.4–5.3)
SODIUM SERPL-SCNC: 137 MMOL/L (ref 135–145)
TSH SERPL DL<=0.005 MIU/L-ACNC: 1.32 UIU/ML (ref 0.3–4.2)

## 2024-10-30 ENCOUNTER — MYC MEDICAL ADVICE (OUTPATIENT)
Dept: ENDOCRINOLOGY | Facility: CLINIC | Age: 69
End: 2024-10-30

## 2024-10-30 DIAGNOSIS — N18.31 TYPE 2 DIABETES MELLITUS WITH STAGE 3A CHRONIC KIDNEY DISEASE, WITH LONG-TERM CURRENT USE OF INSULIN (H): ICD-10-CM

## 2024-10-30 DIAGNOSIS — E11.22 TYPE 2 DIABETES MELLITUS WITH STAGE 3A CHRONIC KIDNEY DISEASE, WITH LONG-TERM CURRENT USE OF INSULIN (H): ICD-10-CM

## 2024-10-30 DIAGNOSIS — Z79.4 TYPE 2 DIABETES MELLITUS WITH STAGE 3A CHRONIC KIDNEY DISEASE, WITH LONG-TERM CURRENT USE OF INSULIN (H): ICD-10-CM

## 2024-10-30 NOTE — RESULT ENCOUNTER NOTE
Hello -    Here are my comments about your recent results:  -Kidney function (GFR) is decreased.  ADVISE: Follow-up with kidney provider  -Sodium is normal.  -Potassium is normal.  -Calcium is normal.  -Glucose is elevated due to your diabetes.  -A1C test (average blood sugar the last 2-3 months) is slightly above your goal.  We have recently adjusted your antidiabetic medication and we will assess the effect of that in 3 months.  .  -TSH (thyroid stimulating hormone) level is normal which indicates normal thyroid function.  -Microalbumin (urine protein) test result has improved compared to previous.  ADVISE: Continue current medications including Jardiance.  For additional lab test information, labtestsonline.org is an excellent reference..    Please let us know if you have any questions or concerns.     Regards,  Amy Sosa MD

## 2024-10-30 NOTE — PROGRESS NOTES
Provider reviewed and signed.   Faxed back to: [phone]  See image below for Timestamp confirmation of successful transmission.        Placed in [facility] file      Sonia Su CMA  Adult Endocrinology   [facility]

## 2024-10-31 NOTE — TELEPHONE ENCOUNTER
Please resend Jardiance to [employer] which is the patient assistance program for refills.    Roxanne Busby CMA  Adult Endocrinology  [facility] [place]

## 2024-11-07 ENCOUNTER — MEDICAL CORRESPONDENCE (OUTPATIENT)
Dept: HEALTH INFORMATION MANAGEMENT | Facility: CLINIC | Age: 69
End: 2024-11-07
Payer: COMMERCIAL

## 2024-11-12 ENCOUNTER — DOCUMENTATION ONLY (OUTPATIENT)
Dept: ENDOCRINOLOGY | Facility: CLINIC | Age: 69
End: 2024-11-12
Payer: COMMERCIAL

## 2024-11-12 NOTE — PROGRESS NOTES
Provider reviewed and signed.   Faxed back to: [phone]  See image for Timestamp confirmation of successful transmission.           Placed in [facility] PAP file       Sonia Su CMA  Adult Endocrinology   [facility]

## 2024-11-12 NOTE — PROGRESS NOTES
Type of form:  Patient Assistance Program   From:  [facility] Patient Assistance Program   Fax:  [phone]  Supplies requested on form:  Jardiance   Provider:  Amy Sosa   Date provider will be in clinic to sign:  11/12/2024  Labeled form and completed to the best of this writers ability.    Placed in provider's file     Sonia Su CMA  Adult Endocrinology   [facility]

## 2024-11-16 ENCOUNTER — HEALTH MAINTENANCE LETTER (OUTPATIENT)
Age: 69
End: 2024-11-16

## 2024-12-03 DIAGNOSIS — I10 HYPERTENSION GOAL BP (BLOOD PRESSURE) < 140/90: ICD-10-CM

## 2024-12-03 RX ORDER — LOSARTAN POTASSIUM 100 MG/1
100 TABLET ORAL DAILY
Qty: 90 TABLET | Refills: 1 | Status: SHIPPED | OUTPATIENT
Start: 2024-12-03

## 2024-12-04 DIAGNOSIS — I10 HYPERTENSION GOAL BP (BLOOD PRESSURE) < 140/90: ICD-10-CM

## 2024-12-04 RX ORDER — HYDROCHLOROTHIAZIDE 25 MG/1
25 TABLET ORAL DAILY
Qty: 90 TABLET | Refills: 0 | Status: SHIPPED | OUTPATIENT
Start: 2024-12-04

## 2024-12-23 DIAGNOSIS — E55.9 VITAMIN D DEFICIENCY: ICD-10-CM

## 2024-12-23 NOTE — TELEPHONE ENCOUNTER
Received refill request for Vitamin D3 2,000 unit tablet from [facility] [place], [place].  Karen Christie,  Nephrology Clinic Navigator  [facility]  Direct: [phone].

## 2024-12-24 RX ORDER — CHOLECALCIFEROL (VITAMIN D3) 50 MCG
1 TABLET ORAL DAILY
Qty: 90 TABLET | Refills: 0 | Status: SHIPPED | OUTPATIENT
Start: 2024-12-24

## 2025-01-07 ENCOUNTER — TRANSFERRED RECORDS (OUTPATIENT)
Dept: HEALTH INFORMATION MANAGEMENT | Facility: CLINIC | Age: 70
End: 2025-01-07
Payer: COMMERCIAL

## 2025-01-21 ENCOUNTER — TELEPHONE (OUTPATIENT)
Dept: ENDOCRINOLOGY | Facility: CLINIC | Age: 70
End: 2025-01-21
Payer: COMMERCIAL

## 2025-01-22 NOTE — TELEPHONE ENCOUNTER
Type of form:  Patient Assistance   From:   [employer] [employer]  Fax:  [phone]  Supplies requested on form:  Ozempic 2 mg/weekly  Provider: Amy Sosa  Date provider will be in clinic to sign:  01/28/2025  Labeled form and completed to the best of this writers ability.    Placed in provider's file     Sonia Su CMA  Adult Endocrinology   [facility]

## 2025-01-22 NOTE — TELEPHONE ENCOUNTER
FREE DRUG APPLICATION INITIATED    Medication:    Free Drug Program Name:  [facility] NCLC  Date Submitted: 1/21/2025 10:41 PM  Additional Information: [employer] message sent to the patient with the patient portion of the [employer] application attached. Provider portion of the application has been added to the media tab to be completed.

## 2025-01-28 ENCOUNTER — PATIENT OUTREACH (OUTPATIENT)
Dept: CARE COORDINATION | Facility: CLINIC | Age: 70
End: 2025-01-28
Payer: COMMERCIAL

## 2025-02-03 NOTE — TELEPHONE ENCOUNTER
Provider reviewed and signed  Faxed back to: [phone]  See image below for Timestamp confirmation of successful transmission.           Placed in [facility] PAP file.      Sonia Su CMA  Adult Endocrinology   [facility]

## 2025-02-06 NOTE — TELEPHONE ENCOUNTER
Sent follow up [employer] message to patient for patient portion of the [employer] application.

## 2025-02-06 NOTE — TELEPHONE ENCOUNTER
FREE DRUG APPLICATION APPROVED    Medication: OZEMPIC (2 MG/DOSE) 8 MG/3ML SC SOPN  Program Name:  [employer]  Effective Date:    Expiration Date: 12/31/2025  Pharmacy Filling the Rx:    Patient Notified: Yes

## 2025-02-17 ENCOUNTER — LAB (OUTPATIENT)
Dept: LAB | Facility: CLINIC | Age: 70
End: 2025-02-17
Payer: COMMERCIAL

## 2025-02-17 DIAGNOSIS — N18.31 STAGE 3A CHRONIC KIDNEY DISEASE (H): ICD-10-CM

## 2025-02-17 LAB
ALBUMIN MFR UR ELPH: 16.6 MG/DL
ALBUMIN SERPL BCG-MCNC: 3.7 G/DL (ref 3.5–5.2)
ANION GAP SERPL CALCULATED.3IONS-SCNC: 10 MMOL/L (ref 7–15)
BUN SERPL-MCNC: 25.2 MG/DL (ref 8–23)
CALCIUM SERPL-MCNC: 9.4 MG/DL (ref 8.8–10.4)
CHLORIDE SERPL-SCNC: 104 MMOL/L (ref 98–107)
CREAT SERPL-MCNC: 1.29 MG/DL (ref 0.51–0.95)
CREAT UR-MCNC: 50.5 MG/DL
EGFRCR SERPLBLD CKD-EPI 2021: 44 ML/MIN/1.73M2
ERYTHROCYTE [DISTWIDTH] IN BLOOD BY AUTOMATED COUNT: 13.3 % (ref 10–15)
GLUCOSE SERPL-MCNC: 172 MG/DL (ref 70–99)
HCO3 SERPL-SCNC: 28 MMOL/L (ref 22–29)
HCT VFR BLD AUTO: 44.8 % (ref 35–47)
HGB BLD-MCNC: 14 G/DL (ref 11.7–15.7)
MCH RBC QN AUTO: 27.2 PG (ref 26.5–33)
MCHC RBC AUTO-ENTMCNC: 31.3 G/DL (ref 31.5–36.5)
MCV RBC AUTO: 87 FL (ref 78–100)
PHOSPHATE SERPL-MCNC: 3.7 MG/DL (ref 2.5–4.5)
PLATELET # BLD AUTO: 350 10E3/UL (ref 150–450)
POTASSIUM SERPL-SCNC: 3.6 MMOL/L (ref 3.4–5.3)
PROT/CREAT 24H UR: 0.33 MG/MG CR (ref 0–0.2)
PTH-INTACT SERPL-MCNC: 58 PG/ML (ref 15–65)
RBC # BLD AUTO: 5.15 10E6/UL (ref 3.8–5.2)
SODIUM SERPL-SCNC: 142 MMOL/L (ref 135–145)
WBC # BLD AUTO: 5 10E3/UL (ref 4–11)

## 2025-02-17 PROCEDURE — 84156 ASSAY OF PROTEIN URINE: CPT

## 2025-02-17 PROCEDURE — 80069 RENAL FUNCTION PANEL: CPT

## 2025-02-17 PROCEDURE — 36415 COLL VENOUS BLD VENIPUNCTURE: CPT

## 2025-02-17 PROCEDURE — 83970 ASSAY OF PARATHORMONE: CPT

## 2025-02-17 PROCEDURE — 85027 COMPLETE CBC AUTOMATED: CPT

## 2025-02-25 ENCOUNTER — DOCUMENTATION ONLY (OUTPATIENT)
Dept: LAB | Facility: CLINIC | Age: 70
End: 2025-02-25
Payer: COMMERCIAL

## 2025-02-25 NOTE — PROGRESS NOTES
Labs were done 2/17/25. Please contact patient to cancel lab visit prior to upcoming Dr. Houston follow up.

## 2025-02-25 NOTE — PROGRESS NOTES
Adrian Cruz has an upcoming lab appointment:    Future Appointments   Date Time Provider Department Center   3/3/2025 10:15 AM [facility] [place] [facility] [place]   3/4/2025 10:30 AM Olu Houston DO [facility]   4/28/2025  8:30 AM Pam Yoon PA-C [facility]   5/6/2025  7:00 AM [facility] [place]     Patient is scheduled for the following lab(s): 1/28 Dr. Houston.     There is no order available. Please review and place future orders as appropriate.      Thank you,  Jeanine Mishra

## 2025-02-26 NOTE — PROGRESS NOTES
Sent patient [employer] message informing patient that lab appointment on 03/03/25 not needed as labs were already completed on 02/17/25.     BOB Arias   Neph/Pulm [facility]

## 2025-03-04 ENCOUNTER — VIRTUAL VISIT (OUTPATIENT)
Dept: NEPHROLOGY | Facility: CLINIC | Age: 70
End: 2025-03-04
Payer: COMMERCIAL

## 2025-03-04 VITALS — WEIGHT: 171 LBS | HEIGHT: 61 IN | BODY MASS INDEX: 32.28 KG/M2

## 2025-03-04 DIAGNOSIS — N18.31 STAGE 3A CHRONIC KIDNEY DISEASE (H): Primary | ICD-10-CM

## 2025-03-04 DIAGNOSIS — E66.811 CLASS 1 OBESITY DUE TO EXCESS CALORIES WITH SERIOUS COMORBIDITY AND BODY MASS INDEX (BMI) OF 32.0 TO 32.9 IN ADULT: ICD-10-CM

## 2025-03-04 DIAGNOSIS — E11.22 TYPE 2 DIABETES MELLITUS WITH STAGE 3B CHRONIC KIDNEY DISEASE, WITH LONG-TERM CURRENT USE OF INSULIN (H): ICD-10-CM

## 2025-03-04 DIAGNOSIS — I10 HYPERTENSION GOAL BP (BLOOD PRESSURE) < 140/90: ICD-10-CM

## 2025-03-04 DIAGNOSIS — Z79.4 TYPE 2 DIABETES MELLITUS WITH STAGE 3B CHRONIC KIDNEY DISEASE, WITH LONG-TERM CURRENT USE OF INSULIN (H): ICD-10-CM

## 2025-03-04 DIAGNOSIS — N18.32 CKD STAGE 3B, GFR 30-44 ML/MIN (H): ICD-10-CM

## 2025-03-04 DIAGNOSIS — E66.09 CLASS 1 OBESITY DUE TO EXCESS CALORIES WITH SERIOUS COMORBIDITY AND BODY MASS INDEX (BMI) OF 32.0 TO 32.9 IN ADULT: ICD-10-CM

## 2025-03-04 DIAGNOSIS — N18.32 TYPE 2 DIABETES MELLITUS WITH STAGE 3B CHRONIC KIDNEY DISEASE, WITH LONG-TERM CURRENT USE OF INSULIN (H): ICD-10-CM

## 2025-03-04 PROCEDURE — 1126F AMNT PAIN NOTED NONE PRSNT: CPT | Performed by: INTERNAL MEDICINE

## 2025-03-04 PROCEDURE — 98007 SYNCH AUDIO-VIDEO EST HI 40: CPT | Performed by: INTERNAL MEDICINE

## 2025-03-04 ASSESSMENT — PAIN SCALES - GENERAL: PAINLEVEL_OUTOF10: NO PAIN (0)

## 2025-03-04 NOTE — PROGRESS NOTES
Virtual Visit Details    Type of service:  Video Visit     Originating Location (pt. Location): Home    Distant Location (provider location):  Off-site  Platform used for Video Visit: Jam[employer]    03/04/25  Nephrology Visit.     Assessment/Plan:   CKD stage 3b: at risk for kidney disease in the setting of diabetes and hypertension. She has some proteinuria - already on losartan, jardiance, ozempic.   Hypertension: at goal of <130/80  Diabetes: hemoglobin A1C 7.4%  Obesity: BMI 32 - on ozempic. Encourage weight optimization to slow progression of disease and minimize proteinuria.     Patient Instructions   Labs in 6 months  Follow-up in one year with labs prior.        51 minutes spent by me on the date of the encounter doing chart review, review of test results, interpretation of tests, patient visit, and documentation     Olu Houston DO      CC: CKD    HPI: Adrian Cruz is a 69 year old female who presents for evaluation of CKD. Ms Cruz's hx is significant for CKD felt to be from diabetes and hypertension. She was previously followed by Dr. Jalloh but transitioning care to myself today. Dx with DM in 1997; retinopathy as well. CKD dating back to 2016 per previous reports.   03/04/25: video visit. No new issues with her health since last visit. Feeling good. On ozempic, jardiance, and losartan. Has had weight loss over the years - 206 in 2021 and now 171 lbs. She is pretty excited about that. Feeling a lot better. She does well with water intake. No NSAIDs. No swelling. No recent home readings.     - History of Hematuria: no  - Swelling: no  - Hx of UTIs: no  - Hx of stones: no  - Rashes/Joint pain: no rash or joint pain  - Family hx of kidney disease: mother with kidney disease - diabetes - was on dialysis  - NSAID use: no  - tobacco: no    Current Outpatient Medications   Medication Sig Dispense Refill    amLODIPine (NORVASC) 10 MG tablet TAKE 1 TABLET(10 MG) BY MOUTH DAILY FOR BLOOD PRESSURE 90  tablet 3    atorvastatin (LIPITOR) 80 MG tablet TAKE 1 TABLET BY MOUTH EVERY DAY 90 tablet 2    blood glucose monitoring (NO BRAND SPECIFIED) meter device kit Use to test blood sugar 2 times daily or as directed. 1 kit 0    blood glucose monitoring (ULTRA THIN 30G) lancets Use to test blood sugar 2 times daily or as directed. Per insurance 100 each 11    cetirizine (ZYRTEC) 10 MG tablet TAKE 1 TABLET (10 MG) BY MOUTH EVERY EVENING AS NEEDED FOR ALLERGIES 90 tablet 1    Continuous Blood Gluc  (FREESTYLE RODNEY 2 READER) JOSE 1 each See Admin Instructions Use multiple times daily to read glucose levels. 1 each 0    Continuous Blood Gluc Sensor (FREESTYLE RODNEY 2 SENSOR) MISC 1 each every 14 days Use 1 sensor every 14 days. Use to read blood sugars per 's instructions. 2 each 5    empagliflozin (JARDIANCE) 25 MG TABS tablet Take 1 tablet (25 mg) by mouth daily. 90 tablet 3    fluticasone (FLONASE) 50 MCG/ACT nasal spray SPRAY 1-2 SPRAYS INTO BOTH NOSTRILS DAILY AS NEEDED FOR RHINITIS OR ALLERGIES 48 mL 2    Glucose Blood (BLOOD GLUCOSE TEST STRIPS) STRP 1 strip by Lancet route 2 times daily 100 strip 11    hydrochlorothiazide (HYDRODIURIL) 25 MG tablet TAKE 1 TABLET BY MOUTH EVERY DAY 90 tablet 0    losartan (COZAAR) 100 MG tablet TAKE 1 TABLET BY MOUTH EVERY DAY 90 tablet 1    omeprazole 20 MG tablet Take 1 tablet (20 mg) by mouth daily as needed 90 tablet 1    order for DME Equipment being ordered: Digital home blood pressure monitor kit 1 Device 0    ORDER FOR DME Equipment being ordered: blood pressure cuff/machine 1 Device 0    Semaglutide, 2 MG/DOSE, (OZEMPIC) 8 MG/3ML pen Inject 2 mg subcutaneously every 7 days. 9 mL 3    solifenacin (VESICARE) 10 MG tablet Take 1 tablet (10 mg) by mouth daily 90 tablet 3    vitamin D3 (CHOLECALCIFEROL) 50 mcg (2000 units) tablet Take 1 tablet (50 mcg) by mouth daily. 90 tablet 0     No current facility-administered medications for this visit.       Exam:  Ht  "1.549 m (5' 1\")   Wt 77.6 kg (171 lb)   LMP  (LMP Unknown)   BMI 32.31 kg/m    GENERAL APPEARANCE: alert and no distress    Results:    No visits with results within 1 Day(s) from this visit.   Latest known visit with results is:   Lab on 02/17/2025   Component Date Value Ref Range Status    Sodium 02/17/2025 142  135 - 145 mmol/L Final    Potassium 02/17/2025 3.6  3.4 - 5.3 mmol/L Final    Chloride 02/17/2025 104  98 - 107 mmol/L Final    Carbon Dioxide (CO2) 02/17/2025 28  22 - 29 mmol/L Final    Anion Gap 02/17/2025 10  7 - 15 mmol/L Final    Glucose 02/17/2025 172 (H)  70 - 99 mg/dL Final    Urea Nitrogen 02/17/2025 25.2 (H)  8.0 - 23.0 mg/dL Final    Creatinine 02/17/2025 1.29 (H)  0.51 - 0.95 mg/dL Final    GFR Estimate 02/17/2025 44 (L)  >60 mL/min/1.73m2 Final    eGFR calculated using 2021 CKD-EPI equation.    Calcium 02/17/2025 9.4  8.8 - 10.4 mg/dL Final    Albumin 02/17/2025 3.7  3.5 - 5.2 g/dL Final    Phosphorus 02/17/2025 3.7  2.5 - 4.5 mg/dL Final    WBC Count 02/17/2025 5.0  4.0 - 11.0 10e3/uL Final    RBC Count 02/17/2025 5.15  3.80 - 5.20 10e6/uL Final    Hemoglobin 02/17/2025 14.0  11.7 - 15.7 g/dL Final    Hematocrit 02/17/2025 44.8  35.0 - 47.0 % Final    MCV 02/17/2025 87  78 - 100 fL Final    MCH 02/17/2025 27.2  26.5 - 33.0 pg Final    MCHC 02/17/2025 31.3 (L)  31.5 - 36.5 g/dL Final    RDW 02/17/2025 13.3  10.0 - 15.0 % Final    Platelet Count 02/17/2025 350  150 - 450 10e3/uL Final    Total Protein Urine mg/dL 02/17/2025 16.6    mg/dL Final    The reference ranges have not been established in urine protein. The results should be integrated into the clinical context for interpretation.    Total Protein Urine mg/mg Creat 02/17/2025 0.33 (H)  0.00 - 0.20 mg/mg Cr Final    Creatinine Urine mg/dL 02/17/2025 50.5  mg/dL Final    The reference ranges have not been established in urine creatinine. The results should be integrated into the clinical context for interpretation.    Parathyroid " Hormone Intact 02/17/2025 58  15 - 65 pg/mL Final      Lab results were reviewed and interpreted.       [id number] AM video visit via [employer] - offsite.   Olu Houston DO

## 2025-03-04 NOTE — NURSING NOTE
Current patient location: [address] [place] [address] [address]  [facility] [address]    Is the patient currently in the state of [place]? YES    Visit mode: VIDEO    If the visit is dropped, the patient can be reconnected by:VIDEO VISIT: Send to e-mail at: [email or web address]    Will anyone else be joining the visit? NO  (If patient encounters technical issues they should call [phone]    Are changes needed to the allergy or medication list? No    Are refills needed on medications prescribed by this physician? NO    Rooming Documentation:  Questionnaire(s) completed    Reason for visit: KATIE AUGUSTINE

## 2025-03-05 ENCOUNTER — TELEPHONE (OUTPATIENT)
Dept: FAMILY MEDICINE | Facility: CLINIC | Age: 70
End: 2025-03-05
Payer: COMMERCIAL

## 2025-03-05 NOTE — TELEPHONE ENCOUNTER
Patient Quality Outreach    Patient is due for the following:   Physical Annual Wellness Visit    Action(s) Taken:   Schedule a nurse only visit for Schedule a Mammo & Immunizations    Type of outreach:    Sent [employer] message.    Questions for provider review:    None           Kristie Ortiz  Chart routed to Provider.

## 2025-03-24 ENCOUNTER — DOCUMENTATION ONLY (OUTPATIENT)
Dept: ENDOCRINOLOGY | Facility: CLINIC | Age: 70
End: 2025-03-24
Payer: COMMERCIAL

## 2025-03-24 NOTE — PROGRESS NOTES
Faxed back to: [phone]  See image below for Timestamp confirmation of successful transmission.           Placed in [facility] file.      Sonia Su CMA  Adult Endocrinology   [facility]

## 2025-03-24 NOTE — PROGRESS NOTES
Type of form:  records last 6 months  From:  [facility]  Fax: [phone]  Supplies requested on form:  Clinical progress notes Printed encounter from: 10/28/2024 and CGM data obtained   Provider: Amy Sosa  Date provider will be in clinic to sign: NA  Labeled form         Sonia Su CMA  Adult Endocrinology   [facility]

## 2025-04-08 ENCOUNTER — TRANSFERRED RECORDS (OUTPATIENT)
Dept: HEALTH INFORMATION MANAGEMENT | Facility: CLINIC | Age: 70
End: 2025-04-08
Payer: COMMERCIAL

## 2025-04-08 LAB — RETINOPATHY: POSITIVE

## 2025-04-22 NOTE — PROGRESS NOTES
Addended by: Nury Morgan on: 3/3/2023 12:48 PM     Modules accepted: Orders Outcome for 04/22/25 2:56 PM: OOHLALA Mobile message sent  Sabrina Avila MA  Outcome for 05/02/25 8:35 AM: Data obtained via [employer] website  Sabrina Avila MA    Patient is showing 5/5 [place] met.   Sabrina Avila MA

## 2025-04-28 ENCOUNTER — OFFICE VISIT (OUTPATIENT)
Dept: UROLOGY | Facility: CLINIC | Age: 70
End: 2025-04-28
Payer: COMMERCIAL

## 2025-04-28 DIAGNOSIS — N39.46 MIXED INCONTINENCE: Primary | ICD-10-CM

## 2025-04-28 DIAGNOSIS — R31.29 MICROSCOPIC HEMATURIA: ICD-10-CM

## 2025-04-28 LAB
ALBUMIN UR-MCNC: NEGATIVE MG/DL
APPEARANCE UR: CLEAR
BACTERIA #/AREA URNS HPF: ABNORMAL /HPF
BILIRUB UR QL STRIP: NEGATIVE
COLOR UR AUTO: ABNORMAL
GLUCOSE UR STRIP-MCNC: >1000 MG/DL
HGB UR QL STRIP: NEGATIVE
KETONES UR STRIP-MCNC: NEGATIVE MG/DL
LEUKOCYTE ESTERASE UR QL STRIP: NEGATIVE
NITRATE UR QL: NEGATIVE
PH UR STRIP: 5.5 [PH] (ref 5–7)
RBC #/AREA URNS AUTO: ABNORMAL /HPF
SP GR UR STRIP: 1.02 (ref 1–1.03)
SQUAMOUS #/AREA URNS AUTO: ABNORMAL /LPF
UROBILINOGEN UR STRIP-MCNC: NORMAL MG/DL
WBC #/AREA URNS AUTO: ABNORMAL /HPF

## 2025-04-28 RX ORDER — SOLIFENACIN SUCCINATE 10 MG/1
10 TABLET, FILM COATED ORAL DAILY
Qty: 90 TABLET | Refills: 3 | Status: SHIPPED | OUTPATIENT
Start: 2025-04-28 | End: 2025-04-28

## 2025-04-28 RX ORDER — SOLIFENACIN SUCCINATE 10 MG/1
10 TABLET, FILM COATED ORAL DAILY
Qty: 90 TABLET | Refills: 3 | Status: SHIPPED | OUTPATIENT
Start: 2025-04-28

## 2025-04-28 NOTE — NURSING NOTE
Adrian Cruz's goals for this visit include:   Chief Complaint   Patient presents with    RECHECK     mixed urinary incontinence, urge predominant       She requests these members of her care team be copied on today's visit information:     PCP: Radha Madrigal    Referring Provider:  Referred Self, MD  No address on file    LMP  (LMP Unknown)     Do you need any medication refills at today's visit?     Laura Lubin LPN on 4/28/2025 at 8:17 AM

## 2025-04-28 NOTE — PROGRESS NOTES
Urology Clinic      Name: Adrian Cruz    MRN: [id number]   YOB: 1955  Accompanied at today's visit by:self                 Chief Complaint:   DALIA          History of Present Illness:   April 28, 2025    HISTORY:   We have been following 69 year old Adrian Cruz for DALIA (UUI>CARLOS) and microhematuria. Last seen by Milagro Moore on 2/16/24 and reported significant improvement with vesicare 10mg daily. Also noted new low grade microhematuria at that time. Plan at that time was to recheck UA and if persisted to proceed with hematuria workup. Her repeat urine sample was negative for blood. Here today for follow-up. Denies gross hematuria. Reports she continues to do well on vesicare 10mg daily. No UTIs in remote past. Patient voices no other concerns at this time.            Allergies:     Allergies   Allergen Reactions    Ace Inhibitors Cough    Glucophage [Metformin And Related] Cramps     Stomach upset            Medications:     Current Outpatient Medications   Medication Sig Dispense Refill    amLODIPine (NORVASC) 10 MG tablet TAKE 1 TABLET(10 MG) BY MOUTH DAILY FOR BLOOD PRESSURE 90 tablet 3    atorvastatin (LIPITOR) 80 MG tablet TAKE 1 TABLET BY MOUTH EVERY DAY 90 tablet 2    blood glucose monitoring (NO BRAND SPECIFIED) meter device kit Use to test blood sugar 2 times daily or as directed. 1 kit 0    blood glucose monitoring (ULTRA THIN 30G) lancets Use to test blood sugar 2 times daily or as directed. Per insurance 100 each 11    cetirizine (ZYRTEC) 10 MG tablet TAKE 1 TABLET (10 MG) BY MOUTH EVERY EVENING AS NEEDED FOR ALLERGIES 90 tablet 1    Continuous Blood Gluc  (FREESTYLE RODNEY 2 READER) JOSE 1 each See Admin Instructions Use multiple times daily to read glucose levels. 1 each 0    Continuous Blood Gluc Sensor (FREESTYLE RODNEY 2 SENSOR) MISC 1 each every 14 days Use 1 sensor every 14 days. Use to read blood sugars per 's instructions. 2 each 5     empagliflozin (JARDIANCE) 25 MG TABS tablet Take 1 tablet (25 mg) by mouth daily. 90 tablet 3    fluticasone (FLONASE) 50 MCG/ACT nasal spray SPRAY 1-2 SPRAYS INTO BOTH NOSTRILS DAILY AS NEEDED FOR RHINITIS OR ALLERGIES 48 mL 2    Glucose Blood (BLOOD GLUCOSE TEST STRIPS) STRP 1 strip by Lancet route 2 times daily 100 strip 11    hydrochlorothiazide (HYDRODIURIL) 25 MG tablet TAKE 1 TABLET BY MOUTH EVERY DAY 90 tablet 0    losartan (COZAAR) 100 MG tablet TAKE 1 TABLET BY MOUTH EVERY DAY 90 tablet 1    omeprazole 20 MG tablet Take 1 tablet (20 mg) by mouth daily as needed 90 tablet 1    order for DME Equipment being ordered: Digital home blood pressure monitor kit 1 Device 0    ORDER FOR DME Equipment being ordered: blood pressure cuff/machine 1 Device 0    Semaglutide, 2 MG/DOSE, (OZEMPIC) 8 MG/3ML pen Inject 2 mg subcutaneously every 7 days. 3 mL 0    Semaglutide, 2 MG/DOSE, (OZEMPIC) 8 MG/3ML pen Inject 2 mg subcutaneously every 7 days. 9 mL 3    solifenacin (VESICARE) 10 MG tablet Take 1 tablet (10 mg) by mouth daily. 90 tablet 3    solifenacin (VESICARE) 10 MG tablet Take 1 tablet (10 mg) by mouth daily 90 tablet 3    vitamin D3 (CHOLECALCIFEROL) 50 mcg (2000 units) tablet Take 1 tablet (50 mcg) by mouth daily. 90 tablet 0     No current facility-administered medications for this visit.             Past  Surgical History:     Past Surgical History:   Procedure Laterality Date    LASER SURGERY OF EYE Right 2015             Physical Exam:   There were no vitals filed for this visit.  PSYCH: NAD  EYES: EOMI  NEURO: AAO x3    LABS:   Creatinine   Date Value Ref Range Status   02/17/2025 1.29 (H) 0.51 - 0.95 mg/dL Final   07/08/2021 1.30 (H) 0.52 - 1.04 mg/dL Final            Assessment and Plan:   69 year old is a pleasant female who has DALIA (UUI>CARLOS) and microhematuria    Plan:  -  continue Vesicare 10mg daily. Renewed for 1 year.  - will obtain UA/micro to evaluate for hematuria. If negative, will follow-up in 1  year. Consider cysto and CTU if showing RBC.   - After discussing the assessment and plan with patient, patient verbalizes understanding and agrees to the above plan. All questions answered.       Other orders as below:  Orders Placed This Encounter   Procedures    UA without Microscopic [[id number]]    Urine Microscopic Exam       11 minutes spent on the date of the encounter doing chart review, review of labs, review of Milagro's note, renewing vesicare, ordering UA/micro, review of test results, interpretation of tests, patient visit and documentation.      Pam Yoon PA-C  Urology  April 28, 2025      Patient Care Team:  Radha Madrigal MD as PCP - General (Family Practice)  Radha Madrigal MD as Assigned PCP  Emma Martinez RPH as Pharmacist  Amy Sosa MD as Assigned Endocrinology Provider  Milagro Moore PA-C as Physician Assistant (Urology)  Amy Sosa MD as Referring Physician (Endocrinology, Diabetes, and Metabolism)  Milagro Moore PA-C as Assigned Surgical Provider  Ana Gregorio RN as Diabetes Educator (Diabetes Education)  Olu Houston DO as Assigned Nephrology Provider  SELF, REFERRED

## 2025-04-30 ENCOUNTER — TELEPHONE (OUTPATIENT)
Dept: UROLOGY | Facility: CLINIC | Age: 70
End: 2025-04-30
Payer: COMMERCIAL

## 2025-04-30 NOTE — TELEPHONE ENCOUNTER
Pam Yoon PA-C  P [facility] Adult [place]  Hel -    Here are my comments about the recent results. There was no blood noted from your urine sample. Plan to follow-up in 1 year as planned.     Please let us know if you have any questions or concerns.     Regards,  Pam SIMMONS. DEBO Yoon    Received above message from provider - patient has not viewed results on [employer].    Marilyn Edwards MA on 4/30/2025 at 8:40 AM

## 2025-04-30 NOTE — TELEPHONE ENCOUNTER
4/30 Spoke with patient and informed her of recent urine sample results and to keep appointment as scheduled for next year - patient verbalized understanding and had no further questions at this time.     Marilyn Edwards MA on 4/30/2025 at 8:42 AM

## 2025-05-06 ENCOUNTER — VIRTUAL VISIT (OUTPATIENT)
Dept: ENDOCRINOLOGY | Facility: CLINIC | Age: 70
End: 2025-05-06
Payer: COMMERCIAL

## 2025-05-06 ENCOUNTER — LAB (OUTPATIENT)
Dept: LAB | Facility: CLINIC | Age: 70
End: 2025-05-06
Payer: COMMERCIAL

## 2025-05-06 DIAGNOSIS — Z13.6 SCREENING FOR CARDIOVASCULAR CONDITION: ICD-10-CM

## 2025-05-06 DIAGNOSIS — M85.851 OSTEOPENIA OF BOTH HIPS: ICD-10-CM

## 2025-05-06 DIAGNOSIS — E11.3519 TYPE 2 DIABETES MELLITUS WITH PROLIFERATIVE RETINOPATHY AND MACULAR EDEMA, WITHOUT LONG-TERM CURRENT USE OF INSULIN, UNSPECIFIED LATERALITY (H): ICD-10-CM

## 2025-05-06 DIAGNOSIS — Z79.4 TYPE 2 DIABETES MELLITUS WITH STAGE 3A CHRONIC KIDNEY DISEASE, WITH LONG-TERM CURRENT USE OF INSULIN (H): Primary | ICD-10-CM

## 2025-05-06 DIAGNOSIS — M85.852 OSTEOPENIA OF BOTH HIPS: ICD-10-CM

## 2025-05-06 DIAGNOSIS — N18.31 TYPE 2 DIABETES MELLITUS WITH STAGE 3A CHRONIC KIDNEY DISEASE, WITH LONG-TERM CURRENT USE OF INSULIN (H): Primary | ICD-10-CM

## 2025-05-06 DIAGNOSIS — E11.22 TYPE 2 DIABETES MELLITUS WITH STAGE 3A CHRONIC KIDNEY DISEASE, WITH LONG-TERM CURRENT USE OF INSULIN (H): ICD-10-CM

## 2025-05-06 DIAGNOSIS — E11.22 TYPE 2 DIABETES MELLITUS WITH STAGE 3A CHRONIC KIDNEY DISEASE, WITH LONG-TERM CURRENT USE OF INSULIN (H): Primary | ICD-10-CM

## 2025-05-06 DIAGNOSIS — N18.31 TYPE 2 DIABETES MELLITUS WITH STAGE 3A CHRONIC KIDNEY DISEASE, WITH LONG-TERM CURRENT USE OF INSULIN (H): ICD-10-CM

## 2025-05-06 DIAGNOSIS — E66.01 MORBID OBESITY (H): ICD-10-CM

## 2025-05-06 DIAGNOSIS — N18.32 CHRONIC KIDNEY DISEASE, STAGE 3B (H): Primary | ICD-10-CM

## 2025-05-06 DIAGNOSIS — Z79.4 TYPE 2 DIABETES MELLITUS WITH STAGE 3A CHRONIC KIDNEY DISEASE, WITH LONG-TERM CURRENT USE OF INSULIN (H): ICD-10-CM

## 2025-05-06 LAB
EST. AVERAGE GLUCOSE BLD GHB EST-MCNC: 177 MG/DL
HBA1C MFR BLD: 7.8 % (ref 0–5.6)

## 2025-05-06 PROCEDURE — 1126F AMNT PAIN NOTED NONE PRSNT: CPT | Mod: 95 | Performed by: INTERNAL MEDICINE

## 2025-05-06 PROCEDURE — 95251 CONT GLUC MNTR ANALYSIS I&R: CPT | Performed by: INTERNAL MEDICINE

## 2025-05-06 PROCEDURE — 98006 SYNCH AUDIO-VIDEO EST MOD 30: CPT | Mod: 25 | Performed by: INTERNAL MEDICINE

## 2025-05-06 RX ORDER — CALCIUM CARBONATE/VITAMIN D3 600 MG-10
1 TABLET ORAL 2 TIMES DAILY
Qty: 180 TABLET | Refills: 3 | Status: SHIPPED | OUTPATIENT
Start: 2025-05-06

## 2025-05-06 ASSESSMENT — PAIN SCALES - GENERAL: PAINLEVEL_OUTOF10: NO PAIN (0)

## 2025-05-06 NOTE — PROGRESS NOTES
Endocrinology virtual Visit    Chief Complaint: RECHECK     Information obtained from:Patient      Assessment/Treatment Plan:      Type 2 diabetes in the setting of stage III CKD-  Desired to switch to Mounjaro [from Ozempic] from A1c and weight management standpoint however currently getting Ozempic through the company patient assistance program which might not be available with Mounjaro.  Will check A1c.  Continue glucose monitor downloaded and reviewed - on continuous glucose monitor 75% within the target range and no lows.  Chronic complications monitoring  Blood pressure well controlled historically.  On atorvastatin at the full dose.  Has microalbuminuria and currently on losartan 100 mg daily; CKD following with nephrology.   Eye exam UTD    Plan:  Jardiance 25 mg daily  Semaglutide 2 mg every 7 days    Morbid Obesity: weight loss of 40 pounds  on semaglutide. Continue same for maintenance.     CKD stage IIIb - Continue losartan and Jardiance. Follow up with nephrology.     Osteopenia: Check follow-up DEXA scan, vitamin D and start calcium supplement.      Test and/or medications prescribed today:  Orders Placed This Encounter   Procedures    DX Bone Density    Hemoglobin A1c    Vitamin D Deficiency (D3 Only)         Amy Sosa MD  Staff Endocrinologist    Division of Endocrinology and Diabetes      Subjective:         HPI: Adrian Cruz is a 69 year old female with history of type 2 diabetes who is here for a follow up.     Type 2 diabetes essential diagnosed in 1997.  Patient is currently on:  Jardiance 25 mg daily  Semaglutide 2 mg every 7 days     GFR 45.  Patient has a known CKD stage IIIa. Following with nephrology.                             Patient lives alone.  Reports urinary incontinence. Kegel exercises didn't help.       General Symptoms: No  Skin Symptoms: No  HENT Symptoms: No  EYE SYMPTOMS: No  HEART SYMPTOMS: No  LUNG SYMPTOMS: No  INTESTINAL SYMPTOMS: No  URINARY SYMPTOMS:  No  GYNECOLOGIC SYMPTOMS: No  BREAST SYMPTOMS: No  SKELETAL SYMPTOMS: No  BLOOD SYMPTOMS: No  NERVOUS SYSTEM SYMPTOMS: No  MENTAL HEALTH SYMPTOMS: No        Allergies   Allergen Reactions    Ace Inhibitors Cough    Glucophage [Metformin And Related] Cramps     Stomach upset       Current Outpatient Medications   Medication Sig Dispense Refill    amLODIPine (NORVASC) 10 MG tablet TAKE 1 TABLET(10 MG) BY MOUTH DAILY FOR BLOOD PRESSURE 90 tablet 3    atorvastatin (LIPITOR) 80 MG tablet TAKE 1 TABLET BY MOUTH EVERY DAY 90 tablet 2    blood glucose monitoring (NO BRAND SPECIFIED) meter device kit Use to test blood sugar 2 times daily or as directed. 1 kit 0    blood glucose monitoring (ULTRA THIN 30G) lancets Use to test blood sugar 2 times daily or as directed. Per insurance 100 each 11    calcium carbonate-vitamin D (CALTRATE) 600-10 MG-MCG per tablet Take 1 tablet by mouth 2 times daily. 180 tablet 3    cetirizine (ZYRTEC) 10 MG tablet TAKE 1 TABLET (10 MG) BY MOUTH EVERY EVENING AS NEEDED FOR ALLERGIES 90 tablet 1    Continuous Blood Gluc  (FREESTYLE RODNEY 2 READER) JOSE 1 each See Admin Instructions Use multiple times daily to read glucose levels. 1 each 0    Continuous Blood Gluc Sensor (FREESTYLE RODNEY 2 SENSOR) MISC 1 each every 14 days Use 1 sensor every 14 days. Use to read blood sugars per 's instructions. 2 each 5    empagliflozin (JARDIANCE) 25 MG TABS tablet Take 1 tablet (25 mg) by mouth daily. 90 tablet 3    fluticasone (FLONASE) 50 MCG/ACT nasal spray SPRAY 1-2 SPRAYS INTO BOTH NOSTRILS DAILY AS NEEDED FOR RHINITIS OR ALLERGIES 48 mL 2    Glucose Blood (BLOOD GLUCOSE TEST STRIPS) STRP 1 strip by Lancet route 2 times daily 100 strip 11    hydrochlorothiazide (HYDRODIURIL) 25 MG tablet TAKE 1 TABLET BY MOUTH EVERY DAY 90 tablet 0    losartan (COZAAR) 100 MG tablet TAKE 1 TABLET BY MOUTH EVERY DAY 90 tablet 1    omeprazole 20 MG tablet Take 1 tablet (20 mg) by mouth daily as needed 90  tablet 1    order for DME Equipment being ordered: Digital home blood pressure monitor kit 1 Device 0    ORDER FOR DME Equipment being ordered: blood pressure cuff/machine 1 Device 0    Semaglutide, 2 MG/DOSE, (OZEMPIC) 8 MG/3ML pen Inject 2 mg subcutaneously every 7 days. 3 mL 0    Semaglutide, 2 MG/DOSE, (OZEMPIC) 8 MG/3ML pen Inject 2 mg subcutaneously every 7 days. 9 mL 3    solifenacin (VESICARE) 10 MG tablet Take 1 tablet (10 mg) by mouth daily. 90 tablet 3    solifenacin (VESICARE) 10 MG tablet Take 1 tablet (10 mg) by mouth daily 90 tablet 3    vitamin D3 (CHOLECALCIFEROL) 50 mcg (2000 units) tablet Take 1 tablet (50 mcg) by mouth daily. 90 tablet 0       Review of Systems     as per HPI above/ vaginal itching and whitish discharge.  Past medical history, past surgical history, and family history reviewed and epic.  Patient lives alone.        Objective:   GENERAL: Healthy, alert and no distress  EYES: Eyes grossly normal to inspection.    RESP: No audible wheeze, cough, or visible cyanosis.   NEURO: alert and oriented.   PSYCH: Mentation appears normal, affect normal/bright, judgement and insight intact, normal speech.  Wt Readings from Last 10 Encounters:   03/04/25 77.6 kg (171 lb)   06/07/24 77.4 kg (170 lb 9.6 oz)   09/18/23 78.9 kg (174 lb)   06/19/23 78.9 kg (174 lb)   05/13/22 86.8 kg (191 lb 6.4 oz)   06/04/21 93 kg (205 lb)   02/16/21 93.4 kg (206 lb)   02/21/20 90.7 kg (200 lb)   06/18/19 92.5 kg (204 lb)   02/19/19 94.3 kg (208 lb)       In House Labs:     Hemoglobin A1C   Date Value Ref Range Status   10/28/2024 7.4 (H) 0.0 - 5.6 % Final     Comment:     Normal <5.7%   Prediabetes 5.7-6.4%    Diabetes 6.5% or higher     Note: Adopted from ADA consensus guidelines.   05/22/2024 7.5 (H) 0.0 - 5.6 % Final     Comment:     Normal <5.7%   Prediabetes 5.7-6.4%    Diabetes 6.5% or higher     Note: Adopted from ADA consensus guidelines.   09/15/2023 6.3 (H) 0.0 - 5.6 % Final     Comment:     Normal  <5.7%   Prediabetes 5.7-6.4%    Diabetes 6.5% or higher     Note: Adopted from ADA consensus guidelines.   06/04/2021 9.3 (H) 0 - 5.6 % Final     Comment:     Normal <5.7% Prediabetes 5.7-6.4%  Diabetes 6.5% or higher - adopted from ADA   consensus guidelines.  Reviewed: OK with previous     02/16/2021 10.9 (H) 0 - 5.6 % Final     Comment:     Results confirmed by repeat test  Normal <5.7% Prediabetes 5.7-6.4%  Diabetes 6.5% or higher - adopted from ADA   consensus guidelines.     02/21/2020 8.4 (H) 0 - 5.6 % Final     Comment:     Normal <5.7% Prediabetes 5.7-6.4%  Diabetes 6.5% or higher - adopted from ADA   consensus guidelines.         TSH   Date Value Ref Range Status   10/28/2024 1.32 0.30 - 4.20 uIU/mL Final   06/16/2023 2.73 0.30 - 4.20 uIU/mL Final   05/13/2022 1.12 0.40 - 4.00 mU/L Final   02/16/2021 2.34 0.40 - 4.00 mU/L Final   05/23/2017 2.28 0.40 - 4.00 mU/L Final   01/22/2015 1.82 0.40 - 4.00 mU/L Final     Comment:     Effective 7/30/2014, the reference range for this assay has changed to reflect   new instrumentation/methodology.     04/25/2013 1.86 0.4 - 5.0 mU/L Final   08/16/2011 1.21 0.4 - 5.0 mU/L Final       Creatinine   Date Value Ref Range Status   02/17/2025 1.29 (H) 0.51 - 0.95 mg/dL Final   07/08/2021 1.30 (H) 0.52 - 1.04 mg/dL Final   GFR > 50       Video-Visit Details    Type of service:  Video Visit  Joined the call at 5/6/2025, 6:59:59 am.  Left the call at 5/6/2025, 7:14:12 am.  You were on the call for 14 minutes 13 seconds.  Distant Location (provider location):  Off-site.   Platform used for Video Visit: [employer]

## 2025-05-06 NOTE — LETTER
5/6/2025      Adrian Cruz  [address]  [facility] [address]      Dear Colleague,    Thank you for referring your patient, Adrian Cruz, to the [facility]. Please see a copy of my visit note below.    Outcome for 04/22/25 2:56 PM: [employer] message sent  Sabrina Avial MA  Outcome for 05/02/25 8:35 AM: Data obtained via [employer] website  Sabrina Avila MA    Patient is showing 5/5 MNCM met.   Sabrina Avila MA            Endocrinology virtual Visit    Chief Complaint: RECHECK     Information obtained from:Patient      Assessment/Treatment Plan:      Type 2 diabetes in the setting of stage III CKD-  Desired to switch to Mounjaro [from Ozempic] from A1c and weight management standpoint however currently getting Ozempic through the company patient assistance program which might not be available with Mounjaro.  Will check A1c.  Continue glucose monitor downloaded and reviewed - on continuous glucose monitor 75% within the target range and no lows.  Chronic complications monitoring  Blood pressure well controlled historically.  On atorvastatin at the full dose.  Has microalbuminuria and currently on losartan 100 mg daily; CKD following with nephrology.   Eye exam UTD    Plan:  Jardiance 25 mg daily  Semaglutide 2 mg every 7 days    Morbid Obesity: weight loss of 40 pounds  on semaglutide. Continue same for maintenance.     CKD stage IIIb - Continue losartan and Jardiance. Follow up with nephrology.     Osteopenia: Check follow-up DEXA scan, vitamin D and start calcium supplement.      Test and/or medications prescribed today:  Orders Placed This Encounter   Procedures     DX Bone Density     Hemoglobin A1c     Vitamin D Deficiency (D3 Only)         Amy Sosa MD  Staff Endocrinologist    Division of Endocrinology and Diabetes      Subjective:         HPI: Adrian Cruz is a 69 year old female with history of type 2 diabetes who is here for a  follow up.     Type 2 diabetes essential diagnosed in 1997.  Patient is currently on:  Jardiance 25 mg daily  Semaglutide 2 mg every 7 days     GFR 45.  Patient has a known CKD stage IIIa. Following with nephrology.                             Patient lives alone.  Reports urinary incontinence. Kegel exercises didn't help.       General Symptoms: No  Skin Symptoms: No  HENT Symptoms: No  EYE SYMPTOMS: No  HEART SYMPTOMS: No  LUNG SYMPTOMS: No  INTESTINAL SYMPTOMS: No  URINARY SYMPTOMS: No  GYNECOLOGIC SYMPTOMS: No  BREAST SYMPTOMS: No  SKELETAL SYMPTOMS: No  BLOOD SYMPTOMS: No  NERVOUS SYSTEM SYMPTOMS: No  MENTAL HEALTH SYMPTOMS: No        Allergies   Allergen Reactions     Ace Inhibitors Cough     Glucophage [Metformin And Related] Cramps     Stomach upset       Current Outpatient Medications   Medication Sig Dispense Refill     amLODIPine (NORVASC) 10 MG tablet TAKE 1 TABLET(10 MG) BY MOUTH DAILY FOR BLOOD PRESSURE 90 tablet 3     atorvastatin (LIPITOR) 80 MG tablet TAKE 1 TABLET BY MOUTH EVERY DAY 90 tablet 2     blood glucose monitoring (NO BRAND SPECIFIED) meter device kit Use to test blood sugar 2 times daily or as directed. 1 kit 0     blood glucose monitoring (ULTRA THIN 30G) lancets Use to test blood sugar 2 times daily or as directed. Per insurance 100 each 11     calcium carbonate-vitamin D (CALTRATE) 600-10 MG-MCG per tablet Take 1 tablet by mouth 2 times daily. 180 tablet 3     cetirizine (ZYRTEC) 10 MG tablet TAKE 1 TABLET (10 MG) BY MOUTH EVERY EVENING AS NEEDED FOR ALLERGIES 90 tablet 1     Continuous Blood Gluc  (FREESTYLE RODNEY 2 READER) JOSE 1 each See Admin Instructions Use multiple times daily to read glucose levels. 1 each 0     Continuous Blood Gluc Sensor (FREESTYLE RODNEY 2 SENSOR) [facility] 1 each every 14 days Use 1 sensor every 14 days. Use to read blood sugars per 's instructions. 2 each 5     empagliflozin (JARDIANCE) 25 MG TABS tablet Take 1 tablet (25 mg) by mouth daily.  90 tablet 3     fluticasone (FLONASE) 50 MCG/ACT nasal spray SPRAY 1-2 SPRAYS INTO BOTH NOSTRILS DAILY AS NEEDED FOR RHINITIS OR ALLERGIES 48 mL 2     Glucose Blood (BLOOD GLUCOSE TEST STRIPS) STRP 1 strip by Lancet route 2 times daily 100 strip 11     hydrochlorothiazide (HYDRODIURIL) 25 MG tablet TAKE 1 TABLET BY MOUTH EVERY DAY 90 tablet 0     losartan (COZAAR) 100 MG tablet TAKE 1 TABLET BY MOUTH EVERY DAY 90 tablet 1     omeprazole 20 MG tablet Take 1 tablet (20 mg) by mouth daily as needed 90 tablet 1     order for DME Equipment being ordered: Digital home blood pressure monitor kit 1 Device 0     ORDER FOR DME Equipment being ordered: blood pressure cuff/machine 1 Device 0     Semaglutide, 2 MG/DOSE, (OZEMPIC) 8 MG/3ML pen Inject 2 mg subcutaneously every 7 days. 3 mL 0     Semaglutide, 2 MG/DOSE, (OZEMPIC) 8 MG/3ML pen Inject 2 mg subcutaneously every 7 days. 9 mL 3     solifenacin (VESICARE) 10 MG tablet Take 1 tablet (10 mg) by mouth daily. 90 tablet 3     solifenacin (VESICARE) 10 MG tablet Take 1 tablet (10 mg) by mouth daily 90 tablet 3     vitamin D3 (CHOLECALCIFEROL) 50 mcg (2000 units) tablet Take 1 tablet (50 mcg) by mouth daily. 90 tablet 0       Review of Systems     as per HPI above/ vaginal itching and whitish discharge.  Past medical history, past surgical history, and family history reviewed and epic.  Patient lives alone.        Objective:   GENERAL: Healthy, alert and no distress  EYES: Eyes grossly normal to inspection.    RESP: No audible wheeze, cough, or visible cyanosis.   NEURO: alert and oriented.   PSYCH: Mentation appears normal, affect normal/bright, judgement and insight intact, normal speech.  Wt Readings from Last 10 Encounters:   03/04/25 77.6 kg (171 lb)   06/07/24 77.4 kg (170 lb 9.6 oz)   09/18/23 78.9 kg (174 lb)   06/19/23 78.9 kg (174 lb)   05/13/22 86.8 kg (191 lb 6.4 oz)   06/04/21 93 kg (205 lb)   02/16/21 93.4 kg (206 lb)   02/21/20 90.7 kg (200 lb)   06/18/19 92.5 kg  (204 lb)   02/19/19 94.3 kg (208 lb)       In House Labs:     Hemoglobin A1C   Date Value Ref Range Status   10/28/2024 7.4 (H) 0.0 - 5.6 % Final     Comment:     Normal <5.7%   Prediabetes 5.7-6.4%    Diabetes 6.5% or higher     Note: Adopted from ADA consensus guidelines.   05/22/2024 7.5 (H) 0.0 - 5.6 % Final     Comment:     Normal <5.7%   Prediabetes 5.7-6.4%    Diabetes 6.5% or higher     Note: Adopted from ADA consensus guidelines.   09/15/2023 6.3 (H) 0.0 - 5.6 % Final     Comment:     Normal <5.7%   Prediabetes 5.7-6.4%    Diabetes 6.5% or higher     Note: Adopted from ADA consensus guidelines.   06/04/2021 9.3 (H) 0 - 5.6 % Final     Comment:     Normal <5.7% Prediabetes 5.7-6.4%  Diabetes 6.5% or higher - adopted from ADA   consensus guidelines.  Reviewed: OK with previous     02/16/2021 10.9 (H) 0 - 5.6 % Final     Comment:     Results confirmed by repeat test  Normal <5.7% Prediabetes 5.7-6.4%  Diabetes 6.5% or higher - adopted from ADA   consensus guidelines.     02/21/2020 8.4 (H) 0 - 5.6 % Final     Comment:     Normal <5.7% Prediabetes 5.7-6.4%  Diabetes 6.5% or higher - adopted from ADA   consensus guidelines.         TSH   Date Value Ref Range Status   10/28/2024 1.32 0.30 - 4.20 uIU/mL Final   06/16/2023 2.73 0.30 - 4.20 uIU/mL Final   05/13/2022 1.12 0.40 - 4.00 mU/L Final   02/16/2021 2.34 0.40 - 4.00 mU/L Final   05/23/2017 2.28 0.40 - 4.00 mU/L Final   01/22/2015 1.82 0.40 - 4.00 mU/L Final     Comment:     Effective 7/30/2014, the reference range for this assay has changed to reflect   new instrumentation/methodology.     04/25/2013 1.86 0.4 - 5.0 mU/L Final   08/16/2011 1.21 0.4 - 5.0 mU/L Final       Creatinine   Date Value Ref Range Status   02/17/2025 1.29 (H) 0.51 - 0.95 mg/dL Final   07/08/2021 1.30 (H) 0.52 - 1.04 mg/dL Final   GFR > 50       Video-Visit Details    Type of service:  Video Visit  Joined the call at 5/6/2025, 6:59:59 am.  Left the call at 5/6/2025, 7:14:12 am.  You were  on the call for 14 minutes 13 seconds.  Distant Location (provider location):  Off-site.   Platform used for Video Visit: OOYYO           Again, thank you for allowing me to participate in the care of your patient.        Sincerely,        Amy Sosa MD    Electronically signed

## 2025-05-06 NOTE — NURSING NOTE
Current patient location: [address] [place] [address] [address]  [facility] [address]    Is the patient currently in the state of [place]? YES    Visit mode: VIDEO    If the visit is dropped, the patient can be reconnected by:VIDEO VISIT: Text to cell phone:   Telephone Information:   Mobile [phone]       Will anyone else be joining the visit? NO  (If patient encounters technical issues they should call [phone]    Are changes needed to the allergy or medication list? No    Are refills needed on medications prescribed by this physician? NO    Rooming Documentation:  Questionnaire(s) not done per department protocol    Reason for visit: RECHECK Shelby Kocher VVF

## 2025-05-06 NOTE — PATIENT INSTRUCTIONS
Orders Placed This Encounter   Procedures    GLUCOSE MONITOR, 72 HOUR, PHYS INTERP    DX Bone Density    Hemoglobin A1c    Vitamin D Deficiency (D3 Only)    Complete blood work today and we will contact you with next steps.

## 2025-05-07 LAB — VIT D+METAB SERPL-MCNC: 34 NG/ML (ref 20–50)

## 2025-05-08 ENCOUNTER — PATIENT OUTREACH (OUTPATIENT)
Dept: CARE COORDINATION | Facility: CLINIC | Age: 70
End: 2025-05-08
Payer: COMMERCIAL

## 2025-05-08 ENCOUNTER — RESULTS FOLLOW-UP (OUTPATIENT)
Dept: ENDOCRINOLOGY | Facility: CLINIC | Age: 70
End: 2025-05-08

## 2025-05-08 NOTE — RESULT ENCOUNTER NOTE
Hello -    -A1C test (average blood sugar the last 2-3 months) is above your goal.  We need to adjust your antidiabetic medications at the time of your follow-up with educator or with us.  ADVISE: making a diabetic followup appointment in 4 weeks. Please check and record your blood sugars at least 4 times daily for 1 week prior to your appointment and bring for review.  Also, you should recheck your A1C in 3 months.  -Vitamin D level is normal and getting 1000 IU daily in your diet or supplements is recommended.     Please let us know if you have any questions or concerns.     Regards,  Amy Sosa MD

## 2025-05-10 ENCOUNTER — MYC REFILL (OUTPATIENT)
Dept: FAMILY MEDICINE | Facility: CLINIC | Age: 70
End: 2025-05-10
Payer: COMMERCIAL

## 2025-05-10 ENCOUNTER — MYC REFILL (OUTPATIENT)
Dept: UROLOGY | Facility: CLINIC | Age: 70
End: 2025-05-10
Payer: COMMERCIAL

## 2025-05-10 DIAGNOSIS — J30.9 ALLERGIC SINUSITIS: ICD-10-CM

## 2025-05-10 DIAGNOSIS — N39.46 MIXED INCONTINENCE: ICD-10-CM

## 2025-05-10 DIAGNOSIS — E78.5 HYPERLIPIDEMIA LDL GOAL <100: ICD-10-CM

## 2025-05-12 RX ORDER — FLUTICASONE PROPIONATE 50 MCG
1-2 SPRAY, SUSPENSION (ML) NASAL DAILY PRN
Qty: 48 ML | Refills: 0 | Status: SHIPPED | OUTPATIENT
Start: 2025-05-12

## 2025-05-12 RX ORDER — ATORVASTATIN CALCIUM 80 MG/1
80 TABLET, FILM COATED ORAL DAILY
Qty: 90 TABLET | Refills: 0 | Status: SHIPPED | OUTPATIENT
Start: 2025-05-12

## 2025-05-13 DIAGNOSIS — N39.41 URGE INCONTINENCE: ICD-10-CM

## 2025-05-13 DIAGNOSIS — Z79.4 TYPE 2 DIABETES MELLITUS WITH STAGE 3A CHRONIC KIDNEY DISEASE, WITH LONG-TERM CURRENT USE OF INSULIN (H): ICD-10-CM

## 2025-05-13 DIAGNOSIS — E11.22 TYPE 2 DIABETES MELLITUS WITH STAGE 3A CHRONIC KIDNEY DISEASE, WITH LONG-TERM CURRENT USE OF INSULIN (H): ICD-10-CM

## 2025-05-13 DIAGNOSIS — N18.31 TYPE 2 DIABETES MELLITUS WITH STAGE 3A CHRONIC KIDNEY DISEASE, WITH LONG-TERM CURRENT USE OF INSULIN (H): ICD-10-CM

## 2025-05-13 DIAGNOSIS — E66.01 MORBID OBESITY (H): ICD-10-CM

## 2025-05-13 DIAGNOSIS — N18.31 STAGE 3A CHRONIC KIDNEY DISEASE (H): ICD-10-CM

## 2025-05-13 RX ORDER — SOLIFENACIN SUCCINATE 10 MG/1
10 TABLET, FILM COATED ORAL DAILY
Qty: 90 TABLET | Refills: 3 | Status: SHIPPED | OUTPATIENT
Start: 2025-05-13 | End: 2025-05-13

## 2025-05-13 RX ORDER — SOLIFENACIN SUCCINATE 10 MG/1
10 TABLET, FILM COATED ORAL DAILY
Qty: 90 TABLET | Refills: 3 | Status: SHIPPED | OUTPATIENT
Start: 2025-05-13

## 2025-05-22 ENCOUNTER — PATIENT OUTREACH (OUTPATIENT)
Dept: CARE COORDINATION | Facility: CLINIC | Age: 70
End: 2025-05-22
Payer: COMMERCIAL

## 2025-06-08 DIAGNOSIS — I10 HYPERTENSION GOAL BP (BLOOD PRESSURE) < 140/90: ICD-10-CM

## 2025-06-09 NOTE — PROGRESS NOTES
[address] [address]  Phone: [phone]  Primary Provider: Riya Swanson  Pre-op Performing Provider: RIYA SWANSON  PREOPERATIVE EVALUATION:  Today's date: 6/4/2021    Adrian Cruz is a 65 year old female who presents for a preoperative evaluation.    Surgical Information:  Surgery/Procedure: Left Eye, 25 gauge Vitrectomy, membrane peel, endolaser, air fluid exchange Avastin    Surgery Location: [facility]  Surgeon: Dr. Dk Eddy  Surgery Date: 6/23/21  Time of Surgery: 6 am  Where patient plans to recover: At home with family  Fax number for surgical facility: [phone]    Type of Anesthesia Anticipated: to be determined    Assessment & Plan     The proposed surgical procedure is considered INTERMEDIATE risk.    Preop general physical exam  -procedure scheduled for 6/23/21   - Basic metabolic panel  - Hemoglobin A1c    Vitreous hemorrhage, unspecified laterality (H)  -Left eye, has had past sessions of PRP but still with increased hemorrhage     Type 2 diabetes mellitus with stage 3a chronic kidney disease, with long-term current use of insulin (H)  -Working with Endocrine on better control   - Basic metabolic panel  - Hemoglobin A1c  - fluconazole (DIFLUCAN) 150 MG tablet  Dispense: 1 tablet; Refill: 0    Morbid obesity (H)  -weight stable   Wt Readings from Last 2 Encounters:   06/04/21 93 kg (205 lb)   02/16/21 93.4 kg (206 lb)       Hyperlipidemia LDL goal <100  -on a statin     Hypertension goal BP (blood pressure) < 140/90  -at goal   - Basic metabolic panel    Yeast infection of the vagina  -Symptoms since starting Jardiance   - fluconazole (DIFLUCAN) 150 MG tablet  Dispense: 1 tablet; Refill: 0           Risks and Recommendations:  The patient has the following additional risks and recommendations for perioperative complications:   - Morbid obesity (BMI >40)  Diabetes:  - Patient is not on insulin  [de-identified] : We discussed further treatment options.  This point he is interested in pain management evaluation.  Referral was given.  Follow-up afterwards. therapy: regular NPO guidelines can be followed.     Medication Instructions:  Patient is to take all scheduled medications on the day of surgery EXCEPT for modifications listed below:   - aspirin: Discontinue aspirin 7-10 days prior to procedure to reduce bleeding risk. It should be resumed postoperatively.    - ACE/ARB: May be continued on the day of surgery.    - Calcium Channel Blockers: May be continued on the day of surgery.   - Diuretics: HOLD on the day of surgery.   - Statins: Continue taking on the day of surgery.    - metformin: HOLD day of surgery.   - sulfonylurea (e.g. glyburide, glipizide): HOLD day of surgery   - SGLT2 Inhibitor (canagliflozin, dapagliflozin, or empagliflozin): HOLD 3 days before surgery.    - GLP-1 Injectable (exenitide, liraglutide, semaglutide, dulaglutide, etc.): HOLD day of surgery     RECOMMENDATION:  APPROVAL GIVEN to proceed with proposed procedure, without further diagnostic evaluation.    Review of external notes as documented above , reviewed notes from EYE       47 minutes spent on the date of the encounter doing chart review, history and exam, documentation and further activities per the note        Subjective     HPI related to upcoming procedure: The left eye has had 2 past sessions of PRP. The eye has developed increased vitreous hemorrhage.     Preop Questions 5/28/2021   1. Have you ever had a heart attack or stroke? No   2. Have you ever had surgery on your heart or blood vessels, such as a stent placement, a coronary artery bypass, or surgery on an artery in your head, neck, heart, or legs? No   3. Do you have chest pain with activity? No   4. Do you have a history of  heart failure? No   5. Do you currently have a cold, bronchitis or symptoms of other infection? No   6. Do you have a cough, shortness of breath, or wheezing? No   7. Do you or anyone in your family have previous history of blood clots? No   8. Do you or does anyone in your family have a serious  bleeding problem such as prolonged bleeding following surgeries or cuts? No   9. Have you ever had problems with anemia or been told to take iron pills? No   10. Have you had any abnormal blood loss such as black, tarry or bloody stools, or abnormal vaginal bleeding? No   11. Have you ever had a blood transfusion? No   12. Are you willing to have a blood transfusion if it is medically needed before, during, or after your surgery? Yes   13. Have you or any of your relatives ever had problems with anesthesia? No   14. Do you have sleep apnea, excessive snoring or daytime drowsiness? No   15. Do you have any artifical heart valves or other implanted medical devices like a pacemaker, defibrillator, or continuous glucose monitor? No   16. Do you have artificial joints? No   17. Are you allergic to latex? No       Health Care Directive:  Patient does not have a Health Care Directive or Living Will: Advance Directive received and scanned. Click on Code in the patient header to view.    Preoperative Review of :   reviewed - no record of controlled substances prescribed.      Status of Chronic Conditions:  DIABETES - Patient has a longstanding history of DiabetesType Type II . Patient is being treated with diet, oral agents, exercise and ASA and denies significant side effects. Control has been fair. Complicating factors include but are not limited to: hypertension, hyperlipidemia, retinopathy and morbid obesity. Diabetes is being managed by Endocrine     HYPERLIPIDEMIA - Patient has a long history of significant Hyperlipidemia requiring medication for treatment with recent fair control. Patient reports no problems or side effects with the medication.     HYPERTENSION - Patient has longstanding history of HTN , currently denies any symptoms referable to elevated blood pressure. Specifically denies chest pain, palpitations, dyspnea, orthopnea, PND or peripheral edema. Blood pressure readings have been in normal range.  Current medication regimen is as listed below. Patient denies any side effects of medication.       Also requesting medication for a yeast infection that has developed over the last few days since starting Empagliflozin.     Review of Systems  CONSTITUTIONAL: NEGATIVE for fever, chills, change in weight  INTEGUMENTARY/SKIN: NEGATIVE for worrisome rashes, moles or lesions  EYES: NEGATIVE for vision changes or irritation  ENT/MOUTH: NEGATIVE for ear, mouth and throat problems  RESP: NEGATIVE for significant cough or SOB  BREAST: NEGATIVE for masses, tenderness or discharge  CV: NEGATIVE for chest pain, palpitations or peripheral edema  GI: NEGATIVE for nausea, abdominal pain, heartburn, or change in bowel habits  : NEGATIVE for frequency, dysuria, or hematuria  MUSCULOSKELETAL: NEGATIVE for significant arthralgias or myalgia  NEURO: NEGATIVE for weakness, dizziness or paresthesias  ENDOCRINE: NEGATIVE for temperature intolerance, skin/hair changes  HEME: NEGATIVE for bleeding problems  PSYCHIATRIC: NEGATIVE for changes in mood or affect    Patient Active Problem List    Diagnosis Date Noted     [facility] 03/09/2012     Priority: High     EMERGENCY CARE PLAN  Presenting Problem Signs and Symptoms Treatment Plan    Questions or concerns during clinic hours    I will call the clinic directly     Questions or concerns outside clinic hours    I will call the 24 hour nurse line at [phone]    Patient needs to schedule an appointment    I will call the 24 hour scheduling team at [phone] or clinic directly    Same day treatment     I will call the clinic first, nurse line if after hours, urgent care and express care if needed                            DX V65.8 REPLACED WITH [id number] [facility] (04/08/2013)       Obesity, Class II, BMI 35-39.9 05/23/2017     Priority: Medium     Morbid obesity (H) 10/28/2015     Priority: Medium     Advanced directives, counseling/discussion 01/23/2012     Priority:  Medium     Advance Care Planning:   ACP Review and Resources Provided:  Reviewed chart for advance care plan.  Adrian Cruz has no plan or code status on file. Mailed available resources and provided with information. Confirmed code status reflects current choices pending further ACP discussions.  Confirmed/documented designated decision maker(s). See permanent comments section of demographics in clinical tab.   Added by Lizzy Holder on 2/10/2015  Discussed advance care planning with patient; information given to patient to review. 1/23/2012        Cataract 09/30/2011     Priority: Medium     Utility update for deleted IMO code  [employer] Update utility       Diabetes mellitus with background retinopathy (H) 09/30/2011     Priority: Medium     Problem list name updated by automated process. Provider to review       Hyperlipidemia LDL goal <100 08/16/2011     Priority: Medium     Type 2 diabetes mellitus with stage 3 chronic kidney disease, with long-term current use of insulin (H) 08/16/2011     Priority: Medium     Hypertension goal BP (blood pressure) < 140/90 08/16/2011     Priority: Medium      Past Medical History:   Diagnosis Date     GERD (gastroesophageal reflux disease)      History of elevated lipids      Lower extremity edema      NIDDM (non-insulin dependent diabetes mellitus) 1997     Rhinitis      Past Surgical History:   Procedure Laterality Date     LASER SURGERY OF EYE Right 2015     Current Outpatient Medications   Medication Sig Dispense Refill     amLODIPine (NORVASC) 10 MG tablet TAKE 1 TABLET(10 MG) BY MOUTH DAILY for blood pressure 90 tablet 1     aspirin (ASA) 81 MG EC tablet TAKE 1 TABLET (81 MG) BY MOUTH EVERY DAY 90 tablet 1     atorvastatin (LIPITOR) 80 MG tablet Take 1 tablet (80 mg) by mouth daily 90 tablet 1     cetirizine (ZYRTEC) 10 MG tablet Take 1 tablet (10 mg) by mouth every evening as needed for allergies 90 tablet 1     empagliflozin (JARDIANCE) 10 MG TABS tablet Take 1  tablet (10 mg) by mouth daily 30 tablet 3     fluconazole (DIFLUCAN) 150 MG tablet Take 1 tablet (150 mg) by mouth once for 1 dose 1 tablet 0     fluticasone (FLONASE) 50 MCG/ACT nasal spray Spray 1-2 sprays into both nostrils daily as needed for rhinitis or allergies 16 g 0     glimepiride (AMARYL) 4 MG tablet TAKE 2 TABLETS BY MOUTH EVERY MORNING BEFORE A MEAL 180 tablet 1     hydrochlorothiazide (HYDRODIURIL) 25 MG tablet Take 1 tablet (25 mg) by mouth daily 90 tablet 1     insulin pen needle (B-D U/F) 31G X 5 MM miscellaneous USE as directed twice a day 100 each 11     liraglutide (VICTOZA PEN) 18 MG/3ML solution Inject 1.8 mg Subcutaneous daily 27 mL 3     losartan (COZAAR) 100 MG tablet Take 1 tablet (100 mg) by mouth daily 90 tablet 1     metFORMIN (GLUCOPHAGE-XR) 500 MG 24 hr tablet TAKE 4 TABLETS BY MOUTH EVERY DAY WITH BREAKFAST 360 tablet 1     omeprazole 20 MG tablet Take 1 tablet (20 mg) by mouth daily as needed 90 tablet 1     ONE TOUCH ULTRA test strip TEST TWICE DAILY 50 each 0     order for DME Equipment being ordered: Digital home blood pressure monitor kit 1 Device 0     ORDER FOR DME Equipment being ordered: blood pressure cuff/machine 1 Device 0       Allergies   Allergen Reactions     Ace Inhibitors Cough     Glucophage [Biguanides] Cramps     Stomach upset        Social History     Tobacco Use     Smoking status: Never Smoker     Smokeless tobacco: Never Used   Substance Use Topics     Alcohol use: No     Family History   Problem Relation Age of Onset     Diabetes Mother      Heart Disease Mother         chf and pacemaker     Genitourinary Problems Mother         ESRD     Diabetes Father      C.A.D. Father         age 49     Cerebrovascular Disease Father      Asthma Sister      Asthma Son      Asthma Daughter      Heart Disease Daughter      Glaucoma No family hx of      Macular Degeneration No family hx of      History   Drug Use No         Objective     /69 (BP Location: Left arm,  "Patient Position: Chair, Cuff Size: Adult Large)   Pulse 83   Ht 1.543 m (5' 0.75\")   Wt 93 kg (205 lb)   LMP  (LMP Unknown)   SpO2 96%   BMI 39.05 kg/m      Physical Exam  GENERAL APPEARANCE: healthy, alert and no distress  EYES: Eyes grossly normal to inspection and conjunctivae and sclerae normal  NECK: no adenopathy and trachea midline and normal to palpation, no carotid bruits   RESP: lungs clear to auscultation - no rales, rhonchi or wheezes  CV: regular rates and rhythm, normal S1 S2, no S3 or S4 and no murmur, click or rub  ABDOMEN: soft, non-tender and no rebound or guarding   MS: extremities normal- no gross deformities noted and peripheral pulses normal  SKIN: no suspicious lesions or rashes  NEURO: Normal strength and tone, mentation intact and speech normal  PSYCH: mentation appears normal      Recent Labs   Lab Test 02/16/21  1108 02/21/20  0726   HGB 12.4  --     141   POTASSIUM 3.8 4.2   CR 1.12* 1.38*   A1C 10.9* 8.4*        Diagnostics:  Labs pending at this time.  Results will be reviewed when available.  Recent Results (from the past 24 hour(s))   Basic metabolic panel    Collection Time: 06/04/21 10:02 AM   Result Value Ref Range    Sodium 138 133 - 144 mmol/L    Potassium 3.7 3.4 - 5.3 mmol/L    Chloride 107 94 - 109 mmol/L    Carbon Dioxide 24 20 - 32 mmol/L    Anion Gap 7 3 - 14 mmol/L    Glucose 169 (H) 70 - 99 mg/dL    Urea Nitrogen 27 7 - 30 mg/dL    Creatinine 1.25 (H) 0.52 - 1.04 mg/dL    GFR Estimate 45 (L) >60 mL/min/[1.73_m2]    GFR Estimate If Black 52 (L) >60 mL/min/[1.73_m2]    Calcium 8.6 8.5 - 10.1 mg/dL   Hemoglobin A1c    Collection Time: 06/04/21 10:02 AM   Result Value Ref Range    Hemoglobin A1C 9.3 (H) 0 - 5.6 %      No EKG required, no history of coronary heart disease, significant arrhythmia, peripheral arterial disease or other structural heart disease.    Revised Cardiac Risk Index (RCRI):  The patient has the following serious cardiovascular risks for " perioperative complications:   - No serious cardiac risks = 0 points     RCRI Interpretation: 0 points: Class I (very low risk - 0.4% complication rate)           Signed Electronically by: Radha Madrigal MD MPH    Copy of this evaluation report is provided to requesting physician.

## 2025-06-10 RX ORDER — LOSARTAN POTASSIUM 100 MG/1
100 TABLET ORAL
Qty: 90 TABLET | Refills: 0 | Status: SHIPPED | OUTPATIENT
Start: 2025-06-10

## 2025-06-10 NOTE — TELEPHONE ENCOUNTER
90 day refill provided. Will need follow up for further refills. Last visit was June 2024. Thank you, Radha Madrigal MD MPH

## 2025-06-25 SDOH — HEALTH STABILITY: PHYSICAL HEALTH: ON AVERAGE, HOW MANY DAYS PER WEEK DO YOU ENGAGE IN MODERATE TO STRENUOUS EXERCISE (LIKE A BRISK WALK)?: 3 DAYS

## 2025-06-25 SDOH — HEALTH STABILITY: PHYSICAL HEALTH: ON AVERAGE, HOW MANY MINUTES DO YOU ENGAGE IN EXERCISE AT THIS LEVEL?: 20 MIN

## 2025-06-25 ASSESSMENT — SOCIAL DETERMINANTS OF HEALTH (SDOH): HOW OFTEN DO YOU GET TOGETHER WITH FRIENDS OR RELATIVES?: THREE TIMES A WEEK

## 2025-06-26 ENCOUNTER — OFFICE VISIT (OUTPATIENT)
Dept: FAMILY MEDICINE | Facility: CLINIC | Age: 70
End: 2025-06-26
Payer: COMMERCIAL

## 2025-06-26 VITALS
HEIGHT: 61 IN | SYSTOLIC BLOOD PRESSURE: 129 MMHG | DIASTOLIC BLOOD PRESSURE: 69 MMHG | HEART RATE: 75 BPM | TEMPERATURE: 97.3 F | BODY MASS INDEX: 31.26 KG/M2 | WEIGHT: 165.6 LBS | RESPIRATION RATE: 16 BRPM | OXYGEN SATURATION: 100 %

## 2025-06-26 DIAGNOSIS — Z00.00 ENCOUNTER FOR MEDICARE ANNUAL WELLNESS EXAM: Primary | ICD-10-CM

## 2025-06-26 DIAGNOSIS — Z79.4 TYPE 2 DIABETES MELLITUS WITH STAGE 3A CHRONIC KIDNEY DISEASE, WITH LONG-TERM CURRENT USE OF INSULIN (H): ICD-10-CM

## 2025-06-26 DIAGNOSIS — I10 HYPERTENSION GOAL BP (BLOOD PRESSURE) < 140/90: ICD-10-CM

## 2025-06-26 DIAGNOSIS — N18.31 TYPE 2 DIABETES MELLITUS WITH STAGE 3A CHRONIC KIDNEY DISEASE, WITH LONG-TERM CURRENT USE OF INSULIN (H): ICD-10-CM

## 2025-06-26 DIAGNOSIS — E78.5 HYPERLIPIDEMIA LDL GOAL <100: ICD-10-CM

## 2025-06-26 DIAGNOSIS — Z12.31 VISIT FOR SCREENING MAMMOGRAM: ICD-10-CM

## 2025-06-26 DIAGNOSIS — Z23 NEED FOR VACCINATION: ICD-10-CM

## 2025-06-26 DIAGNOSIS — Z12.11 SCREEN FOR COLON CANCER: ICD-10-CM

## 2025-06-26 DIAGNOSIS — E11.22 TYPE 2 DIABETES MELLITUS WITH STAGE 3A CHRONIC KIDNEY DISEASE, WITH LONG-TERM CURRENT USE OF INSULIN (H): ICD-10-CM

## 2025-06-26 DIAGNOSIS — N18.31 STAGE 3A CHRONIC KIDNEY DISEASE (H): ICD-10-CM

## 2025-06-26 RX ORDER — LOSARTAN POTASSIUM 100 MG/1
100 TABLET ORAL
Qty: 90 TABLET | Refills: 3 | Status: SHIPPED | OUTPATIENT
Start: 2025-06-26

## 2025-06-26 RX ORDER — HYDROCHLOROTHIAZIDE 25 MG/1
25 TABLET ORAL DAILY
Qty: 90 TABLET | Refills: 3 | Status: SHIPPED | OUTPATIENT
Start: 2025-06-26

## 2025-06-26 RX ORDER — ATORVASTATIN CALCIUM 80 MG/1
80 TABLET, FILM COATED ORAL DAILY
Qty: 90 TABLET | Refills: 3 | Status: SHIPPED | OUTPATIENT
Start: 2025-06-26

## 2025-06-26 NOTE — PROGRESS NOTES
"Preventive Care Visit  [facility]  Radha Mdarigal MD, Family Medicine  Jun 26, 2025      Assessment & Plan     Encounter for Medicare annual wellness exam  -preventive guidelines reviewed  -Shingrix and RSV to be done at the pharmacy   - REVIEW OF HEALTH MAINTENANCE PROTOCOL ORDERS  - PRIMARY CARE FOLLOW-UP SCHEDULING    Stage 3a chronic kidney disease (H)  -followed by Nephrology   -on losartan, jardiance, ozempic.   -no use of NSAIDs    Type 2 diabetes mellitus with stage 3a chronic kidney disease, with long-term current use of insulin (H)  -per Endocrine     Lab Results   Component Value Date    A1C 7.8 05/06/2025    A1C 7.4 10/28/2024    A1C 7.5 05/22/2024    A1C 6.3 09/15/2023    A1C 6.3 06/16/2023    A1C 9.3 06/04/2021    A1C 10.9 02/16/2021    A1C 8.4 02/21/2020    A1C 7.7 06/18/2019    A1C 10.2 02/19/2019         Hypertension goal BP (blood pressure) < 130/80  -blood pressure is at goal   - hydrochlorothiazide (HYDRODIURIL) 25 MG tablet  Dispense: 90 tablet; Refill: 3  - losartan (COZAAR) 100 MG tablet  Dispense: 90 tablet; Refill: 3    Hyperlipidemia LDL goal <100  -continue statin   - atorvastatin (LIPITOR) 80 MG tablet  Dispense: 90 tablet; Refill: 3    The ASCVD Risk score (Lianne BRENNAN, et al., 2019) failed to calculate for the following reasons:    The valid total cholesterol range is 130 to 320 mg/dL      Visit for screening mammogram  - MA Screen Bilateral w/Mikey    Screen for colon cancer  - Fecal colorectal cancer screen FIT - Future (S+30)    Need for vaccination  - COVID-19 12+ (PFIZER)            BMI  Estimated body mass index is 31.81 kg/m  as calculated from the following:    Height as of this encounter: 1.537 m (5' 0.5\").    Weight as of this encounter: 75.1 kg (165 lb 9.6 oz).   Weight management plan: Discussed healthy diet and exercise guidelines  Reviewed preventive health counseling, as reflected in patient instructions       Regular exercise       Healthy " diet/nutrition       Vision screening       Colorectal Cancer Screening  Counseling  Appropriate preventive services were addressed with this patient via screening, questionnaire, or discussion as appropriate for fall prevention, nutrition, physical activity, Tobacco-use cessation, social engagement, weight loss and cognition.  Checklist reviewing preventive services available has been given to the patient.  Reviewed patient's diet, addressing concerns and/or questions.   She is at risk for lack of exercise and has been provided with information to increase physical activity for the benefit of her well-being.   The patient was instructed to see the dentist every 6 months.         Follow-up    Follow-up Visit   Expected date:  Jul 03, 2026 (Approximate)      Follow Up Appointment Details:     Follow-up with whom?: PCP    Follow-Up for what?: Medicare Wellness    Welcome or Annual?: Annual Wellness    How?: In Person             Follow-up Visit   Expected date:  Jul 03, 2026 (Approximate)      Follow Up Appointment Details:     Follow-up with whom?: PCP    Follow-Up for what?: Medicare Wellness    Welcome or Annual?: Annual Wellness    How?: In Person                 Nissa Campbell is a 69 year old, presenting for the following:  Physical        6/26/2025     2:28 PM   Additional Questions   Roomed by lesa   Accompanied by self         6/26/2025     2:28 PM   Patient Reported Additional Medications   Patient reports taking the following new medications no        Via the Health Maintenance questionnaire, the patient has reported the following services have been completed -Mammogram: dayan rocha 2024-05-06, this information has been sent to the abstraction team.      Wt Readings from Last 2 Encounters:   06/26/25 75.1 kg (165 lb 9.6 oz)   03/04/25 77.6 kg (171 lb)      HPI    Lipids:  -taking statin without side effects     Hypertension Follow-up     Do you check your blood pressure regularly outside of  the clinic? Sometimes  Are you following a low salt diet? Yes  Are your blood pressures ever more than 140 on the top number (systolic) OR more       than 90 on the bottom number (diastolic), for example 140/90? No     Chronic Kidney Disease Follow-up     Do you take any over the counter pain medicine?: No      Diabetes:  -managed by Endo   -glucose at home this  mg/dl     Advance Care Planning    Discussed advance care planning with patient; informed AVS has link to [employer].        6/25/2025   General Health   How would you rate your overall physical health? Good   Feel stress (tense, anxious, or unable to sleep) Not at all         6/25/2025   Nutrition   Diet: Diabetic         6/25/2025   Exercise   Days per week of moderate/strenous exercise 3 days   Average minutes spent exercising at this level 20 min         6/25/2025   Social Factors   Frequency of gathering with friends or relatives Three times a week   Worry food won't last until get money to buy more No   Food not last or not have enough money for food? No   Do you have housing? (Housing is defined as stable permanent housing and does not include staying outside in a car, in a tent, in an abandoned building, in an overnight shelter, or couch-surfing.) Yes   Are you worried about losing your housing? No   Lack of transportation? No   Unable to get utilities (heat,electricity)? No         6/25/2025   Fall Risk   Fallen 2 or more times in the past year? No   Trouble with walking or balance? No          6/25/2025   Activities of Daily Living- Home Safety   Needs help with the following daily activites None of the above   Safety concerns in the home None of the above         6/25/2025   Dental   Dentist two times every year? (!) NO         6/25/2025   Hearing Screening   Hearing concerns? None of the above         6/25/2025   Driving Risk Screening   Patient/family members have concerns about driving No         6/25/2025   General  Alertness/Fatigue Screening   Have you been more tired than usual lately? No         6/25/2025   Urinary Incontinence Screening   Bothered by leaking urine in past 6 months No         Today's PHQ-2 Score:       6/25/2025     9:25 PM   PHQ-2 ( 1999 Pf[employer])   Q1: Little interest or pleasure in doing things 0   Q2: Feeling down, depressed or hopeless 0   PHQ-2 Score 0    Q1: Little interest or pleasure in doing things Not at all   Q2: Feeling down, depressed or hopeless Not at all   PHQ-2 Score 0       Patient-reported           6/25/2025   Substance Use   Alcohol more than 3/day or more than 7/wk No   Do you have a current opioid prescription? No   How severe/bad is pain from 1 to 10? 0/10 (No Pain)   Do you use any other substances recreationally? No     Social History     Tobacco Use    Smoking status: Never    Smokeless tobacco: Never   Vaping Use    Vaping status: Never Used   Substance Use Topics    Alcohol use: No    Drug use: No           8/1/2023   LAST FHS-7 RESULTS   1st degree relative breast or ovarian cancer No    No   Any relative bilateral breast cancer No    No   Any male have breast cancer No    No   Any ONE woman have BOTH breast AND ovarian cancer No    No   Any woman with breast cancer before 50yrs No    No   2 or more relatives with breast AND/OR ovarian cancer No    No   2 or more relatives with breast AND/OR bowel cancer No    No       Multiple values from one day are sorted in reverse-chronological order        Mammogram Screening - Mammogram every 1-2 years updated in Health Maintenance based on mutual decision making      History of abnormal Pap smear: No - age 65 or older with adequate negative prior screening test results (3 consecutive negative cytology results, 2 consecutive negative cotesting results, or 2 consecutive negative HrHPV test results within 10 years, with the most recent test occurring within the recommended screening interval for the test used)        Latest Ref Rng & Units  5/5/2016     8:30 AM 5/5/2016    12:00 AM   PAP / HPV   PAP (Historical)   NIL    HPV 16 DNA NEG Negative     HPV 18 DNA NEG Negative     Other HR HPV NEG Negative       ASCVD Risk   The ASCVD Risk score (Lianne BRENNAN, et al., 2019) failed to calculate for the following reasons:    The valid total cholesterol range is 130 to 320 mg/dL          Reviewed and updated as needed this visit by Provider   Tobacco  Allergies  Meds  Problems  Med Hx  Surg Hx  Fam Hx            Past Medical History:   Diagnosis Date    GERD (gastroesophageal reflux disease)     History of elevated lipids     Lower extremity edema     NIDDM (non-insulin dependent diabetes mellitus) 1997    Rhinitis      Past Surgical History:   Procedure Laterality Date    LASER SURGERY OF EYE Right 2015     Labs reviewed in EPIC  BP Readings from Last 3 Encounters:   06/26/25 129/69   06/07/24 116/67   05/13/22 131/76    Wt Readings from Last 3 Encounters:   06/26/25 75.1 kg (165 lb 9.6 oz)   03/04/25 77.6 kg (171 lb)   06/07/24 77.4 kg (170 lb 9.6 oz)                  Patient Active Problem List   Diagnosis    Hyperlipidemia LDL goal <100    Type 2 diabetes mellitus with stage 3 chronic kidney disease, with long-term current use of insulin (H)    Hypertension goal BP (blood pressure) < 140/90    Cataract    Diabetes mellitus with background retinopathy (H)    Morbid obesity (H)    Obesity, Class II, BMI 35-39.9    Chronic kidney disease, stage 3b (H)    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 Father         age 49    Cerebrovascular Disease Father     Asthma Sister     Asthma Son     Asthma Daughter     Heart Disease Daughter     Glaucoma No family hx of     Macular Degeneration No family hx of          Current Outpatient Medications   Medication Sig Dispense Refill    amLODIPine (NORVASC) 10 MG tablet TAKE 1 TABLET(10 MG) BY MOUTH DAILY FOR BLOOD PRESSURE 90 tablet 3    atorvastatin (LIPITOR) 80 MG tablet Take 1 tablet (80 mg) by mouth daily. 90 tablet 3    blood glucose monitoring (NO BRAND SPECIFIED) meter device kit Use to test blood sugar 2 times daily or as directed. 1 kit 0    blood glucose monitoring (ULTRA THIN 30G) lancets Use to test blood sugar 2 times daily or as directed. Per insurance 100 each 11    calcium carbonate-vitamin D (CALTRATE) 600-10 MG-MCG per tablet Take 1 tablet by mouth 2 times daily. 180 tablet 3    cetirizine (ZYRTEC) 10 MG tablet TAKE 1 TABLET (10 MG) BY MOUTH EVERY EVENING AS NEEDED FOR ALLERGIES 90 tablet 1    Continuous Blood Gluc  (FREESTYLE RODNEY 2 READER) JOSE 1 each See Admin Instructions Use multiple times daily to read glucose levels. 1 each 0    Continuous Blood Gluc Sensor (FREESTYLE RODNEY 2 SENSOR) MISC 1 each every 14 days Use 1 sensor every 14 days. Use to read blood sugars per 's instructions. 2 each 5    empagliflozin (JARDIANCE) 25 MG TABS tablet Take 1 tablet (25 mg) by mouth daily. 90 tablet 3    fluticasone (FLONASE) 50 MCG/ACT nasal spray Spray 1-2 sprays into both nostrils daily as needed for rhinitis or allergies. 48 mL 0    Glucose Blood (BLOOD GLUCOSE TEST STRIPS) STRP 1 strip by Lancet route 2 times daily 100 strip 11    hydrochlorothiazide (HYDRODIURIL) 25 MG tablet Take 1 tablet (25 mg) by mouth daily. 90 tablet 3    losartan (COZAAR) 100 MG tablet Take 1 tablet (100 mg) by mouth daily at 2 pm. 90 tablet 3    omeprazole 20 MG tablet Take 1 tablet (20  mg) by mouth daily as needed 90 tablet 1    order for DME Equipment being ordered: Digital home blood pressure monitor kit 1 Device 0    ORDER FOR DME Equipment being ordered: blood pressure cuff/machine 1 Device 0    Semaglutide, 2 MG/DOSE, (OZEMPIC) 8 MG/3ML pen Inject 2 mg subcutaneously every 7 days. 3 mL 0    Semaglutide, 2 MG/DOSE, (OZEMPIC) 8 MG/3ML pen Inject 2 mg subcutaneously every 7 days. 9 mL 3    solifenacin (VESICARE) 10 MG tablet Take 1 tablet (10 mg) by mouth daily. 90 tablet 3    vitamin D3 (CHOLECALCIFEROL) 50 mcg (2000 units) tablet Take 1 tablet (50 mcg) by mouth daily. 90 tablet 0     Allergies   Allergen Reactions    Ace Inhibitors Cough    Glucophage [Metformin And Related] Cramps     Stomach upset     Recent Labs   Lab Test 05/06/25  1456 02/17/25  1200 10/28/24  1153 05/22/24  1431 09/15/23  1007 06/16/23  0829 12/06/22  1403 05/13/22  1112 10/20/21  1015 07/08/21  1610 06/04/21  1002 02/16/21  1108   A1C 7.8*  --  7.4* 7.5*   < > 6.3*   < > 9.9*   < >  --  9.3* 10.9*   LDL  --   --   --  71  --  74  --  61  --   --   --  74   HDL  --   --   --  43*  --  42*  --  44*  --   --   --  44*   TRIG  --   --   --  65  --  78  --  90  --   --   --  96   ALT  --   --   --   --   --   --   --  18  --   --   --  18   CR  --  1.29* 1.50* 1.25*   < > 1.32*   < > 1.20*  --  1.30* 1.25* 1.12*   GFRESTIMATED  --  44* 37* 46*   < > 44*   < > 49*  --  42* 45* 51*   GFRESTBLACK  --   --   --   --   --   --   --   --   --  49* 52* 59*   POTASSIUM  --  3.6 4.4 4.3   < > 3.9   < > 3.9  --  4.2 3.7 3.8   TSH  --   --  1.32  --   --  2.73  --  1.12  --   --   --  2.34    < > = values in this interval not displayed.      Current providers sharing in care for this patient include:  Patient Care Team:  Radha Madrigal MD as PCP - General (Family Practice)  Radha Madrigal MD as Assigned PCP  Emma Martinez [facility] as Pharmacist  Amy Sosa MD as Assigned Endocrinology Provider  Milagro Moore PA-C as  "Physician Assistant (Urology)  Aym Sosa MD as Referring Physician (Endocrinology, Diabetes, and Metabolism)  Ana Gregorio, RN as Diabetes Educator (Diabetes Education)  Olu Houston DO as Assigned Nephrology Provider  Pam Yoon PA-C as Assigned Surgical Provider    The following health maintenance items are reviewed in Epic and correct as of today:  Health Maintenance   Topic Date Due    ZOSTER VACCINE (1 of 2) Never done    RSV VACCINE (1 - Risk 60-74 years 1-dose series) Never done    COLORECTAL CANCER SCREENING  10/28/2020    MAMMO SCREENING  08/01/2024    COVID-19 VACCINE (6 - 2024-25 season) 09/01/2024    MICROALBUMIN  04/28/2025    LIPID  05/22/2025    DIABETIC FOOT EXAM  06/07/2025    BMP  08/17/2025    INFLUENZA VACCINE (Season Ended) 09/01/2025    A1C  11/06/2025    HEMOGLOBIN  02/17/2026    EYE EXAM  04/08/2026    MEDICARE ANNUAL WELLNESS VISIT  06/26/2026    ANNUAL REVIEW OF HM ORDERS  06/26/2026    FALL RISK ASSESSMENT  06/26/2026    ADVANCE CARE PLANNING  06/26/2030    DTAP/TDAP/TD VACCINE (3 - Td or Tdap) 05/13/2032    DEXA  05/27/2037    PARATHYROID  Completed    PHOSPHORUS  Completed    HEPATITIS C SCREENING  Completed    PHQ-2 (once per calendar year)  Completed    PNEUMOCOCCAL VACCINE 50+ YEARS  Completed    URINALYSIS  Completed    ALK PHOS  Completed    HPV VACCINE  Aged Out    MENINGITIS VACCINE  Aged Out         Review of Systems  Constitutional, HEENT, cardiovascular, pulmonary, gi and gu systems are negative, except as otherwise noted.     Objective    Exam  /69 (BP Location: Left arm, Patient Position: Sitting, Cuff Size: Adult Regular)   Pulse 75   Temp 97.3  F (36.3  C) (Oral)   Resp 16   Ht 1.537 m (5' 0.5\")   Wt 75.1 kg (165 lb 9.6 oz)   LMP  (LMP Unknown)   SpO2 100%   BMI 31.81 kg/m     Estimated body mass index is 31.81 kg/m  as calculated from the following:    Height as of this encounter: 1.537 m (5' 0.5\").    Weight as of this " encounter: 75.1 kg (165 lb 9.6 oz).    Physical Exam  GENERAL APPEARANCE: healthy, alert and no distress  EYES: Eyes grossly normal to inspection and conjunctivae and sclerae normal  RESP: lungs clear to auscultation - no rales, rhonchi or wheezes  CV: regular rates and rhythm, normal S1 S2, no S3 or S4 and no murmur, click or rub  ABDOMEN: soft, non-tender and no rebound or guarding   MS: extremities normal- no gross deformities noted and peripheral pulses normal  NEURO: Normal strength and tone, mentation intact and speech normal  PSYCH: mentation appears normal  Diabetic foot exam: normal DP and PT pulses, no trophic changes or ulcerative lesions, normal sensory exam, and onychomycosis          6/26/2025   Mini Cog   Clock Draw Score 2 Normal   3 Item Recall 3 objects recalled   Mini Cog Total Score 5              Signed Electronically by: Radha Madrigal MD

## 2025-06-26 NOTE — PATIENT INSTRUCTIONS
At [facility], we strive to deliver an exceptional experience to you, every time we see you. If you receive a survey, please let us know what we are doing well and/or what we could improve upon, as we do value your feedback.  If you have MyChart, you can expect to receive results automatically within 24 hours of their completion.  Your provider will send a note interpreting your results as well.   If you do not have MyChart, you should receive your results in about a week by mail.    Your care team:                            Family Medicine Internal Medicine   MD Maverick Manzano, MD Aura Martin, MD Lenny Wu, MD Malena Anthony, PA-C Maura Recinos APRIRIS, HELDER Membreno, MD Pediatrics   MD Shanel Carrasco, MD Ary Oreilly, PAJUSTO Flores APRN CNP   Isis Daniels, MD Eli Hoyt, MD Riddhi Cedeno, CNP      Same-Day Provider (No follow-up visits)    DEBO Saleh PA-C     Clinic hours: Monday - Thursday 7 am-6 pm; Fridays 7 am-5 pm.   Urgent care: Monday - Friday 10 am- 8 pm; Saturday and Sunday 9 am-5 pm.    Clinic: [phone]       [facility]: Monday - Thursday 8 am - 7 pm; Friday 8 am - 6 pm  [facility]: [phone]     [facility] Scheduling: Monday - Friday 7 am - 7 pm; Saturday 7 am - 3:30 pm  ([phone]) [place] : [phone]    Patient Education   Preventive Care Advice   This is general advice given by our system to help you stay healthy. However, your care team may have specific advice just for you. Please talk to your care team about your preventive care needs.  Nutrition  Eat 5 or more servings of fruits and vegetables each day.  Try wheat bread, brown rice and whole grain pasta (instead of white bread, rice, and pasta).  Get enough calcium and vitamin D. Check the label on foods and aim for  100% of the RDA (recommended daily allowance).  Lifestyle  Exercise at least 150 minutes each week  (30 minutes a day, 5 days a week).  Do muscle strengthening activities 2 days a week. These help control your weight and prevent disease.  No smoking.  Wear sunscreen to prevent skin cancer.  Have a dental exam and cleaning every 6 months.  Yearly exams  See your health care team every year to talk about:  Any changes in your health.  Any medicines your care team has prescribed.  Preventive care, family planning, and ways to prevent chronic diseases.  Shots (vaccines)   HPV shots (up to age 26), if you've never had them before.  Hepatitis B shots (up to age 59), if you've never had them before.  COVID-19 shot: Get this shot when it's due.  Flu shot: Get a flu shot every year.  Tetanus shot: Get a tetanus shot every 10 years.  Pneumococcal, hepatitis A, and RSV shots: Ask your care team if you need these based on your risk.  Shingles shot (for age 50 and up)  General health tests  Diabetes screening:  Starting at age 35, Get screened for diabetes at least every 3 years.  If you are younger than age 35, ask your care team if you should be screened for diabetes.  Cholesterol test: At age 39, start having a cholesterol test every 5 years, or more often if advised.  Bone density scan (DEXA): At age 50, ask your care team if you should have this scan for osteoporosis (brittle bones).  Hepatitis C: Get tested at least once in your life.  STIs (sexually transmitted infections)  Before age 24: Ask your care team if you should be screened for STIs.  After age 24: Get screened for STIs if you're at risk. You are at risk for STIs (including HIV) if:  You are sexually active with more than one person.  You don't use condoms every time.  You or a partner was diagnosed with a sexually transmitted infection.  If you are at risk for HIV, ask about PrEP medicine to prevent HIV.  Get tested for HIV at least once in your life, whether  you are at risk for HIV or not.  Cancer screening tests  Cervical cancer screening: If you have a cervix, begin getting regular cervical cancer screening tests starting at age 21.  Breast cancer scan (mammogram): If you've ever had breasts, begin having regular mammograms starting at age 40. This is a scan to check for breast cancer.  Colon cancer screening: It is important to start screening for colon cancer at age 45.  Have a colonoscopy test every 10 years (or more often if you're at risk) Or, ask your provider about stool tests like a FIT test every year or Cologuard test every 3 years.  To learn more about your testing options, visit:   .  For help making a decision, visit:   https://bit.ly/gg42677.  Prostate cancer screening test: If you have a prostate, ask your care team if a prostate cancer screening test (PSA) at age 55 is right for you.  Lung cancer screening: If you are a current or former smoker ages 50 to 80, ask your care team if ongoing lung cancer screenings are right for you.  For informational purposes only. Not to replace the advice of your health care provider. Copyright   2023 [employer]. All rights reserved. Clinically reviewed by the [facility] Transitions Program. [employer] [id number] - REV 01/24.     Patient Education   Preventive Care Advice   This is general advice given by our system to help you stay healthy. However, your care team may have specific advice just for you. Please talk to your care team about your preventive care needs.  Nutrition  Eat 5 or more servings of fruits and vegetables each day.  Try wheat bread, brown rice and whole grain pasta (instead of white bread, rice, and pasta).  Get enough calcium and vitamin D. Check the label on foods and aim for 100% of the RDA (recommended daily allowance).  Lifestyle  Exercise at least 150 minutes each week  (30 minutes a day, 5 days a week).  Do muscle strengthening activities 2 days a week. These help control  your weight and prevent disease.  No smoking.  Wear sunscreen to prevent skin cancer.  Have a dental exam and cleaning every 6 months.  Yearly exams  See your health care team every year to talk about:  Any changes in your health.  Any medicines your care team has prescribed.  Preventive care, family planning, and ways to prevent chronic diseases.  Shots (vaccines)   HPV shots (up to age 26), if you've never had them before.  Hepatitis B shots (up to age 59), if you've never had them before.  COVID-19 shot: Get this shot when it's due.  Flu shot: Get a flu shot every year.  Tetanus shot: Get a tetanus shot every 10 years.  Pneumococcal, hepatitis A, and RSV shots: Ask your care team if you need these based on your risk.  Shingles shot (for age 50 and up)  General health tests  Diabetes screening:  Starting at age 35, Get screened for diabetes at least every 3 years.  If you are younger than age 35, ask your care team if you should be screened for diabetes.  Cholesterol test: At age 39, start having a cholesterol test every 5 years, or more often if advised.  Bone density scan (DEXA): At age 50, ask your care team if you should have this scan for osteoporosis (brittle bones).  Hepatitis C: Get tested at least once in your life.  STIs (sexually transmitted infections)  Before age 24: Ask your care team if you should be screened for STIs.  After age 24: Get screened for STIs if you're at risk. You are at risk for STIs (including HIV) if:  You are sexually active with more than one person.  You don't use condoms every time.  You or a partner was diagnosed with a sexually transmitted infection.  If you are at risk for HIV, ask about PrEP medicine to prevent HIV.  Get tested for HIV at least once in your life, whether you are at risk for HIV or not.  Cancer screening tests  Cervical cancer screening: If you have a cervix, begin getting regular cervical cancer screening tests starting at age 21.  Breast cancer scan  (mammogram): If you've ever had breasts, begin having regular mammograms starting at age 40. This is a scan to check for breast cancer.  Colon cancer screening: It is important to start screening for colon cancer at age 45.  Have a colonoscopy test every 10 years (or more often if you're at risk) Or, ask your provider about stool tests like a FIT test every year or Cologuard test every 3 years.  To learn more about your testing options, visit:   .  For help making a decision, visit:   https://bit.ly/sm30624.  Prostate cancer screening test: If you have a prostate, ask your care team if a prostate cancer screening test (PSA) at age 55 is right for you.  Lung cancer screening: If you are a current or former smoker ages 50 to 80, ask your care team if ongoing lung cancer screenings are right for you.  For informational purposes only. Not to replace the advice of your health care provider. Copyright   2023 [employer]. All rights reserved. Clinically reviewed by the [facility] Transitions Program. [employer] [id number] - REV 01/24.

## 2025-07-10 ENCOUNTER — PATIENT OUTREACH (OUTPATIENT)
Dept: CARE COORDINATION | Facility: CLINIC | Age: 70
End: 2025-07-10
Payer: COMMERCIAL

## 2025-07-13 ENCOUNTER — HEALTH MAINTENANCE LETTER (OUTPATIENT)
Age: 70
End: 2025-07-13

## 2025-07-14 DIAGNOSIS — N18.31 TYPE 2 DIABETES MELLITUS WITH STAGE 3A CHRONIC KIDNEY DISEASE, WITH LONG-TERM CURRENT USE OF INSULIN (H): ICD-10-CM

## 2025-07-14 DIAGNOSIS — Z79.4 TYPE 2 DIABETES MELLITUS WITH STAGE 3A CHRONIC KIDNEY DISEASE, WITH LONG-TERM CURRENT USE OF INSULIN (H): ICD-10-CM

## 2025-07-14 DIAGNOSIS — E11.22 TYPE 2 DIABETES MELLITUS WITH STAGE 3A CHRONIC KIDNEY DISEASE, WITH LONG-TERM CURRENT USE OF INSULIN (H): ICD-10-CM

## 2025-07-16 RX ORDER — EMPAGLIFLOZIN 25 MG/1
25 TABLET, FILM COATED ORAL DAILY
Qty: 90 TABLET | Refills: 2 | Status: SHIPPED | OUTPATIENT
Start: 2025-07-16

## 2025-07-16 NOTE — TELEPHONE ENCOUNTER
Last Written Prescription:  empagliflozin (JARDIANCE) 25 MG TABS tablet 90 tablet 3 10/31/2024     ----------------------  Last Visit Date: 5/6/25  Future Visit Date: 5/11/26  ----------------------      Refill decision:   [x] Medication refilled per  Medication Refill in Ambulatory Care  policy.      Request from pharmacy:  Requested Prescriptions   Pending Prescriptions Disp Refills    JARDIANCE 25 MG TABS tablet [Pharmacy Med Name: JARDIANCE 25MG TAB] 90 tablet 2     Sig: TAKE ONE TABLET BY MOUTH DAILY       Sodium Glucose Co-Transport Inhibitor Agents Passed - 7/16/2025  2:42 PM        Passed - Patient has documented A1c within the specified period of time.     If HgbA1C is 8 or greater, it needs to be on file within the past 3 months.  If less than 8, must be on file within the past 6 months.     Recent Labs   Lab Test 05/06/25  1456   A1C 7.8*             Passed - Medication is active on med list and the sig matches. RN to manually verify dose and sig if red X/fail.     If the protocol passes (green check), you do not need to verify med dose and sig.    A prescription matches if they are the same clinical intention.    For Example: once daily and every morning are the same.    The protocol can not identify upper and lower case letters as matching and will fail.     For Example: Take 1 tablet (50 mg) by mouth daily     TAKE 1 TABLET (50 MG) BY MOUTH DAILY    For all fails (red x), verify dose and sig.    If the refill does match what is on file, the RN can still proceed to approve the refill request.       If they do not match, route to the appropriate provider.             Passed - Recent (6 month) or future (90 days) visit with the authorizing provider's specialty (provided they have been seen in the past 9 months)     The patient must have completed an in-person or virtual visit within the past 6 months or has a future visit scheduled within the next 90 days with the authorizing provider s specialty.  Urgent  care and e-visits do not quality as an office visit for this protocol.          Passed - Medication indicated for associated diagnosis     Medication is associated with one or more of the following diagnoses:     Diabetic nephropathy, With Albuminuria - Type 2 diabetes mellitus     Disorder of cardiovascular system; Prophylaxis - Type 2 diabetes mellitus     Type 2 diabetes mellitus    Disorder of cardiovascular system; Prophylaxis - Heart failure   Chronic kidney disease, (At risk of progression) to reduce the risk of sustained   estimated GFR decline, end-stage kidney disease, cardiovascular death,   and hospitalization for heart failure     Heart failure, (NYHA class II to IV, reduced ejection fraction) to reduce risk of  cardiovascular death and hospitalization           Passed - Has GFR on file in past 12 months and most recent value is >30        Passed - Patient is age 18 or older        Passed - Patient is not pregnant        Passed - Patient has documented normal Potassium within the last 12 mos.     Recent Labs   Lab Test 02/17/25  1200   POTASSIUM 3.6             Passed - Patient has no positive pregnancy test within the past 12 mos.

## 2025-08-03 DIAGNOSIS — J30.9 ALLERGIC SINUSITIS: ICD-10-CM

## 2025-08-04 RX ORDER — FLUTICASONE PROPIONATE 50 MCG
1-2 SPRAY, SUSPENSION (ML) NASAL DAILY PRN
Qty: 48 ML | Refills: 0 | Status: SHIPPED | OUTPATIENT
Start: 2025-08-04

## 2025-08-16 ENCOUNTER — ANCILLARY PROCEDURE (OUTPATIENT)
Dept: MAMMOGRAPHY | Facility: CLINIC | Age: 70
End: 2025-08-16
Attending: PREVENTIVE MEDICINE
Payer: COMMERCIAL

## 2025-08-16 DIAGNOSIS — Z12.31 VISIT FOR SCREENING MAMMOGRAM: ICD-10-CM

## 2025-08-16 PROCEDURE — 77063 BREAST TOMOSYNTHESIS BI: CPT | Mod: TC | Performed by: RADIOLOGY

## 2025-08-16 PROCEDURE — 77067 SCR MAMMO BI INCL CAD: CPT | Mod: TC | Performed by: RADIOLOGY

## 2025-09-03 ENCOUNTER — TELEPHONE (OUTPATIENT)
Dept: FAMILY MEDICINE | Facility: CLINIC | Age: 70
End: 2025-09-03
Payer: COMMERCIAL

## 2025-09-03 DIAGNOSIS — Z79.4 TYPE 2 DIABETES MELLITUS WITH STAGE 3A CHRONIC KIDNEY DISEASE, WITH LONG-TERM CURRENT USE OF INSULIN (H): ICD-10-CM

## 2025-09-03 DIAGNOSIS — N18.31 TYPE 2 DIABETES MELLITUS WITH STAGE 3A CHRONIC KIDNEY DISEASE, WITH LONG-TERM CURRENT USE OF INSULIN (H): ICD-10-CM

## 2025-09-03 DIAGNOSIS — E11.22 TYPE 2 DIABETES MELLITUS WITH STAGE 3A CHRONIC KIDNEY DISEASE, WITH LONG-TERM CURRENT USE OF INSULIN (H): ICD-10-CM
